# Patient Record
Sex: FEMALE | Race: WHITE | NOT HISPANIC OR LATINO | Employment: OTHER | URBAN - METROPOLITAN AREA
[De-identification: names, ages, dates, MRNs, and addresses within clinical notes are randomized per-mention and may not be internally consistent; named-entity substitution may affect disease eponyms.]

---

## 2017-06-26 ENCOUNTER — HOSPITAL ENCOUNTER (EMERGENCY)
Facility: HOSPITAL | Age: 32
Discharge: PSYCHIATRIC OR SUBSTANCE USE FACILITY | End: 2017-06-27
Attending: SURGERY
Payer: MEDICAID

## 2017-06-26 VITALS
SYSTOLIC BLOOD PRESSURE: 128 MMHG | RESPIRATION RATE: 16 BRPM | DIASTOLIC BLOOD PRESSURE: 71 MMHG | WEIGHT: 220 LBS | BODY MASS INDEX: 38.97 KG/M2 | HEART RATE: 89 BPM | TEMPERATURE: 98 F

## 2017-06-26 DIAGNOSIS — Z00.8 ENCOUNTER FOR PSYCHOLOGICAL EVALUATION: ICD-10-CM

## 2017-06-26 DIAGNOSIS — R45.851 DEPRESSION WITH SUICIDAL IDEATION: Primary | ICD-10-CM

## 2017-06-26 DIAGNOSIS — F32.A DEPRESSION WITH SUICIDAL IDEATION: Primary | ICD-10-CM

## 2017-06-26 LAB
ALBUMIN SERPL BCP-MCNC: 4.2 G/DL
ALP SERPL-CCNC: 72 U/L
ALT SERPL W/O P-5'-P-CCNC: 19 U/L
AMPHET+METHAMPHET UR QL: NEGATIVE
ANION GAP SERPL CALC-SCNC: 8 MMOL/L
APAP SERPL-MCNC: <3 UG/ML
AST SERPL-CCNC: 15 U/L
B-HCG UR QL: NEGATIVE
BARBITURATES UR QL SCN>200 NG/ML: NEGATIVE
BASOPHILS # BLD AUTO: 0.03 K/UL
BASOPHILS NFR BLD: 0.3 %
BENZODIAZ UR QL SCN>200 NG/ML: NEGATIVE
BILIRUB SERPL-MCNC: 0.4 MG/DL
BILIRUB UR QL STRIP: NEGATIVE
BUN SERPL-MCNC: 10 MG/DL
BZE UR QL SCN: NEGATIVE
CALCIUM SERPL-MCNC: 9.4 MG/DL
CANNABINOIDS UR QL SCN: NEGATIVE
CHLORIDE SERPL-SCNC: 107 MMOL/L
CLARITY UR: CLEAR
CO2 SERPL-SCNC: 26 MMOL/L
COLOR UR: YELLOW
CREAT SERPL-MCNC: 0.9 MG/DL
CREAT UR-MCNC: 186.8 MG/DL
DIFFERENTIAL METHOD: NORMAL
EOSINOPHIL # BLD AUTO: 0.1 K/UL
EOSINOPHIL NFR BLD: 1.4 %
ERYTHROCYTE [DISTWIDTH] IN BLOOD BY AUTOMATED COUNT: 13 %
EST. GFR  (AFRICAN AMERICAN): >60 ML/MIN/1.73 M^2
EST. GFR  (NON AFRICAN AMERICAN): >60 ML/MIN/1.73 M^2
ETHANOL SERPL-MCNC: <10 MG/DL
GLUCOSE SERPL-MCNC: 88 MG/DL
GLUCOSE UR QL STRIP: NEGATIVE
HCT VFR BLD AUTO: 43.5 %
HGB BLD-MCNC: 14.6 G/DL
HGB UR QL STRIP: NEGATIVE
KETONES UR QL STRIP: NEGATIVE
LEUKOCYTE ESTERASE UR QL STRIP: NEGATIVE
LYMPHOCYTES # BLD AUTO: 2.9 K/UL
LYMPHOCYTES NFR BLD: 31.3 %
MCH RBC QN AUTO: 29.7 PG
MCHC RBC AUTO-ENTMCNC: 33.6 %
MCV RBC AUTO: 88 FL
METHADONE UR QL SCN>300 NG/ML: NEGATIVE
MONOCYTES # BLD AUTO: 0.6 K/UL
MONOCYTES NFR BLD: 6.9 %
NEUTROPHILS # BLD AUTO: 5.6 K/UL
NEUTROPHILS NFR BLD: 60.1 %
NITRITE UR QL STRIP: NEGATIVE
OPIATES UR QL SCN: NEGATIVE
PCP UR QL SCN>25 NG/ML: NEGATIVE
PH UR STRIP: 8 [PH] (ref 5–8)
PLATELET # BLD AUTO: 254 K/UL
PMV BLD AUTO: 12.4 FL
POTASSIUM SERPL-SCNC: 4.2 MMOL/L
PROT SERPL-MCNC: 7.7 G/DL
PROT UR QL STRIP: ABNORMAL
RBC # BLD AUTO: 4.92 M/UL
SALICYLATES SERPL-MCNC: <5 MG/DL
SODIUM SERPL-SCNC: 141 MMOL/L
SP GR UR STRIP: 1.02 (ref 1–1.03)
T4 FREE SERPL-MCNC: 0.96 NG/DL
TOXICOLOGY INFORMATION: NORMAL
TSH SERPL DL<=0.005 MIU/L-ACNC: 2.36 UIU/ML
URN SPEC COLLECT METH UR: ABNORMAL
UROBILINOGEN UR STRIP-ACNC: 1 EU/DL
WBC # BLD AUTO: 9.28 K/UL

## 2017-06-26 PROCEDURE — 99285 EMERGENCY DEPT VISIT HI MDM: CPT | Mod: 25

## 2017-06-26 PROCEDURE — 80307 DRUG TEST PRSMV CHEM ANLYZR: CPT

## 2017-06-26 PROCEDURE — 81025 URINE PREGNANCY TEST: CPT

## 2017-06-26 PROCEDURE — 84443 ASSAY THYROID STIM HORMONE: CPT

## 2017-06-26 PROCEDURE — 82746 ASSAY OF FOLIC ACID SERUM: CPT

## 2017-06-26 PROCEDURE — 80320 DRUG SCREEN QUANTALCOHOLS: CPT

## 2017-06-26 PROCEDURE — 81003 URINALYSIS AUTO W/O SCOPE: CPT

## 2017-06-26 PROCEDURE — 82306 VITAMIN D 25 HYDROXY: CPT

## 2017-06-26 PROCEDURE — 85025 COMPLETE CBC W/AUTO DIFF WBC: CPT

## 2017-06-26 PROCEDURE — 82607 VITAMIN B-12: CPT

## 2017-06-26 PROCEDURE — 80053 COMPREHEN METABOLIC PANEL: CPT

## 2017-06-26 PROCEDURE — 84439 ASSAY OF FREE THYROXINE: CPT

## 2017-06-26 PROCEDURE — 80329 ANALGESICS NON-OPIOID 1 OR 2: CPT

## 2017-06-26 PROCEDURE — 36415 COLL VENOUS BLD VENIPUNCTURE: CPT

## 2017-06-26 PROCEDURE — 84481 FREE ASSAY (FT-3): CPT

## 2017-06-26 RX ORDER — DIPHENHYDRAMINE HYDROCHLORIDE 50 MG/ML
50 INJECTION INTRAMUSCULAR; INTRAVENOUS EVERY 4 HOURS PRN
Status: DISCONTINUED | OUTPATIENT
Start: 2017-06-26 | End: 2017-06-27 | Stop reason: HOSPADM

## 2017-06-26 RX ORDER — HALOPERIDOL 5 MG/ML
5 INJECTION INTRAMUSCULAR EVERY 4 HOURS PRN
Status: DISCONTINUED | OUTPATIENT
Start: 2017-06-26 | End: 2017-06-27 | Stop reason: HOSPADM

## 2017-06-26 RX ORDER — LORAZEPAM 2 MG/ML
2 INJECTION INTRAMUSCULAR EVERY 4 HOURS PRN
Status: DISCONTINUED | OUTPATIENT
Start: 2017-06-26 | End: 2017-06-27 | Stop reason: HOSPADM

## 2017-06-27 LAB
25(OH)D3+25(OH)D2 SERPL-MCNC: 38 NG/ML
FOLATE SERPL-MCNC: 13.9 NG/ML
T3FREE SERPL-MCNC: 2.5 PG/ML
VIT B12 SERPL-MCNC: 1059 PG/ML

## 2017-06-27 PROCEDURE — 93005 ELECTROCARDIOGRAM TRACING: CPT

## 2017-06-27 PROCEDURE — 93010 ELECTROCARDIOGRAM REPORT: CPT | Mod: ,,, | Performed by: INTERNAL MEDICINE

## 2017-06-27 NOTE — ED NOTES
Call received from [place],  with [facility], states transfer will be delayed till around 0500, r/t no truck available in area.

## 2017-06-27 NOTE — ED NOTES
Hourly Rounding Complete. Pt resting quietly eyes closed respirations even and unlabored. Bed locked and low. Sitter at bedside for continuous visual monitoring. Will continue to monitor. Pt packet faxed out to psychiatric facilities for placement.

## 2017-06-27 NOTE — ED PROVIDER NOTES
[facility]                                        2017                   Chief Complaint  31 y.o. female with Psychiatric Evaluation    History of Present Illness  Maria Yancey presents to the emergency room with suicidal ideation and depression  Patient states she's have had a long history of depression, off medications this past week  Patient states that she has bipolar, however is not manic on ER evaluation this evening  Patient states that she had a recent psychiatric hospitalization in 2016 for SI  Patient states she no longer wants to live but expresses no clear suicidal plan on interview   Patient is not paranoid or psychotic, not hallucinating or delusional on ER interview today    The history is provided by the patient    Past Medical History   -- ADHD (attention deficit hyperactivity disorder)    -- Asthma    -- Bipolar 1 disorder    -- Cervical pain    -- Fibromyalgia    -- Spina bifida    -- Spinal stenosis of lumbar region    -- Syringomyelia    -- Syrinx of spinal cord    -- Trigger finger      Past Surgical History   -- CARPAL TUNNEL RELEASE     --  SECTION, CLASSIC     -- sciatica     -- spinal bifida     -- spinalsonois        No Known Allergies     Review of Systems and Physical Exam     Review of Systems  -- Constitution - no fever, denies fatigue, no weakness, no chills  -- Eyes - no tearing or redness, no visual disturbance  -- Ear, Nose - no tinnitus or earache, no nasal congestion or discharge  -- Mouth,Throat - no sore throat, no toothache, normal voice, normal swallowing  -- Respiratory - denies cough and congestion, no shortness of breath, no SILVEIRA  -- Cardiovascular - denies chest pain, no palpitations, denies claudication  -- Gastrointestinal - denies abdominal pain, nausea, vomiting, or diarrhea  -- Musculoskeletal - denies back pain, negative for myalgias and arthralgias   -- Neurological - no headache, denies weakness or seizure; no  LOC  -- Psychiatric - suicidal ideation, no psychosis or fractured thought noted     Vital Signs  -- Her blood pressure is 128/71 and her pulse is 89. Her respiration is 16.      Physical Exam  -- Nursing note and vitals reviewed  -- Constitutional: Appears well-developed and well-nourished  -- Head: Atraumatic. Normocephalic. No obvious abnormality  -- Eyes: Pupils are equal and reactive to light. Normal conjunctiva and lids  -- Cardiac: Normal rate, regular rhythm and normal heart sounds  -- Pulmonary: Normal respiratory effort, breath sounds clear to auscultation  -- Abdominal: Soft, no tenderness. Normal bowel sounds. Normal liver edge  -- Musculoskeletal: Normal range of motion, no effusions. Joints stable   -- Neurological: No focal deficits. Showed good interaction with staff    Emergency Room Course     Diagnosis  -- The encounter diagnosis was Depression with suicidal ideation.    Disposition and Plan  -- Disposition: PEC  -- Condition: stable  -- Pt will be placed in a psychiatric facility  -- The patient is a direct observation until placement  -- The patient has been made aware of his or her rights while under PEC in the ER  -- All questions have been answered; will follow ER protocols until placement    Lab work was performed in the ER, this patient is cleared for psychiatric placement     This note is dictated on Dragon Natural Speaking word recognition program.  There are word recognition mistakes that are occasionally missed on review.           Tobi Ruvalcaba MD  06/26/17 [id number]

## 2017-06-27 NOTE — ED NOTES
Spoke with Darcie from [facility] regarding a few questions about pt's medicaol hx. States she will contact MD about accepting pt and call me back.

## 2017-06-27 NOTE — ED NOTES
Received return call from aDrcie at [facility], pt accepted by Dr Bowers. Will call report and arrange transfer.

## 2017-06-27 NOTE — ED TRIAGE NOTES
Pt reports depression, off of psych meds x1 month, SI w/ plan of shoot self or jump in front of car.

## 2018-09-08 PROCEDURE — 80061 LIPID PANEL: CPT

## 2018-09-08 PROCEDURE — 83036 HEMOGLOBIN GLYCOSYLATED A1C: CPT

## 2018-09-09 ENCOUNTER — HOSPITAL ENCOUNTER (INPATIENT)
Facility: HOSPITAL | Age: 33
LOS: 6 days | Discharge: HOME | DRG: 885 | End: 2018-09-15
Attending: PSYCHIATRY & NEUROLOGY | Admitting: PSYCHIATRY & NEUROLOGY
Payer: MEDICAID

## 2018-09-09 DIAGNOSIS — R45.851 DEPRESSION WITH SUICIDAL IDEATION: Primary | ICD-10-CM

## 2018-09-09 DIAGNOSIS — F19.20 POLYSUBSTANCE DEPENDENCE: ICD-10-CM

## 2018-09-09 DIAGNOSIS — M79.7 FIBROMYALGIA: ICD-10-CM

## 2018-09-09 DIAGNOSIS — F32.A DEPRESSION WITH SUICIDAL IDEATION: Primary | ICD-10-CM

## 2018-09-09 DIAGNOSIS — F25.1 SCHIZOAFFECTIVE DISORDER, DEPRESSIVE TYPE: ICD-10-CM

## 2018-09-09 DIAGNOSIS — M25.572 LEFT ANKLE PAIN: ICD-10-CM

## 2018-09-09 DIAGNOSIS — F43.10 PTSD (POST-TRAUMATIC STRESS DISORDER): ICD-10-CM

## 2018-09-09 DIAGNOSIS — F41.9 ANXIETY: ICD-10-CM

## 2018-09-09 PROCEDURE — 11400000 HC PSYCH PRIVATE ROOM

## 2018-09-09 PROCEDURE — 25000003 PHARM REV CODE 250: Performed by: PSYCHIATRY & NEUROLOGY

## 2018-09-09 RX ORDER — IBUPROFEN 200 MG
1 TABLET ORAL DAILY PRN
Status: DISCONTINUED | OUTPATIENT
Start: 2018-09-09 | End: 2018-09-15 | Stop reason: HOSPADM

## 2018-09-09 RX ORDER — MAG HYDROX/ALUMINUM HYD/SIMETH 200-200-20
30 SUSPENSION, ORAL (FINAL DOSE FORM) ORAL EVERY 6 HOURS PRN
Status: DISCONTINUED | OUTPATIENT
Start: 2018-09-09 | End: 2018-09-15 | Stop reason: HOSPADM

## 2018-09-09 RX ORDER — HYDROXYZINE PAMOATE 50 MG/1
50 CAPSULE ORAL NIGHTLY PRN
Status: DISCONTINUED | OUTPATIENT
Start: 2018-09-09 | End: 2018-09-15 | Stop reason: HOSPADM

## 2018-09-09 RX ORDER — OLANZAPINE 10 MG/1
10 TABLET ORAL EVERY 8 HOURS PRN
Status: DISCONTINUED | OUTPATIENT
Start: 2018-09-09 | End: 2018-09-15 | Stop reason: HOSPADM

## 2018-09-09 RX ORDER — FOLIC ACID 1 MG/1
1 TABLET ORAL DAILY
Status: DISCONTINUED | OUTPATIENT
Start: 2018-09-09 | End: 2018-09-15 | Stop reason: HOSPADM

## 2018-09-09 RX ORDER — ACETAMINOPHEN 325 MG/1
650 TABLET ORAL EVERY 6 HOURS PRN
Status: DISCONTINUED | OUTPATIENT
Start: 2018-09-09 | End: 2018-09-15 | Stop reason: HOSPADM

## 2018-09-09 RX ORDER — LOPERAMIDE HYDROCHLORIDE 2 MG/1
2 CAPSULE ORAL
Status: DISCONTINUED | OUTPATIENT
Start: 2018-09-09 | End: 2018-09-15 | Stop reason: HOSPADM

## 2018-09-09 RX ORDER — DOCUSATE SODIUM 100 MG/1
100 CAPSULE, LIQUID FILLED ORAL DAILY PRN
Status: DISCONTINUED | OUTPATIENT
Start: 2018-09-09 | End: 2018-09-15 | Stop reason: HOSPADM

## 2018-09-09 RX ORDER — OLANZAPINE 10 MG/2ML
10 INJECTION, POWDER, FOR SOLUTION INTRAMUSCULAR EVERY 8 HOURS PRN
Status: DISCONTINUED | OUTPATIENT
Start: 2018-09-09 | End: 2018-09-15 | Stop reason: HOSPADM

## 2018-09-09 RX ADMIN — FOLIC ACID 1 MG: 1 TABLET ORAL at 02:09

## 2018-09-09 RX ADMIN — HYDROXYZINE PAMOATE 50 MG: 50 CAPSULE ORAL at 08:09

## 2018-09-09 RX ADMIN — THERA TABS 1 TABLET: TAB at 02:09
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
## 2018-09-09 NOTE — PLAN OF CARE
"Problem: Overarching Goals (Adult)  Goal: Develops/Participates in Therapeutic [place] to Support Successful Transition    Intervention: Foster Therapeutic [place]  Group Note:    Behavior:  Patient attended group therapy, appeared depressed,  was tearful at times, and observed slowly rocking back and forth intermittently in her seat.            Intervention:  The SW implemented a motivational interviewing based group therapy session with materials and handouts prescribed in the Group Treatment for Substance Abuse, second edition, A Djcupw-xk-Yttizl Therapy Manual. The materials used were from Pre contemplation/Contemplation/Preparation Session Eleven discussing gratitude.              Response:  Patient verbalized she is grateful to "wake up every day". States she is particularly grateful for her "mom" and will "buy her a flower or cup of coffee at the store to show her appreciation". Patient disclosed she was previously "homeless for 2 years" and is grateful for the roof over her head. She also verbalized she enjoys "the beach" or "going for a walk" and reflecting on the things she is grateful for.           Plan:  Encourage ongoing participation in therapeutic activities.                         "

## 2018-09-10 PROBLEM — F43.10 PTSD (POST-TRAUMATIC STRESS DISORDER): Status: ACTIVE | Noted: 2018-09-10

## 2018-09-10 PROBLEM — F25.1 SCHIZOAFFECTIVE DISORDER, DEPRESSIVE TYPE: Status: ACTIVE | Noted: 2018-09-10

## 2018-09-10 PROBLEM — F19.20 POLYSUBSTANCE DEPENDENCE: Status: ACTIVE | Noted: 2018-09-10

## 2018-09-10 LAB
CHOLEST SERPL-MCNC: 169 MG/DL
CHOLEST/HDLC SERPL: 3.2 {RATIO}
ESTIMATED AVG GLUCOSE: 91 MG/DL
HBA1C MFR BLD HPLC: 4.8 %
HDLC SERPL-MCNC: 53 MG/DL
HDLC SERPL: 31.4 %
LDLC SERPL CALC-MCNC: 103.8 MG/DL
NONHDLC SERPL-MCNC: 116 MG/DL
TRIGL SERPL-MCNC: 61 MG/DL

## 2018-09-10 PROCEDURE — S4991 NICOTINE PATCH NONLEGEND: HCPCS | Performed by: PSYCHIATRY & NEUROLOGY

## 2018-09-10 PROCEDURE — 25000003 PHARM REV CODE 250: Performed by: PSYCHIATRY & NEUROLOGY

## 2018-09-10 PROCEDURE — 99233 SBSQ HOSP IP/OBS HIGH 50: CPT | Mod: AF,HB,, | Performed by: PSYCHIATRY & NEUROLOGY

## 2018-09-10 PROCEDURE — 11400000 HC PSYCH PRIVATE ROOM

## 2018-09-10 PROCEDURE — 99231 SBSQ HOSP IP/OBS SF/LOW 25: CPT | Mod: ,,, | Performed by: NURSE PRACTITIONER

## 2018-09-10 PROCEDURE — 90833 PSYTX W PT W E/M 30 MIN: CPT | Mod: AF,HB,, | Performed by: PSYCHIATRY & NEUROLOGY

## 2018-09-10 PROCEDURE — 36415 COLL VENOUS BLD VENIPUNCTURE: CPT

## 2018-09-10 RX ORDER — DIVALPROEX SODIUM 500 MG/1
500 TABLET, FILM COATED, EXTENDED RELEASE ORAL NIGHTLY
Status: DISCONTINUED | OUTPATIENT
Start: 2018-09-10 | End: 2018-09-14

## 2018-09-10 RX ORDER — VENLAFAXINE HYDROCHLORIDE 37.5 MG/1
37.5 CAPSULE, EXTENDED RELEASE ORAL DAILY
Status: DISCONTINUED | OUTPATIENT
Start: 2018-09-10 | End: 2018-09-11

## 2018-09-10 RX ORDER — QUETIAPINE FUMARATE 25 MG/1
50 TABLET, FILM COATED ORAL NIGHTLY
Status: DISCONTINUED | OUTPATIENT
Start: 2018-09-10 | End: 2018-09-11

## 2018-09-10 RX ADMIN — NICOTINE 1 PATCH: 14 PATCH, EXTENDED RELEASE TRANSDERMAL at 10:09

## 2018-09-10 RX ADMIN — VENLAFAXINE HYDROCHLORIDE 37.5 MG: 37.5 CAPSULE, EXTENDED RELEASE ORAL at 10:09

## 2018-09-10 RX ADMIN — THERA TABS 1 TABLET: TAB at 08:09

## 2018-09-10 RX ADMIN — DIVALPROEX SODIUM 500 MG: 500 TABLET, EXTENDED RELEASE ORAL at 08:09

## 2018-09-10 RX ADMIN — QUETIAPINE FUMARATE 50 MG: 25 TABLET ORAL at 08:09

## 2018-09-10 RX ADMIN — FOLIC ACID 1 MG: 1 TABLET ORAL at 08:09

## 2018-09-10 NOTE — PLAN OF CARE
Problem: Patient Care Overview (Adult)  Goal: Plan of Care Review  Outcome: Ongoing (interventions implemented as appropriate)  Pt.  Slept  8.5 Hours so far  this shift without problems noted. q 15 min safety checks done this shift. Safety and comfort maintained. Cont. Plan.

## 2018-09-10 NOTE — PROGRESS NOTES
"   09/10/18 1040   [facility] Group Therapy   Group Name Leisure Skills Training   Specific Interventions Cognitive Stimulation Training   Participation Level None   Patient did not attend group due to "I'm not feeling good." Patient remained in bed in her assigned room. Staff will continue to monitor and document progress made toward POC.  "

## 2018-09-10 NOTE — PLAN OF CARE
Problem: Patient Care Overview (Adult)  Goal: Plan of Care Review  Outcome: Ongoing (interventions implemented as appropriate)  Pt is withdrawn and depressed.  Staying in room.  Pt compliant with meds.  Cooperative with instructions.  Encouraged pt to come out of her room more and attend groups as she begins to feel better and not as paranoid.  Pt verbalized understanding, will continue to monitor.

## 2018-09-10 NOTE — PLAN OF CARE
Problem: Patient Care Overview (Adult)  Goal: Individualization & Mutuality  Outcome: Ongoing (interventions implemented as appropriate)  Paranoid/delusional thinking will improve from taking meds as ordered.  Depression will also improve through med compliance and attending groups to learn coping skills.

## 2018-09-10 NOTE — PLAN OF CARE
Problem: Patient Care Overview (Adult)  Goal: Plan of Care Review  Outcome: Ongoing (interventions implemented as appropriate)  Up and about the unit.  Showered this evening.  Talking appropriately with peers.  Received hydroxyzine pamoate 50 mg po for c/o insomnia this evening..  Pt is paranoid and thinks people are trying to kill her and her kids.  Also thinks the radio is sending her messages.  All precautions maintained.
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
## 2018-09-10 NOTE — HPI
32 yo female patient presented to ER with c/o suicidal thoughts. Admitted to [facility] and medicine consulted for H/P. VSS/afebrile. Reviewed labs

## 2018-09-10 NOTE — PLAN OF CARE
Problem: Overarching Goals (Adult)  Goal: Optimized Coping Skills in Response to Life Stressors    Intervention: Promote Effective Coping Strategies  Group Note    Behavior:  Patient attended Psychotherapy Group and participated. Patient was calm and cooperative.       Intervention:  Temptation and Confidence - Counselor utilized material from Group Treatment for Substance Abuse, second edition, A [facility] Therapy Manual. The materials used were from Pre contemplation/Contemplation/Preparation Session 14 (pp. 146-152). Patients identified the situations where they are most tempted (to uses substances or resort to unhealthy behaviors) and then assessed their confidence to refrain from using in similar situations.         Response:  Patient states her most tempting times are when she is physically tired, angry or experiencing pain. Patient states she is somewhat confident that she can manage temptations using here higher power. Encouraged patient to practice regular self reflection and relying on a good support system.         Plan:
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
## 2018-09-10 NOTE — CONSULTS
[facility] Hospital Medicine  Consult Note    Patient Name: Maria Yancey  MRN: [id number]  Admission Date: 2018  Hospital Length of Stay: 1 days  Attending Physician: Julio Almazan MD   Primary Care Provider: Primary Doctor No           Patient information was obtained from patient and ER records.     Inpatient consult to [facility] for History and Physical  Consult performed by: Belgica Kumar NP  Consult ordered by: Salomón Nettles MD        Subjective:     Principal Problem: Schizoaffective disorder, depressive type    Chief Complaint: No chief complaint on file.       HPI: 34 yo female patient presented to ER with c/o suicidal thoughts. Admitted to U and medicine consulted for H/P. VSS/afebrile. Reviewed labs    Past Medical History:   Diagnosis Date    Addiction to drug     ADHD (attention deficit hyperactivity disorder)     Anxiety     Asthma     Bipolar 1 disorder     Cervical pain     Depression     Fibromyalgia     Hallucination     History of psychiatric hospitalization     Hx of psychiatric care     Magalis     Psychiatric exam requested by authority     Psychiatric problem     Schizoaffective disorder     Spina bifida     Spinal stenosis of lumbar region     Substance abuse     Suicide attempt     Syringomyelia     Syrinx of spinal cord     Therapy     Trigger finger        Past Surgical History:   Procedure Laterality Date    CARPAL TUNNEL RELEASE       SECTION, CLASSIC      siactica      spinal bifida      spinalsonois         Review of patient's allergies indicates:  No Known Allergies    Current Facility-Administered Medications on File Prior to Encounter   Medication    [DISCONTINUED] diphenhydrAMINE injection 50 mg    [DISCONTINUED] haloperidol lactate injection 5 mg    [DISCONTINUED] lorazepam injection 2 mg    [COMPLETED] nicotine 21 mg/24 hr 1 patch     Current Outpatient Medications on File Prior to Encounter    Medication Sig    [DISCONTINUED] lisdexamfetamine (VYVANSE) 30 MG capsule Take 1 capsule (30 mg total) by mouth every morning.     Family History     Problem Relation (Age of Onset)    Arthritis Father    Diabetes Mother, Father    Hyperlipidemia Mother, Father    Hypertension Mother, Father        Tobacco Use    Smoking status: Current Every Day Smoker     Packs/day: 0.50     Years: 15.00     Pack years: 7.50     Types: Cigarettes    Smokeless tobacco: Never Used    Tobacco comment: smoking cessation handout provided and reviewed with pt   Substance and Sexual Activity    Alcohol use: Yes     Alcohol/week: 0.0 oz     Comment: socially 1 glass of wine    Drug use: Yes     Types: Amphetamines     Comment: states last use was 3 days ago    Sexual activity: Not Currently     Partners: Male     Review of Systems   Constitutional: Negative for chills, fatigue, fever and unexpected weight change.   HENT: Negative for congestion, ear pain, sore throat and trouble swallowing.    Eyes: Negative for pain and visual disturbance.   Respiratory: Negative for cough, chest tightness and shortness of breath.    Cardiovascular: Negative for chest pain, palpitations and leg swelling.   Gastrointestinal: Negative for abdominal distention, abdominal pain, constipation, diarrhea and vomiting.   Genitourinary: Negative for difficulty urinating, dysuria, flank pain, frequency and hematuria.   Musculoskeletal: Negative for back pain, gait problem, joint swelling, neck pain and neck stiffness.   Skin: Negative for rash and wound.   Neurological: Negative for dizziness, seizures, speech difficulty, light-headedness and headaches.     Objective:     Vital Signs (Most Recent):  Temp: 96.3 °F (35.7 °C) (09/10/18 0900)  Pulse: 74 (09/10/18 0900)  Resp: 18 (09/10/18 0900)  BP: 113/65 (09/10/18 0900) Vital Signs (24h Range):  Temp:  [96.3 °F (35.7 °C)-98.8 °F (37.1 °C)] 96.3 °F (35.7 °C)  Pulse:  [74-88] 74  Resp:  [18] 18  BP:  (109-119)/(55-78) 113/65     Weight: 85.7 kg (189 lb)  Body mass index is 33.48 kg/m².    Physical Exam   Constitutional: She is oriented to person, place, and time. She appears well-developed and well-nourished.   HENT:   Head: Normocephalic and atraumatic.   Neck: No thyromegaly present.   Cardiovascular: Normal rate, regular rhythm, normal heart sounds and intact distal pulses.   No murmur heard.  Pulmonary/Chest: Effort normal and breath sounds normal. No respiratory distress. She has no wheezes. She has no rales.   Abdominal: Soft. Bowel sounds are normal. She exhibits no distension and no mass. There is no tenderness.   Musculoskeletal: Normal range of motion. She exhibits no edema.   Lymphadenopathy:     She has no cervical adenopathy.   Neurological: She is alert and oriented to person, place, and time.     Neuro: Cranial nerves:  CN II Visual fields full to confrontation.   CN III, IV, VI Pupils are equal, round, and reactive to light.  CN III: no palsy  Nystagmus: none   Diplopia: none  Ophthalmoparesis: none  CN V Facial sensation intact.   CN VII Facial expression full, symmetric.   CN VIII normal.   CN IX normal.   CN X normal.   CN XI normal.   CN XII normal.         Skin: Skin is warm and dry. No erythema.   Vitals reviewed.      Significant Labs:   UPT negative  U/A  Results for orders placed or performed during the hospital encounter of 06/26/17   Urinalysis   Result Value Ref Range    Specimen UA Urine, Clean Catch     Color, UA Yellow Yellow, Straw, Jess    Appearance, UA Clear Clear    pH, UA 8.0 5.0 - 8.0    Specific Gravity, UA 1.020 1.005 - 1.030    Protein, UA Trace (A) Negative    Glucose, UA Negative Negative    Ketones, UA Negative Negative    Bilirubin (UA) Negative Negative    Occult Blood UA Negative Negative    Nitrite, UA Negative Negative    Urobilinogen, UA 1.0 <2.0 EU/dL    Leukocytes, UA Negative Negative     UDS  Results for orders placed or performed during the hospital  encounter of 09/08/18   Drug screen panel, emergency   Result Value Ref Range    Benzodiazepines Negative     Methadone metabolites Negative     Cocaine (Metab.) Negative     Opiate Scrn, Ur Negative     Barbiturate Screen, Ur Negative     Amphetamine Screen, Ur Presumptive Positive     THC Negative     Phencyclidine Negative     Creatinine, Random Ur 315.4 15.0 - 325.0 mg/dL    Toxicology Information SEE COMMENT      CBC  Results for orders placed or performed during the hospital encounter of 09/08/18   CBC auto differential   Result Value Ref Range    WBC 8.86 3.90 - 12.70 K/uL    RBC 4.71 4.00 - 5.40 M/uL    Hemoglobin 13.6 12.0 - 16.0 g/dL    Hematocrit 40.2 37.0 - 48.5 %    MCV 85 82 - 98 fL    MCH 28.9 27.0 - 31.0 pg    MCHC 33.8 32.0 - 36.0 g/dL    RDW 13.0 11.5 - 14.5 %    Platelets 330 150 - 350 K/uL    MPV 12.3 9.2 - 12.9 fL    Gran # (ANC) 5.5 1.8 - 7.7 K/uL    Lymph # 2.5 1.0 - 4.8 K/uL    Mono # 0.8 0.3 - 1.0 K/uL    Eos # 0.0 0.0 - 0.5 K/uL    Baso # 0.03 0.00 - 0.20 K/uL    Gran% 61.6 38.0 - 73.0 %    Lymph% 28.7 18.0 - 48.0 %    Mono% 8.9 4.0 - 15.0 %    Eosinophil% 0.5 0.0 - 8.0 %    Basophil% 0.3 0.0 - 1.9 %    Differential Method Automated      CMP  Results for orders placed or performed during the hospital encounter of 09/08/18   Comprehensive metabolic panel   Result Value Ref Range    Sodium 142 136 - 145 mmol/L    Potassium 3.7 3.5 - 5.1 mmol/L    Chloride 110 95 - 110 mmol/L    CO2 20 (L) 23 - 29 mmol/L    Glucose 120 (H) 70 - 110 mg/dL    BUN, Bld 13 6 - 20 mg/dL    Creatinine 0.9 0.5 - 1.4 mg/dL    Calcium 9.9 8.7 - 10.5 mg/dL    Total Protein 8.2 6.0 - 8.4 g/dL    Albumin 4.5 3.5 - 5.2 g/dL    Total Bilirubin 1.0 0.1 - 1.0 mg/dL    Alkaline Phosphatase 85 55 - 135 U/L    AST 14 10 - 40 U/L    ALT 14 10 - 44 U/L    Anion Gap 12 8 - 16 mmol/L    eGFR if African American >60 >60 mL/min/1.73 m^2    eGFR if non African American >60 >60 mL/min/1.73 m^2     TSH  Results for orders placed or  performed during the hospital encounter of 09/08/18   TSH   Result Value Ref Range    TSH 1.085 0.400 - 4.000 uIU/mL     ETOH  Results for orders placed or performed during the hospital encounter of 09/08/18   Ethanol   Result Value Ref Range    Alcohol, Medical, Serum <10 <10 mg/dL     Salicylate  Results for orders placed or performed during the hospital encounter of 09/08/18   Salicylate level   Result Value Ref Range    Salicylate Lvl <5.0 (L) 15.0 - 30.0 mg/dL     Acetaminophen  Results for orders placed or performed during the hospital encounter of 09/08/18   Acetaminophen level   Result Value Ref Range    Acetaminophen (Tylenol), Serum <3.0 (L) 10.0 - 20.0 ug/mL             Assessment/Plan:     * Schizoaffective disorder, depressive type              Polysubstance dependence              PTSD (post-traumatic stress disorder)              Depression with suicidal ideation    Further orders per psych          Anxiety              Fibromyalgia                VTE Risk Mitigation (From admission, onward)    None              Thank you for your consult. I will sign off. Please contact us if you have any additional questions.    Belgica Kumar NP  Department of Hospital Medicine   [facility]

## 2018-09-10 NOTE — SUBJECTIVE & OBJECTIVE
Past Medical History:   Diagnosis Date    Addiction to drug     ADHD (attention deficit hyperactivity disorder)     Anxiety     Asthma     Bipolar 1 disorder     Cervical pain     Depression     Fibromyalgia     Hallucination     History of psychiatric hospitalization     Hx of psychiatric care     Magalis     Psychiatric exam requested by authority     Psychiatric problem     Schizoaffective disorder     Spina bifida     Spinal stenosis of lumbar region     Substance abuse     Suicide attempt     Syringomyelia     Syrinx of spinal cord     Therapy     Trigger finger        Past Surgical History:   Procedure Laterality Date    CARPAL TUNNEL RELEASE       SECTION, CLASSIC      siactica      spinal bifida      spinalsonois         Review of patient's allergies indicates:  No Known Allergies    Current Facility-Administered Medications on File Prior to Encounter   Medication    [DISCONTINUED] diphenhydrAMINE injection 50 mg    [DISCONTINUED] haloperidol lactate injection 5 mg    [DISCONTINUED] lorazepam injection 2 mg    [COMPLETED] nicotine 21 mg/24 hr 1 patch     Current Outpatient Medications on File Prior to Encounter   Medication Sig    [DISCONTINUED] lisdexamfetamine (VYVANSE) 30 MG capsule Take 1 capsule (30 mg total) by mouth every morning.     Family History     Problem Relation (Age of Onset)    Arthritis Father    Diabetes Mother, Father    Hyperlipidemia Mother, Father    Hypertension Mother, Father        Tobacco Use    Smoking status: Current Every Day Smoker     Packs/day: 0.50     Years: 15.00     Pack years: 7.50     Types: Cigarettes    Smokeless tobacco: Never Used    Tobacco comment: smoking cessation handout provided and reviewed with pt   Substance and Sexual Activity    Alcohol use: Yes     Alcohol/week: 0.0 oz     Comment: socially 1 glass of wine    Drug use: Yes     Types: Amphetamines     Comment: states last use was 3 days ago    Sexual activity:  Not Currently     Partners: Male     Review of Systems   Constitutional: Negative for chills, fatigue, fever and unexpected weight change.   HENT: Negative for congestion, ear pain, sore throat and trouble swallowing.    Eyes: Negative for pain and visual disturbance.   Respiratory: Negative for cough, chest tightness and shortness of breath.    Cardiovascular: Negative for chest pain, palpitations and leg swelling.   Gastrointestinal: Negative for abdominal distention, abdominal pain, constipation, diarrhea and vomiting.   Genitourinary: Negative for difficulty urinating, dysuria, flank pain, frequency and hematuria.   Musculoskeletal: Negative for back pain, gait problem, joint swelling, neck pain and neck stiffness.   Skin: Negative for rash and wound.   Neurological: Negative for dizziness, seizures, speech difficulty, light-headedness and headaches.     Objective:     Vital Signs (Most Recent):  Temp: 96.3 °F (35.7 °C) (09/10/18 0900)  Pulse: 74 (09/10/18 0900)  Resp: 18 (09/10/18 0900)  BP: 113/65 (09/10/18 0900) Vital Signs (24h Range):  Temp:  [96.3 °F (35.7 °C)-98.8 °F (37.1 °C)] 96.3 °F (35.7 °C)  Pulse:  [74-88] 74  Resp:  [18] 18  BP: (109-119)/(55-78) 113/65     Weight: 85.7 kg (189 lb)  Body mass index is 33.48 kg/m².    Physical Exam   Constitutional: She is oriented to person, place, and time. She appears well-developed and well-nourished.   HENT:   Head: Normocephalic and atraumatic.   Neck: No thyromegaly present.   Cardiovascular: Normal rate, regular rhythm, normal heart sounds and intact distal pulses.   No murmur heard.  Pulmonary/Chest: Effort normal and breath sounds normal. No respiratory distress. She has no wheezes. She has no rales.   Abdominal: Soft. Bowel sounds are normal. She exhibits no distension and no mass. There is no tenderness.   Musculoskeletal: Normal range of motion. She exhibits no edema.   Lymphadenopathy:     She has no cervical adenopathy.   Neurological: She is alert  and oriented to person, place, and time.     Neuro: Cranial nerves:  CN II Visual fields full to confrontation.   CN III, IV, VI Pupils are equal, round, and reactive to light.  CN III: no palsy  Nystagmus: none   Diplopia: none  Ophthalmoparesis: none  CN V Facial sensation intact.   CN VII Facial expression full, symmetric.   CN VIII normal.   CN IX normal.   CN X normal.   CN XI normal.   CN XII normal.         Skin: Skin is warm and dry. No erythema.   Vitals reviewed.      Significant Labs:   UPT negative  U/A  Results for orders placed or performed during the hospital encounter of 06/26/17   Urinalysis   Result Value Ref Range    Specimen UA Urine, Clean Catch     Color, UA Yellow Yellow, Straw, Jess    Appearance, UA Clear Clear    pH, UA 8.0 5.0 - 8.0    Specific Gravity, UA 1.020 1.005 - 1.030    Protein, UA Trace (A) Negative    Glucose, UA Negative Negative    Ketones, UA Negative Negative    Bilirubin (UA) Negative Negative    Occult Blood UA Negative Negative    Nitrite, UA Negative Negative    Urobilinogen, UA 1.0 <2.0 EU/dL    Leukocytes, UA Negative Negative     UDS  Results for orders placed or performed during the hospital encounter of 09/08/18   Drug screen panel, emergency   Result Value Ref Range    Benzodiazepines Negative     Methadone metabolites Negative     Cocaine (Metab.) Negative     Opiate Scrn, Ur Negative     Barbiturate Screen, Ur Negative     Amphetamine Screen, Ur Presumptive Positive     THC Negative     Phencyclidine Negative     Creatinine, Random Ur 315.4 15.0 - 325.0 mg/dL    Toxicology Information SEE COMMENT      CBC  Results for orders placed or performed during the hospital encounter of 09/08/18   CBC auto differential   Result Value Ref Range    WBC 8.86 3.90 - 12.70 K/uL    RBC 4.71 4.00 - 5.40 M/uL    Hemoglobin 13.6 12.0 - 16.0 g/dL    Hematocrit 40.2 37.0 - 48.5 %    MCV 85 82 - 98 fL    MCH 28.9 27.0 - 31.0 pg    MCHC 33.8 32.0 - 36.0 g/dL    RDW 13.0 11.5 - 14.5 %     Platelets 330 150 - 350 K/uL    MPV 12.3 9.2 - 12.9 fL    Gran # (ANC) 5.5 1.8 - 7.7 K/uL    Lymph # 2.5 1.0 - 4.8 K/uL    Mono # 0.8 0.3 - 1.0 K/uL    Eos # 0.0 0.0 - 0.5 K/uL    Baso # 0.03 0.00 - 0.20 K/uL    Gran% 61.6 38.0 - 73.0 %    Lymph% 28.7 18.0 - 48.0 %    Mono% 8.9 4.0 - 15.0 %    Eosinophil% 0.5 0.0 - 8.0 %    Basophil% 0.3 0.0 - 1.9 %    Differential Method Automated      CMP  Results for orders placed or performed during the hospital encounter of 09/08/18   Comprehensive metabolic panel   Result Value Ref Range    Sodium 142 136 - 145 mmol/L    Potassium 3.7 3.5 - 5.1 mmol/L    Chloride 110 95 - 110 mmol/L    CO2 20 (L) 23 - 29 mmol/L    Glucose 120 (H) 70 - 110 mg/dL    BUN, Bld 13 6 - 20 mg/dL    Creatinine 0.9 0.5 - 1.4 mg/dL    Calcium 9.9 8.7 - 10.5 mg/dL    Total Protein 8.2 6.0 - 8.4 g/dL    Albumin 4.5 3.5 - 5.2 g/dL    Total Bilirubin 1.0 0.1 - 1.0 mg/dL    Alkaline Phosphatase 85 55 - 135 U/L    AST 14 10 - 40 U/L    ALT 14 10 - 44 U/L    Anion Gap 12 8 - 16 mmol/L    eGFR if African American >60 >60 mL/min/1.73 m^2    eGFR if non African American >60 >60 mL/min/1.73 m^2     TSH  Results for orders placed or performed during the hospital encounter of 09/08/18   TSH   Result Value Ref Range    TSH 1.085 0.400 - 4.000 uIU/mL     ETOH  Results for orders placed or performed during the hospital encounter of 09/08/18   Ethanol   Result Value Ref Range    Alcohol, Medical, Serum <10 <10 mg/dL     Salicylate  Results for orders placed or performed during the hospital encounter of 09/08/18   Salicylate level   Result Value Ref Range    Salicylate Lvl <5.0 (L) 15.0 - 30.0 mg/dL     Acetaminophen  Results for orders placed or performed during the hospital encounter of 09/08/18   Acetaminophen level   Result Value Ref Range    Acetaminophen (Tylenol), Serum <3.0 (L) 10.0 - 20.0 ug/mL
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
## 2018-09-11 PROCEDURE — 25000003 PHARM REV CODE 250: Performed by: PSYCHIATRY & NEUROLOGY

## 2018-09-11 PROCEDURE — 90833 PSYTX W PT W E/M 30 MIN: CPT | Mod: AF,HB,, | Performed by: PSYCHIATRY & NEUROLOGY

## 2018-09-11 PROCEDURE — 11400000 HC PSYCH PRIVATE ROOM

## 2018-09-11 PROCEDURE — 99233 SBSQ HOSP IP/OBS HIGH 50: CPT | Mod: AF,HB,, | Performed by: PSYCHIATRY & NEUROLOGY

## 2018-09-11 RX ORDER — VENLAFAXINE HYDROCHLORIDE 75 MG/1
75 CAPSULE, EXTENDED RELEASE ORAL DAILY
Status: DISCONTINUED | OUTPATIENT
Start: 2018-09-12 | End: 2018-09-13

## 2018-09-11 RX ORDER — QUETIAPINE FUMARATE 100 MG/1
100 TABLET, FILM COATED ORAL NIGHTLY
Status: DISCONTINUED | OUTPATIENT
Start: 2018-09-11 | End: 2018-09-12

## 2018-09-11 RX ORDER — BUPROPION HYDROCHLORIDE 150 MG/1
150 TABLET ORAL DAILY
Status: DISCONTINUED | OUTPATIENT
Start: 2018-09-11 | End: 2018-09-15 | Stop reason: HOSPADM

## 2018-09-11 RX ADMIN — THERA TABS 1 TABLET: TAB at 08:09

## 2018-09-11 RX ADMIN — FOLIC ACID 1 MG: 1 TABLET ORAL at 08:09

## 2018-09-11 RX ADMIN — BUPROPION HYDROCHLORIDE 150 MG: 150 TABLET, EXTENDED RELEASE ORAL at 12:09

## 2018-09-11 RX ADMIN — DIVALPROEX SODIUM 500 MG: 500 TABLET, EXTENDED RELEASE ORAL at 08:09

## 2018-09-11 RX ADMIN — QUETIAPINE FUMARATE 100 MG: 100 TABLET ORAL at 08:09

## 2018-09-11 RX ADMIN — VENLAFAXINE HYDROCHLORIDE 37.5 MG: 37.5 CAPSULE, EXTENDED RELEASE ORAL at 08:09

## 2018-09-11 NOTE — PLAN OF CARE
Problem: Overarching Goals (Adult)  Goal: Optimized Coping Skills in Response to Life Stressors    Intervention: Promote Effective Coping Strategies  Group Note    Behavior:  Patient attended Psychotherapy Group and participated. Patient presents with constricted affect, but appropriate mood.     Intervention:  Managing Expectations-- discussed what expectations are, who they come fromand why and how those expectations affect us. Shared with group suggestions to manage expectations. Participants shred expectations they have of themselves and others and how they can better manage them.         Response:  Patient states her expectations of herself are to stay sober, find a better job and make new friends. Patient states her family wants her to stay sober, pay her own bills. Patient states her 21yo and 12yo sons were sent to live with their father. Patient states they were putting her down. Patient states since she has cut out unsafe friends, she can only stay home and stare at her four walls. Discussed expanding network of support and moving into new sober circles.       Plan:   Will continue to encourage patient participation in group. Will meet 1:1 with patient later.   Patient signed release of information for her aunt and requested assistance with disability. Will send a referral for [facility].

## 2018-09-11 NOTE — PLAN OF CARE
Problem: Patient Care Overview (Adult)  Goal: Plan of Care Review  Outcome: Ongoing (interventions implemented as appropriate)  Pt is calm and cooperative.  Somewhat withdrawn at times.  Pt reports some depression but states her mood is better since admission.  Encouraged pt to continue taking meds to improve depression and to attend groups to learn coping skills for dealing with depression.  Pt verbalized understanding, will continue to monitor.

## 2018-09-11 NOTE — PROGRESS NOTES
"   09/11/18 1155   Assessment   Patient's Identification of the Problem Patient states I have a lot of mental health issues, my depression, anxiety, hearing voices through the radio and tv. I don't sleep good at night, stop taking my medicine about a year ago. I'm always paranoid thinking people are trying to kill me. Patient admits to negative leisure lifestyle of crystal meth, alcohol and cigarettes. Patient reports she is , has 2 sons, 2 years of college, unemployed, Mu-ism, lives alone in [place]. Patient verbalized main goal is to "get rest at night, control anxiety and paranoia, regulate medication, go to outpatient counseling, go to 12 step meetings."    Leisure Interest Yard Work;Gardening;Sit Outside;Cooking;Fishing;Ride Bike;Classical/Table Games;Quaker;Other (See Comments)  (decrease in leisure interest)   Leisure Barriers In Pain;Loss of Interest;Lack of Finances;ETOH Use;Energy Level;Drug Use;Physical Limitation;Fears/Phobias;Other (See Comments)  (fear being in public,arthritis&back pain,fibromyalgia per pt)   Treatment Focus To Improve Reality Orientation;To Improve Leisure Awareness/Lifestyle/Interest;To Promote Successful and Safe Self Expression;To Improve Coping Skills;To Increase Energy Level;To Educate and Increase Awareness of Sober Free Activities     Treatment Recommendation: To address problem(s) #  1:1 Intervention (as needed)    Reality Orientation Skilled Activity  Cognitive Stimulation Skilled Activity  Creative Expression Skilled Activity  Self Expression Skilled Activity  Mild Exercises Skilled Activity  Special Events / Awareness Skilled Activity  Stress Management Skilled Activity  Coping Skilled Activity  Leisure Education and Awareness Skilled Activity    Treatment Goal(s):  Long Term Goals Refer To Master Treatment Plan    Short Term Treatment Goal(s)  Patient Will:  Exhibit Improvement in Mood  Demonstrate Constructive Expression of Feelings and Behavior  Identify at " Least 2 Coping Skills or Leisure Skills to Reduce Depression and Hopelessness Upon Request from Therapist  Identify (+) Leisure / Recreation Activities that Promote Wellness and Promote a Sober Lifestyle    Discharge Recommendations:  Encourage Patient to Actively Utilize Available Community Resources to Increase Leisure Involvement to Decrease Signs and Symptoms of Illness  Encourage Patient to Utilize Coping Skills on a Regular Basis to Reduce the Risk of Decompensating and Re-Hospitalizations  AA/NA Meetings  Follow Up with After Care Appointments  Continue with Current Leisure Activities

## 2018-09-11 NOTE — PROGRESS NOTES
"PSYCHIATRY DAILY INPATIENT PROGRESS NOTE  SUBSEQUENT HOSPITAL VISIT    ENCOUNTER DATE: 9/11/2018  SITE: [facility]    DATE OF ADMISSION: 9/9/2018 12:41 PM  LENGTH OF STAY: 2 days      HISTORY    CHIEF COMPLAINT   Maria Yancey is a 33 y.o. female, seen during daily manzo rounds on the inpatient unit.  Maria Yancey presents with the chief complaint of paranoia- "My psychiatrist thought that I should come to the hospital."        HPI   (Elements: Location, Quality, Severity, Duration, Timing, Content, Modifying Factors, Associated Signs & Symptoms)    The patient was seen and examined. The chart was reviewed. Reviewed notes and exam by LPC, RNs, Actiivty Coordinator, and NP form the last 24 hours.    Staff reports no behavioral or management issues. Reports pt to be isolative and paranoid.    The patient has been compliant with treatment. The patient denied any side effects.    Continued Symptoms of Depression: +diminished mood or loss of interest/anhedonia; +irritability, +diminished energy, +change in sleep, +change in appetite, +diminished concentration or cognition or indecisiveness, no PMA/R, +excessive guilt or hopelessness or worthlessness, +suicidal ideations     Continued Changes in Sleep: +trouble with initiation/maintenance, no early morning awakening with inability to return to sleep     Continued Suicidal/(no)Homicidal ideations: +active/passive ideations, +organized plans, no future intentions; loneliness was identified as one of many triggers for this episode, along with her relapse, family stressors     Continued Symptoms of psychosis: +hallucinations, +delusions, no disorganized thinking, no disorganized behavior or abnormal motor behavior, no negative symptoms     Denied past or current Symptoms of esther or hypomania: no elevated, expansive, or irritable mood with no increased energy or activity; with no inflated self-esteem or grandiosity, decreased need for sleep, increased rate of speech, " FOI or racing thoughts, distractibility, increased goal directed activity or PMA, or risky/disinhibited behavior     Continued Symptoms of Anxiety: +excessive anxiety/worry/fear,  with +restlessness, +fatigue, +poor concentration, +irritability, +muscle tension, +sleep disturbance     Less Symptoms of Panic Disorder: no panic attacks since admission; withagoraphobia     Continued Symptoms of PTSD: +h/o trauma; +re-experiencing/intrusive symptoms, +avoidant behavior, +negative alterations in cognition or mood, +hyperarousal symptoms; without dissociative symptoms     +Substance Use: positive for intoxication, withdrawal, tolerance, used in larger amounts or duration than intended, unsuccessful attempts to limit or quit, increased time engaging in or seeking out, cravings or strong desire to use, failure to fulfill obligations, negative consequences in social/interpersonal/occupational,/recreational areas, use in dangerous situations, and medical or psychological consequences; +polysubstance use  -She is still interested in only outpatient treatment at this time  -She denied ay current symptoms of withdrawal.   -She denied current cravings    +h/o ADHD- treatment with a stimulant is possibly worsening her psychosis; she is open to alternative tx options.       PSYCHOTHERAPY ADD-ON +[id number]   30 (16-37*) minutes    Time: 16 minutes  Participants: Met with patient    Therapeutic Intervention Type: behavior modifying psychotherapy, supportive psychotherapy  Why chosen therapy is appropriate versus another modality: relevant to diagnosis, patient responds to this modality, evidence based practice    Target symptoms: depression, anxiety , substance abuse, psychosis  Primary focus: psychosis and hallucinations   Psychotherapeutic techniques: supportive, behavioral and cognitive techniques; psycho-education; identifying triggers and contributors to symptoms     Outcome monitoring methods: self-report, observation    Patient's  response to intervention:  The patient's response to intervention is accepting.    Progress toward goals:  The patient's progress toward goals is fair .          ROS  General ROS: negative  Ophthalmic ROS: negative  ENT ROS: negative  Allergy and Immunology ROS: negative  Hematological and Lymphatic ROS: negative  Endocrine ROS: negative  Respiratory ROS: no cough, shortness of breath, or wheezing  Cardiovascular ROS: no chest pain or dyspnea on exertion  Gastrointestinal ROS: no abdominal pain, change in bowel habits, or black or bloody stools  Genito-Urinary ROS: no dysuria, trouble voiding, or hematuria  Musculoskeletal ROS: negative  Neurological ROS: no TIA or stroke symptoms  Dermatological ROS: negative    PAST MEDICAL HISTORY   Past Medical History:   Diagnosis Date    Addiction to drug     ADHD (attention deficit hyperactivity disorder)     Anxiety     Asthma     Bipolar 1 disorder     Cervical pain     Depression     Fibromyalgia     Hallucination     History of psychiatric hospitalization     Hx of psychiatric care     Magalis     Psychiatric exam requested by authority     Psychiatric problem     Schizoaffective disorder     Spina bifida     Spinal stenosis of lumbar region     Substance abuse     Suicide attempt     Syringomyelia     Syrinx of spinal cord     Therapy     Trigger finger            PSYCHOTROPIC MEDICATIONS   Scheduled Meds:   divalproex ER  500 mg Oral QHS    folic acid  1 mg Oral Daily    multivitamin  1 tablet Oral Daily    QUEtiapine  50 mg Oral QHS    venlafaxine  37.5 mg Oral Daily     Continuous Infusions:  PRN Meds:.acetaminophen, aluminum-magnesium hydroxide-simethicone, docusate sodium, hydrOXYzine pamoate, loperamide, nicotine, OLANZapine **AND** OLANZapine        EXAMINATION    VITALS   Vitals:    09/11/18 0815   BP: 133/70   Pulse: 73   Resp: 18   Temp: 96.5 °F (35.8 °C)       CONSTITUTIONAL  General Appearance: WF, in hospital garb;  "NAD     MUSCULOSKELETAL  Muscle Strength and Tone:  normal  Abnormal Involuntary Movements:  none  Gait and Station:  normal; non-ataxic     PSYCHIATRIC   Level of Consciousness: awake, alert  Orientation: p/p/t/s  Grooming: diminished and inadequate to circumstances  Psychomotor Behavior: no PMA/R  Speech: nl r/t/v/s  Language:  English fluent  Mood: "alright"  Affect: decreased range, mood-incongruent, guarded, anxious, dysthymic  Thought Process:  linear and organized  Associations:  intact; no SHAGUFTA  Thought Content:  +AVH/delusions; denied HI, +SI  Memory:  intact to recent and remote events  Attention:  intact to conversation; not distractible   Fund of Knowledge:  age and education appropriate  Estimate if Intelligence:  average based on work/education history, vocabulary and mental status exam  Insight:  good- seeks help, recognizes illness  Judgment:   good- no bx issues, compliant and cooperative             DIAGNOSTIC TESTING   Laboratory Results  No results found for this or any previous visit (from the past 24 hour(s)).      ASSESSMENT      IMPRESSION   Schizoaffective Disorder, depressed type  Unspecified Anxiety Disorder  PTSD  H/o ADHD  Fibromyalgia      Polysubstance dependence (Methamphetamines, cannabis, alcohol, narcotics; methamphetamines current, continuous, severe, without physiological dependence)  Nicotine Dependence     Psychosocial stressors           MEDICAL DECISION MAKING          PROBLEM LIST AND MANAGEMENT PLANS; PRESCRIPTION DRUG MANAGEMENT  Compliance: yes  Side Effects: no  Regimen Adjustments:      Psychosis: Increase Seroquel to 100 mg po q HS     Depression: Increase Effexor XR to 75 mg po q day; start trial of Wellbutrin  mg po q day     Anxiety: Effexor as above; depakote off-label as below    ADHD: Wellbutrin as above     Pain: Continue Depakote 500 mg po q HS (off-label)- check level in 3 days with CMP (on Friday AM)     Substance use: pt counseled; outpatient tx once " stable; Wellbutrin off-label as above     Nicotine use: pt counseled; nicotine patch daily; Wellbutrin off-label as above     Psychosocial stressors: pt counseled; SW assisting with resources        DIAGNOSTIC TESTING  Labs reviewed; follow up pending labs     Disposition:  -SW to assist with aftercare planning and collateral  -Once stable discharge home with outpatient follow up care and/or rehab  -Continue inpatient treatment under a [facility] and/or [facility] for danger to self and grave disability as evident by depression with SI and psychosis with heavy addiction issues and psychosocial stressors.        NEED FOR CONTINUED HOSPITALIZATION  Psychiatric illness continues to pose a potential threat to life or bodily function, of self or others, thereby requiring the need for continued inpatient psychiatric hospitalization: Yes    Protective inpatient pyschiatric hospitalization required while a safe disposition plan is enacted: Yes    Patient stabilized and ready for discharge from inpatient psychiatric unit: No      STAFF:   Salomón Nettles MD  Psychiatry

## 2018-09-11 NOTE — PLAN OF CARE
Problem: Patient Care Overview (Adult)  Goal: Plan of Care Review  Outcome: Ongoing (interventions implemented as appropriate)  Plan of care reviewed.  Denies intent to harm self or others at this time.  Accepts PM medications.  Gait steady, no falls.  Pt in bed sleeping but easily aroused.  States that she is tired.  Explained program expectation but pt still wanted to stay in bed because she said she was sleepy. Noted in history of not sleeping well at home so allowed to sleep for this evening. Promoted an individualized safety plan, reality-based interactions, effective coping strategies, and impulse control.  Will continue to monitor for safety.         Problem: Mental State/Mood Impairment (Psychotic Signs/Symptoms) (Adult)  Goal: Improved Mental State/Mood  Pt staying in bed this evening, accepted PM meds as ordered by MD

## 2018-09-11 NOTE — PLAN OF CARE
Problem: Patient Care Overview (Adult)  Goal: Plan of Care Review  Outcome: Ongoing (interventions implemented as appropriate)  Lying quiet in bed, eyes closed, respiration even and unlabored, appearing asleep.  Slept most all shift except was awake a couple of times at 0030 and 0200.  Back to sleep within 1/2 hour to an hour respectively. Safety and precautions maintained with rounds every 15 minutes, bed is fixed in a low position and pathways kept clear.  No fall occurred.

## 2018-09-12 PROCEDURE — 99233 SBSQ HOSP IP/OBS HIGH 50: CPT | Mod: AF,HB,, | Performed by: PSYCHIATRY & NEUROLOGY

## 2018-09-12 PROCEDURE — 25000003 PHARM REV CODE 250: Performed by: PSYCHIATRY & NEUROLOGY

## 2018-09-12 PROCEDURE — 90833 PSYTX W PT W E/M 30 MIN: CPT | Mod: AF,HB,, | Performed by: PSYCHIATRY & NEUROLOGY

## 2018-09-12 PROCEDURE — 11400000 HC PSYCH PRIVATE ROOM

## 2018-09-12 RX ORDER — QUETIAPINE FUMARATE 25 MG/1
50 TABLET, FILM COATED ORAL DAILY
Status: DISCONTINUED | OUTPATIENT
Start: 2018-09-12 | End: 2018-09-15 | Stop reason: HOSPADM

## 2018-09-12 RX ORDER — QUETIAPINE FUMARATE 200 MG/1
200 TABLET, FILM COATED ORAL NIGHTLY
Status: DISCONTINUED | OUTPATIENT
Start: 2018-09-12 | End: 2018-09-15 | Stop reason: HOSPADM

## 2018-09-12 RX ADMIN — THERA TABS 1 TABLET: TAB at 08:09

## 2018-09-12 RX ADMIN — VENLAFAXINE HYDROCHLORIDE 75 MG: 75 CAPSULE, EXTENDED RELEASE ORAL at 08:09

## 2018-09-12 RX ADMIN — QUETIAPINE FUMARATE 50 MG: 25 TABLET ORAL at 09:09

## 2018-09-12 RX ADMIN — BUPROPION HYDROCHLORIDE 150 MG: 150 TABLET, EXTENDED RELEASE ORAL at 08:09

## 2018-09-12 RX ADMIN — DIVALPROEX SODIUM 500 MG: 500 TABLET, EXTENDED RELEASE ORAL at 08:09

## 2018-09-12 RX ADMIN — FOLIC ACID 1 MG: 1 TABLET ORAL at 08:09

## 2018-09-12 RX ADMIN — QUETIAPINE FUMARATE 200 MG: 200 TABLET ORAL at 08:09

## 2018-09-12 NOTE — PLAN OF CARE
Problem: Patient Care Overview (Adult)  Goal: Plan of Care Review  Outcome: Ongoing (interventions implemented as appropriate)  Pt mostly isolating in bed.Pt c/o poor sleep last night,awake on and off.Pt encouraged to try and stay out of bed today.Pt also c/o her medication making her feel drowsy.Eating 100% of meals.Denies suicidal ideations and hallucinations/delusions.Medication compliant.

## 2018-09-12 NOTE — PROGRESS NOTES
"PSYCHIATRY DAILY INPATIENT PROGRESS NOTE  SUBSEQUENT HOSPITAL VISIT    ENCOUNTER DATE: 9/12/2018  SITE: [facility]    DATE OF ADMISSION: 9/9/2018 12:41 PM  LENGTH OF STAY: 3 days      HISTORY    CHIEF COMPLAINT   Maria Yancey is a 33 y.o. female, seen during daily manzo rounds on the inpatient unit.  Maria Yancey presents with the chief complaint of paranoia- "My psychiatrist thought that I should come to the hospital."        HPI   (Elements: Location, Quality, Severity, Duration, Timing, Content, Modifying Factors, Associated Signs & Symptoms)    The patient was seen and examined. The chart was reviewed. Reviewed notes and exam by LPC, RNs, and Actiivty Coordinator, from the last 24 hours.    Staff reports no behavioral or management issues. Staff reports that the pt is periodically isolative and continues to be paranoid. She stayed in her room secondary to being spied on by the TV. She did attend some groups though.     The patient has been compliant with treatment. The patient denied any side effects.    Continued but less Symptoms of Depression: less diminished mood or loss of interest/anhedonia; no irritability, less diminished energy, less change in sleep, less change in appetite, +diminished concentration or cognition or indecisiveness, no PMA/R, +excessive guilt or hopelessness or worthlessness, less suicidal ideations     Continued but less Changes in Sleep: less trouble with initiation/maintenance, no early morning awakening with inability to return to sleep     Continued but less Suicidal/(no)Homicidal ideations: less active/passive ideations, no organized plans, no future intentions; loneliness was identified as one of many triggers for this episode, along with her relapse, and family stressors     Continued Symptoms of psychosis: less hallucinations, +delusions, no disorganized thinking, no disorganized behavior or abnormal motor behavior, no negative symptoms     Denied past or current " Symptoms of esther or hypomania: no elevated, expansive, or irritable mood with no increased energy or activity; with no inflated self-esteem or grandiosity, decreased need for sleep, increased rate of speech, FOI or racing thoughts, distractibility, increased goal directed activity or PMA, or risky/disinhibited behavior     Continued but less Symptoms of Anxiety: less excessive anxiety/worry/fear,  with less restlessness, less fatigue, +poor concentration, no irritability, less muscle tension, less sleep disturbance     Controlled Symptoms of Panic Disorder: no panic attacks since admission; withagoraphobia     Continued but less Symptoms of PTSD: +h/o trauma; less re-experiencing/intrusive symptoms, less avoidant behavior, less negative alterations in cognition or mood, less hyperarousal symptoms; without dissociative symptoms     +Substance Use: positive for intoxication, withdrawal, tolerance, used in larger amounts or duration than intended, unsuccessful attempts to limit or quit, increased time engaging in or seeking out, cravings or strong desire to use, failure to fulfill obligations, negative consequences in social/interpersonal/occupational,/recreational areas, use in dangerous situations, and medical or psychological consequences; +polysubstance use  -She is still interested in only outpatient treatment at this time  -She denied ay current symptoms of withdrawal.   -She denied current cravings    +h/o ADHD- treatment with a stimulant is possibly worsening her psychosis; she is open to alternative tx options (wellbutrin)      PSYCHOTHERAPY ADD-ON +[id number]   30 (16-37*) minutes    Time: 16 minutes  Participants: Met with patient    Therapeutic Intervention Type: behavior modifying psychotherapy, supportive psychotherapy  Why chosen therapy is appropriate versus another modality: relevant to diagnosis, patient responds to this modality, evidence based practice    Target symptoms: depression, anxiety , substance  abuse, psychosis  Primary focus: psychosis and hallucinations   Psychotherapeutic techniques: supportive, behavioral and cognitive techniques; psycho-education; identifying triggers and contributors to symptoms and solutions; problem solving techniques      Outcome monitoring methods: self-report, observation    Patient's response to intervention:  The patient's response to intervention is accepting.    Progress toward goals:  The patient's progress toward goals is fair .          ROS  General ROS: negative  Ophthalmic ROS: negative  ENT ROS: negative  Allergy and Immunology ROS: negative  Hematological and Lymphatic ROS: negative  Endocrine ROS: negative  Respiratory ROS: no cough, shortness of breath, or wheezing  Cardiovascular ROS: no chest pain or dyspnea on exertion  Gastrointestinal ROS: no abdominal pain, change in bowel habits, or black or bloody stools  Genito-Urinary ROS: no dysuria, trouble voiding, or hematuria  Musculoskeletal ROS: negative  Neurological ROS: no TIA or stroke symptoms  Dermatological ROS: negative    PAST MEDICAL HISTORY   Past Medical History:   Diagnosis Date    Addiction to drug     ADHD (attention deficit hyperactivity disorder)     Anxiety     Asthma     Bipolar 1 disorder     Cervical pain     Depression     Fibromyalgia     Hallucination     History of psychiatric hospitalization     Hx of psychiatric care     Magalis     Psychiatric exam requested by authority     Psychiatric problem     Schizoaffective disorder     Spina bifida     Spinal stenosis of lumbar region     Substance abuse     Suicide attempt     Syringomyelia     Syrinx of spinal cord     Therapy     Trigger finger            PSYCHOTROPIC MEDICATIONS   Scheduled Meds:   buPROPion  150 mg Oral Daily    divalproex ER  500 mg Oral QHS    folic acid  1 mg Oral Daily    multivitamin  1 tablet Oral Daily    QUEtiapine  100 mg Oral QHS    venlafaxine  75 mg Oral Daily     Continuous  "Infusions:  PRN Meds:.acetaminophen, aluminum-magnesium hydroxide-simethicone, docusate sodium, hydrOXYzine pamoate, loperamide, nicotine, OLANZapine **AND** OLANZapine        EXAMINATION    VITALS   Vitals:    09/12/18 0805   BP: 131/70   Pulse: 69   Resp: 18   Temp: 96.3 °F (35.7 °C)       CONSTITUTIONAL  General Appearance: WF, in casual garb; NAD     MUSCULOSKELETAL  Muscle Strength and Tone:  normal  Abnormal Involuntary Movements:  none  Gait and Station:  normal; non-ataxic     PSYCHIATRIC   Level of Consciousness: awake, alert  Orientation: p/p/t/s  Grooming: less diminished and more adequate to circumstances  Psychomotor Behavior: no PMA/R  Speech: nl r/t/v/s  Language:  English fluent  Mood: "ok"  Affect: decreased range, guarded, anxious, less dysthymic  Thought Process:  linear and organized  Associations:  intact; no SHAGUFTA  Thought Content:  less AH, no VH, +paranoid delusions; denied HI, less SI  Memory:  intact to recent and remote events  Attention:  intact to conversation; not distractible   Fund of Knowledge:  age and education appropriate  Estimate if Intelligence:  average based on work/education history, vocabulary and mental status exam  Insight:  good- seeks help, recognizes illness  Judgment:   good- no bx issues, compliant and cooperative             DIAGNOSTIC TESTING   Laboratory Results  No results found for this or any previous visit (from the past 24 hour(s)).      ASSESSMENT      IMPRESSION   Schizoaffective Disorder, depressed type  Unspecified Anxiety Disorder  PTSD  H/o ADHD  Fibromyalgia      Polysubstance dependence (Methamphetamines, cannabis, alcohol, narcotics; methamphetamines current, continuous, severe, without physiological dependence)  Nicotine Dependence     Psychosocial stressors           MEDICAL DECISION MAKING          PROBLEM LIST AND MANAGEMENT PLANS; PRESCRIPTION DRUG MANAGEMENT  Compliance: yes  Side Effects: no  Regimen Adjustments:      Psychosis: Increase " Seroquel to 50 mg po q AM and 200 mg po q HS     Depression: Continued Effexor XR 75 mg po q day; continue Wellbutrin  mg po q day     Anxiety: Effexor as above; depakote off-label as below    ADHD: Wellbutrin as above     Pain: Continue Depakote 500 mg po q HS (off-label)- check level in 2 days with CMP (on Friday AM)     Substance use: pt counseled; outpatient tx once stable; Wellbutrin off-label as above     Nicotine use: pt counseled; nicotine patch daily; Wellbutrin off-label as above     Psychosocial stressors: pt counseled; SW assisting with resources        DIAGNOSTIC TESTING  Labs reviewed; follow up pending labs     Disposition:  -SW to assist with aftercare planning and collateral  -Once stable discharge home with outpatient follow up care and/or rehab  -Continue inpatient treatment under a PEC and/or C[facility] for danger to self and grave disability as evident by depression with SI and psychosis with heavy addiction issues and psychosocial stressors.        NEED FOR CONTINUED HOSPITALIZATION  Psychiatric illness continues to pose a potential threat to life or bodily function, of self or others, thereby requiring the need for continued inpatient psychiatric hospitalization: Yes    Protective inpatient pyschiatric hospitalization required while a safe disposition plan is enacted: Yes    Patient stabilized and ready for discharge from inpatient psychiatric unit: No      STAFF:   Salomón Nettles MD  Psychiatry

## 2018-09-12 NOTE — PROGRESS NOTES
09/12/18 1040   [facility] Group Therapy   Group Name Leisure Education   Specific Interventions Skilled Activity Leisure Education and Awareness   Participation Level Active;Sharing   Participation Quality Cooperative;Social   Insight/Motivation Applies New Skills;Good   Affect/Mood Display Appropriate   Cognition Alert   Psychomotor WNL   Patient showed interest, directions repeated as needed, receptive to resource information given

## 2018-09-12 NOTE — PLAN OF CARE
Problem: Overarching Goals (Adult)  Goal: Optimized Coping Skills in Response to Life Stressors    Intervention: Promote Effective Coping Strategies  Group Note    Behavior:  Patient attended Psychotherapy Group but was sedated, falling asleep at the table and eventually left before the session was over.     Intervention:  How Do You [place] - Negative Coping Skills - Processed with group negative, unhealthy coping skills and emotions and the consequences. Discussed identifying triggers, and recognizing the negative impact of the behavior and replacing them with more positive ways to cope.        Response:  Patient notes she has numerous negative ways of coping including attempting suicide, using drugs and alcohol, using sleep to escape and not eating. Patient states her negative copers get people to leave her alone.  Discussed finding more positive ways to cope including exercise, journaling, and communicating her needs.         Plan:  Will continue to encourage patient participation in group. Patient states her medication has made her tired, so she went to lie day.

## 2018-09-12 NOTE — PLAN OF CARE
"Problem: Patient Care Overview (Adult)  Goal: Plan of Care Review  Outcome: Ongoing (interventions implemented as appropriate)  Pt came out of room to take meds. Questioned pt about going back to room so soon after eating snacks.  Pt stated "I don't like cell phones and TV's and electronic because they take pictures of me and I hear things." So pt remains paranoid and delusional.  Did take PM meds.         "

## 2018-09-12 NOTE — PLAN OF CARE
"Problem: Overarching Goals (Adult)  Goal: Develops/Participates in Therapeutic [place] to Support Successful Transition    Intervention: Mutually Develop Transition Plan  Collateral Contact:   Spoke with Patient's aunt Christal [phone]  "She thinks people are following her, thinks she has parasites in her house and scrubs herself with Clorox. Thinks people are watching her.  I think that's when she does the drugs or is coming off them. Sometimes she's fine. It's when she's talking crazy and repeating herself that I know she's done something.    It's been like that for the last couple of months. She works with me in the yards. She's been telling me she cant sleep. She's not on any of her psych meds from a year ago. She was going to [facility] but she was in sober living and when she came back, she didn't go back to [facility]. I made her an appointment with the psychiatrist and that's how she ended up in the hospital.   She cant sleep at night, is constantly thinking about stuff  When she's doing well she's a good person. She wouldn't do anything to hurt herself. She just needs to get back on track, get back on her medications. She rocks a little sometimes and gets emotional and starts crying but besides that she's been doing good. She rents from my son ($350.) and he's flexible if she gets behind.   Aunt states when patient is discharged she will make sure she gets to her appointments. States either she or her sister (patient's mom) will pick patient up when she is discharged. Patient's mother lives next door.              "

## 2018-09-12 NOTE — PLAN OF CARE
Problem: Patient Care Overview (Adult)  Goal: Plan of Care Review  Outcome: Ongoing (interventions implemented as appropriate)  Plan of care reviewed.  Denies intent to harm self or others at this time.  Accepts all meals and medications.  Gait steady, no falls.  Initially pt was sleeping in bed , easily aroused. Once awake asked if it was ok to go into the dayroom.  Informed pt that was expected for her to be out of her room as much as possible during the day/evening and for her to participate in the mileu.  Pt said ok and went into the dayroom. Unkempt appearance. However pt ate her snack and is currently back in bed.    Promoted an individualized safety plan, reality-based interactions, effective coping strategies, and impulse control.  Will continue to monitor for safety.         Problem: Mental State/Mood Impairment (Psychotic Signs/Symptoms) (Adult)  Goal: Improved Mental State/Mood  Outcome: Ongoing (interventions implemented as appropriate)  Pt compliant with meds as prescribed.

## 2018-09-13 PROCEDURE — 90833 PSYTX W PT W E/M 30 MIN: CPT | Mod: AF,HB,, | Performed by: PSYCHIATRY & NEUROLOGY

## 2018-09-13 PROCEDURE — 11400000 HC PSYCH PRIVATE ROOM

## 2018-09-13 PROCEDURE — 25000003 PHARM REV CODE 250: Performed by: PSYCHIATRY & NEUROLOGY

## 2018-09-13 PROCEDURE — 99233 SBSQ HOSP IP/OBS HIGH 50: CPT | Mod: AF,HB,, | Performed by: PSYCHIATRY & NEUROLOGY

## 2018-09-13 RX ORDER — VENLAFAXINE HYDROCHLORIDE 150 MG/1
150 CAPSULE, EXTENDED RELEASE ORAL DAILY
Status: DISCONTINUED | OUTPATIENT
Start: 2018-09-14 | End: 2018-09-15 | Stop reason: HOSPADM

## 2018-09-13 RX ADMIN — QUETIAPINE FUMARATE 200 MG: 200 TABLET ORAL at 08:09

## 2018-09-13 RX ADMIN — DIVALPROEX SODIUM 500 MG: 500 TABLET, EXTENDED RELEASE ORAL at 08:09

## 2018-09-13 RX ADMIN — BUPROPION HYDROCHLORIDE 150 MG: 150 TABLET, EXTENDED RELEASE ORAL at 08:09

## 2018-09-13 RX ADMIN — FOLIC ACID 1 MG: 1 TABLET ORAL at 08:09

## 2018-09-13 RX ADMIN — THERA TABS 1 TABLET: TAB at 08:09

## 2018-09-13 RX ADMIN — VENLAFAXINE HYDROCHLORIDE 75 MG: 75 CAPSULE, EXTENDED RELEASE ORAL at 08:09

## 2018-09-13 RX ADMIN — QUETIAPINE FUMARATE 50 MG: 25 TABLET ORAL at 08:09

## 2018-09-13 NOTE — NURSING
"Received pt. At start of the shift lying in bed resting. Pt. Got up to take a shower then to have hs snack. During one to one pt. Able to verbalize effectiveness of medications, stating decrease A/H, " they are more distance" No problems noted at this time. No paranoid behavior noted.  Cont. Plan.  "

## 2018-09-13 NOTE — PROGRESS NOTES
"   09/13/18 1030   [facility] Group Therapy   Group Name Therapeutic Recreation   Specific Interventions Cognitive Stimulation Training  (strategy,problem solve and teamwork)   Participation Level None   Patient refused to attend group due to "the medicine got me sleepy." Staff will continue to encourage group participation, monitor and document progress made toward POC.  "

## 2018-09-13 NOTE — PROGRESS NOTES
"PSYCHIATRY DAILY INPATIENT PROGRESS NOTE  SUBSEQUENT HOSPITAL VISIT    ENCOUNTER DATE: 9/13/2018  SITE: [facility]    DATE OF ADMISSION: 9/9/2018 12:41 PM  LENGTH OF STAY: 4 days      HISTORY    CHIEF COMPLAINT   Maria Yancey is a 33 y.o. female, seen during daily manzo rounds on the inpatient unit.  Maria Yancey presents with the chief complaint of paranoia- "My psychiatrist thought that I should come to the hospital."        HPI   (Elements: Location, Quality, Severity, Duration, Timing, Content, Modifying Factors, Associated Signs & Symptoms)    The patient was seen and examined. The chart was reviewed. Reviewed notes and exam by RNs, LPC, and Actiivty Coordinator, from the last 24 hours.    Staff reports no behavioral or management issues. Staff reports that the still isolates at times and continues to be paranoid (less so than at admit).  She did attend and participates in some groups.     The patient has been compliant with treatment. The patient denied any side effects.    Continued but lessening Symptoms of Depression: less diminished mood or loss of interest/anhedonia; no irritability, less diminished energy, less change in sleep, less change in appetite, less diminished concentration or cognition or indecisiveness, no PMA/R, less excessive guilt or hopelessness or worthlessness, less suicidal ideations     Continued but less Changes in Sleep: less trouble with initiation/maintenance, no early morning awakening with inability to return to sleep; slept 9.5 hours last night- less broken     Continued but less Suicidal/(no)Homicidal ideations: less active/passive ideations, no organized plans, no future intentions; loneliness was identified as one of many triggers for this episode, along with her relapse, and family stressors; symptoms are less frequent and less intense     Continued but lessening Symptoms of psychosis: less hallucinations, less delusions (persist but less fx/bx), no disorganized " thinking, no disorganized behavior or abnormal motor behavior, no negative symptoms     Denied past or current Symptoms of esther or hypomania: no elevated, expansive, or irritable mood with no increased energy or activity; with no inflated self-esteem or grandiosity, decreased need for sleep, increased rate of speech, FOI or racing thoughts, distractibility, increased goal directed activity or PMA, or risky/disinhibited behavior     Continued but lessening Symptoms of Anxiety: less excessive anxiety/worry/fear,  with less restlessness, less fatigue, less poor concentration, no irritability, less muscle tension, less sleep disturbance     Controlled Symptoms of Panic Disorder: no panic attacks since admission; withagoraphobia     Continued but lessening Symptoms of PTSD: +h/o trauma; less re-experiencing/intrusive symptoms, less avoidant behavior, less negative alterations in cognition or mood, less hyperarousal symptoms; without dissociative symptoms     +Substance Use: positive for intoxication, withdrawal, tolerance, used in larger amounts or duration than intended, unsuccessful attempts to limit or quit, increased time engaging in or seeking out, cravings or strong desire to use, failure to fulfill obligations, negative consequences in social/interpersonal/occupational,/recreational areas, use in dangerous situations, and medical or psychological consequences; +polysubstance use  -She is interested in only outpatient treatment at this time; she declined inpatient rehab  -She denied ay current symptoms of withdrawal.   -She denied current cravings    +h/o ADHD- treatment with a stimulant is possibly worsening her psychosis; she is open to alternative tx options (wellbutrin)    Her pain (back) is much improved. It is a 0/10 at this time. She feels that this is much improved.       PSYCHOTHERAPY ADD-ON +[id number]   30 (16-37*) minutes    Time: 16 minutes  Participants: Met with patient    Therapeutic Intervention Type:  behavior modifying psychotherapy, supportive psychotherapy  Why chosen therapy is appropriate versus another modality: relevant to diagnosis, patient responds to this modality, evidence based practice    Target symptoms: depression, anxiety , substance abuse, psychosis  Primary focus: psychosis and hallucinations   Psychotherapeutic techniques: supportive, behavioral and cognitive techniques; psycho-education; identifying modifiable triggers, precipitants and contributors to depression, anxiety and psychosis     Outcome monitoring methods: self-report, observation    Patient's response to intervention:  The patient's response to intervention is accepting.    Progress toward goals:  The patient's progress toward goals is fair .      ROS  General ROS: negative  Ophthalmic ROS: negative  ENT ROS: negative  Allergy and Immunology ROS: negative  Hematological and Lymphatic ROS: negative  Endocrine ROS: negative  Respiratory ROS: no cough, shortness of breath, or wheezing  Cardiovascular ROS: no chest pain or dyspnea on exertion  Gastrointestinal ROS: no abdominal pain, change in bowel habits, or black or bloody stools  Genito-Urinary ROS: no dysuria, trouble voiding, or hematuria  Musculoskeletal ROS: negative  Neurological ROS: no TIA or stroke symptoms  Dermatological ROS: negative    PAST MEDICAL HISTORY   Past Medical History:   Diagnosis Date    Addiction to drug     ADHD (attention deficit hyperactivity disorder)     Anxiety     Asthma     Bipolar 1 disorder     Cervical pain     Depression     Fibromyalgia     Hallucination     History of psychiatric hospitalization     Hx of psychiatric care     Magalis     Psychiatric exam requested by authority     Psychiatric problem     Schizoaffective disorder     Spina bifida     Spinal stenosis of lumbar region     Substance abuse     Suicide attempt     Syringomyelia     Syrinx of spinal cord     Therapy     Trigger finger            PSYCHOTROPIC  "MEDICATIONS   Scheduled Meds:   buPROPion  150 mg Oral Daily    divalproex ER  500 mg Oral QHS    folic acid  1 mg Oral Daily    multivitamin  1 tablet Oral Daily    QUEtiapine  200 mg Oral QHS    QUEtiapine  50 mg Oral Daily    venlafaxine  75 mg Oral Daily     Continuous Infusions:  PRN Meds:.acetaminophen, aluminum-magnesium hydroxide-simethicone, docusate sodium, hydrOXYzine pamoate, loperamide, nicotine, OLANZapine **AND** OLANZapine        EXAMINATION    VITALS   Vitals:    09/13/18 0816   BP: (!) 160/72   Pulse: 75   Resp: 18   Temp: 96.8 °F (36 °C)       CONSTITUTIONAL  General Appearance: WF, in casual garb; NAD     MUSCULOSKELETAL  Muscle Strength and Tone:  normal  Abnormal Involuntary Movements:  none  Gait and Station:  normal; non-ataxic     PSYCHIATRIC   Level of Consciousness: awake, alert  Orientation: p/p/t/s  Grooming: improved, adequate to circumstances  Psychomotor Behavior: no PMA/R  Speech: nl r/t/v/s  Language:  English fluent  Mood: "ok"  Affect: less decreased range, less guarded, less anxious, less dysthymic  Thought Process:  linear and organized  Associations:  intact; no SHAGUFTA  Thought Content:  less AH, no VH, less paranoid delusions; denied HI, less SI  Memory:  intact to recent and remote events  Attention:  intact to conversation; not distractible   Fund of Knowledge:  age and education appropriate  Estimate if Intelligence:  average based on work/education history, vocabulary and mental status exam  Insight:  good- seeks help, recognizes illness  Judgment:   good- no bx issues, compliant and cooperative             DIAGNOSTIC TESTING   Laboratory Results  No results found for this or any previous visit (from the past 24 hour(s)).      ASSESSMENT      IMPRESSION   Schizoaffective Disorder, depressed type  Unspecified Anxiety Disorder  PTSD  H/o ADHD  Fibromyalgia      Polysubstance dependence (Methamphetamines, cannabis, alcohol, narcotics; methamphetamines current, continuous, " severe, without physiological dependence)  Nicotine Dependence     Psychosocial stressors           MEDICAL DECISION MAKING          PROBLEM LIST AND MANAGEMENT PLANS; PRESCRIPTION DRUG MANAGEMENT  Compliance: yes  Side Effects: no  Regimen Adjustments:      Psychosis: Continue Seroquel 50 mg po q AM and 200 mg po q HS     Depression: Increase Effexor XR to 150 mg po q day; continue Wellbutrin  mg po q day     Anxiety: Effexor as above; depakote off-label as below    ADHD: Wellbutrin as above     Pain: effexor as above (off-label); Continue Depakote 500 mg po q HS (off-label)- check level tomorrow AM with CMP (on Friday AM)     Substance use: pt counseled; outpatient tx once stable; Wellbutrin off-label as above     Nicotine use: pt counseled; nicotine patch daily; Wellbutrin off-label as above     Psychosocial stressors: pt counseled; SW assisting with resources        DIAGNOSTIC TESTING  Labs reviewed; follow up pending labs     Disposition:  -SW to assist with aftercare planning and collateral  -Once stable discharge home with outpatient follow up care and/or rehab  -Continue inpatient treatment under a PEC and/or CEC for danger to self and grave disability as evident by depression with SI and psychosis with heavy addiction issues and psychosocial stressors.        NEED FOR CONTINUED HOSPITALIZATION  Psychiatric illness continues to pose a potential threat to life or bodily function, of self or others, thereby requiring the need for continued inpatient psychiatric hospitalization: Yes    Protective inpatient pyschiatric hospitalization required while a safe disposition plan is enacted: Yes    Patient stabilized and ready for discharge from inpatient psychiatric unit: No      STAFF:   Salomón Nettels MD  Psychiatry

## 2018-09-13 NOTE — PLAN OF CARE
"Problem: Overarching Goals (Adult)  Goal: Optimized Coping Skills in Response to Life Stressors    Intervention: Promote Effective Coping Strategies  Patient did not attend psychotherapy group. Patient was awake in bed and stated "ok" when asked to come for group. Will continue to encourage patient participation.        "

## 2018-09-13 NOTE — PLAN OF CARE
Problem: Patient Care Overview (Adult)  Goal: Plan of Care Review  Outcome: Ongoing (interventions implemented as appropriate)  Pt in bed much more today.Pt c/o feeling more drowsy today.Up for meals and appetite good.Pt reports the voices are much less but they never totally go away.Medication compliant.

## 2018-09-13 NOTE — PLAN OF CARE
Problem: Patient Care Overview (Adult)  Goal: Plan of Care Review  Outcome: Ongoing (interventions implemented as appropriate)  Pt is sleeping at this time and has slept 8 hours with one awakening.  NAD.  Resp even & unlabored.  Pathways clear.  Q 15 minute safety checks ongoing.  All precautions maintained

## 2018-09-14 LAB
ALBUMIN SERPL BCP-MCNC: 3.4 G/DL
ALP SERPL-CCNC: 69 U/L
ALT SERPL W/O P-5'-P-CCNC: 15 U/L
ANION GAP SERPL CALC-SCNC: 7 MMOL/L
AST SERPL-CCNC: 12 U/L
BILIRUB SERPL-MCNC: 0.2 MG/DL
BUN SERPL-MCNC: 13 MG/DL
CALCIUM SERPL-MCNC: 9 MG/DL
CHLORIDE SERPL-SCNC: 104 MMOL/L
CO2 SERPL-SCNC: 29 MMOL/L
CREAT SERPL-MCNC: 0.7 MG/DL
EST. GFR  (AFRICAN AMERICAN): >60 ML/MIN/1.73 M^2
EST. GFR  (NON AFRICAN AMERICAN): >60 ML/MIN/1.73 M^2
GLUCOSE SERPL-MCNC: 76 MG/DL
POTASSIUM SERPL-SCNC: 4.3 MMOL/L
PROT SERPL-MCNC: 6.5 G/DL
SODIUM SERPL-SCNC: 140 MMOL/L
VALPROATE SERPL-MCNC: 22 UG/ML

## 2018-09-14 PROCEDURE — 36415 COLL VENOUS BLD VENIPUNCTURE: CPT

## 2018-09-14 PROCEDURE — 25000003 PHARM REV CODE 250: Performed by: PSYCHIATRY & NEUROLOGY

## 2018-09-14 PROCEDURE — S4991 NICOTINE PATCH NONLEGEND: HCPCS | Performed by: PSYCHIATRY & NEUROLOGY

## 2018-09-14 PROCEDURE — 11400000 HC PSYCH PRIVATE ROOM

## 2018-09-14 PROCEDURE — 80164 ASSAY DIPROPYLACETIC ACD TOT: CPT

## 2018-09-14 PROCEDURE — 99233 SBSQ HOSP IP/OBS HIGH 50: CPT | Mod: AF,HB,, | Performed by: PSYCHIATRY & NEUROLOGY

## 2018-09-14 PROCEDURE — 80053 COMPREHEN METABOLIC PANEL: CPT

## 2018-09-14 RX ORDER — DIVALPROEX SODIUM 500 MG/1
500 TABLET, FILM COATED, EXTENDED RELEASE ORAL NIGHTLY
Status: DISCONTINUED | OUTPATIENT
Start: 2018-09-14 | End: 2018-09-15 | Stop reason: HOSPADM

## 2018-09-14 RX ORDER — DIVALPROEX SODIUM 500 MG/1
1500 TABLET, FILM COATED, EXTENDED RELEASE ORAL NIGHTLY
Status: DISCONTINUED | OUTPATIENT
Start: 2018-09-14 | End: 2018-09-14

## 2018-09-14 RX ADMIN — VENLAFAXINE HYDROCHLORIDE 150 MG: 150 CAPSULE, EXTENDED RELEASE ORAL at 08:09

## 2018-09-14 RX ADMIN — FOLIC ACID 1 MG: 1 TABLET ORAL at 08:09

## 2018-09-14 RX ADMIN — THERA TABS 1 TABLET: TAB at 08:09

## 2018-09-14 RX ADMIN — QUETIAPINE FUMARATE 50 MG: 25 TABLET ORAL at 08:09

## 2018-09-14 RX ADMIN — DIVALPROEX SODIUM 500 MG: 500 TABLET, EXTENDED RELEASE ORAL at 08:09

## 2018-09-14 RX ADMIN — QUETIAPINE FUMARATE 200 MG: 200 TABLET ORAL at 08:09

## 2018-09-14 RX ADMIN — BUPROPION HYDROCHLORIDE 150 MG: 150 TABLET, EXTENDED RELEASE ORAL at 08:09

## 2018-09-14 RX ADMIN — NICOTINE 1 PATCH: 14 PATCH, EXTENDED RELEASE TRANSDERMAL at 02:09

## 2018-09-14 NOTE — PLAN OF CARE
Problem: Patient Care Overview (Adult)  Goal: Plan of Care Review  Outcome: Ongoing (interventions implemented as appropriate)  Pt continues to isolate in room.Out of room only for meals.Appetite good.Grooming fair.Reports voices are less.Medication compliant.

## 2018-09-14 NOTE — PLAN OF CARE
Problem: Overarching Goals (Adult)  Goal: Optimized Coping Skills in Response to Life Stressors  Patient did not attend Psychotherapy Group. Patient isolating in room in bed. Patient state the medication is sedating. Told her I had informed the doctor. Will continue to encourage patient participation in group.

## 2018-09-14 NOTE — PLAN OF CARE
Problem: Patient Care Overview (Adult)  Goal: Plan of Care Review  Outcome: Ongoing (interventions implemented as appropriate)  Received pt. Lying in bed at start of the shift with eyes closed. Up for HS snack then back to bed. Pt. Appeared distracted upon one to one. When asked if the medicine was helping with the voices, pt. Replied yes, moving head up and down then getting under the bed covers. Q 15 min checks, Safety and comfort maintained.  Pt. Slept 8 hours so far this shift. Cont. Plan.

## 2018-09-14 NOTE — PROGRESS NOTES
"PSYCHIATRY DAILY INPATIENT PROGRESS NOTE  SUBSEQUENT HOSPITAL VISIT    ENCOUNTER DATE: 9/14/2018  SITE: [facility]    DATE OF ADMISSION: 9/9/2018 12:41 PM  LENGTH OF STAY: 5 days      HISTORY    CHIEF COMPLAINT   Maria Yancey is a 33 y.o. female, seen during daily manzo rounds on the inpatient unit.  Maria Yancey presents with the chief complaint of paranoia- "My psychiatrist thought that I should come to the hospital."        HPI   (Elements: Location, Quality, Severity, Duration, Timing, Content, Modifying Factors, Associated Signs & Symptoms)    The patient was seen and examined. The chart was reviewed. Reviewed notes and exam by RNs, LPC, and Actiivty Coordinator, from the last 24 hours.    Staff reports no behavioral or management issues. Staff reports that the still isolates at times and continues to be paranoid (less so than at admit).  She did attend and participates in groups    The patient has been compliant with treatment. The patient denied any side effects.    Improved Symptoms of Depression: less diminished mood or loss of interest/anhedonia; no irritability, less diminished energy, less change in sleep, less change in appetite, less diminished concentration or cognition or indecisiveness, no PMA/R, less excessive guilt or hopelessness or worthlessness, no suicidal ideations     Improved Changes in Sleep: less trouble with initiation/maintenance, no early morning awakening with inability to return to sleep;      Resolved Suicidal/(no)Homicidal ideations: no active/passive ideations, no organized plans, no future intentions; loneliness was identified as one of many triggers for this episode, along with her relapse, and family stressors; symptoms are less frequent and less intense     Improved Symptoms of psychosis: less hallucinations, less delusions (persist but less fx/bx), no disorganized thinking, no disorganized behavior or abnormal motor behavior, no negative symptoms     Denied past or " current Symptoms of esther or hypomania: no elevated, expansive, or irritable mood with no increased energy or activity; with no inflated self-esteem or grandiosity, decreased need for sleep, increased rate of speech, FOI or racing thoughts, distractibility, increased goal directed activity or PMA, or risky/disinhibited behavior     Continued but lessening Symptoms of Anxiety: less excessive anxiety/worry/fear,  with less restlessness, less fatigue, less poor concentration, no irritability, less muscle tension, less sleep disturbance     Controlled Symptoms of Panic Disorder: no panic attacks since admission; withagoraphobia     Continued but lessening Symptoms of PTSD: +h/o trauma; less re-experiencing/intrusive symptoms, less avoidant behavior, less negative alterations in cognition or mood, less hyperarousal symptoms; without dissociative symptoms     +Substance Use: positive for intoxication, withdrawal, tolerance, used in larger amounts or duration than intended, unsuccessful attempts to limit or quit, increased time engaging in or seeking out, cravings or strong desire to use, failure to fulfill obligations, negative consequences in social/interpersonal/occupational,/recreational areas, use in dangerous situations, and medical or psychological consequences; +polysubstance use  -She is interested in only outpatient treatment at this time; she declined inpatient rehab  -She denied ay current symptoms of withdrawal.   -She denied current cravings    +h/o ADHD- treatment with a stimulant is possibly worsening her psychosis; she is open to alternative tx options (wellbutrin)    Her pain (back) is much improved. It is a 0/10 at this time. She feels that this is much improved.     ROS  General ROS: negative  Ophthalmic ROS: negative  ENT ROS: negative  Allergy and Immunology ROS: negative  Hematological and Lymphatic ROS: negative  Endocrine ROS: negative  Respiratory ROS: no cough, shortness of breath, or  wheezing  Cardiovascular ROS: no chest pain or dyspnea on exertion  Gastrointestinal ROS: no abdominal pain, change in bowel habits, or black or bloody stools  Genito-Urinary ROS: no dysuria, trouble voiding, or hematuria  Musculoskeletal ROS: negative  Neurological ROS: no TIA or stroke symptoms  Dermatological ROS: negative    PAST MEDICAL HISTORY   Past Medical History:   Diagnosis Date    Addiction to drug     ADHD (attention deficit hyperactivity disorder)     Anxiety     Asthma     Bipolar 1 disorder     Cervical pain     Depression     Fibromyalgia     Hallucination     History of psychiatric hospitalization     Hx of psychiatric care     Magalis     Psychiatric exam requested by authority     Psychiatric problem     Schizoaffective disorder     Spina bifida     Spinal stenosis of lumbar region     Substance abuse     Suicide attempt     Syringomyelia     Syrinx of spinal cord     Therapy     Trigger finger            PSYCHOTROPIC MEDICATIONS   Scheduled Meds:   buPROPion  150 mg Oral Daily    divalproex ER  500 mg Oral QHS    folic acid  1 mg Oral Daily    multivitamin  1 tablet Oral Daily    QUEtiapine  200 mg Oral QHS    QUEtiapine  50 mg Oral Daily    venlafaxine  150 mg Oral Daily     Continuous Infusions:  PRN Meds:.acetaminophen, aluminum-magnesium hydroxide-simethicone, docusate sodium, hydrOXYzine pamoate, loperamide, nicotine, OLANZapine **AND** OLANZapine        EXAMINATION    VITALS   Vitals:    09/14/18 0850   BP: 117/73   Pulse: 67   Resp: 18   Temp: 96.9 °F (36.1 °C)       CONSTITUTIONAL  General Appearance: WF, in casual garb; NAD     MUSCULOSKELETAL  Muscle Strength and Tone:  normal  Abnormal Involuntary Movements:  none  Gait and Station:  normal; non-ataxic     PSYCHIATRIC   Level of Consciousness: awake, alert  Orientation: p/p/t/s  Grooming: improved, adequate to circumstances  Psychomotor Behavior: no PMA/R  Speech: nl r/t/v/s  Language:  English  "fluent  Mood: "ok"  Affect: less decreased range, less guarded, less anxious, less dysthymic  Thought Process:  linear and organized  Associations:  intact; no SHAGUFTA  Thought Content:  less AH, no VH, no apparent paranoid delusions; denied HI, no SI  Memory:  intact to recent and remote events  Attention:  intact to conversation; not distractible   Fund of Knowledge:  age and education appropriate  Estimate if Intelligence:  average based on work/education history, vocabulary and mental status exam  Insight:  good- seeks help, recognizes illness  Judgment:   good- no bx issues, compliant and cooperative             DIAGNOSTIC TESTING   Laboratory Results  Recent Results (from the past 24 hour(s))   Valproic Acid    Collection Time: 09/14/18  6:31 AM   Result Value Ref Range    Valproic Acid Lvl 22.0 (L) 50.0 - 100.0 ug/mL   Comprehensive metabolic panel    Collection Time: 09/14/18  6:31 AM   Result Value Ref Range    Sodium 140 136 - 145 mmol/L    Potassium 4.3 3.5 - 5.1 mmol/L    Chloride 104 95 - 110 mmol/L    CO2 29 23 - 29 mmol/L    Glucose 76 70 - 110 mg/dL    BUN, Bld 13 6 - 20 mg/dL    Creatinine 0.7 0.5 - 1.4 mg/dL    Calcium 9.0 8.7 - 10.5 mg/dL    Total Protein 6.5 6.0 - 8.4 g/dL    Albumin 3.4 (L) 3.5 - 5.2 g/dL    Total Bilirubin 0.2 0.1 - 1.0 mg/dL    Alkaline Phosphatase 69 55 - 135 U/L    AST 12 10 - 40 U/L    ALT 15 10 - 44 U/L    Anion Gap 7 (L) 8 - 16 mmol/L    eGFR if African American >60 >60 mL/min/1.73 m^2    eGFR if non African American >60 >60 mL/min/1.73 m^2         ASSESSMENT      IMPRESSION   Schizoaffective Disorder, depressed type  Unspecified Anxiety Disorder  PTSD  H/o ADHD  Fibromyalgia      Polysubstance dependence (Methamphetamines, cannabis, alcohol, narcotics; methamphetamines current, continuous, severe, without physiological dependence)  Nicotine Dependence     Psychosocial stressors           MEDICAL DECISION MAKING          PROBLEM LIST AND MANAGEMENT PLANS; PRESCRIPTION DRUG " MANAGEMENT  Compliance: yes  Side Effects: no  Regimen Adjustments:      Psychosis: Continue Seroquel 50 mg po q AM and 200 mg po q HS     Depression: Increase Effexor XR to 150 mg po q day; continue Wellbutrin  mg po q day     Anxiety: Effexor as above; depakote off-label as below  9/14 level of 22    ADHD: Wellbutrin as above     Pain: effexor as above (off-label); Continue Depakote 500 mg po q HS (off-label)- check level tomorrow AM with CMP (on Friday AM)     Substance use: pt counseled; outpatient tx once stable; Wellbutrin off-label as above     Nicotine use: pt counseled; nicotine patch daily; Wellbutrin off-label as above     Psychosocial stressors: pt counseled; SW assisting with resources        DIAGNOSTIC TESTING  Labs reviewed; follow up pending labs     Disposition:  -SW to assist with aftercare planning and collateral  -Once stable discharge home with outpatient follow up care and/or rehab  -Continue inpatient treatment under a PEC and/or CEC for danger to self and grave disability as evident by depression with SI and psychosis with heavy addiction issues and psychosocial stressors.        NEED FOR CONTINUED HOSPITALIZATION  Psychiatric illness continues to pose a potential threat to life or bodily function, of self or others, thereby requiring the need for continued inpatient psychiatric hospitalization: Yes    Protective inpatient pyschiatric hospitalization required while a safe disposition plan is enacted: Yes    Patient stabilized and ready for discharge from inpatient psychiatric unit: No      STAFF:   Julio Almazan MD  Psychiatry

## 2018-09-14 NOTE — PROGRESS NOTES
09/14/18 1030   [facility] Group Therapy   Group Name Leisure Skills Training   Specific Interventions Cognitive Stimulation Training  (creative thinking/memory recall/improve concentration)   Participation Level Active;Sharing   Participation Quality Cooperative;Social   Insight/Motivation Applies New Skills;Good   Affect/Mood Display Appropriate   Cognition Alert   Psychomotor WNL   Patient interest was stimulated, maintained attention span/focused on activity. Patient read, wrote and shared answers.

## 2018-09-15 VITALS
HEART RATE: 81 BPM | TEMPERATURE: 97 F | BODY MASS INDEX: 33.84 KG/M2 | HEIGHT: 63 IN | WEIGHT: 191 LBS | DIASTOLIC BLOOD PRESSURE: 74 MMHG | SYSTOLIC BLOOD PRESSURE: 111 MMHG | RESPIRATION RATE: 20 BRPM

## 2018-09-15 PROBLEM — F32.A DEPRESSION WITH SUICIDAL IDEATION: Status: RESOLVED | Noted: 2018-09-09 | Resolved: 2018-09-15

## 2018-09-15 PROBLEM — R45.851 DEPRESSION WITH SUICIDAL IDEATION: Status: RESOLVED | Noted: 2018-09-09 | Resolved: 2018-09-15

## 2018-09-15 PROCEDURE — 25000003 PHARM REV CODE 250: Performed by: PSYCHIATRY & NEUROLOGY

## 2018-09-15 PROCEDURE — 99239 HOSP IP/OBS DSCHRG MGMT >30: CPT | Mod: AF,HB,, | Performed by: PSYCHIATRY & NEUROLOGY

## 2018-09-15 RX ORDER — DIVALPROEX SODIUM 500 MG/1
500 TABLET, FILM COATED, EXTENDED RELEASE ORAL NIGHTLY
Qty: 30 TABLET | Refills: 1 | Status: ON HOLD | OUTPATIENT
Start: 2018-09-15 | End: 2019-01-29 | Stop reason: HOSPADM

## 2018-09-15 RX ORDER — QUETIAPINE FUMARATE 50 MG/1
50 TABLET, FILM COATED ORAL DAILY
Qty: 30 TABLET | Refills: 1 | Status: ON HOLD | OUTPATIENT
Start: 2018-09-16 | End: 2019-01-29 | Stop reason: HOSPADM

## 2018-09-15 RX ORDER — IBUPROFEN 200 MG
1 TABLET ORAL DAILY
Status: ON HOLD | COMMUNITY
Start: 2018-09-15 | End: 2019-03-17 | Stop reason: HOSPADM

## 2018-09-15 RX ORDER — FOLIC ACID 1 MG/1
1 TABLET ORAL DAILY
Qty: 30 TABLET | Refills: 1 | Status: SHIPPED | OUTPATIENT
Start: 2018-09-16 | End: 2019-05-30

## 2018-09-15 RX ORDER — BUPROPION HYDROCHLORIDE 150 MG/1
150 TABLET ORAL DAILY
Qty: 30 TABLET | Refills: 1 | Status: ON HOLD | OUTPATIENT
Start: 2018-09-16 | End: 2019-01-29 | Stop reason: HOSPADM

## 2018-09-15 RX ORDER — QUETIAPINE FUMARATE 200 MG/1
200 TABLET, FILM COATED ORAL NIGHTLY
Qty: 30 TABLET | Refills: 1 | Status: ON HOLD | OUTPATIENT
Start: 2018-09-15 | End: 2019-01-29 | Stop reason: HOSPADM

## 2018-09-15 RX ORDER — VENLAFAXINE HYDROCHLORIDE 150 MG/1
150 CAPSULE, EXTENDED RELEASE ORAL DAILY
Qty: 30 CAPSULE | Refills: 1 | Status: ON HOLD | OUTPATIENT
Start: 2018-09-16 | End: 2019-01-29 | Stop reason: HOSPADM

## 2018-09-15 RX ADMIN — VENLAFAXINE HYDROCHLORIDE 150 MG: 150 CAPSULE, EXTENDED RELEASE ORAL at 08:09

## 2018-09-15 RX ADMIN — FOLIC ACID 1 MG: 1 TABLET ORAL at 08:09

## 2018-09-15 RX ADMIN — THERA TABS 1 TABLET: TAB at 08:09

## 2018-09-15 RX ADMIN — QUETIAPINE FUMARATE 50 MG: 25 TABLET ORAL at 08:09

## 2018-09-15 RX ADMIN — BUPROPION HYDROCHLORIDE 150 MG: 150 TABLET, EXTENDED RELEASE ORAL at 08:09

## 2018-09-15 NOTE — PROGRESS NOTES
PSYCHOTHERAPY ADD-ON +[id number]   30 (16-37*) minutes    Time: 16 minutes  Participants: Met with patient    Therapeutic Intervention Type: insight oriented psychotherapy, behavior modifying psychotherapy, supportive psychotherapy  Why chosen therapy is appropriate versus another modality: relevant to diagnosis, patient responds to this modality, evidence based practice    Target symptoms: depression, anxiety , substance abuse, mood disorder, psychosis  Primary focus: mood, psychosis, substance use  Psychotherapeutic techniques: supportive, behavioral and motivational techniques; psycho-education; safety plan and wrap up session; identifying support system    Outcome monitoring methods: self-report, observation    Patient's response to intervention:  The patient's response to intervention is accepting.    Progress toward goals:  The patient's progress toward goals is good.    Salomón Nettles MD  Psychiatry

## 2018-09-15 NOTE — PLAN OF CARE
Problem: Patient Care Overview (Adult)  Goal: Plan of Care Review  Outcome: Ongoing (interventions implemented as appropriate)  Patient calm and cooperative, isolative in rooms. Taking medications as ordered, appetite good, vs stable., denies any SI/HI. Will continue to monitor patient.

## 2018-09-15 NOTE — PLAN OF CARE
Problem: Overarching Goals (Adult)  Goal: Optimized Coping Skills in Response to Life Stressors    Intervention: Promote Effective Coping Strategies  Group Note        Behavior    Patient attended group psychotherapy this morning. Patient exhibited severe anxiety as evidenced by a tendency to exhibit racing thoughts.         Intervention       CBT-based group psychotherapy focusing on the relevance of self-reevaluation concerning the problem behavior and recognizing when and how this behavior conflicts with personal values and life goals.         Response    Patient stated that she thinks some things she can do is get to sleep on time, wake up and read some thing inspirational and go for long walks to help her in getting ready to interact with others, She agreed that looking at what she can do to minimize her anxiety is a good exercise.         Plan     Patient will be encouraged to continue to explore internal and external coping strategies as well as to look on the impact of her behavior on others.
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
## 2018-09-15 NOTE — NURSING
Pt discharge arranged for today.  All belongings accounted for and will be returned upon leaving.  Discharge instructions and meds reviewed with pt, understanding verbalized.  Denies any S/I, H/I or any other acute distress at this time.  Pt will be following up with [facility] out-pt services in [place] [address].  [phone].  Appointment is on 9/25/18 at 4pm.  Pt will receive a tobacco cessation appointment and substance abuse therapy appointment at mentioned appointment due to a positive UDS.  AVS was faxed at 2027 on 9/18/18.

## 2019-01-19 PROBLEM — M79.606 LEG PAIN: Status: ACTIVE | Noted: 2019-01-19

## 2019-01-19 PROBLEM — S86.892A LEFT MEDIAL TIBIAL STRESS SYNDROME: Status: ACTIVE | Noted: 2019-01-19

## 2019-01-25 ENCOUNTER — HOSPITAL ENCOUNTER (INPATIENT)
Facility: HOSPITAL | Age: 34
LOS: 5 days | Discharge: OTHER INSTITUTION | DRG: 885 | End: 2019-01-30
Attending: PSYCHIATRY & NEUROLOGY | Admitting: PSYCHIATRY & NEUROLOGY
Payer: MEDICAID

## 2019-01-25 DIAGNOSIS — R45.851 DEPRESSION WITH SUICIDAL IDEATION: ICD-10-CM

## 2019-01-25 DIAGNOSIS — F25.1 SCHIZOAFFECTIVE DISORDER, DEPRESSIVE TYPE: Primary | ICD-10-CM

## 2019-01-25 DIAGNOSIS — F32.A DEPRESSION WITH SUICIDAL IDEATION: ICD-10-CM

## 2019-01-25 PROCEDURE — 11400000 HC PSYCH PRIVATE ROOM

## 2019-01-25 PROCEDURE — 25000003 PHARM REV CODE 250: Performed by: PSYCHIATRY & NEUROLOGY

## 2019-01-25 RX ORDER — OLANZAPINE 10 MG/1
10 TABLET ORAL EVERY 8 HOURS PRN
Status: DISCONTINUED | OUTPATIENT
Start: 2019-01-25 | End: 2019-01-30 | Stop reason: HOSPADM

## 2019-01-25 RX ORDER — LOPERAMIDE HYDROCHLORIDE 2 MG/1
2 CAPSULE ORAL
Status: DISCONTINUED | OUTPATIENT
Start: 2019-01-25 | End: 2019-01-30 | Stop reason: HOSPADM

## 2019-01-25 RX ORDER — OLANZAPINE 10 MG/2ML
10 INJECTION, POWDER, FOR SOLUTION INTRAMUSCULAR EVERY 8 HOURS PRN
Status: DISCONTINUED | OUTPATIENT
Start: 2019-01-25 | End: 2019-01-30 | Stop reason: HOSPADM

## 2019-01-25 RX ORDER — DIVALPROEX SODIUM 500 MG/1
500 TABLET, DELAYED RELEASE ORAL NIGHTLY
Status: DISCONTINUED | OUTPATIENT
Start: 2019-01-25 | End: 2019-01-30 | Stop reason: HOSPADM

## 2019-01-25 RX ORDER — QUETIAPINE FUMARATE 100 MG/1
100 TABLET, FILM COATED ORAL NIGHTLY
Status: DISCONTINUED | OUTPATIENT
Start: 2019-01-25 | End: 2019-01-26

## 2019-01-25 RX ORDER — ACETAMINOPHEN 325 MG/1
650 TABLET ORAL EVERY 6 HOURS PRN
Status: DISCONTINUED | OUTPATIENT
Start: 2019-01-25 | End: 2019-01-30 | Stop reason: HOSPADM

## 2019-01-25 RX ORDER — FOLIC ACID 1 MG/1
1 TABLET ORAL DAILY
Status: DISCONTINUED | OUTPATIENT
Start: 2019-01-26 | End: 2019-01-30 | Stop reason: HOSPADM

## 2019-01-25 RX ORDER — MAG HYDROX/ALUMINUM HYD/SIMETH 200-200-20
30 SUSPENSION, ORAL (FINAL DOSE FORM) ORAL EVERY 6 HOURS PRN
Status: DISCONTINUED | OUTPATIENT
Start: 2019-01-25 | End: 2019-01-30 | Stop reason: HOSPADM

## 2019-01-25 RX ORDER — IBUPROFEN 200 MG
1 TABLET ORAL DAILY PRN
Status: DISCONTINUED | OUTPATIENT
Start: 2019-01-25 | End: 2019-01-30 | Stop reason: HOSPADM

## 2019-01-25 RX ORDER — HYDROXYZINE PAMOATE 50 MG/1
50 CAPSULE ORAL NIGHTLY PRN
Status: DISCONTINUED | OUTPATIENT
Start: 2019-01-25 | End: 2019-01-30 | Stop reason: HOSPADM

## 2019-01-25 RX ORDER — DOCUSATE SODIUM 100 MG/1
100 CAPSULE, LIQUID FILLED ORAL DAILY PRN
Status: DISCONTINUED | OUTPATIENT
Start: 2019-01-25 | End: 2019-01-30 | Stop reason: HOSPADM

## 2019-01-25 RX ADMIN — QUETIAPINE FUMARATE 100 MG: 100 TABLET ORAL at 08:01

## 2019-01-25 RX ADMIN — DIVALPROEX SODIUM 500 MG: 500 TABLET, DELAYED RELEASE ORAL at 08:01

## 2019-01-25 NOTE — PLAN OF CARE
Problem: Impaired Control (Excessive Substance Use)  Goal: Participates in Recovery Program (Excessive Substance Use)  Outcome: Ongoing (interventions implemented as appropriate)  Pt will be committed to maintaining sobriety by accepting placement in a rehab facility from here by day 3.  Pt will actively participate in treatment to promote recovery.

## 2019-01-25 NOTE — PLAN OF CARE
Problem: Adult Behavioral Health Plan of Care  Goal: Patient-Specific Goal (Individualization)  Outcome: Ongoing (interventions implemented as appropriate)  Admit Note:  Pt received from [facility] ryan, pec'd by dr Velasco.  Arrived on [facility] at 1555, accompanied by tech and .  Pt wanded outside unit.  Ambulatory and skin assessment/body search complete.  Pt has some swelling to her left lower leg just above the ankle and she reports some tingling to her left foot.  No discoloration, pedal pulse palpable.  Pt reports injury to leg about a month ago, states it was xrayed at [place], results negative per pt.  Normal gait.  Pt states she went to her out-pt psychiatrist today and he recommended she come back to the hospital after his eval.  Pt has been off meds for about a month.  She is positive for methamphetamines.  She is visibly restless and paranoid/fearful.  Reports that she believes someone might be trying to kill her.  Thinks songs on the radio are about her and always feels like she is being watched.  Pt is restless and tearful on admission interview but cooperative and nonviolent.  Pt has a long Hx of in-pt admissions with substance abuse and med noncompliance.  Pt wishes to be back on her medication so she can stop being so afraid and get her life back on track.  States she is willing to go to rehab from here.  States she is homeless at the moment.  Pt also reports auditory hallucinations, last being yesterday morning.  Denies any thoughts of self-harm at this time but states she has had them at times for brief periods.  Denies any intent, able to contract for safety.  Pt oriented to unit.  Instructed to notify staff of any needs or concerns, understanding verbalized.  Will continue to monitor mood and behavior.

## 2019-01-25 NOTE — PLAN OF CARE
Problem: Sensory Perception Impairment (Psychotic Signs/Symptoms)  Goal: Decreased Frequency and Intensity of Sensory Symptoms (Psychotic Signs/Symptoms)  Outcome: Ongoing (interventions implemented as appropriate)  Pt will voice an improvement in feelings of paranoia and report a decrease in the frequency of auditory hallucinations by day 4 through adherence with medications.

## 2019-01-25 NOTE — PLAN OF CARE
Problem: Feelings of Worthlessness, Hopelessness, Excessive Guilt (Depressive Signs/Symptoms)  Goal: Enhanced Self-Esteem and Confidence (Depressive Signs/Symptoms)  Outcome: Ongoing (interventions implemented as appropriate)  Pt will be able to state 3 things she likes about herself by day 4 and voice her short-term and long-term goals to maintain an improved mood once she leaves the hospital.

## 2019-01-26 PROCEDURE — 99233 SBSQ HOSP IP/OBS HIGH 50: CPT | Mod: AF,HB,, | Performed by: PSYCHIATRY & NEUROLOGY

## 2019-01-26 PROCEDURE — 25000003 PHARM REV CODE 250: Performed by: FAMILY MEDICINE

## 2019-01-26 PROCEDURE — 11400000 HC PSYCH PRIVATE ROOM

## 2019-01-26 PROCEDURE — 25000003 PHARM REV CODE 250: Performed by: PSYCHIATRY & NEUROLOGY

## 2019-01-26 PROCEDURE — 90833 PR PSYCHOTHERAPY W/PATIENT W/E&M, 30 MIN (ADD ON): ICD-10-PCS | Mod: AF,HB,, | Performed by: PSYCHIATRY & NEUROLOGY

## 2019-01-26 PROCEDURE — 99231 PR SUBSEQUENT HOSPITAL CARE,LEVL I: ICD-10-PCS | Mod: ,,, | Performed by: FAMILY MEDICINE

## 2019-01-26 PROCEDURE — 99233 PR SUBSEQUENT HOSPITAL CARE,LEVL III: ICD-10-PCS | Mod: AF,HB,, | Performed by: PSYCHIATRY & NEUROLOGY

## 2019-01-26 PROCEDURE — 90833 PSYTX W PT W E/M 30 MIN: CPT | Mod: AF,HB,, | Performed by: PSYCHIATRY & NEUROLOGY

## 2019-01-26 PROCEDURE — 99231 SBSQ HOSP IP/OBS SF/LOW 25: CPT | Mod: ,,, | Performed by: FAMILY MEDICINE

## 2019-01-26 RX ORDER — MUPIROCIN 20 MG/G
OINTMENT TOPICAL 2 TIMES DAILY
Status: DISCONTINUED | OUTPATIENT
Start: 2019-01-26 | End: 2019-01-30 | Stop reason: HOSPADM

## 2019-01-26 RX ORDER — ALBUTEROL SULFATE 2.5 MG/.5ML
2.5 SOLUTION RESPIRATORY (INHALATION) EVERY 6 HOURS PRN
Status: DISCONTINUED | OUTPATIENT
Start: 2019-01-26 | End: 2019-01-30 | Stop reason: HOSPADM

## 2019-01-26 RX ORDER — BUPROPION HYDROCHLORIDE 150 MG/1
150 TABLET ORAL DAILY
Status: DISCONTINUED | OUTPATIENT
Start: 2019-01-26 | End: 2019-01-30 | Stop reason: HOSPADM

## 2019-01-26 RX ORDER — QUETIAPINE FUMARATE 200 MG/1
200 TABLET, FILM COATED ORAL NIGHTLY
Status: DISCONTINUED | OUTPATIENT
Start: 2019-01-26 | End: 2019-01-27

## 2019-01-26 RX ADMIN — DIVALPROEX SODIUM 500 MG: 500 TABLET, DELAYED RELEASE ORAL at 08:01

## 2019-01-26 RX ADMIN — BUPROPION HYDROCHLORIDE 150 MG: 150 TABLET, EXTENDED RELEASE ORAL at 02:01

## 2019-01-26 RX ADMIN — THERA TABS 1 TABLET: TAB at 08:01

## 2019-01-26 RX ADMIN — MUPIROCIN: 20 OINTMENT TOPICAL at 08:01

## 2019-01-26 RX ADMIN — MUPIROCIN: 20 OINTMENT TOPICAL at 02:01

## 2019-01-26 RX ADMIN — FOLIC ACID 1 MG: 1 TABLET ORAL at 08:01

## 2019-01-26 RX ADMIN — QUETIAPINE FUMARATE 200 MG: 200 TABLET ORAL at 08:01
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
## 2019-01-26 NOTE — PSYCH
"Patient stated that she would come to group and never came to the session today.       Intervention     CBT-based group psychotherapy focused today on ways to manage depressive thoughts by re-framing them.       Response    Patient was very anxious when she stated she would attend group and she appeared to quickly walk to the restroom and stated "as soon as I wash my face I will be there."       Plan     To continue to encourage the patient to attend group psychotherapy with focused attention on safety planning development.  "

## 2019-01-26 NOTE — CONSULTS
[facility] Medicine  Consult Note    Patient Name: Maria Yancey  MRN: [id number]  Admission Date: 1/25/2019  Hospital Length of Stay: 1 days  Attending Physician: Julio Almazan MD   Primary Care Provider: Primary Doctor No           Patient information was obtained from patient and ER records.     Consults     History & Physical      SUBJECTIVE:     History of Present Illness:  Patient is a 33 y.o. female presents with depression, anxiety, polysubstance abuse. Admitted to [facility]. With pmh of fibromyalgia and asthma - ventolin prn    No past medical history other than psych. No complaints today.    PTA Medications   Medication Sig    buPROPion (WELLBUTRIN XL) 150 MG TB24 tablet Take 1 tablet (150 mg total) by mouth once daily.    divalproex ER (DEPAKOTE) 500 MG Tb24 Take 1 tablet (500 mg total) by mouth every evening.    folic acid (FOLVITE) 1 MG tablet Take 1 tablet (1 mg total) by mouth once daily.    gabapentin (NEURONTIN) 300 MG capsule Take 1 capsule (300 mg total) by mouth 3 (three) times daily.    HYDROcodone-acetaminophen (NORCO) 7.5-325 mg per tablet Take 1 tablet by mouth every 6 (six) hours as needed for Pain.    nicotine (NICODERM CQ) 14 mg/24 hr Place 1 patch onto the skin once daily.    QUEtiapine (SEROQUEL) 200 MG Tab Take 1 tablet (200 mg total) by mouth every evening.    QUEtiapine (SEROQUEL) 50 MG tablet Take 1 tablet (50 mg total) by mouth once daily.    venlafaxine (EFFEXOR-XR) 150 MG Cp24 Take 1 capsule (150 mg total) by mouth once daily.       Review of patient's allergies indicates:  No Known Allergies    Past Medical History:   Diagnosis Date    Addiction to drug     ADHD (attention deficit hyperactivity disorder)     Anxiety     Asthma     Bipolar 1 disorder     Cervical pain     Depression     Fibromyalgia     Hallucination     History of psychiatric hospitalization     Hx of psychiatric care     Magalis     Psychiatric exam requested by  authority     Psychiatric problem     Schizoaffective disorder     Spina bifida     Spinal stenosis of lumbar region     Substance abuse     Suicide attempt     Syringomyelia     Syrinx of spinal cord     Therapy     Trigger finger      Past Surgical History:   Procedure Laterality Date    CARPAL TUNNEL RELEASE       SECTION, CLASSIC      siactica      spinal bifida      spinalsonois       Family History   Problem Relation Age of Onset    Hypertension Mother     Diabetes Mother     Hyperlipidemia Mother     Hypertension Father     Arthritis Father     Diabetes Father     Hyperlipidemia Father      Social History     Tobacco Use    Smoking status: Current Every Day Smoker     Packs/day: 0.50     Years: 15.00     Pack years: 7.50     Types: Cigarettes    Smokeless tobacco: Never Used    Tobacco comment: smoking cessation handout provided and reviewed with pt   Substance Use Topics    Alcohol use: Yes     Alcohol/week: 0.0 oz     Comment: socially 1 glass of wine    Drug use: Yes     Types: Amphetamines     Comment: states last use was 3 days ago        Review of Systems:  Constitutional: no fever or chills  Respiratory: no cough or shorness of breath  Cardiovascular: no chest pain or palpitations  Gastrointestinal: no nausea or vomiting, no abdominal pain or change in bowel habits  Musculoskeletal: no arthralgias or myalgias  Psych: anxious, depressed  OBJECTIVE:     Vital Signs (Most Recent)  Temp: 96.7 °F (35.9 °C) (19)  Pulse: 72 (19)  Resp: 18 (19)  BP: (!) 94/46 (19)    Physical Exam:  General: well developed, well nourished  Lungs:  clear to auscultation bilaterally and normal respiratory effort  Cardiovascular: Heart: regular rate and rhythm, S1, S2 normal, no murmur, click, rub or gallop. Chest Wall: no tenderness. Extremities: no cyanosis or edema, or clubbing. Pulses: 2+ and symmetric.  Abdomen/Rectal: Abdomen: soft, non-tender  non-distented; bowel sounds normal; no masses,  no organomegaly. Rectal: not examined  Skin: Skin color, texture, turgor normal. No rashes. Lesion to her chin  Musculoskeletal:normal gait  Psych: pleasant  Neuro: Cranial nerves:  CN II Visual fields full to confrontation.   CN III, IV, VI Pupils are equal, round, and reactive to light.  CN III: no palsy  Nystagmus: none   Diplopia: none  Ophthalmoparesis: none  CN V Facial sensation intact.   CN VII Facial expression full, symmetric.   CN VIII normal.   CN IX normal.   CN X normal.   CN XI normal.   CN XII normal.        Laboratory  CBC:   Recent Labs   Lab 01/25/19  1354   WBC 5.39   RBC 4.47   HGB 12.0   HCT 39.0      MCV 87   MCH 26.8*   MCHC 30.8*     CMP:   Recent Labs   Lab 01/25/19  1354   GLU 82   CALCIUM 9.3   ALBUMIN 3.9   PROT 7.5      K 4.2   CO2 28      BUN 10   CREATININE 0.8   ALKPHOS 88   ALT 14   AST 14   BILITOT 0.4     Recent Labs   Lab 01/25/19  1346   COLORU Yellow   SPECGRAV 1.015   PHUR >8.0   PROTEINUA Trace*   NITRITE Negative   LEUKOCYTESUR Negative   UROBILINOGEN Negative     TSH   Date Value Ref Range Status   09/08/2018 1.085 0.400 - 4.000 uIU/mL Final     No results found for this or any previous visit.  Alcohol, Medical, Serum   Date Value Ref Range Status   01/25/2019 <10 <10 mg/dL Final     Acetaminophen (Tylenol), Serum   Date Value Ref Range Status   01/25/2019 <3.0 (L) 10.0 - 20.0 ug/mL Final     Comment:     Toxic Levels:  Adults (4 hr post-ingestion).........>150 ug/mL  Adults (12 hr post-ingestion)........>40 ug/mL  Peds (2 hr post-ingestion, liquid)...>225 ug/mL       Results for orders placed or performed during the hospital encounter of 01/25/19   Salicylate level   Result Value Ref Range    Salicylate Lvl <5.0 (L) 15.0 - 30.0 mg/dL     Results for orders placed or performed during the hospital encounter of 01/25/19   Drug screen panel, emergency   Result Value Ref Range    Benzodiazepines Negative      Methadone metabolites Negative     Cocaine (Metab.) Negative     Opiate Scrn, Ur Negative     Barbiturate Screen, Ur Negative     Amphetamine Screen, Ur Presumptive Positive     THC Negative     Phencyclidine Negative     Creatinine, Random Ur 130.6 15.0 - 325.0 mg/dL    Toxicology Information SEE COMMENT      Preg Test, Ur   Date Value Ref Range Status   01/25/2019 Negative  Final       ASSESSMENT/PLAN:     Active Hospital Problems    Diagnosis  POA    *Schizoaffective disorder, depressive type [F25.1]  Yes    Depression with suicidal ideation [F32.9, R45.851]  Not Applicable    PTSD (post-traumatic stress disorder) [F43.10]  Yes    Polysubstance dependence [F19.20]  Yes    Fibromyalgia [M79.7]  Yes      Resolved Hospital Problems   No resolved problems to display.       Plan: Further orders for psych      Assessment/Plan:     * Schizoaffective disorder, depressive type    As per psych       PTSD (post-traumatic stress disorder)    Orders per psyche       Fibromyalgia    Noted.  Warmth, nsaids, pain control, maximize sleep and exercise.         VTE Risk Mitigation (From admission, onward)    None              Thank you for your consult. I will sign off. Please contact us if you have any additional questions.    Vanita Minaya MD  Department of Hospital Medicine   [facility]

## 2019-01-26 NOTE — PLAN OF CARE
Problem: Adult Behavioral Health Plan of Care  Goal: Plan of Care Review  Outcome: Ongoing (interventions implemented as appropriate)  Plan of care reviewed with patient, patient agrees with plan. Denies suicidal ideations and homicidal ideations at this time. Accepts meals, snacks and medication. Gait steady, no falls. Clear pathways and clutter-free environment maintained. Resting in bed most of time, but out on unit more today. Promoted an individualized safety plan, effective coping skills, a drug-free lifestyle, clarity of thought and reality-based interactions, and resolution of depression.  Will continue to monitor for safety.

## 2019-01-26 NOTE — PLAN OF CARE
Problem: Adult Behavioral Health Plan of Care  Goal: Plan of Care Review  Outcome: Ongoing (interventions implemented as appropriate)  Plan of care reviewed with patient, patient agrees with plan. Denies suicidal ideations and homicidal ideations at this time. Accepts meals, snacks and medication. Gait steady, no falls. Clear pathways and clutter-free environment maintained. Resting in bed most of time. Promoted an individualized safety plan, effective coping skills, a drug-free lifestyle, clarity of thought and reality-based interactions, and resolution of depression.  Will continue to monitor for safety.
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
## 2019-01-26 NOTE — NURSING
Patient resting quietly in bed with eyes closed.  Respirations easy and unlabored appears asleep.  Slept well for 10 hours.  Safety maintained with rounds every 15 minutes. Bed fixed at lowest position.  Path ways kept clear.  No fall occured .

## 2019-01-27 PROCEDURE — 99233 SBSQ HOSP IP/OBS HIGH 50: CPT | Mod: AF,HB,, | Performed by: PSYCHIATRY & NEUROLOGY

## 2019-01-27 PROCEDURE — 25000003 PHARM REV CODE 250: Performed by: PSYCHIATRY & NEUROLOGY

## 2019-01-27 PROCEDURE — 99233 PR SUBSEQUENT HOSPITAL CARE,LEVL III: ICD-10-PCS | Mod: AF,HB,, | Performed by: PSYCHIATRY & NEUROLOGY

## 2019-01-27 PROCEDURE — 11400000 HC PSYCH PRIVATE ROOM

## 2019-01-27 RX ORDER — QUETIAPINE FUMARATE 300 MG/1
300 TABLET, FILM COATED ORAL NIGHTLY
Status: DISCONTINUED | OUTPATIENT
Start: 2019-01-27 | End: 2019-01-30 | Stop reason: HOSPADM

## 2019-01-27 RX ADMIN — MUPIROCIN: 20 OINTMENT TOPICAL at 08:01

## 2019-01-27 RX ADMIN — BUPROPION HYDROCHLORIDE 150 MG: 150 TABLET, EXTENDED RELEASE ORAL at 08:01

## 2019-01-27 RX ADMIN — FOLIC ACID 1 MG: 1 TABLET ORAL at 08:01

## 2019-01-27 RX ADMIN — QUETIAPINE FUMARATE 300 MG: 300 TABLET ORAL at 08:01

## 2019-01-27 RX ADMIN — DIVALPROEX SODIUM 500 MG: 500 TABLET, DELAYED RELEASE ORAL at 08:01

## 2019-01-27 RX ADMIN — THERA TABS 1 TABLET: TAB at 08:01

## 2019-01-27 NOTE — PLAN OF CARE
Problem: Adult Behavioral Health Plan of Care  Goal: Plan of Care Review  Outcome: Ongoing (interventions implemented as appropriate)   01/26/19 1951   Plan of Care Review   Plan of Care Reviewed With patient   Progress improving     Patient was in her room until it was time for a shower.  She states that she is feeling better since she has been able to rest, and that she had not been able to rest well because she has been homeless.  She is calm and cooperative with staff.  Patient denies SI, HI, or plan at this time.

## 2019-01-27 NOTE — PLAN OF CARE
Problem: Adult Behavioral Health Plan of Care  Goal: Plan of Care Review  Outcome: Ongoing (interventions implemented as appropriate)  Pt has slept 8 hours with one interruption so far.  NAD.  Resp even & unlabored.  Pathways clear and bed is low.  Q 15 minute safety checks ongoing.  All precautions maintained.

## 2019-01-27 NOTE — PROGRESS NOTES
PSYCHIATRY DAILY INPATIENT PROGRESS NOTE  SUBSEQUENT HOSPITAL VISIT    ENCOUNTER DATE: 1/27/2019  [facility]    DATE OF ADMISSION: 1/25/2019  4:03 PM  LENGTH OF STAY: 2 days      HISTORY    CHIEF COMPLAINT   Maria Yancey is a 33 y.o. female, seen during daily manzo rounds on the inpatient unit.  Maria Yancey presents with the chief complaint of depression/SI        HPI   (Elements: Location, Quality, Severity, Duration, Timing, Content, Modifying Factors, Associated Signs & Symptoms)    The patient was seen and examined. The chart was reviewed. Reviewed notes by RNs, LPNs, LPC and MD from the last 24 hours.     Staff reports no behavioral or management issues.     The patient has been compliant with treatment. The patient denied any side effects.    Symptoms are reported to be unchanged for yesterday as documented below.     Continued Symptoms of Depression: +diminished mood or loss of interest/anhedonia; +irritability, +diminished energy, +change in sleep, +change in appetite, +diminished concentration or cognition or indecisiveness, no PMA/R, +excessive guilt or hopelessness or worthlessness, +suicidal ideations     Continued Changes in Sleep: +trouble with initiation/maintenance, no early morning awakening with inability to return to sleep     Continued Suicidal/(no)Homicidal ideations: +active/passive ideations, +organized plans, no future intentions     Continued Symptoms of psychosis: +hallucinations, +delusions, no disorganized thinking, no disorganized behavior or abnormal motor behavior, no negative symptoms     Denied past or current Symptoms of esther or hypomania: no elevated, expansive, or irritable mood with no increased energy or activity; with no inflated self-esteem or grandiosity, decreased need for sleep, increased rate of speech, FOI or racing thoughts, distractibility, increased goal directed activity or PMA, or risky/disinhibited behavior     Continued Symptoms of JORDIN: +excessive  anxiety/worry/fear, +more days than not, not about numerous issues, +difficult to control, with +restlessness, +fatigue, +poor concentration, +irritability, +muscle tension, +sleep disturbance; +causes functionally impairing distress      Controlled Symptoms of Panic Disorder: no panic attacks since admission; with agoraphobia     Continued Symptoms of PTSD: +h/o trauma; +re-experiencing/intrusive symptoms, +avoidant behavior, +negative alterations in cognition or mood, +hyperarousal symptoms; without dissociative symptoms     +Substance Use (amphetamines: positive for intoxication, withdrawal, tolerance, used in larger amounts or duration than intended, unsuccessful attempts to limit or quit, increased time engaging in or seeking out, cravings or strong desire to use, failure to fulfill obligations, negative consequences in social/interpersonal/occupational,/recreational areas, use in dangerous situations, and medical or psychological consequences; +polysubstance use  +meth withdrawals       ROS  General ROS: negative  Ophthalmic ROS: negative  ENT ROS: negative  Allergy and Immunology ROS: negative  Hematological and Lymphatic ROS: negative  Endocrine ROS: negative  Respiratory ROS: no cough, shortness of breath, or wheezing  Cardiovascular ROS: no chest pain or dyspnea on exertion  Gastrointestinal ROS: no abdominal pain, change in bowel habits, or black or bloody stools  Genito-Urinary ROS: no dysuria, trouble voiding, or hematuria  Musculoskeletal ROS: negative  Neurological ROS: no TIA or stroke symptoms  Dermatological ROS: negative    PAST MEDICAL HISTORY   Past Medical History:   Diagnosis Date    Addiction to drug     ADHD (attention deficit hyperactivity disorder)     Anxiety     Asthma     Bipolar 1 disorder     Cervical pain     Depression     Fibromyalgia     Hallucination     History of psychiatric hospitalization     Hx of psychiatric care     Magalis     Psychiatric exam requested by  "authority     Psychiatric problem     Schizoaffective disorder     Spina bifida     Spinal stenosis of lumbar region     Substance abuse     Suicide attempt     Syringomyelia     Syrinx of spinal cord     Therapy     Trigger finger            PSYCHOTROPIC MEDICATIONS   Scheduled Meds:   buPROPion  150 mg Oral Daily    divalproex  500 mg Oral QHS    folic acid  1 mg Oral Daily    multivitamin  1 tablet Oral Daily    mupirocin   Topical (Top) BID    QUEtiapine  200 mg Oral QHS     Continuous Infusions:  PRN Meds:.acetaminophen, albuterol sulfate, aluminum-magnesium hydroxide-simethicone, docusate sodium, hydrOXYzine pamoate, loperamide, nicotine, OLANZapine **AND** OLANZapine        EXAMINATION    VITALS   Vitals:    01/27/19 0731   BP: 125/70   Pulse: 76   Resp: 18   Temp: 97 °F (36.1 °C)     Body mass index is 33.27 kg/m².'      CONSTITUTIONAL  General Appearance: WF, in hospital garb; NAD     MUSCULOSKELETAL  Muscle Strength and Tone:  normal  Abnormal Involuntary Movements:  none  Gait and Station:  normal; non-ataxic     PSYCHIATRIC   Level of Consciousness: awake, alert  Orientation: p/p/t/s  Grooming: diminished and inadequate to circumstances  Psychomotor Behavior: no PMA/R  Speech: nl r/t/v/s  Language:  English fluent  Mood: "about the same"  Affect: decreased range, guarded, anxious, dysthymic  Thought Process:  linear and organized  Associations:  intact; no SHAGUFTA  Thought Content:  +AVH/delusions; denied HI, +SI  Memory:  intact to recent and remote events  Attention:  intact to conversation; not distractible   Fund of Knowledge:  age and education appropriate  Estimate if Intelligence:  average based on work/education history, vocabulary and mental status exam  Insight:  good- seeks help, recognizes illness  Judgment:   good- no bx issues, compliant and cooperative                DIAGNOSTIC TESTING   Laboratory Results  No results found for this or any previous visit (from the past 24 " hour(s)).      ASSESSMENT      IMPRESSION   Schizoaffective Disorder, depressed type  Unspecified Anxiety Disorder  PTSD  Fibromyalgia      Polysubstance dependence (Methamphetamines, cannabis, alcohol, narcotics; methamphetamines current, continuous, severe, without physiological dependence)  Nicotine Dependence     Psychosocial stressors           MEDICAL DECISION MAKING          PROBLEM LIST AND MANAGEMENT PLANS; PRESCRIPTION DRUG MANAGEMENT  Compliance: yes  Side Effects: no  Regimen Adjustments:      Psychosis: increase Serqouel to 300 mg po q H; adjunctive depakote as below     Depression: continue Wellbutrin  mg po q day; consider re-trial of effexor     Anxiety: depakote off-label as below; wellbutrin off-label as above     Pain: cotninue Depakote 500 mg po q HS (off-label)- check level in 3 days     Substance use: pt counseled; outpatient tx once stable vs rehab if pt willing     Nicotine use: pt counseled; nicotine patch daily     Psychosocial stressors: pt counseled; SW assisting with resources        DIAGNOSTIC TESTING  Labs reviewed; follow up pending labs     Disposition:  -SW to assist with aftercare planning and collateral  -Once stable discharge home with outpatient follow up care and/or rehab  -Continue inpatient treatment under a PEC and/or CEC for danger to self and grave disability as evident by depression with SI and psychosis with heavy addiction issues and psychosocial stressors.        NEED FOR CONTINUED HOSPITALIZATION  Psychiatric illness continues to pose a potential threat to life or bodily function, of self or others, thereby requiring the need for continued inpatient psychiatric hospitalization: Yes    Protective inpatient pyschiatric hospitalization required while a safe disposition plan is enacted: Yes    Patient stabilized and ready for discharge from inpatient psychiatric unit: No      STAFF:   Salomón Nettles MD  Psychiatry

## 2019-01-27 NOTE — PLAN OF CARE
Problem: Adult Behavioral Health Plan of Care  Goal: Plan of Care Review  Outcome: Ongoing (interventions implemented as appropriate)  Pt calm and cooperative.  Somewhat anxious and restless.  Pt does not report any hallucinations at this time.  Still seems suspicious and guarded.  Pt denies any S/i and H/i at this time.  Encouraged pt to continue taking all meds, attending groups and report any issues to staff.  Pt verbalized understanding, will continue to monitor.

## 2019-01-28 PROCEDURE — 90833 PSYTX W PT W E/M 30 MIN: CPT | Mod: AF,HB,, | Performed by: PSYCHIATRY & NEUROLOGY

## 2019-01-28 PROCEDURE — 99233 SBSQ HOSP IP/OBS HIGH 50: CPT | Mod: AF,HB,, | Performed by: PSYCHIATRY & NEUROLOGY

## 2019-01-28 PROCEDURE — 90471 IMMUNIZATION ADMIN: CPT | Performed by: PSYCHIATRY & NEUROLOGY

## 2019-01-28 PROCEDURE — 63600175 PHARM REV CODE 636 W HCPCS: Performed by: PSYCHIATRY & NEUROLOGY

## 2019-01-28 PROCEDURE — 11400000 HC PSYCH PRIVATE ROOM

## 2019-01-28 PROCEDURE — 90833 PR PSYCHOTHERAPY W/PATIENT W/E&M, 30 MIN (ADD ON): ICD-10-PCS | Mod: AF,HB,, | Performed by: PSYCHIATRY & NEUROLOGY

## 2019-01-28 PROCEDURE — 99233 PR SUBSEQUENT HOSPITAL CARE,LEVL III: ICD-10-PCS | Mod: AF,HB,, | Performed by: PSYCHIATRY & NEUROLOGY

## 2019-01-28 PROCEDURE — 90686 IIV4 VACC NO PRSV 0.5 ML IM: CPT | Performed by: PSYCHIATRY & NEUROLOGY

## 2019-01-28 PROCEDURE — 25000003 PHARM REV CODE 250: Performed by: PSYCHIATRY & NEUROLOGY

## 2019-01-28 RX ADMIN — BUPROPION HYDROCHLORIDE 150 MG: 150 TABLET, EXTENDED RELEASE ORAL at 08:01

## 2019-01-28 RX ADMIN — QUETIAPINE FUMARATE 300 MG: 300 TABLET ORAL at 09:01

## 2019-01-28 RX ADMIN — DIVALPROEX SODIUM 500 MG: 500 TABLET, DELAYED RELEASE ORAL at 09:01

## 2019-01-28 RX ADMIN — INFLUENZA A VIRUS A/MICHIGAN/45/2015 X-275 (H1N1) ANTIGEN (FORMALDEHYDE INACTIVATED), INFLUENZA A VIRUS A/SINGAPORE/INFIMH-16-0019/2016 IVR-186 (H3N2) ANTIGEN (FORMALDEHYDE INACTIVATED), INFLUENZA B VIRUS B/PHUKET/3073/2013 ANTIGEN (FORMALDEHYDE INACTIVATED), AND INFLUENZA B VIRUS B/MARYLAND/15/2016 BX-69A ANTIGEN (FORMALDEHYDE INACTIVATED) 0.5 ML: 15; 15; 15; 15 INJECTION, SUSPENSION INTRAMUSCULAR at 05:01

## 2019-01-28 RX ADMIN — MUPIROCIN: 20 OINTMENT TOPICAL at 08:01

## 2019-01-28 RX ADMIN — THERA TABS 1 TABLET: TAB at 08:01

## 2019-01-28 RX ADMIN — FOLIC ACID 1 MG: 1 TABLET ORAL at 08:01

## 2019-01-28 RX ADMIN — MUPIROCIN: 20 OINTMENT TOPICAL at 09:01

## 2019-01-28 NOTE — PLAN OF CARE
Problem: Adult Behavioral Health Plan of Care  Goal: Plan of Care Review  Outcome: Ongoing (interventions implemented as appropriate)  Shift note : patient is in her room . She is taking all ordered medications and is eating all of her meals . She states that she was on medications but needs them to be changed so that they will work better . She is depressed . But hopeful .

## 2019-01-28 NOTE — PLAN OF CARE
"Problem: Adult Behavioral Health Plan of Care  Goal: Optimized Coping Skills in Response to Life Stressors    Intervention: Promote Effective Coping Strategies  Group Note:    Behavior:   The patient attended group, but did not participate significantly. She said she is feeling "really tired" from adjusting to her medications.    Intervention:  The counselor implemented a brief CBT-based approach to group therapy focused on clarifying the importance of effective, appropriate boundaries as well as strategies to improve boundaries.    Response:  She said she "never really thought about" boundaries before.    Plan:  Continue to encourage the patient to participate in milieu.                          "
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
## 2019-01-28 NOTE — PLAN OF CARE
Problem: Adult Behavioral Health Plan of Care  Goal: Plan of Care Review  Outcome: Ongoing (interventions implemented as appropriate)  Note : patient was offered the tamiflu and the flu vaccine . She declined the tamiflu but wants the flu vaccine .

## 2019-01-28 NOTE — PLAN OF CARE
"Problem: Adult Behavioral Health Plan of Care  Goal: Rounds/Family Conference  TREATMENT TEAM UPDATE      Chief Complaint:  The patient was admitted due to depression and psychotic behavior related to substance use. Her UTOX was positive for amphetamines. She has a history of schizoaffective disorder.    Current:  She attended team dressed in personal clothing. She said she feels "okay" today, and said she feels more well rested. She said, "I don't feel jittery anymore." She said she feels "sleepy" but slept well last night. She said the auditory hallucinations have improved and she feels safe on the unit.    She said she initially came to the hospital because se was unable to fill her medications. She was off of her medicines for 2 weeks, lost her job and her home, and she is now here to "get back on track."    She said, "I normally hear voices through the TV and the radio, but I'm feeling better and I'm not as depressed." She said she often feels anxious and this causes issues for her in maintaining a job.    Plan:  Continue to assess and obtain collaterals.      "

## 2019-01-28 NOTE — PSYCH
The patient has signed a release of information form for [facility] in [place], [place]  The packet has been forwarded to [place].

## 2019-01-28 NOTE — PLAN OF CARE
Problem: Adult Behavioral Health Plan of Care  Goal: Plan of Care Review  Outcome: Ongoing (interventions implemented as appropriate)  Isolated to assigned bed much of the evening but did come out to eat snack, take PM medications, then returned to assigned room.   Pt stated that she feels better than when she was first admitted.  Guarded, calm, quiet, cooperative.  Good appetite.  Pt states that she would like to go to drug rehab once she is discharged from [facility].  Pt is resting quietly in her bed at this time.  Sleeping off and on.  All precautions maintained.

## 2019-01-28 NOTE — PROGRESS NOTES
"   01/28/19 1434   Assessment   Patient's Identification of the Problem Patient is looking down, shaking continously and "ok drowsy" mood. Patient states her admit is due to "went 2 weeks without medications, hearing voices, got real depressed, lost my job(December). Patient admits to negative leisure lifestyle of cigarettes, meth, wine(occasionally). Patient reports she is , have 2 children, aged 13 and 20, 2 years of college, unemployed, suppose to wear glasses, lives in her truck or on friends couches in [place]. Patient verbalized main goals " just need to get my medications right so I can get back on track, find a place to live, go to rehab if I have to and a sober living house."   Leisure Interest Exercise;Listen to Music;Sit Outside;Fishing;Ride Bike;Enjoy Family/Friends;Computer;Sports;Adventist;Play Instrument;Other (See Comments)  (play guitar,decrease in leisure interest)   Leisure Barriers Endurance;In Pain;Loss of Interest;Lack of Finances;Energy Level;Self Confidence;Social Support;Drug Use;Fears/Phobias;Other (See Comments)  (fear parts being sold black market,back pain,fibromyalgia)   Treatment Focus To Improve Mood;Increase Self Confidence;To Improve Leisure Awareness/Lifestyle/Interest;To Promote Successful and Safe Self Expression;To Improve Coping Skills;To Increase Energy Level;To Promote Social Skills/Tolerance/Interaction;To Educate and Increase Awareness of Sober Free Activities     Treatment Recommendation: To address problem(s) #  1:1 Intervention (as needed)    Reality Orientation Skilled Activity  Cognitive Stimulation Skilled Activity  Creative Expression Skilled Activity  Mild Exercises Skilled Activity  Coping Skilled Activity  Leisure Education and Awareness Skilled Activity    Treatment Goal(s):  Long Term Goals Refer To Master Treatment Plan    Short Term Treatment Goal(s)  Patient Will:  Exhibit Improvement in Mood  Demonstrate Constructive Expression of Feelings and " Behavior  Identify at Least 2 Coping Skills or Leisure Skills to Reduce Depression and Hopelessness Upon Request from Therapist  Identify (+) Leisure / Recreation Activities that Promote Wellness and Promote a Sober Lifestyle    Discharge Recommendations:  Encourage Patient to Actively Utilize Available Community Resources to Increase Leisure Involvement to Decrease Signs and Symptoms of Illness  Encourage Patient to Utilize Coping Skills on a Regular Basis to Reduce the Risk of Decompensating and Re-Hospitalizations  AA/NA Meetings  Follow Up with After Care Appointments  Continue with Current Leisure Activities

## 2019-01-28 NOTE — PROGRESS NOTES
PSYCHIATRY DAILY INPATIENT PROGRESS NOTE  SUBSEQUENT HOSPITAL VISIT    ENCOUNTER DATE: 1/28/2019  SITE: [facility]    DATE OF ADMISSION: 1/25/2019  4:03 PM  LENGTH OF STAY: 3 days      HISTORY    CHIEF COMPLAINT   Maria Yancey is a 33 y.o. female, seen during daily manzo rounds on the inpatient unit.  Maria Yancey presents with the chief complaint of depression/SI        HPI   (Elements: Location, Quality, Severity, Duration, Timing, Content, Modifying Factors, Associated Signs & Symptoms)    The patient was seen and examined. The chart was reviewed. Reviewed notes by RNs, LPNs, LPC and MD from the last 24 hours.     Staff reports no behavioral or management issues.     The patient has been compliant with treatment. The patient denied any side effects.    Symptoms are reported to be mildly improved from yesterday as documented below.     Continued but less Symptoms of Depression: less diminished mood or loss of interest/anhedonia; less irritability, less diminished energy, no change in sleep, less change in appetite, less diminished concentration or cognition or indecisiveness, no PMA/R, less excessive guilt or hopelessness or worthlessness, less suicidal ideations     Continued but less Changes in Sleep: less trouble with initiation/maintenance, no early morning awakening with inability to return to sleep     Lessening Suicidal/(no)Homicidal ideations: less active/passive ideations, +organized plans, no future intentions     Lessening Symptoms of psychosis: less hallucinations, less delusions, no disorganized thinking, no disorganized behavior or abnormal motor behavior, no negative symptoms     Denied past or current Symptoms of esther or hypomania: no elevated, expansive, or irritable mood with no increased energy or activity; with no inflated self-esteem or grandiosity, decreased need for sleep, increased rate of speech, FOI or racing thoughts, distractibility, increased goal directed activity or PMA, or  risky/disinhibited behavior     Continued but less Symptoms of JORDIN: less excessive anxiety/worry/fear, with less restlessness, fatigue, poor concentration, irritability, muscle tension, and sleep disturbance     Controlled Symptoms of Panic Disorder: no panic attacks since admission; with agoraphobia     Continued but less Symptoms of PTSD: +h/o trauma; less re-experiencing/intrusive symptoms, less avoidant behavior, less negative alterations in cognition or mood, less hyperarousal symptoms; without dissociative symptoms     +Substance Use (amphetamines: positive for intoxication, withdrawal, tolerance, used in larger amounts or duration than intended, unsuccessful attempts to limit or quit, increased time engaging in or seeking out, cravings or strong desire to use, failure to fulfill obligations, negative consequences in social/interpersonal/occupational,/recreational areas, use in dangerous situations, and medical or psychological consequences; +polysubstance use  +meth withdrawals- mildly improved     PSYCHOTHERAPY ADD-ON +[id number]   30 (16-37*) minutes    Time: 16 minutes  Participants: Met with patient    Therapeutic Intervention Type: insight oriented psychotherapy, behavior modifying psychotherapy, supportive psychotherapy  Why chosen therapy is appropriate versus another modality: relevant to diagnosis, patient responds to this modality, evidence based practice    Target symptoms: substance abuse, mood disorder, psychosis  Primary focus: substance use, psychosis, mood  Psychotherapeutic techniques: supportive, behavioral and motivational techniques; psycho-education    Outcome monitoring methods: self-report, observation    Patient's response to intervention:  The patient's response to intervention is accepting.    Progress toward goals:  The patient's progress toward goals is good.          ROS  General ROS: negative  Ophthalmic ROS: negative  ENT ROS: negative  Allergy and Immunology ROS:  negative  Hematological and Lymphatic ROS: negative  Endocrine ROS: negative  Respiratory ROS: no cough, shortness of breath, or wheezing  Cardiovascular ROS: no chest pain or dyspnea on exertion  Gastrointestinal ROS: no abdominal pain, change in bowel habits, or black or bloody stools  Genito-Urinary ROS: no dysuria, trouble voiding, or hematuria  Musculoskeletal ROS: negative  Neurological ROS: no TIA or stroke symptoms  Dermatological ROS: negative    PAST MEDICAL HISTORY   Past Medical History:   Diagnosis Date    Addiction to drug     ADHD (attention deficit hyperactivity disorder)     Anxiety     Asthma     Bipolar 1 disorder     Cervical pain     Depression     Fibromyalgia     Hallucination     History of psychiatric hospitalization     Hx of psychiatric care     Magalis     Psychiatric exam requested by authority     Psychiatric problem     Schizoaffective disorder     Spina bifida     Spinal stenosis of lumbar region     Substance abuse     Suicide attempt     Syringomyelia     Syrinx of spinal cord     Therapy     Trigger finger            PSYCHOTROPIC MEDICATIONS   Scheduled Meds:   buPROPion  150 mg Oral Daily    divalproex  500 mg Oral QHS    folic acid  1 mg Oral Daily    multivitamin  1 tablet Oral Daily    mupirocin   Topical (Top) BID    QUEtiapine  300 mg Oral QHS     Continuous Infusions:  PRN Meds:.acetaminophen, albuterol sulfate, aluminum-magnesium hydroxide-simethicone, docusate sodium, hydrOXYzine pamoate, loperamide, nicotine, OLANZapine **AND** OLANZapine        EXAMINATION    VITALS   Vitals:    01/28/19 0759   BP: (!) 109/56   Pulse: 74   Resp: 18   Temp: 97.6 °F (36.4 °C)     Body mass index is 33.27 kg/m².'      CONSTITUTIONAL  General Appearance: WF, in casual garb; NAD     MUSCULOSKELETAL  Muscle Strength and Tone:  normal  Abnormal Involuntary Movements:  none  Gait and Station:  normal; non-ataxic     PSYCHIATRIC   Level of Consciousness: awake,  "alert  Orientation: p/p/t/s  Grooming: improving and more adequate to circumstances  Psychomotor Behavior: no PMA/R  Speech: nl r/t/v/s  Language:  English fluent  Mood: "a little better"  Affect: less decreased range, less guarded, less anxious/dysthymic  Thought Process:  linear and organized  Associations:  intact; no SHAGUFTA  Thought Content:  less AVH/delusions; denied HI, less SI  Memory:  intact to recent and remote events  Attention:  intact to conversation; not distractible   Fund of Knowledge:  age and education appropriate  Estimate if Intelligence:  average based on work/education history, vocabulary and mental status exam  Insight:  good- seeks help, recognizes illness  Judgment:   good- no bx issues, compliant and cooperative           DIAGNOSTIC TESTING   Laboratory Results  No results found for this or any previous visit (from the past 24 hour(s)).      ASSESSMENT      IMPRESSION   Schizoaffective Disorder, depressed type  Unspecified Anxiety Disorder  PTSD  Fibromyalgia      Polysubstance dependence (Methamphetamines, cannabis, alcohol, narcotics; methamphetamines current, continuous, severe, without physiological dependence)  Nicotine Dependence     Psychosocial stressors           MEDICAL DECISION MAKING          PROBLEM LIST AND MANAGEMENT PLANS; PRESCRIPTION DRUG MANAGEMENT  Compliance: yes  Side Effects: no  Regimen Adjustments:      Psychosis: Increased Serqouel to 300 mg po q H; adjunctive depakote as below     Depression: continue Wellbutrin  mg po q day; consider re-trial of effexor if needed     Anxiety: depakote off-label as below; wellbutrin off-label as above     Pain: continue Depakote 500 mg po q HS (off-label)- check level in 2 days     Substance use: pt counseled; outpatient tx once stable vs rehab if pt willing     Nicotine use: pt counseled; nicotine patch daily     Psychosocial stressors: pt counseled; SW assisting with resources        DIAGNOSTIC TESTING  Labs reviewed; follow " up pending labs     Disposition:  -SW to assist with aftercare planning and collateral  -Once stable discharge home with outpatient follow up care and/or rehab  -Continue inpatient treatment under a PEC and/or CEC for danger to self and grave disability as evident by depression with SI and psychosis with heavy addiction issues and psychosocial stressors.        NEED FOR CONTINUED HOSPITALIZATION  Psychiatric illness continues to pose a potential threat to life or bodily function, of self or others, thereby requiring the need for continued inpatient psychiatric hospitalization: Yes    Protective inpatient pyschiatric hospitalization required while a safe disposition plan is enacted: Yes    Patient stabilized and ready for discharge from inpatient psychiatric unit: No      STAFF:   Salomón Nettles MD  Psychiatry

## 2019-01-28 NOTE — PLAN OF CARE
Problem: Adult Behavioral Health Plan of Care  Goal: Plan of Care Review  Outcome: Ongoing (interventions implemented as appropriate)  Lying quiet in bed, eyes closed, respiration even and unlabored, appearing asleep.  Slept well most all shift. Awoke briefly at about 0030 but was noted back to sleep within the 1/2 hour.  Safety and precautions maintained with rounds every 15 minutes, bed is fixed in a low position and pathways kept clear.  No fall occurred.

## 2019-01-28 NOTE — PSYCH
The patient has been accepted at [facility], on 1/30/2019. [facility] is located at,  [address] [place] [address]. The patient will be scheduled to leave [facility] at 6:00 a.m by [employer] transportation.

## 2019-01-28 NOTE — PROGRESS NOTES
01/28/19 1040   [facility] Group Therapy   Group Name Leisure Skills Training   Specific Interventions Cognitive Stimulation Training   Participation Level None   Despite encouragement to patient's resistant to attend group, staff will continue monitor and document progress toward POC and encourage group participation.

## 2019-01-29 VITALS
DIASTOLIC BLOOD PRESSURE: 67 MMHG | RESPIRATION RATE: 20 BRPM | SYSTOLIC BLOOD PRESSURE: 111 MMHG | HEART RATE: 90 BPM | HEIGHT: 63 IN | BODY MASS INDEX: 33.28 KG/M2 | WEIGHT: 187.81 LBS | TEMPERATURE: 98 F

## 2019-01-29 PROCEDURE — 99233 PR SUBSEQUENT HOSPITAL CARE,LEVL III: ICD-10-PCS | Mod: AF,HB,, | Performed by: PSYCHIATRY & NEUROLOGY

## 2019-01-29 PROCEDURE — 99233 SBSQ HOSP IP/OBS HIGH 50: CPT | Mod: AF,HB,, | Performed by: PSYCHIATRY & NEUROLOGY

## 2019-01-29 PROCEDURE — 25000003 PHARM REV CODE 250: Performed by: PSYCHIATRY & NEUROLOGY

## 2019-01-29 PROCEDURE — 90833 PSYTX W PT W E/M 30 MIN: CPT | Mod: AF,HB,, | Performed by: PSYCHIATRY & NEUROLOGY

## 2019-01-29 PROCEDURE — 11400000 HC PSYCH PRIVATE ROOM

## 2019-01-29 PROCEDURE — 90833 PR PSYCHOTHERAPY W/PATIENT W/E&M, 30 MIN (ADD ON): ICD-10-PCS | Mod: AF,HB,, | Performed by: PSYCHIATRY & NEUROLOGY

## 2019-01-29 RX ORDER — IBUPROFEN 200 MG
1 TABLET ORAL DAILY
Status: ON HOLD | COMMUNITY
Start: 2019-01-29 | End: 2019-03-17 | Stop reason: HOSPADM

## 2019-01-29 RX ORDER — QUETIAPINE FUMARATE 300 MG/1
300 TABLET, FILM COATED ORAL NIGHTLY
Qty: 30 TABLET | Refills: 1 | Status: ON HOLD | OUTPATIENT
Start: 2019-01-29 | End: 2019-03-17 | Stop reason: HOSPADM

## 2019-01-29 RX ORDER — DIVALPROEX SODIUM 500 MG/1
500 TABLET, DELAYED RELEASE ORAL NIGHTLY
Qty: 30 TABLET | Refills: 1 | Status: ON HOLD | OUTPATIENT
Start: 2019-01-29 | End: 2019-03-17 | Stop reason: HOSPADM

## 2019-01-29 RX ORDER — BUPROPION HYDROCHLORIDE 150 MG/1
150 TABLET ORAL DAILY
Qty: 30 TABLET | Refills: 1 | Status: ON HOLD | OUTPATIENT
Start: 2019-01-30 | End: 2019-03-17 | Stop reason: SDUPTHER

## 2019-01-29 RX ADMIN — DIVALPROEX SODIUM 500 MG: 500 TABLET, DELAYED RELEASE ORAL at 08:01

## 2019-01-29 RX ADMIN — THERA TABS 1 TABLET: TAB at 08:01

## 2019-01-29 RX ADMIN — FOLIC ACID 1 MG: 1 TABLET ORAL at 08:01

## 2019-01-29 RX ADMIN — MUPIROCIN: 20 OINTMENT TOPICAL at 08:01

## 2019-01-29 RX ADMIN — QUETIAPINE FUMARATE 300 MG: 300 TABLET ORAL at 08:01

## 2019-01-29 RX ADMIN — BUPROPION HYDROCHLORIDE 150 MG: 150 TABLET, EXTENDED RELEASE ORAL at 08:01

## 2019-01-29 NOTE — PROGRESS NOTES
PSYCHIATRY DAILY INPATIENT PROGRESS NOTE  SUBSEQUENT HOSPITAL VISIT    ENCOUNTER DATE: 1/29/2019  SITE: [facility]    DATE OF ADMISSION: 1/25/2019  4:03 PM  LENGTH OF STAY: 4 days      HISTORY    CHIEF COMPLAINT   Maria Yancey is a 33 y.o. female, seen during daily manzo rounds on the inpatient unit.  Maria Yancey presents with the chief complaint of depression/SI        HPI   (Elements: Location, Quality, Severity, Duration, Timing, Content, Modifying Factors, Associated Signs & Symptoms)    The patient was seen and examined. The chart was reviewed. Reviewed notes by RNs, LPNs, LPC and MD from the last 24 hours.     Staff reports no behavioral or management issues.     The patient has been compliant with treatment. The patient denied any side effects.    Patient continues to be dysphoric and experience some passive suicidal ideation.  She is future oriented toward discharge to [place].  She sees the pattern that she is in and feels like her life will not change.      Improving Symptoms of Depression: less diminished mood or loss of interest/anhedonia; less irritability, less diminished energy, no change in sleep, less change in appetite, less diminished concentration or cognition or indecisiveness, no PMA/R, less excessive guilt or hopelessness or worthlessness, no suicidal ideations     Continued but less Changes in Sleep: less trouble with initiation/maintenance, no early morning awakening with inability to return to sleep     Lessening Suicidal/(no)Homicidal ideations: less active/passive ideations, no organized plans, no future intentions     Resolved Symptoms of psychosis: no hallucinations, less delusions, no disorganized thinking, no disorganized behavior or abnormal motor behavior, no negative symptoms     Denied past or current Symptoms of esther or hypomania: no elevated, expansive, or irritable mood with no increased energy or activity; with no inflated self-esteem or grandiosity, decreased  need for sleep, increased rate of speech, FOI or racing thoughts, distractibility, increased goal directed activity or PMA, or risky/disinhibited behavior     Continued but less Symptoms of JORDIN: less excessive anxiety/worry/fear, with less restlessness, fatigue, poor concentration, irritability, muscle tension, and sleep disturbance     Controlled Symptoms of Panic Disorder: no panic attacks since admission; with agoraphobia     Continued but less Symptoms of PTSD: +h/o trauma; less re-experiencing/intrusive symptoms, less avoidant behavior, less negative alterations in cognition or mood, less hyperarousal symptoms; without dissociative symptoms     +Substance Use (amphetamines: positive for intoxication, withdrawal, tolerance, used in larger amounts or duration than intended, unsuccessful attempts to limit or quit, increased time engaging in or seeking out, cravings or strong desire to use, failure to fulfill obligations, negative consequences in social/interpersonal/occupational,/recreational areas, use in dangerous situations, and medical or psychological consequences; +polysubstance use  +meth withdrawals- mildly improved     PSYCHOTHERAPY ADD-ON +[id number]   30 (16-37*) minutes    Time: 16 minutes  Participants: Met with patient    Therapeutic Intervention Type: behavior modifying psychotherapy, supportive psychotherapy  Why chosen therapy is appropriate versus another modality: relevant to diagnosis, patient responds to this modality, evidence based practice    Target symptoms: substance abuse, mood disorder, psychosis  Primary focus: substance use, psychosis, mood  Psychotherapeutic techniques: supportive, behavioral and motivational techniques; psycho-education    Outcome monitoring methods: self-report, observation    Patient's response to intervention:  The patient's response to intervention is accepting.    Progress toward goals:  The patient's progress toward goals is good.    Discussed her avoidance of work  and responsibility as worsening her perception of herself      ROS  General ROS: negative  Ophthalmic ROS: negative  ENT ROS: negative  Allergy and Immunology ROS: negative  Hematological and Lymphatic ROS: negative  Endocrine ROS: negative  Respiratory ROS: no cough, shortness of breath, or wheezing  Cardiovascular ROS: no chest pain or dyspnea on exertion  Gastrointestinal ROS: no abdominal pain, change in bowel habits, or black or bloody stools  Genito-Urinary ROS: no dysuria, trouble voiding, or hematuria  Musculoskeletal ROS: negative  Neurological ROS: no TIA or stroke symptoms  Dermatological ROS: negative    PAST MEDICAL HISTORY   Past Medical History:   Diagnosis Date    Addiction to drug     ADHD (attention deficit hyperactivity disorder)     Anxiety     Asthma     Bipolar 1 disorder     Cervical pain     Depression     Fibromyalgia     Hallucination     History of psychiatric hospitalization     [facility]    Hx of psychiatric care     Seroquel    Magalis     Psychiatric exam requested by authority     Psychiatric problem     Auditory hallucinations    PTSD (post-traumatic stress disorder)     Schizoaffective disorder     Sleep difficulties     Spina bifida     Spinal stenosis of lumbar region     Substance abuse     Suicide attempt     Once by hanging, once by overdose    Syringomyelia     Syrinx of spinal cord     Therapy     [facility]    Trigger finger     Withdrawal symptoms, drug or narcotic            PSYCHOTROPIC MEDICATIONS   Scheduled Meds:   buPROPion  150 mg Oral Daily    divalproex  500 mg Oral QHS    folic acid  1 mg Oral Daily    multivitamin  1 tablet Oral Daily    mupirocin   Topical (Top) BID    QUEtiapine  300 mg Oral QHS     Continuous Infusions:  PRN Meds:.acetaminophen, albuterol sulfate, aluminum-magnesium hydroxide-simethicone, docusate sodium, hydrOXYzine pamoate, loperamide, nicotine, OLANZapine **AND**  "OLANZapine        EXAMINATION    VITALS   Vitals:    01/29/19 0749   BP: 119/67   Pulse: 77   Resp: 18   Temp: 97.2 °F (36.2 °C)     Body mass index is 33.27 kg/m².'      CONSTITUTIONAL  General Appearance: WF, in casual garb; NAD     MUSCULOSKELETAL  Muscle Strength and Tone:  normal  Abnormal Involuntary Movements:  none  Gait and Station:  normal; non-ataxic     PSYCHIATRIC   Level of Consciousness: awake, alert  Orientation: p/p/t/s  Grooming: improving and more adequate to circumstances  Psychomotor Behavior: no PMA/R  Speech: nl r/t/v/s  Language:  English fluent  Mood: "bad"  Affect: dysphoric and tearful, anxious/dysthymic  Thought Process:  linear and organized  Associations:  intact; no SHAGUFTA  Thought Content:  less AVH/delusions; denied HI, less SI  Memory:  intact to recent and remote events  Attention:  intact to conversation; not distractible   Fund of Knowledge:  age and education appropriate  Estimate if Intelligence:  average based on work/education history, vocabulary and mental status exam  Insight:  good- seeks help, recognizes illness  Judgment:   good- no bx issues, compliant and cooperative           DIAGNOSTIC TESTING   Laboratory Results  No results found for this or any previous visit (from the past 24 hour(s)).      ASSESSMENT      IMPRESSION   Schizoaffective Disorder, depressed type  Unspecified Anxiety Disorder  PTSD  Fibromyalgia      Polysubstance dependence (Methamphetamines, cannabis, alcohol, narcotics; methamphetamines current, continuous, severe, without physiological dependence)  Nicotine Dependence     Psychosocial stressors           MEDICAL DECISION MAKING          PROBLEM LIST AND MANAGEMENT PLANS; PRESCRIPTION DRUG MANAGEMENT  Compliance: yes  Side Effects: no  Regimen Adjustments:      Psychosis: Continue Serqouel to 300 mg po q H; adjunctive depakote as below     Depression: continue Wellbutrin  mg po q day; consider re-trial of effexor if needed     Anxiety: depakote " off-label as below; wellbutrin off-label as above     Pain: continue Depakote 500 mg po q HS (off-label)- check level tomorrow morning     Substance use: pt counseled; outpatient tx once stable vs rehab if pt willing     Nicotine use: pt counseled; nicotine patch daily     Psychosocial stressors: pt counseled; SW assisting with resources        DIAGNOSTIC TESTING  Labs reviewed; follow up pending labs     Disposition:  -SW to assist with aftercare planning and collateral  -Once stable discharge home with outpatient follow up care and/or rehab  -Continue inpatient treatment under a PEC and/or CEC for danger to self and grave disability as evident by depression with SI and psychosis with heavy addiction issues and psychosocial stressors.        NEED FOR CONTINUED HOSPITALIZATION  Psychiatric illness continues to pose a potential threat to life or bodily function, of self or others, thereby requiring the need for continued inpatient psychiatric hospitalization: Yes    Protective inpatient pyschiatric hospitalization required while a safe disposition plan is enacted: Yes    Patient stabilized and ready for discharge from inpatient psychiatric unit: No      STAFF:   Julio Almazan MD  Psychiatry

## 2019-01-29 NOTE — PROGRESS NOTES
"   01/29/19 1040   [facility] Group Therapy   Group Name Therapeutic Recreation   Specific Interventions Coping Skills Training   Participation Level None   Patient chose to remain in bed due to "the medicine got me groggy."  "

## 2019-01-29 NOTE — PLAN OF CARE
"Problem: Adult Behavioral Health Plan of Care  Goal: Optimized Coping Skills in Response to Life Stressors    Intervention: Promote Effective Coping Strategies  Group Note:    Behavior:  The patient came to group, but participated only minimally. She presented with depressed mood and guarded affect, and only participated when asked a direct question.    Intervention:  The counselor implemented a brief CBT based group focused on identifying protective factors for mental health, including an assessment of the domains of social support, coping skills, physical health, sense of purpose, self-esteem, and healthy thinking. Patients were encouraged to set a goal to improve at least one domain.    Response:  She said, "I think I have good thinking. I could be living on a street, but I have a truck." She sat with poor eye contact for the rest of the session.    Plan:  Continue to encourage the patient to participate in milieu.                          "

## 2019-01-29 NOTE — PSYCH
Transportation arrangements have been scheduled for 1/30/2019at 6 am. [employer] transportation has been requested and notified. The confirmation number for the ride is [id number].

## 2019-01-29 NOTE — PLAN OF CARE
Problem: Adult Behavioral Health Plan of Care  Goal: Plan of Care Review  Outcome: Ongoing (interventions implemented as appropriate)  Shift note : patient is waiting to go to rehab soon . She is eating all meals and is taking all of her ordered medication her mood is good and her affect is bright .

## 2019-01-30 LAB
ALBUMIN SERPL BCP-MCNC: 3.5 G/DL
ALP SERPL-CCNC: 71 U/L
ALT SERPL W/O P-5'-P-CCNC: 12 U/L
ANION GAP SERPL CALC-SCNC: 8 MMOL/L
AST SERPL-CCNC: 12 U/L
BILIRUB SERPL-MCNC: 0.3 MG/DL
BUN SERPL-MCNC: 17 MG/DL
CALCIUM SERPL-MCNC: 9.1 MG/DL
CHLORIDE SERPL-SCNC: 106 MMOL/L
CO2 SERPL-SCNC: 25 MMOL/L
CREAT SERPL-MCNC: 0.7 MG/DL
EST. GFR  (AFRICAN AMERICAN): >60 ML/MIN/1.73 M^2
EST. GFR  (NON AFRICAN AMERICAN): >60 ML/MIN/1.73 M^2
GLUCOSE SERPL-MCNC: 82 MG/DL
POTASSIUM SERPL-SCNC: 3.9 MMOL/L
PROT SERPL-MCNC: 6.9 G/DL
SODIUM SERPL-SCNC: 139 MMOL/L
VALPROATE SERPL-MCNC: 38.2 UG/ML

## 2019-01-30 PROCEDURE — 80053 COMPREHEN METABOLIC PANEL: CPT

## 2019-01-30 PROCEDURE — 80164 ASSAY DIPROPYLACETIC ACD TOT: CPT

## 2019-01-30 PROCEDURE — 99239 PR HOSPITAL DISCHARGE DAY,>30 MIN: ICD-10-PCS | Mod: AF,HB,, | Performed by: PSYCHIATRY & NEUROLOGY

## 2019-01-30 PROCEDURE — 36415 COLL VENOUS BLD VENIPUNCTURE: CPT

## 2019-01-30 PROCEDURE — 99239 HOSP IP/OBS DSCHRG MGMT >30: CPT | Mod: AF,HB,, | Performed by: PSYCHIATRY & NEUROLOGY

## 2019-01-30 NOTE — PLAN OF CARE
Problem: Adult Behavioral Health Plan of Care  Goal: Plan of Care Review  Outcome: Ongoing (interventions implemented as appropriate)  Pt is sleeping at this time and has slept 8.5 hours uninterrupted.  NAD.  Resp even & unlabored.  Pathways clear and bed in low position.  Q 15 minute safety checks ongoing.  All precautions maintained

## 2019-01-30 NOTE — NURSING
Pt discharged to [facility] at [address] [place] [address] in [place], [place].  Phone number is [phone].  [employer] will be here at 6am to transport.  Prescriptions, personal belongings and discharge instructions provided.  Pt voices readiness for discharge.  Denies SI/HI/hallucinations.  No distress noted.

## 2019-01-30 NOTE — NURSING
Pt discharged to [place] per order.  Personal belongings, prescriptions, discharge instructions and bag meal provided.  Pt voices readiness for discharge.  Denies SI/HI/hallucinations.  No distress noted.  Pt will receive tobacco cessation appointment at mentioned appointment.

## 2019-01-30 NOTE — PSYCH
Patient will be following up with [facility] at [address] [place] [address] in [place], [phone].  Patient was brought directly there from this facility by Medicaid transportation at 6 am on 1/30/2019.  Patient will receive tobacco cessation therapy along with substance abuse therapy due to a positive UDS on admit.  AVS faxed on 1/30/2019 at 11:37 am
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
## 2019-03-12 ENCOUNTER — HOSPITAL ENCOUNTER (INPATIENT)
Facility: HOSPITAL | Age: 34
LOS: 6 days | Discharge: HOME | DRG: 885 | End: 2019-03-18
Attending: PSYCHIATRY & NEUROLOGY | Admitting: PSYCHIATRY & NEUROLOGY
Payer: MEDICAID

## 2019-03-12 DIAGNOSIS — F25.1 SCHIZOAFFECTIVE DISORDER, DEPRESSIVE TYPE: ICD-10-CM

## 2019-03-12 DIAGNOSIS — R45.851 DEPRESSION WITH SUICIDAL IDEATION: Primary | ICD-10-CM

## 2019-03-12 DIAGNOSIS — F17.210 CIGARETTE NICOTINE DEPENDENCE WITHOUT COMPLICATION: ICD-10-CM

## 2019-03-12 DIAGNOSIS — F19.20 POLYSUBSTANCE DEPENDENCE: ICD-10-CM

## 2019-03-12 DIAGNOSIS — M79.7 FIBROMYALGIA: ICD-10-CM

## 2019-03-12 DIAGNOSIS — F32.A DEPRESSION WITH SUICIDAL IDEATION: Primary | ICD-10-CM

## 2019-03-12 PROCEDURE — 83036 HEMOGLOBIN GLYCOSYLATED A1C: CPT

## 2019-03-12 PROCEDURE — 81025 URINE PREGNANCY TEST: CPT

## 2019-03-12 PROCEDURE — 25000003 PHARM REV CODE 250: Performed by: PSYCHIATRY & NEUROLOGY

## 2019-03-12 PROCEDURE — 11400000 HC PSYCH PRIVATE ROOM

## 2019-03-12 RX ORDER — OLANZAPINE 10 MG/2ML
10 INJECTION, POWDER, FOR SOLUTION INTRAMUSCULAR EVERY 8 HOURS PRN
Status: DISCONTINUED | OUTPATIENT
Start: 2019-03-12 | End: 2019-03-18 | Stop reason: HOSPADM

## 2019-03-12 RX ORDER — LOPERAMIDE HYDROCHLORIDE 2 MG/1
2 CAPSULE ORAL
Status: DISCONTINUED | OUTPATIENT
Start: 2019-03-12 | End: 2019-03-18 | Stop reason: HOSPADM

## 2019-03-12 RX ORDER — DOCUSATE SODIUM 100 MG/1
100 CAPSULE, LIQUID FILLED ORAL DAILY PRN
Status: DISCONTINUED | OUTPATIENT
Start: 2019-03-12 | End: 2019-03-18 | Stop reason: HOSPADM

## 2019-03-12 RX ORDER — FOLIC ACID 1 MG/1
1 TABLET ORAL DAILY
Status: DISCONTINUED | OUTPATIENT
Start: 2019-03-13 | End: 2019-03-18 | Stop reason: HOSPADM

## 2019-03-12 RX ORDER — MAG HYDROX/ALUMINUM HYD/SIMETH 200-200-20
30 SUSPENSION, ORAL (FINAL DOSE FORM) ORAL EVERY 6 HOURS PRN
Status: DISCONTINUED | OUTPATIENT
Start: 2019-03-12 | End: 2019-03-18 | Stop reason: HOSPADM

## 2019-03-12 RX ORDER — ACETAMINOPHEN 325 MG/1
650 TABLET ORAL EVERY 6 HOURS PRN
Status: DISCONTINUED | OUTPATIENT
Start: 2019-03-12 | End: 2019-03-18 | Stop reason: HOSPADM

## 2019-03-12 RX ORDER — OLANZAPINE 10 MG/1
10 TABLET ORAL EVERY 8 HOURS PRN
Status: DISCONTINUED | OUTPATIENT
Start: 2019-03-12 | End: 2019-03-18 | Stop reason: HOSPADM

## 2019-03-12 RX ORDER — QUETIAPINE FUMARATE 300 MG/1
300 TABLET, FILM COATED ORAL NIGHTLY
Status: DISCONTINUED | OUTPATIENT
Start: 2019-03-12 | End: 2019-03-13

## 2019-03-12 RX ORDER — HYDROXYZINE PAMOATE 50 MG/1
50 CAPSULE ORAL NIGHTLY PRN
Status: DISCONTINUED | OUTPATIENT
Start: 2019-03-12 | End: 2019-03-18 | Stop reason: HOSPADM

## 2019-03-12 RX ORDER — IBUPROFEN 200 MG
1 TABLET ORAL DAILY PRN
Status: DISCONTINUED | OUTPATIENT
Start: 2019-03-12 | End: 2019-03-18 | Stop reason: HOSPADM

## 2019-03-12 RX ADMIN — QUETIAPINE FUMARATE 300 MG: 300 TABLET ORAL at 09:03

## 2019-03-13 PROBLEM — F17.210 CIGARETTE NICOTINE DEPENDENCE WITHOUT COMPLICATION: Status: ACTIVE | Noted: 2019-03-13

## 2019-03-13 LAB
B-HCG UR QL: NEGATIVE
ESTIMATED AVG GLUCOSE: 100 MG/DL
HBA1C MFR BLD HPLC: 5.1 %

## 2019-03-13 PROCEDURE — 90833 PSYTX W PT W E/M 30 MIN: CPT | Mod: AF,HB,, | Performed by: PSYCHIATRY & NEUROLOGY

## 2019-03-13 PROCEDURE — 11400000 HC PSYCH PRIVATE ROOM

## 2019-03-13 PROCEDURE — 99223 PR INITIAL HOSPITAL CARE,LEVL III: ICD-10-PCS | Mod: AF,HB,, | Performed by: PSYCHIATRY & NEUROLOGY

## 2019-03-13 PROCEDURE — 99223 1ST HOSP IP/OBS HIGH 75: CPT | Mod: AF,HB,, | Performed by: PSYCHIATRY & NEUROLOGY

## 2019-03-13 PROCEDURE — 36415 COLL VENOUS BLD VENIPUNCTURE: CPT

## 2019-03-13 PROCEDURE — 99233 SBSQ HOSP IP/OBS HIGH 50: CPT | Mod: ,,, | Performed by: NURSE PRACTITIONER

## 2019-03-13 PROCEDURE — 90833 PR PSYCHOTHERAPY W/PATIENT W/E&M, 30 MIN (ADD ON): ICD-10-PCS | Mod: AF,HB,, | Performed by: PSYCHIATRY & NEUROLOGY

## 2019-03-13 PROCEDURE — 99233 PR SUBSEQUENT HOSPITAL CARE,LEVL III: ICD-10-PCS | Mod: ,,, | Performed by: NURSE PRACTITIONER

## 2019-03-13 PROCEDURE — 25000003 PHARM REV CODE 250: Performed by: PSYCHIATRY & NEUROLOGY

## 2019-03-13 RX ORDER — PRAZOSIN HYDROCHLORIDE 1 MG/1
1 CAPSULE ORAL NIGHTLY
Status: DISCONTINUED | OUTPATIENT
Start: 2019-03-13 | End: 2019-03-16

## 2019-03-13 RX ORDER — RISPERIDONE 0.5 MG/1
1 TABLET ORAL NIGHTLY
Status: DISCONTINUED | OUTPATIENT
Start: 2019-03-13 | End: 2019-03-14

## 2019-03-13 RX ORDER — DIAZEPAM 5 MG/1
5 TABLET ORAL 4 TIMES DAILY
Status: DISCONTINUED | OUTPATIENT
Start: 2019-03-13 | End: 2019-03-14

## 2019-03-13 RX ORDER — DIVALPROEX SODIUM 500 MG/1
1000 TABLET, FILM COATED, EXTENDED RELEASE ORAL NIGHTLY
Status: DISCONTINUED | OUTPATIENT
Start: 2019-03-13 | End: 2019-03-18 | Stop reason: HOSPADM

## 2019-03-13 RX ADMIN — DIAZEPAM 5 MG: 5 TABLET ORAL at 04:03

## 2019-03-13 RX ADMIN — THERA TABS 1 TABLET: TAB at 09:03

## 2019-03-13 RX ADMIN — DIAZEPAM 5 MG: 5 TABLET ORAL at 09:03

## 2019-03-13 RX ADMIN — RISPERIDONE 1 MG: 0.5 TABLET ORAL at 08:03

## 2019-03-13 RX ADMIN — DIAZEPAM 5 MG: 5 TABLET ORAL at 08:03

## 2019-03-13 RX ADMIN — HYDROXYZINE PAMOATE 50 MG: 50 CAPSULE ORAL at 12:03

## 2019-03-13 RX ADMIN — DIVALPROEX SODIUM 1000 MG: 500 TABLET, FILM COATED, EXTENDED RELEASE ORAL at 08:03

## 2019-03-13 RX ADMIN — FOLIC ACID 1 MG: 1 TABLET ORAL at 09:03

## 2019-03-13 RX ADMIN — PRAZOSIN HYDROCHLORIDE 1 MG: 1 CAPSULE ORAL at 08:03

## 2019-03-13 RX ADMIN — DIAZEPAM 5 MG: 5 TABLET ORAL at 01:03

## 2019-03-13 NOTE — PROGRESS NOTES
"   03/13/19 1040   [facility] Group Therapy   Group Name Leisure Skills Training   Specific Interventions Coping Skills Training   Participation Level None   Patient did not attend group due to "I'm kind of sleepy. I'll come tomorrow." Patient remained in bed, in her assigned room.  "
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
## 2019-03-13 NOTE — PROGRESS NOTES
"   03/13/19 1536   Assessment   Patient's Identification of the Problem Patient presents sleepy, tired, disheveled and "embarrassed, the valium got me feeling very tired and sleepy." Patient states her admit is due to "SI, depression, being homeless, relapsed with meth." Patient admits to negative leisure lifestyle of cigarettes, alcohol(occasionally), meth. Patient reports she is , currently single, 2 children, 2 years of college, unemployed, a Yazdanism, suppose to wear glasses, lives with her cousin or sometimes sleep in her truck. Patient verbalized main goal is to go to treatment and transition into a sober living home.   Leisure Interest Watching TV;Exercise;Listen to Music;Sit Outside;Fishing;Ride Bike;Enjoy Family/Friends;Computer;Sports;Mandaen;Play Instrument;Journal;Other (See Comments)  (decrease in participation)   Leisure Barriers Endurance;Loss of Interest;Lack of Finances;ETOH Use;Energy Level;Self Confidence;Social Support;Drug Use;Fears/Phobias;Other (See Comments)  (fear not stopping drugs&being homeless,alcohol occasional)   Treatment Focus To Improve Mood;Increase Self Confidence;To Improve Leisure Awareness/Lifestyle/Interest;To Promote Successful and Safe Self Expression;To Improve Coping Skills;To Increase Energy Level;To Promote Social Skills/Tolerance/Interaction;To Educate and Increase Awareness of Sober Free Activities     Treatment Recommendation: To address problem(s) #  1:1 Intervention (as needed)    Cognitive Stimulation Skilled Activity  Creative Expression Skilled Activity  Mild Exercises Skilled Activity  Stress Management Skilled Activity  Coping Skilled Activity  Leisure Education and Awareness Skilled Activity    Treatment Goal(s):  Long Term Goals Refer To Master Treatment Plan    Short Term Treatment Goal(s)  Patient Will:  Exhibit Improvement in Mood  Demonstrate Constructive Expression of Feelings and Behavior  Identify at Least 2 Coping Skills or Leisure Skills to " Reduce Depression and Hopelessness Upon Request from Therapist  Identify (+) Leisure / Recreation Activities that Promote Wellness and Promote a Sober Lifestyle    Discharge Recommendations:  Encourage Patient to Actively Utilize Available Community Resources to Increase Leisure Involvement to Decrease Signs and Symptoms of Illness  AA/NA Meetings  Support Group  Follow Up with After Care Appointments  Continue with Current Leisure Activities

## 2019-03-13 NOTE — CONSULTS
[facility] Medicine  Consult Note    Patient Name: Maria Yancey  MRN: [id number]  Admission Date: 3/12/2019  Hospital Length of Stay: 1 days  Attending Physician: Julio Almazan MD   Primary Care Provider: Darci Georges MD           Patient information was obtained from patient and ER records.     Inpatient consult to [facility] for History and Physical  Consult performed by: Sheila Aguilar NP  Consult ordered by: Julio Almazan MD        Subjective:     Principal Problem: <principal problem not specified>    Chief Complaint: No chief complaint on file.       HPI:   33 y.o. female with a past psychiatric history of schizoaffective PTSD Fibromyalgia, currently admitted to the inpatient unit with: suicidal ideation  BP Readings from Last 3 Encounters:   19 112/63   19 122/73   19 111/67     + smoker  UDS +amphetamines          Past Medical History:   Diagnosis Date    Addiction to drug     ADHD (attention deficit hyperactivity disorder)     Anxiety     Asthma     Bipolar 1 disorder     Cervical pain     Depression     Fibromyalgia     Hallucination     History of psychiatric hospitalization     [facility] 2018    Hx of psychiatric care     Seroquel    Magalis     Psychiatric exam requested by authority     Psychiatric problem     Auditory hallucinations    PTSD (post-traumatic stress disorder)     Schizoaffective disorder     Sleep difficulties     Spina bifida     Spinal stenosis of lumbar region     Substance abuse     Suicide attempt     Once by hanging, once by overdose    Syringomyelia     Syrinx of spinal cord     Therapy     [facility]    Trigger finger     Withdrawal symptoms, drug or narcotic        Past Surgical History:   Procedure Laterality Date    CARPAL TUNNEL RELEASE       SECTION, CLASSIC      siactica      spinal bifida      spinalsonois         Review of patient's allergies  indicates:  No Known Allergies    Current Facility-Administered Medications on File Prior to Encounter   Medication    [DISCONTINUED] diphenhydrAMINE injection 50 mg    [DISCONTINUED] haloperidol lactate injection 5 mg    [DISCONTINUED] lorazepam injection 2 mg     Current Outpatient Medications on File Prior to Encounter   Medication Sig    buPROPion (WELLBUTRIN XL) 150 MG TB24 tablet Take 1 tablet (150 mg total) by mouth once daily.    divalproex (DEPAKOTE) 500 MG TbEC Take 1 tablet (500 mg total) by mouth every evening.    folic acid (FOLVITE) 1 MG tablet Take 1 tablet (1 mg total) by mouth once daily.    nicotine (NICODERM CQ) 14 mg/24 hr Place 1 patch onto the skin once daily.    nicotine (NICODERM CQ) 14 mg/24 hr Place 1 patch onto the skin once daily.    QUEtiapine (SEROQUEL) 300 MG Tab Take 1 tablet (300 mg total) by mouth every evening.     Family History     Problem Relation (Age of Onset)    Arthritis Father    Diabetes Mother, Father    Hyperlipidemia Mother, Father    Hypertension Mother, Father        Tobacco Use    Smoking status: Current Every Day Smoker     Packs/day: 0.50     Years: 15.00     Pack years: 7.50     Types: Cigarettes    Smokeless tobacco: Never Used    Tobacco comment: smoking cessation handout provided and reviewed with pt   Substance and Sexual Activity    Alcohol use: Yes     Alcohol/week: 0.0 oz     Comment: socially 1 glass of wine    Drug use: Yes     Types: Amphetamines     Comment: states last use was 3 days ago    Sexual activity: Not Currently     Partners: Male     Birth control/protection: None     Review of Systems   Constitutional: Negative for chills and fever.   HENT: Negative for congestion and sore throat.    Respiratory: Negative for cough, chest tightness and shortness of breath.    Cardiovascular: Negative for chest pain, palpitations and leg swelling.   Gastrointestinal: Negative for abdominal pain, diarrhea, nausea and vomiting.   Genitourinary:  Negative for flank pain, frequency and hematuria.   Musculoskeletal: Negative for back pain and neck pain.   Skin: Negative for rash and wound.   Neurological: Negative for dizziness, seizures, syncope and headaches.   Psychiatric/Behavioral: Positive for suicidal ideas. Negative for agitation, confusion and self-injury.     Objective:     Vital Signs (Most Recent):  Temp: 96.7 °F (35.9 °C) (03/13/19 0753)  Pulse: 84 (03/13/19 0753)  Resp: 20 (03/13/19 0753)  BP: 112/63 (03/13/19 0753) Vital Signs (24h Range):  Temp:  [96.7 °F (35.9 °C)-97.5 °F (36.4 °C)] 96.7 °F (35.9 °C)  Pulse:  [] 84  Resp:  [18-20] 20  SpO2:  [98 %] 98 %  BP: (106-122)/(63-86) 112/63     Weight: 58.7 kg (129 lb 6.6 oz)  Body mass index is 22.92 kg/m².    Physical Exam   Constitutional: She is oriented to person, place, and time. She appears well-developed and well-nourished. No distress.   HENT:   Head: Normocephalic and atraumatic.   Eyes: Pupils are equal, round, and reactive to light.   Neck: No thyromegaly present.   Cardiovascular: Normal rate, regular rhythm, normal heart sounds and intact distal pulses.   No murmur heard.  Pulmonary/Chest: Effort normal and breath sounds normal. No respiratory distress. She has no wheezes. She has no rales.   Abdominal: Soft. Bowel sounds are normal. She exhibits no distension and no mass. There is no tenderness.   Musculoskeletal: Normal range of motion. She exhibits no edema or deformity.   Lymphadenopathy:     She has no cervical adenopathy.   Neurological: She is alert and oriented to person, place, and time.   CN II visual fields full to confrontation  CN III, Iv, VI- pupils ERRL   CN III- no palsy  Nystagmus; none   Diplopia- none  ophthalmoparesis- none  CN V- facial sensation intact  CN VII- facial expression full and symmetric  CNVIII- normal  CN IV-Normal  CN X- normal  CN XI- Normal   CN XII normal      Skin: Skin is warm and dry. No rash noted. No erythema.   Psychiatric: She is  withdrawn.   Nursing note and vitals reviewed.      Significant Labs:   History & Physical      SUBJECTIVE:     History of Present Illness:  Patient is a 33 y.o. female presents with depression/si. Ad[facility] to [facility].    No past medical history other than psych. No complaints today.    PTA Medications   Medication Sig    buPROPion (WELLBUTRIN XL) 150 MG TB24 tablet Take 1 tablet (150 mg total) by mouth once daily.    divalproex (DEPAKOTE) 500 MG TbEC Take 1 tablet (500 mg total) by mouth every evening.    folic acid (FOLVITE) 1 MG tablet Take 1 tablet (1 mg total) by mouth once daily.    nicotine (NICODERM CQ) 14 mg/24 hr Place 1 patch onto the skin once daily.    nicotine (NICODERM CQ) 14 mg/24 hr Place 1 patch onto the skin once daily.    QUEtiapine (SEROQUEL) 300 MG Tab Take 1 tablet (300 mg total) by mouth every evening.       Review of patient's allergies indicates:  No Known Allergies    Past Medical History:   Diagnosis Date    Addiction to drug     ADHD (attention deficit hyperactivity disorder)     Anxiety     Asthma     Bipolar 1 disorder     Cervical pain     Depression     Fibromyalgia     Hallucination     History of psychiatric hospitalization     [facility] 2018     of psychiatric care     Seroquel    Magalis     Psychiatric exam requested by authority     Psychiatric problem     Auditory hallucinations    PTSD (post-traumatic stress disorder)     Schizoaffective disorder     Sleep difficulties     Spina bifida     Spinal stenosis of lumbar region     Substance abuse     Suicide attempt     Once by hanging, once by overdose    Syringomyelia     Syrinx of spinal cord     Therapy     [facility]    Trigger finger     Withdrawal symptoms, drug or narcotic      Past Surgical History:   Procedure Laterality Date    CARPAL TUNNEL RELEASE       SECTION, CLASSIC      siactica      spinal bifida      spinalsonois       Family History   Problem Relation Age  of Onset    Hypertension Mother     Diabetes Mother     Hyperlipidemia Mother     Hypertension Father     Arthritis Father     Diabetes Father     Hyperlipidemia Father      Social History     Tobacco Use    Smoking status: Current Every Day Smoker     Packs/day: 0.50     Years: 15.00     Pack years: 7.50     Types: Cigarettes    Smokeless tobacco: Never Used    Tobacco comment: smoking cessation handout provided and reviewed with pt   Substance Use Topics    Alcohol use: Yes     Alcohol/week: 0.0 oz     Comment: socially 1 glass of wine    Drug use: Yes     Types: Amphetamines     Comment: states last use was 3 days ago        Review of Systems:  Constitutional: no fever or chills  Respiratory: no cough or shorness of breath  Cardiovascular: no chest pain or palpitations  Gastrointestinal: no nausea or vomiting, no abdominal pain or change in bowel habits  Musculoskeletal: no arthralgias or myalgias  Psych dysphoric, withdrawn  OBJECTIVE:     Vital Signs (Most Recent)  Temp: 96.7 °F (35.9 °C) (03/13/19 0753)  Pulse: 84 (03/13/19 0753)  Resp: 20 (03/13/19 0753)  BP: 112/63 (03/13/19 0753)    Physical Exam:  General: well developed, well nourished  Lungs:  clear to auscultation bilaterally and normal respiratory effort  Cardiovascular: Heart: regular rate and rhythm, S1, S2 normal, no murmur, click, rub or gallop. Chest Wall: no tenderness. Extremities: no cyanosis or edema, or clubbing. Pulses: 2+ and symmetric.  Abdomen/Rectal: Abdomen: soft, non-tender non-distented; bowel sounds normal; no masses,  no organomegaly. Rectal: not examined  Skin: Skin color, texture, turgor normal. No rashes or lesions  Musculoskeletal:normal gait  Psych:withdrawn, dysphoric  Neuro: Cranial nerves:  CN II Visual fields full to confrontation.   CN III, IV, VI Pupils are equal, round, and reactive to light.  CN III: no palsy  Nystagmus: none   Diplopia: none  Ophthalmoparesis: none  CN V Facial sensation intact.   CN VII  Facial expression full, symmetric.   CN VIII normal.   CN IX normal.   CN X normal.   CN XI normal.   CN XII normal.        Laboratory  CBC:   Recent Labs   Lab 03/12/19  1758   WBC 7.88   RBC 4.77   HGB 13.0   HCT 39.9      MCV 84   MCH 27.3   MCHC 32.6     CMP:   Recent Labs   Lab 03/12/19  1757   GLU 94   CALCIUM 9.8   ALBUMIN 4.1   PROT 7.9      K 3.7   CO2 25      BUN 14   CREATININE 0.8   ALKPHOS 93   ALT 14   AST 14   BILITOT 0.4     Recent Labs   Lab 03/12/19  1725   COLORU Yellow   SPECGRAV 1.020   PHUR 7.0   PROTEINUA Negative   NITRITE Negative   LEUKOCYTESUR Negative   UROBILINOGEN Negative     TSH   Date Value Ref Range Status   03/12/2019 1.873 0.400 - 4.000 uIU/mL Final     No results found for this or any previous visit.  Alcohol, Medical, Serum   Date Value Ref Range Status   03/12/2019 <10 <10 mg/dL Final     Acetaminophen (Tylenol), Serum   Date Value Ref Range Status   03/12/2019 <3.0 (L) 10.0 - 20.0 ug/mL Final     Comment:     Toxic Levels:  Adults (4 hr post-ingestion).........>150 ug/mL  Adults (12 hr post-ingestion)........>40 ug/mL  Peds (2 hr post-ingestion, liquid)...>225 ug/mL       Results for orders placed or performed during the hospital encounter of 03/12/19   Salicylate level   Result Value Ref Range    Salicylate Lvl <5.0 (L) 15.0 - 30.0 mg/dL     Results for orders placed or performed during the hospital encounter of 03/12/19   Drug screen panel, emergency   Result Value Ref Range    Benzodiazepines Negative     Methadone metabolites Negative     Cocaine (Metab.) Negative     Opiate Scrn, Ur Negative     Barbiturate Screen, Ur Negative     Amphetamine Screen, Ur Presumptive Positive     THC Negative     Phencyclidine Negative     Creatinine, Random Ur 125.6 15.0 - 325.0 mg/dL    Toxicology Information SEE COMMENT      Preg Test, Ur   Date Value Ref Range Status   03/12/2019 Negative  Final       ASSESSMENT/PLAN:     Active Hospital Problems    Diagnosis  POA     Depression with suicidal ideation [F32.9, R45.851]  Not Applicable      Resolved Hospital Problems   No resolved problems to display.       Plan: Further orders for psych      Significant Imaging: none    Assessment/Plan:     Cigarette nicotine dependence without complication    Nicotine patch       Depression with suicidal ideation    Per psychiatry        Polysubstance dependence      Per psychiatry /rehab     Fibromyalgia    Pain; psychiatry tx with depakote off label         VTE Risk Mitigation (From admission, onward)    None              Thank you for your consult. I will sign off. Please contact us if you have any additional questions.    Sheila Aguilar, NP  Department of Hospital Medicine   [facility]

## 2019-03-13 NOTE — HPI
33 y.o. female with a past psychiatric history of schizoaffective PTSD Fibromyalgia, currently admitted to the inpatient unit with: suicidal ideation  BP Readings from Last 3 Encounters:   03/13/19 112/63   03/12/19 122/73   01/29/19 111/67     + smoker  UDS +amphetamines

## 2019-03-13 NOTE — PLAN OF CARE
Problem: Adult Inpatient Plan of Care  Goal: Plan of Care Review  Outcome: Ongoing (interventions implemented as appropriate)  Pt in room in bed majority of shift.Up for meals and eating 100%.Hygiene and grooming poor.Pt,when up had trouble remaining still.Interested in rehab.Medication compliant.

## 2019-03-13 NOTE — PLAN OF CARE
"Problem: Adult Behavioral Health Plan of Care  Goal: Rounds/Family Conference  TREATMENT TEAM UPDATE      Chief Complaint:  The patient was admitted due to depression related to a recent relapse on meth. She was hospitalized on this unit in January 2019, and she went to [place]. She returned home and relapsed. UTOX was positive for amphetamines.    Current:  The patient attended team dressed in hospital scrubs. She appeared tired and disheveled, and she said she was embarrassed to have returned to the hospital. She said she went to rehab at [place], but she said she came home to do her taxes. She said she was using "a few lines per day" and also was smoking additional meth. She said she wants to become sober and would like to go to treatment and transition into a sober living home. She said she felt "really tired" and was having trouble sleeping, so she requested to go to her room to rest. She was calm, cooperative, and polite.    Plan:  Continue to assess and obtain collaterals.      "

## 2019-03-13 NOTE — SUBJECTIVE & OBJECTIVE
Past Medical History:   Diagnosis Date    Addiction to drug     ADHD (attention deficit hyperactivity disorder)     Anxiety     Asthma     Bipolar 1 disorder     Cervical pain     Depression     Fibromyalgia     Hallucination     [facility] of [facility]     [facility]    Hx of psychiatric care     Seroquel    Magalis     Psychiatric exam requested by authority     Psychiatric problem     Auditory hallucinations    PTSD (post-traumatic stress disorder)     Schizoaffective disorder     Sleep difficulties     Spina bifida     Spinal stenosis of lumbar region     Substance abuse     Suicide attempt     Once by hanging, once by overdose    Syringomyelia     Syrinx of spinal cord     Therapy     [facility]    Trigger finger     Withdrawal symptoms, drug or narcotic        Past Surgical History:   Procedure Laterality Date    CARPAL TUNNEL RELEASE       SECTION, CLASSIC      siactica      spinal bifida      spinalsonois         Review of patient's allergies indicates:  No Known Allergies    Current Facility-Administered Medications on File Prior to Encounter   Medication    [DISCONTINUED] diphenhydrAMINE injection 50 mg    [DISCONTINUED] haloperidol lactate injection 5 mg    [DISCONTINUED] lorazepam injection 2 mg     Current Outpatient Medications on File Prior to Encounter   Medication Sig    buPROPion (WELLBUTRIN XL) 150 MG TB24 tablet Take 1 tablet (150 mg total) by mouth once daily.    divalproex (DEPAKOTE) 500 MG TbEC Take 1 tablet (500 mg total) by mouth every evening.    folic acid (FOLVITE) 1 MG tablet Take 1 tablet (1 mg total) by mouth once daily.    nicotine (NICODERM CQ) 14 mg/24 hr Place 1 patch onto the skin once daily.    nicotine (NICODERM CQ) 14 mg/24 hr Place 1 patch onto the skin once daily.    QUEtiapine (SEROQUEL) 300 MG Tab Take 1 tablet (300 mg total) by mouth every evening.     Family History     Problem Relation (Age of  Onset)    Arthritis Father    Diabetes Mother, Father    Hyperlipidemia Mother, Father    Hypertension Mother, Father        Tobacco Use    Smoking status: Current Every Day Smoker     Packs/day: 0.50     Years: 15.00     Pack years: 7.50     Types: Cigarettes    Smokeless tobacco: Never Used    Tobacco comment: smoking cessation handout provided and reviewed with pt   Substance and Sexual Activity    Alcohol use: Yes     Alcohol/week: 0.0 oz     Comment: socially 1 glass of wine    Drug use: Yes     Types: Amphetamines     Comment: states last use was 3 days ago    Sexual activity: Not Currently     Partners: Male     Birth control/protection: None     Review of Systems   Constitutional: Negative for chills and fever.   HENT: Negative for congestion and sore throat.    Respiratory: Negative for cough, chest tightness and shortness of breath.    Cardiovascular: Negative for chest pain, palpitations and leg swelling.   Gastrointestinal: Negative for abdominal pain, diarrhea, nausea and vomiting.   Genitourinary: Negative for flank pain, frequency and hematuria.   Musculoskeletal: Negative for back pain and neck pain.   Skin: Negative for rash and wound.   Neurological: Negative for dizziness, seizures, syncope and headaches.   Psychiatric/Behavioral: Positive for suicidal ideas. Negative for agitation, confusion and self-injury.     Objective:     Vital Signs (Most Recent):  Temp: 96.7 °F (35.9 °C) (03/13/19 0753)  Pulse: 84 (03/13/19 0753)  Resp: 20 (03/13/19 0753)  BP: 112/63 (03/13/19 0753) Vital Signs (24h Range):  Temp:  [96.7 °F (35.9 °C)-97.5 °F (36.4 °C)] 96.7 °F (35.9 °C)  Pulse:  [] 84  Resp:  [18-20] 20  SpO2:  [98 %] 98 %  BP: (106-122)/(63-86) 112/63     Weight: 58.7 kg (129 lb 6.6 oz)  Body mass index is 22.92 kg/m².    Physical Exam   Constitutional: She is oriented to person, place, and time. She appears well-developed and well-nourished. No distress.   HENT:   Head: Normocephalic and  atraumatic.   Eyes: Pupils are equal, round, and reactive to light.   Neck: No thyromegaly present.   Cardiovascular: Normal rate, regular rhythm, normal heart sounds and intact distal pulses.   No murmur heard.  Pulmonary/Chest: Effort normal and breath sounds normal. No respiratory distress. She has no wheezes. She has no rales.   Abdominal: Soft. Bowel sounds are normal. She exhibits no distension and no mass. There is no tenderness.   Musculoskeletal: Normal range of motion. She exhibits no edema or deformity.   Lymphadenopathy:     She has no cervical adenopathy.   Neurological: She is alert and oriented to person, place, and time.   CN II visual fields full to confrontation  CN III, Iv, VI- pupils ERRL   CN III- no palsy  Nystagmus; none   Diplopia- none  ophthalmoparesis- none  CN V- facial sensation intact  CN VII- facial expression full and symmetric  CNVIII- normal  CN IV-Normal  CN X- normal  CN XI- Normal   CN XII normal      Skin: Skin is warm and dry. No rash noted. No erythema.   Psychiatric: She is withdrawn.   Nursing note and vitals reviewed.      Significant Labs:   History & Physical      SUBJECTIVE:     History of Present Illness:  Patient is a 33 y.o. female presents with depression/si. Admitted to [facility].    No past medical history other than psych. No complaints today.    PTA Medications   Medication Sig    buPROPion (WELLBUTRIN XL) 150 MG TB24 tablet Take 1 tablet (150 mg total) by mouth once daily.    divalproex (DEPAKOTE) 500 MG TbEC Take 1 tablet (500 mg total) by mouth every evening.    folic acid (FOLVITE) 1 MG tablet Take 1 tablet (1 mg total) by mouth once daily.    nicotine (NICODERM CQ) 14 mg/24 hr Place 1 patch onto the skin once daily.    nicotine (NICODERM CQ) 14 mg/24 hr Place 1 patch onto the skin once daily.    QUEtiapine (SEROQUEL) 300 MG Tab Take 1 tablet (300 mg total) by mouth every evening.       Review of patient's allergies indicates:  No Known Allergies    Past  Medical History:   Diagnosis Date    Addiction to drug     ADHD (attention deficit hyperactivity disorder)     Anxiety     Asthma     Bipolar 1 disorder     Cervical pain     Depression     Fibromyalgia     Hallucination     History of psychiatric hospitalization     [facility] 2018    Hx of psychiatric care     Seroquel    Magalis     Psychiatric exam requested by authority     Psychiatric problem     Auditory hallucinations    PTSD (post-traumatic stress disorder)     Schizoaffective disorder     Sleep difficulties     Spina bifida     Spinal stenosis of lumbar region     Substance abuse     Suicide attempt     Once by hanging, once by overdose    Syringomyelia     Syrinx of spinal cord     Therapy     [facility]    Trigger finger     Withdrawal symptoms, drug or narcotic      Past Surgical History:   Procedure Laterality Date    CARPAL TUNNEL RELEASE       SECTION, CLASSIC      siactica      spinal bifida      spinalsonois       Family History   Problem Relation Age of Onset    Hypertension Mother     Diabetes Mother     Hyperlipidemia Mother     Hypertension Father     Arthritis Father     Diabetes Father     Hyperlipidemia Father      Social History     Tobacco Use    Smoking status: Current Every Day Smoker     Packs/day: 0.50     Years: 15.00     Pack years: 7.50     Types: Cigarettes    Smokeless tobacco: Never Used    Tobacco comment: smoking cessation handout provided and reviewed with pt   Substance Use Topics    Alcohol use: Yes     Alcohol/week: 0.0 oz     Comment: socially 1 glass of wine    Drug use: Yes     Types: Amphetamines     Comment: states last use was 3 days ago        Review of Systems:  Constitutional: no fever or chills  Respiratory: no cough or shorness of breath  Cardiovascular: no chest pain or palpitations  Gastrointestinal: no nausea or vomiting, no abdominal pain or change in bowel habits  Musculoskeletal: no arthralgias or  myalgias  Psych dysphoric, withdrawn  OBJECTIVE:     Vital Signs (Most Recent)  Temp: 96.7 °F (35.9 °C) (03/13/19 0753)  Pulse: 84 (03/13/19 0753)  Resp: 20 (03/13/19 0753)  BP: 112/63 (03/13/19 0753)    Physical Exam:  General: well developed, well nourished  Lungs:  clear to auscultation bilaterally and normal respiratory effort  Cardiovascular: Heart: regular rate and rhythm, S1, S2 normal, no murmur, click, rub or gallop. Chest Wall: no tenderness. Extremities: no cyanosis or edema, or clubbing. Pulses: 2+ and symmetric.  Abdomen/Rectal: Abdomen: soft, non-tender non-distented; bowel sounds normal; no masses,  no organomegaly. Rectal: not examined  Skin: Skin color, texture, turgor normal. No rashes or lesions  Musculoskeletal:normal gait  Psych:withdrawn, dysphoric  Neuro: Cranial nerves:  CN II Visual fields full to confrontation.   CN III, IV, VI Pupils are equal, round, and reactive to light.  CN III: no palsy  Nystagmus: none   Diplopia: none  Ophthalmoparesis: none  CN V Facial sensation intact.   CN VII Facial expression full, symmetric.   CN VIII normal.   CN IX normal.   CN X normal.   CN XI normal.   CN XII normal.        Laboratory  CBC:   Recent Labs   Lab 03/12/19  1758   WBC 7.88   RBC 4.77   HGB 13.0   HCT 39.9      MCV 84   MCH 27.3   MCHC 32.6     CMP:   Recent Labs   Lab 03/12/19  1757   GLU 94   CALCIUM 9.8   ALBUMIN 4.1   PROT 7.9      K 3.7   CO2 25      BUN 14   CREATININE 0.8   ALKPHOS 93   ALT 14   AST 14   BILITOT 0.4     Recent Labs   Lab 03/12/19  1725   COLORU Yellow   SPECGRAV 1.020   PHUR 7.0   PROTEINUA Negative   NITRITE Negative   LEUKOCYTESUR Negative   UROBILINOGEN Negative     TSH   Date Value Ref Range Status   03/12/2019 1.873 0.400 - 4.000 uIU/mL Final     No results found for this or any previous visit.  Alcohol, Medical, Serum   Date Value Ref Range Status   03/12/2019 <10 <10 mg/dL Final     Acetaminophen (Tylenol), Serum   Date Value Ref Range Status    03/12/2019 <3.0 (L) 10.0 - 20.0 ug/mL Final     Comment:     Toxic Levels:  Adults (4 hr post-ingestion).........>150 ug/mL  Adults (12 hr post-ingestion)........>40 ug/mL  Peds (2 hr post-ingestion, liquid)...>225 ug/mL       Results for orders placed or performed during the hospital encounter of 03/12/19   Salicylate level   Result Value Ref Range    Salicylate Lvl <5.0 (L) 15.0 - 30.0 mg/dL     Results for orders placed or performed during the hospital encounter of 03/12/19   Drug screen panel, emergency   Result Value Ref Range    Benzodiazepines Negative     Methadone metabolites Negative     Cocaine (Metab.) Negative     Opiate Scrn, Ur Negative     Barbiturate Screen, Ur Negative     Amphetamine Screen, Ur Presumptive Positive     THC Negative     Phencyclidine Negative     Creatinine, Random Ur 125.6 15.0 - 325.0 mg/dL    Toxicology Information SEE COMMENT      Preg Test, Ur   Date Value Ref Range Status   03/12/2019 Negative  Final       ASSESSMENT/PLAN:     Active Hospital Problems    Diagnosis  POA    Depression with suicidal ideation [F32.9, R45.851]  Not Applicable      Resolved Hospital Problems   No resolved problems to display.       Plan: Further orders for psych      Significant Imaging: none
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
## 2019-03-13 NOTE — PLAN OF CARE
Problem: Adult Behavioral Health Plan of Care  Goal: Optimized Coping Skills in Response to Life Stressors    Intervention: Promote Effective Coping Strategies  Group Note:    Behavior:  The patient is currently experiencing withdrawal from amphetamines. She is sleeping in her room because she said she has not been sleeping well recently. She also received valium today to help her withdrawal symptoms, which may be impacting her ability to participate. She is clinically inappropriate for group today because of these concerns.    Plan:  Staff will continue to encourage the patient to participate in milieu.

## 2019-03-13 NOTE — PLAN OF CARE
Problem: Adult Inpatient Plan of Care  Goal: Plan of Care Review  Outcome: Ongoing (interventions implemented as appropriate)  Lying quiet in bed, eyes closed, respiration even and unlabored, appearing asleep.  Slept poorly most all shift, waking often and with the first time requiring redirection to go to the bathroom.  Asleep at present and slept about 3 hours of broken sleep so far.  Safety and precautions maintained with rounds every 15 minutes, bed is fixed in a low position and pathways kept clear.  No fall occurred.

## 2019-03-14 PROCEDURE — 99233 PR SUBSEQUENT HOSPITAL CARE,LEVL III: ICD-10-PCS | Mod: AF,HB,, | Performed by: PSYCHIATRY & NEUROLOGY

## 2019-03-14 PROCEDURE — 25000003 PHARM REV CODE 250: Performed by: PSYCHIATRY & NEUROLOGY

## 2019-03-14 PROCEDURE — 99233 SBSQ HOSP IP/OBS HIGH 50: CPT | Mod: AF,HB,, | Performed by: PSYCHIATRY & NEUROLOGY

## 2019-03-14 PROCEDURE — 11400000 HC PSYCH PRIVATE ROOM

## 2019-03-14 RX ORDER — DIAZEPAM 5 MG/1
5 TABLET ORAL 3 TIMES DAILY
Status: DISCONTINUED | OUTPATIENT
Start: 2019-03-14 | End: 2019-03-15

## 2019-03-14 RX ORDER — RISPERIDONE 2 MG/1
2 TABLET ORAL NIGHTLY
Status: DISCONTINUED | OUTPATIENT
Start: 2019-03-14 | End: 2019-03-18 | Stop reason: HOSPADM

## 2019-03-14 RX ADMIN — DIAZEPAM 5 MG: 5 TABLET ORAL at 08:03

## 2019-03-14 RX ADMIN — HYDROXYZINE PAMOATE 50 MG: 50 CAPSULE ORAL at 09:03

## 2019-03-14 RX ADMIN — PRAZOSIN HYDROCHLORIDE 1 MG: 1 CAPSULE ORAL at 08:03

## 2019-03-14 RX ADMIN — RISPERIDONE 2 MG: 2 TABLET ORAL at 08:03

## 2019-03-14 RX ADMIN — DIAZEPAM 5 MG: 5 TABLET ORAL at 03:03

## 2019-03-14 RX ADMIN — DIVALPROEX SODIUM 1000 MG: 500 TABLET, FILM COATED, EXTENDED RELEASE ORAL at 08:03

## 2019-03-14 RX ADMIN — THERA TABS 1 TABLET: TAB at 08:03

## 2019-03-14 RX ADMIN — FOLIC ACID 1 MG: 1 TABLET ORAL at 08:03

## 2019-03-14 NOTE — PLAN OF CARE
Problem: Adult Behavioral Health Plan of Care  Goal: Optimized Coping Skills in Response to Life Stressors    Intervention: Promote Effective Coping Strategies  Group Note:    Behavior:  The patient declined a group or 1:1 today because she said she felt tired and depressed. The patient remains withdrawn and is only minimally interacting with staff and peers.    Plan:  Continue to encourage the patient to participate in milieu.

## 2019-03-14 NOTE — PLAN OF CARE
"Problem: Adult Behavioral Health Plan of Care  Goal: Develops/Participates in Therapeutic [place] to Support Successful Transition    Intervention: Foster Therapeutic [place]  Admit Note:    Chief Complaint:  The patient was admitted due to depression and psychosis related to a recent relapse on amphetamines.    Pt Age/Gender/Appearance/Psych History/Symptoms and Duration:  The patient is a 33 year old female who appears her stated age. She was recently inpatient on this unit in January 2019. She has a history of multiple other hospitalizations, and she reports a history of schizoaffective disorder. She has a history of auditory hallucinations and has believed at times the TV was sending her messages. She recently relapsed on meth when she became homeless. When she was last here, she was referred to [place] and stayed for 3 weeks until she had to return to this area to file her taxes. She has been paranoid and suspicious as well as isolating in her room since admit. She is motivated to return to treatment because she would like to cease substance use.    Suicidal Ideations/Plan/Attempt History/Risk and Protective Factors:  The patient reported thoughts that her life was not worth living. She denies a current plan. She has two prior attempts from 2009 (hanging)  and 2016 (overdose). She is at risk due to substance use and is protected by willingness to go to treatment.    Substance Abuse History/UTOX:  The patient's UTOX was positive for amphetamines. She said she has been using a "few lines" per day and also smoking meth as well.    Sleep/Appetite Quality:  She has been sleeping more than usual since admit, but she denies appetite changes.    Compliance/Legal History/Issues:  She denies current legal involvement.    Abuse Concerns:  She denies experiencing abuse.    Cultural/[place] Values/Beliefs:  She is Anabaptist, but she said she is not practicing.    Supports/Marital Status/Quality of Interpersonal " Relationships:  The patient is not currently . She has two children, one of whom is an adult. She said she has poor interpersonal relationships due to substance use.    Initial Discharge Plan:  Refer to rehab    Recommendations:  Encourage participation in milieu.

## 2019-03-14 NOTE — NURSING
"Received pt. Out and aabout on the unit at start of the shift,took a shower, had HS snack and took hs med. Pt. Noted isolative to self, noted bizarre at times, talking to self," that bitch" pt. Appeared paranoid , talking and looking back at female peer. Will cont. To monitor closely.   "

## 2019-03-14 NOTE — PROGRESS NOTES
"   03/14/19 1030   [facility] Group Therapy   Group Name Leisure Skills Training   Specific Interventions Cognitive Stimulation Training   Participation Level None   Patient did not attend group due to "is there another group later today? Can I go to that one? I'm a lilttle depressed, my back hurts , I'm just tired." Patient remained in bed in her assigned room.  "

## 2019-03-14 NOTE — PROGRESS NOTES
PSYCHIATRY DAILY INPATIENT PROGRESS NOTE  SUBSEQUENT HOSPITAL VISIT    ENCOUNTER DATE: 3/14/2019  S[facility]: [facility]    DATE OF ADMISSION: 3/12/2019  7:55 PM  LENGTH OF STAY: 2 days      HISTORY    CHIEF COMPLAINT   Maria Yancey is a 33 y.o. female, seen during daily manzo rounds on the inpatient unit.  Maria Yancey presents with the chief complaint of suicidal ideation    HPI   (Elements: Location, Quality, Severity, Duration, Timing, Content, Modifying Factors, Associated Signs & Symptoms)    The patient was seen and examined. The chart was reviewed.     Staff reports no behavioral or management issues.     The patient has been compliant with treatment. The patient denied any side effects.    Patient appears nervous and somewhat bizarre for interview.  She is not terribly talkative and is somewhat guarded.      Continued Symptoms of Depression: +diminished mood or loss of interest/anhedonia; +irritability, +diminished energy, +change in sleep, +change in appetite, +diminished concentration or cognition or indecisiveness, no PMA/R, +excessive guilt or hopelessness or worthlessness, +suicidal ideations     Improving Changes in Sleep: less trouble with initiation/maintenance, no early morning awakening with inability to return to sleep     Continued Suicidal/(no)Homicidal ideations: +active/passive ideations, +organized plans, no future intentions     With plan to walk infront of traffic     Somewhat improving Symptoms of psychosis: less hallucinations, +delusions, no disorganized thinking, no disorganized behavior or abnormal motor behavior, no negative symptoms    Continues to believe people are after her but has some insight to possible delusional content versus some reality based thoughts     Continued Symptoms of JORDIN: +excessive anxiety/worry/fear, +more days than not, not about numerous issues, +difficult to control, with +restlessness, +fatigue, +poor concentration, +irritability, +muscle tension,  +sleep disturbance; +causes functionally impairing distress      + Symptoms of Panic Disorder: +recurrent panic attacks, may be precipitated or un-precipitated, note source of worry and/or behavioral changes secondary; withagoraphobia     +Symptoms of PTSD: +h/o trauma; +re-experiencing/intrusive symptoms, +avoidant behavior, +negative alterations in cognition or mood, +hyperarousal symptoms; without dissociative symptoms      No symptoms of magalis ocd or eating disorder      ROS  Review of systems  Constitutional: No recent change in weight or fever  Musculoskeletal: No back, neck, muscle, or joint pain  Respiratory: No cough or shortness of breath  Cardiovascular: No chest pain, palpitations, or leg swelling  Gastrointestinal: No abdominal pain, nausea vomiting, or diarrhea  Genitourinary: No pain on urination  Neurologic: No dizziness, seizures, or headaches  Eyes: No change in vision  HENT: No congestion, hearing problem, tinnitus, dysphagia, or sore throat  Skin: No rash or wound    PAST MEDICAL HISTORY   Past Medical History:   Diagnosis Date    Addiction to drug     ADHD (attention deficit hyperactivity disorder)     Anxiety     Asthma     Bipolar 1 disorder     Cervical pain     Depression     Fibromyalgia     Hallucination     History of psychiatric hospitalization     [facility] 9/2018     of psychiatric care     Seroquel    Magalis     Psychiatric exam requested by authority     Psychiatric problem     Auditory hallucinations    PTSD (post-traumatic stress disorder)     Schizoaffective disorder     Sleep difficulties     Spina bifida     Spinal stenosis of lumbar region     Substance abuse     Suicide attempt     Once by hanging, once by overdose    Syringomyelia     Syrinx of spinal cord     Therapy     [facility]    Trigger finger     Withdrawal symptoms, drug or narcotic            PSYCHOTROPIC MEDICATIONS   Scheduled Meds:   diazePAM  5 mg Oral TID    divalproex ER  " 1,000 mg Oral QHS    folic acid  1 mg Oral Daily    multivitamin  1 tablet Oral Daily    prazosin  1 mg Oral QHS    risperiDONE  2 mg Oral QHS     Continuous Infusions:  PRN Meds:.acetaminophen, aluminum-magnesium hydroxide-simethicone, docusate sodium, hydrOXYzine pamoate, loperamide, nicotine, OLANZapine **AND** OLANZapine        EXAMINATION    VITALS   Vitals:    03/14/19 0900   BP: 99/66   Pulse: 88   Resp: 18   Temp: 97.3 °F (36.3 °C)       CONSTITUTIONAL  General Appearance: older than stated age; NAD     MUSCULOSKELETAL  Muscle Strength and Tone:  normal  Abnormal Involuntary Movements:  none  Gait and Station:  normal; non-ataxic     PSYCHIATRIC   Level of Consciousness: awake, alert  Orientation: p/p/t/s  Grooming:  inadequate to circumstances  Psychomotor Behavior: some PMA  Speech: nl r/t/v/s  Language: able to repeat words English fluent  Mood: "nervous"  Affect: fidgety guarded uncomfortable  Thought Process:  linear and organized  Associations:  intact; no SHAGUFTA  Thought Content: +SI AVH no HI  Memory:  intact to recent and remote events  Attention:  intact to conversation; not distractible   Fund of Knowledge:  age and education appropriate  Estimate if Intelligence:  average based on work/education history, vocabulary and mental status exam  Insight:  good- seeks help  Judgment:   good- no bx issues, compliant and cooperative            DIAGNOSTIC TESTING   Laboratory Results  No results found for this or any previous visit (from the past 24 hour(s)).      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  JORDIN  Panic without agoraphobia   PTSD  Fibromyalgia      Polysubstance dependence (Methamphetamines, cannabis, alcohol, narcotics; methamphetamines current, continuous, severe, without physiological dependence)  Nicotine Dependence     Psychosocial stressors        PROBLEM LIST AND MANAGEMENT PLANS    PRESCRIPTION DRUG " MANAGEMENT  Compliance: yes  Side Effects: no  Regimen Adjustments:      Psychosis: Stop Seroquel, Increase Risperdal 2mg QHS with plan to titrate higher as tolerated     Depression: Restarted Wellbutrin  mg po q day;     Anxiety: depakote off-label as below; wellbutrin      Pain: Restart Depakote at higher dose 1000 mg po q HS (off-label)     Substance use: pt counseled; will attempt to place in inpatient rehab if willing; valium taper will decrease as agitation and psychosis symptoms improve     Nicotine use: pt counseled; nicotine patch daily     Psychosocial stressors: pt counseled; SW assisting with resources    DISCHARGE PLANNING  -SW to assist with aftercare planning and collateral  -Once stable discharge home with outpatient follow up care and/or rehab  -Continue inpatient treatment under a PEC and/or CEC for danger to self, as evident by voiced suicidal ideation in the context of heavy substance abuse and withdrawal.       NEED FOR CONTINUED HOSPITALIZATION  Psychiatric illness continues to pose a potential threat to life or bodily function, of self or others, thereby requiring the need for continued inpatient psychiatric hospitalization: Yes    Protective inpatient pyschiatric hospitalization required while a safe disposition plan is enacted: Yes    Patient stabilized and ready for discharge from inpatient psychiatric unit: No      STAFF:   Julio Almazan MD  Psychiatry

## 2019-03-14 NOTE — PSYCH
The patient has signed release of information forms for [facility] and [facility]. Packets have been faxed to both facilities.

## 2019-03-14 NOTE — PLAN OF CARE
Problem: Adult Inpatient Plan of Care  Goal: Plan of Care Review  Outcome: Ongoing (interventions implemented as appropriate)  Pt reports poor sleep last night.Pt reports she was disturbed due to frequent checks by staff.Pt unkept.Appetite good.Pt appears less confused today.Isolating in room.Appears anxious when engaged in conversation.Medication compliant.

## 2019-03-14 NOTE — PLAN OF CARE
"Problem: Adult Inpatient Plan of Care  Goal: Plan of Care Review  Outcome: Ongoing (interventions implemented as appropriate)  Pt.  Slept 4  Hours  so far this shift. Noted to br easily awaken while during rounds. Pt. Declined sleep aide, stating, " I do not want to sleep that much"  q 15 min safety checks done this shift. Safety and comfort maintained. Cont. Plan.      "

## 2019-03-15 PROCEDURE — 25000003 PHARM REV CODE 250: Performed by: PSYCHIATRY & NEUROLOGY

## 2019-03-15 PROCEDURE — 11400000 HC PSYCH PRIVATE ROOM

## 2019-03-15 PROCEDURE — 99233 PR SUBSEQUENT HOSPITAL CARE,LEVL III: ICD-10-PCS | Mod: AF,HB,, | Performed by: PSYCHIATRY & NEUROLOGY

## 2019-03-15 PROCEDURE — 99233 SBSQ HOSP IP/OBS HIGH 50: CPT | Mod: AF,HB,, | Performed by: PSYCHIATRY & NEUROLOGY

## 2019-03-15 RX ORDER — DIAZEPAM 2 MG/1
2 TABLET ORAL 3 TIMES DAILY
Status: DISCONTINUED | OUTPATIENT
Start: 2019-03-15 | End: 2019-03-15

## 2019-03-15 RX ORDER — DIAZEPAM 2 MG/1
2 TABLET ORAL 2 TIMES DAILY
Status: DISCONTINUED | OUTPATIENT
Start: 2019-03-15 | End: 2019-03-16

## 2019-03-15 RX ADMIN — DIAZEPAM 5 MG: 5 TABLET ORAL at 09:03

## 2019-03-15 RX ADMIN — DIAZEPAM 2 MG: 2 TABLET ORAL at 08:03

## 2019-03-15 RX ADMIN — DIVALPROEX SODIUM 1000 MG: 500 TABLET, FILM COATED, EXTENDED RELEASE ORAL at 08:03

## 2019-03-15 RX ADMIN — PRAZOSIN HYDROCHLORIDE 1 MG: 1 CAPSULE ORAL at 08:03

## 2019-03-15 RX ADMIN — THERA TABS 1 TABLET: TAB at 09:03

## 2019-03-15 RX ADMIN — FOLIC ACID 1 MG: 1 TABLET ORAL at 09:03

## 2019-03-15 RX ADMIN — RISPERIDONE 2 MG: 2 TABLET ORAL at 08:03

## 2019-03-15 NOTE — PLAN OF CARE
Problem: Adult Behavioral Health Plan of Care  Goal: Optimized Coping Skills in Response to Life Stressors    Intervention: Promote Effective Coping Strategies  Group Note:    Behavior:  While the patient voiced some improvement, she remains isolative and guarded and did not wish to attend group.    Plan:  Continue to encourage the patient to participate in therapeutic activities.

## 2019-03-15 NOTE — PLAN OF CARE
Problem: Adult Inpatient Plan of Care  Goal: Plan of Care Review  Outcome: Ongoing (interventions implemented as appropriate)  Pt is sleeping at this time and has slept 6.5 hours intermittently.  NAD.  Resp even & unlabored.  Pathways clear and bed in low position.  Q 15 minute safety checks ongoing.  All precautions maintained

## 2019-03-15 NOTE — PROGRESS NOTES
PSYCHIATRY DAILY INPATIENT PROGRESS NOTE  SUBSEQUENT HOSPITAL VISIT    ENCOUNTER DATE: 3/15/2019  [facility]    DATE OF ADMISSION: 3/12/2019  7:55 PM  LENGTH OF STAY: 3 days      HISTORY    CHIEF COMPLAINT   Maria Yancey is a 33 y.o. female, seen during daily manzo rounds on the inpatient unit.  Maria Yancey presents with the chief complaint of suicidal ideation    HPI   (Elements: Location, Quality, Severity, Duration, Timing, Content, Modifying Factors, Associated Signs & Symptoms)    The patient was seen and examined. The chart was reviewed.     Staff reports no behavioral or management issues.     The patient has been compliant with treatment. The patient feels overly sedated    Patient is more sedated today.  She explains that the valium is too strong.  She continues to be bizarre and isolative.  She is not having withdrawal symptoms.  Her suicidal ideation is improving.      Continued Symptoms of Depression: +diminished mood or loss of interest/anhedonia; +irritability, +diminished energy, +change in sleep, +change in appetite, +diminished concentration or cognition or indecisiveness, no PMA/R, +excessive guilt or hopelessness or worthlessness, +suicidal ideations     Improving Changes in Sleep: less trouble with initiation/maintenance, no early morning awakening with inability to return to sleep     Less Suicidal/(no)Homicidal ideations: less active/passive ideations, no longer organized plans, no future intentions     Recently with plan to work infront of traffic but this is resolving.      Somewhat improving Symptoms of psychosis: resolved hallucinations, +delusions, no disorganized thinking, no disorganized behavior or abnormal motor behavior, no negative symptoms    Less thoughts that persons are after her.  This could be a product of her sedation.       Continued Symptoms of JORDIN: +excessive anxiety/worry/fear, +more days than not, not about numerous issues, +difficult to control, with  +restlessness, +fatigue, +poor concentration, +irritability, +muscle tension, +sleep disturbance; +causes functionally impairing distress      + Symptoms of Panic Disorder: +recurrent panic attacks, may be precipitated or un-precipitated, note source of worry and/or behavioral changes secondary; withagoraphobia     +Symptoms of PTSD: +h/o trauma; +re-experiencing/intrusive symptoms, +avoidant behavior, +negative alterations in cognition or mood, +hyperarousal symptoms; without dissociative symptoms      No symptoms of magalis ocd or eating disorder      ROS  Review of systems  Constitutional: No recent change in weight or fever  Musculoskeletal: No back, neck, muscle, or joint pain  Respiratory: No cough or shortness of breath  Cardiovascular: No chest pain, palpitations, or leg swelling  Gastrointestinal: No abdominal pain, nausea vomiting, or diarrhea  Genitourinary: No pain on urination  Neurologic: No dizziness, seizures, or headaches  Eyes: No change in vision  HENT: No congestion, hearing problem, tinnitus, dysphagia, or sore throat  Skin: No rash or wound    PAST MEDICAL HISTORY   Past Medical History:   Diagnosis Date    Addiction to drug     ADHD (attention deficit hyperactivity disorder)     Anxiety     Asthma     Bipolar 1 disorder     Cervical pain     Depression     Fibromyalgia     Hallucination     [facility]     [facility]    Hx of psychiatric care     Seroquel    Magalis     Psychiatric exam requested by authority     Psychiatric problem     Auditory hallucinations    PTSD (post-traumatic stress disorder)     Schizoaffective disorder     Sleep difficulties     Spina bifida     Spinal stenosis of lumbar region     Substance abuse     Suicide attempt     Once by hanging, once by overdose    Syringomyelia     Syrinx of spinal cord     Therapy     [facility]    Trigger finger     Withdrawal symptoms, drug or narcotic   "          PSYCHOTROPIC MEDICATIONS   Scheduled Meds:   diazePAM  2 mg Oral TID    divalproex ER  1,000 mg Oral QHS    folic acid  1 mg Oral Daily    multivitamin  1 tablet Oral Daily    prazosin  1 mg Oral QHS    risperiDONE  2 mg Oral QHS     Continuous Infusions:  PRN Meds:.acetaminophen, aluminum-magnesium hydroxide-simethicone, docusate sodium, hydrOXYzine pamoate, loperamide, nicotine, OLANZapine **AND** OLANZapine        EXAMINATION    VITALS   Vitals:    03/15/19 0739   BP: 107/72   Pulse: 94   Resp: 18   Temp: 97.7 °F (36.5 °C)       CONSTITUTIONAL  General Appearance: older than stated age; NAD     MUSCULOSKELETAL  Muscle Strength and Tone:  normal  Abnormal Involuntary Movements:  none  Gait and Station:  normal; non-ataxic     PSYCHIATRIC   Level of Consciousness: tired  Orientation: p/p/t/s  Grooming:  inadequate to circumstances  Psychomotor Behavior: some PMA  Speech: nl r/t/v/s  Language: able to repeat words English fluent  Mood: "real tired"  Affect: tired in bed,   Thought Process:  linear and organized  Associations:  intact; no SHAGUFTA  Thought Content: less SI delusions no AVH no HI  Memory:  intact to recent and remote events  Attention:  intact to conversation; not distractible   Fund of Knowledge:  age and education appropriate  Estimate if Intelligence:  average based on work/education history, vocabulary and mental status exam  Insight:  good- seeks help  Judgment:   good- no bx issues, compliant and cooperative            DIAGNOSTIC TESTING   Laboratory Results  No results found for this or any previous visit (from the past 24 hour(s)).      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  JORDIN  Panic without agoraphobia   PTSD  Fibromyalgia      Polysubstance dependence (Methamphetamines, cannabis, alcohol, narcotics; methamphetamines current, continuous, severe, without physiological dependence)  Nicotine Dependence     Psychosocial " stressors        PROBLEM LIST AND MANAGEMENT PLANS    PRESCRIPTION DRUG MANAGEMENT  Compliance: yes  Side Effects: no  Regimen Adjustments:      Psychosis: Stop Seroquel, Increase Risperdal 2mg QHS with plan to titrate higher as tolerated     Depression: Restarted Wellbutrin  mg po q day;     Anxiety: depakote off-label as below; wellbutrin      Pain: Restart Depakote at higher dose 1000 mg po q HS (off-label)  Will get CMP Depakote Noe morning     Substance use: pt counseled; will attempt to place in inpatient rehab if willing; valium taper will decrease as agitation and psychosis symptoms improve     Nicotine use: pt counseled; nicotine patch daily     Psychosocial stressors: pt counseled; SW assisting with resources    DISCHARGE PLANNING  -SW to assist with aftercare planning and collateral  -Once stable discharge home with outpatient follow up care and/or rehab  -Continue inpatient treatment under a PEC and/or CEC for danger to self, as evident by voiced suicidal ideation in the context of heavy substance abuse and withdrawal.       NEED FOR CONTINUED HOSPITALIZATION  Psychiatric illness continues to pose a potential threat to life or bodily function, of self or others, thereby requiring the need for continued inpatient psychiatric hospitalization: Yes    Protective inpatient pyschiatric hospitalization required while a safe disposition plan is enacted: Yes    Patient stabilized and ready for discharge from inpatient psychiatric unit: No      STAFF:   Julio Almazan MD  Psychiatry

## 2019-03-15 NOTE — PSYCH
The patient has been accepted at [facility], [facility] [place]. [id number], [phone].  The patient is scheduled to arrive at [facility] on Monday, March 18, 2019. Medicaid transportation has been arranged for 8:30am.; therefore,  [facility]'s transportation has the ride, the confirmation number is [id number]. Prescriptions must accompany the patient to the facility with her discharge papers

## 2019-03-15 NOTE — PROGRESS NOTES
"   03/15/19 1030   [facility] Group Therapy   Group Name Leisure Education   Specific Interventions Coping Skills Training   Participation Level Sharing   Participation Quality Drowsy   Insight/Motivation Improved   Affect/Mood Display Appropriate   Cognition Oriented   Psychomotor WNL   Patient participated in 1:1 discussion about the importance of therapeutic recreation and how it helps you to Get Well, Live Well and Stay Well. Patient given handout. Patient states "I feel tired, trying to find the energy to get up." Patient says "participating in positive activities would give me more energy and improve my health. I like playing basketball and walking."  "

## 2019-03-15 NOTE — PLAN OF CARE
Problem: Adult Inpatient Plan of Care  Goal: Plan of Care Review  Outcome: Ongoing (interventions implemented as appropriate)  Pt mostly in bed resting/sleeping.Pt c/o feeling tired in spite of sleeping better last night.Pt reports feeling better overall today.Thinking more clearly.Talking more today.Medication compliant.

## 2019-03-15 NOTE — PLAN OF CARE
Problem: Adult Inpatient Plan of Care  Goal: Plan of Care Review  Outcome: Ongoing (interventions implemented as appropriate)  Pt calm and cooperative.  Denies any S/I or H/I.  Somewhat withdrawn and stays in her room most of the time.  Encouraged pt to interact with others more once she feels better.  Pt verbalized understanding.  No acute distress apparent at this time, will continue to monitor.

## 2019-03-16 PROCEDURE — 25000003 PHARM REV CODE 250: Performed by: PSYCHIATRY & NEUROLOGY

## 2019-03-16 PROCEDURE — 11400000 HC PSYCH PRIVATE ROOM

## 2019-03-16 PROCEDURE — 99233 PR SUBSEQUENT HOSPITAL CARE,LEVL III: ICD-10-PCS | Mod: AF,HB,, | Performed by: PSYCHIATRY & NEUROLOGY

## 2019-03-16 PROCEDURE — 90833 PR PSYCHOTHERAPY W/PATIENT W/E&M, 30 MIN (ADD ON): ICD-10-PCS | Mod: AF,HB,, | Performed by: PSYCHIATRY & NEUROLOGY

## 2019-03-16 PROCEDURE — 90833 PSYTX W PT W E/M 30 MIN: CPT | Mod: AF,HB,, | Performed by: PSYCHIATRY & NEUROLOGY

## 2019-03-16 PROCEDURE — 99233 SBSQ HOSP IP/OBS HIGH 50: CPT | Mod: AF,HB,, | Performed by: PSYCHIATRY & NEUROLOGY

## 2019-03-16 RX ORDER — BUPROPION HYDROCHLORIDE 150 MG/1
150 TABLET ORAL DAILY
Status: DISCONTINUED | OUTPATIENT
Start: 2019-03-16 | End: 2019-03-18 | Stop reason: HOSPADM

## 2019-03-16 RX ADMIN — ACETAMINOPHEN 650 MG: 325 TABLET ORAL at 08:03

## 2019-03-16 RX ADMIN — RISPERIDONE 2 MG: 2 TABLET ORAL at 08:03

## 2019-03-16 RX ADMIN — DIAZEPAM 2 MG: 2 TABLET ORAL at 08:03

## 2019-03-16 RX ADMIN — THERA TABS 1 TABLET: TAB at 08:03

## 2019-03-16 RX ADMIN — FOLIC ACID 1 MG: 1 TABLET ORAL at 08:03

## 2019-03-16 RX ADMIN — DIVALPROEX SODIUM 1000 MG: 500 TABLET, FILM COATED, EXTENDED RELEASE ORAL at 08:03

## 2019-03-16 RX ADMIN — BUPROPION HYDROCHLORIDE 150 MG: 150 TABLET, EXTENDED RELEASE ORAL at 09:03

## 2019-03-16 NOTE — PROGRESS NOTES
PSYCHIATRY DAILY INPATIENT PROGRESS NOTE  SUBSEQUENT HOSPITAL VISIT    ENCOUNTER DATE: 3/16/2019  SITE: [facility]    DATE OF ADMISSION: 3/12/2019  7:55 PM  LENGTH OF STAY: 4 days      HISTORY    CHIEF COMPLAINT   Maria Yancey is a 33 y.o. female, seen during daily manzo rounds on the inpatient unit.  Maria Yancey presents with the chief complaint of suicidal ideation    HPI   (Elements: Location, Quality, Severity, Duration, Timing, Content, Modifying Factors, Associated Signs & Symptoms)    The patient was seen and examined. The chart was reviewed. Reviewed notes by RNs, LPC, CTRS and MD from the last 24 hours.     Staff reports no behavioral or management issues.     The patient has been compliant with treatment. The patient feels overly sedated.    She is scheduled to go to [facility] on Monday, March 18, 2019. She is excited about the opportunity.     Continued but less Symptoms of Depression: less diminished mood or loss of interest/anhedonia; less irritability, less diminished energy, less change in sleep, less change in appetite, less diminished concentration or cognition or indecisiveness, no PMA/R, less excessive guilt or hopelessness or worthlessness, less suicidal ideations     Improving Changes in Sleep: less trouble with initiation/maintenance, no early morning awakening with inability to return to sleep     Lessening Suicidal/(no)Homicidal ideations: less active/passive ideations, no longer organized plans, no future intentions; symptoms are less frequent/intense     Improving Symptoms of psychosis: resolved hallucinations, less delusions, no disorganized thinking, no disorganized behavior or abnormal motor behavior, no negative symptoms    Continued but less Symptoms of JORDIN: less excessive anxiety/worry/fear, with less restlessness, less fatigue, less poor concentration, less irritability, less muscle tension, less sleep disturbance     Improving Symptoms of Panic Disorder: no panic attacks  since admission; with less agoraphobia     Continued but less Symptoms of PTSD: +h/o trauma; less re-experiencing/intrusive symptoms, less avoidant behavior, less negative alterations in cognition or mood, less hyperarousal symptoms; without dissociative symptoms      Denied Symptoms of esther or hypomania: no elevated, expansive, or irritable mood with no  increased energy or activity; with no inflated self-esteem or grandiosity, decreased need for sleep, increased rate of speech, FOI or racing thoughts, distractibility, increased goal directed activity or PMA, or risky/disinhibited behavior    PSYCHOTHERAPY ADD-ON +[id number]   30 (16-37*) minutes    Time: 16 minutes  Participants: Met with patient    Therapeutic Intervention Type: insight oriented psychotherapy, behavior modifying psychotherapy, supportive psychotherapy  Why chosen therapy is appropriate versus another modality: relevant to diagnosis, patient responds to this modality, evidence based practice    Target symptoms: depression, anxiety , substance abuse, psychosis  Primary focus: paranoia, substance use, psychosocial stressors  Psychotherapeutic techniques: supportive, behavioral, and motivational techniques; psycho-education    Outcome monitoring methods: self-report, observation    Patient's response to intervention:  The patient's response to intervention is accepting.    Progress toward goals:  The patient's progress toward goals is good.            ROS  Review of systems  Constitutional: No recent change in weight or fever  Musculoskeletal: No back, neck, muscle, or joint pain  Respiratory: No cough or shortness of breath  Cardiovascular: No chest pain, palpitations, or leg swelling  Gastrointestinal: No abdominal pain, nausea vomiting, or diarrhea  Genitourinary: No pain on urination  Neurologic: No dizziness, seizures, or headaches  Eyes: No change in vision  HENT: No congestion, hearing problem, tinnitus, dysphagia, or sore throat  Skin: No rash or  "wound    PAST MEDICAL HISTORY   Past Medical History:   Diagnosis Date    Addiction to drug     ADHD (attention deficit hyperactivity disorder)     Anxiety     Asthma     Bipolar 1 disorder     Cervical pain     Depression     Fibromyalgia     Hallucination     History of psychiatric hospitalization     [facility]/2018    Hx of psychiatric care     Seroquel    Magalis     Psychiatric exam requested by authority     Psychiatric problem     Auditory hallucinations    PTSD (post-traumatic stress disorder)     Schizoaffective disorder     Sleep difficulties     Spina bifida     Spinal stenosis of lumbar region     Substance abuse     Suicide attempt     Once by hanging, once by overdose    Syringomyelia     Syrinx of spinal cord     Therapy     [facility]    Trigger finger     Withdrawal symptoms, drug or narcotic            PSYCHOTROPIC MEDICATIONS   Scheduled Meds:   diazePAM  2 mg Oral BID    divalproex ER  1,000 mg Oral QHS    folic acid  1 mg Oral Daily    multivitamin  1 tablet Oral Daily    prazosin  1 mg Oral QHS    risperiDONE  2 mg Oral QHS     Continuous Infusions:  PRN Meds:.acetaminophen, aluminum-magnesium hydroxide-simethicone, docusate sodium, hydrOXYzine pamoate, loperamide, nicotine, OLANZapine **AND** OLANZapine        EXAMINATION    VITALS   Vitals:    03/16/19 0808   BP: (!) 108/56   Pulse: 94   Resp: 16   Temp: 97.5 °F (36.4 °C)     Body mass index is 22.92 kg/m².    CONSTITUTIONAL  General Appearance: older than stated age; NAD; WF, normal weight     MUSCULOSKELETAL  Muscle Strength and Tone:  normal  Abnormal Involuntary Movements:  none  Gait and Station:  normal; non-ataxic     PSYCHIATRIC   Level of Consciousness: awake, alert  Orientation: p/p/t/s  Grooming:  more adequate to circumstances  Psychomotor Behavior: no PMR or PMA  Speech: nl r/t/v/s  Language: able to repeat words English fluent  Mood: "ok"  Affect: improving range, less " anxious/dysthymia  Thought Process:  linear and organized, goal directed  Associations:  intact; no SHAGUFTA  Thought Content: less SI, less delusions, no AVH, no HI  Memory:  intact to recent and remote events  Attention:  intact to conversation; not distractible   Fund of Knowledge:  age and education appropriate  Estimate if Intelligence:  average based on work/education history, vocabulary and mental status exam  Insight:  good- seeks help, recognizes illness  Judgment:   good- no bx issues, compliant and cooperative        DIAGNOSTIC TESTING   Laboratory Results  No results found for this or any previous visit (from the past 24 hour(s)).      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  JORDIN  Panic without agoraphobia   PTSD  Fibromyalgia      Polysubstance dependence (Methamphetamines, cannabis, alcohol, narcotics; methamphetamines current, continuous, severe, without physiological dependence)  Nicotine Dependence     Psychosocial stressors        PROBLEM LIST AND MANAGEMENT PLANS; PRESCRIPTION DRUG MANAGEMENT  Compliance: yes  Side Effects: no  Regimen Adjustments:      Psychosis: Continue Risperdal 2mg QHS; depakote as below     Depression: Resume Wellbutrin XL at 150 mg po q day     Anxiety: depakote off-label as below; wellbutrin      Pain: Resumed/Continue Depakote at 1000 mg po q HS (off-label)  Will get CMP, CBC, and Depakote level Sunday morning     Substance use: pt counseled; rehab Monday; valium taper completed     Nicotine use: pt counseled; nicotine patch daily; wellbutrin     Psychosocial stressors: pt counseled; SW assisting with resources    DISCHARGE PLANNING  -SW to assist with aftercare planning and collateral  -Once stable discharge home with outpatient follow up care and/or rehab  -Continue inpatient treatment under a PEC and/or CEC for danger to self, as evident by voiced suicidal ideation in the context of heavy substance abuse and  withdrawal.   Killian plan to discharge the patient to rehab Monday if stable.     NEED FOR CONTINUED HOSPITALIZATION  Psychiatric illness continues to pose a potential threat to life or bodily function, of self or others, thereby requiring the need for continued inpatient psychiatric hospitalization: Yes    Protective inpatient pyschiatric hospitalization required while a safe disposition plan is enacted: Yes    Patient stabilized and ready for discharge from inpatient psychiatric unit: No      STAFF:   Salomón Nettles MD  Psychiatry

## 2019-03-16 NOTE — PSYCH
Pt no back and forth on wanting to go to rehab.  Earlier today stated she did not want to go to rehab.

## 2019-03-16 NOTE — PLAN OF CARE
"Problem: Adult Inpatient Plan of Care  Goal: Plan of Care Review  Outcome: Ongoing (interventions implemented as appropriate)  Patient accepting medication and snacks without difficult  Isolates in her room, no interaction with peers and minim interaction with staff  States "I doing ok today"  Mood sad and downcast  Denies suicidal ideation   Encouraged to live a drug free life style upon discharge  NADN  Will monitor for safety      "

## 2019-03-16 NOTE — PLAN OF CARE
Problem: Adult Inpatient Plan of Care  Goal: Plan of Care Review  Outcome: Ongoing (interventions implemented as appropriate)  Pt is calm and cooperative.  Denies any S/I or H/I at this time.  Pt still withdrawn and stays in her room most of the time.  Encouraged pt to be more interactive.  Scheduled to dc to rehab on Monday.  No acute distress apparent at this time, will continue to monitor.

## 2019-03-17 PROBLEM — R45.851 DEPRESSION WITH SUICIDAL IDEATION: Status: RESOLVED | Noted: 2019-01-25 | Resolved: 2019-03-17

## 2019-03-17 PROBLEM — F32.A DEPRESSION WITH SUICIDAL IDEATION: Status: RESOLVED | Noted: 2019-01-25 | Resolved: 2019-03-17

## 2019-03-17 LAB
ALBUMIN SERPL BCP-MCNC: 3.2 G/DL
ALP SERPL-CCNC: 76 U/L
ALT SERPL W/O P-5'-P-CCNC: 10 U/L
ANION GAP SERPL CALC-SCNC: 8 MMOL/L
AST SERPL-CCNC: 10 U/L
BASOPHILS # BLD AUTO: 0.03 K/UL
BASOPHILS NFR BLD: 0.5 %
BILIRUB SERPL-MCNC: 0.4 MG/DL
BUN SERPL-MCNC: 15 MG/DL
CALCIUM SERPL-MCNC: 9 MG/DL
CHLORIDE SERPL-SCNC: 108 MMOL/L
CO2 SERPL-SCNC: 24 MMOL/L
CREAT SERPL-MCNC: 0.7 MG/DL
DIFFERENTIAL METHOD: ABNORMAL
EOSINOPHIL # BLD AUTO: 0.1 K/UL
EOSINOPHIL NFR BLD: 1.7 %
ERYTHROCYTE [DISTWIDTH] IN BLOOD BY AUTOMATED COUNT: 14.2 %
EST. GFR  (AFRICAN AMERICAN): >60 ML/MIN/1.73 M^2
EST. GFR  (NON AFRICAN AMERICAN): >60 ML/MIN/1.73 M^2
GLUCOSE SERPL-MCNC: 83 MG/DL
HCT VFR BLD AUTO: 39 %
HGB BLD-MCNC: 12.4 G/DL
LYMPHOCYTES # BLD AUTO: 2.1 K/UL
LYMPHOCYTES NFR BLD: 35.4 %
MCH RBC QN AUTO: 26.9 PG
MCHC RBC AUTO-ENTMCNC: 31.8 G/DL
MCV RBC AUTO: 85 FL
MONOCYTES # BLD AUTO: 0.4 K/UL
MONOCYTES NFR BLD: 7.1 %
NEUTROPHILS # BLD AUTO: 3.3 K/UL
NEUTROPHILS NFR BLD: 55.3 %
PLATELET # BLD AUTO: 246 K/UL
PMV BLD AUTO: 12 FL
POTASSIUM SERPL-SCNC: 3.9 MMOL/L
PROT SERPL-MCNC: 6.8 G/DL
RBC # BLD AUTO: 4.61 M/UL
SODIUM SERPL-SCNC: 140 MMOL/L
VALPROATE SERPL-MCNC: 46.4 UG/ML
WBC # BLD AUTO: 6.02 K/UL

## 2019-03-17 PROCEDURE — 36415 COLL VENOUS BLD VENIPUNCTURE: CPT

## 2019-03-17 PROCEDURE — 90833 PSYTX W PT W E/M 30 MIN: CPT | Mod: AF,HB,, | Performed by: PSYCHIATRY & NEUROLOGY

## 2019-03-17 PROCEDURE — 11400000 HC PSYCH PRIVATE ROOM

## 2019-03-17 PROCEDURE — 99233 SBSQ HOSP IP/OBS HIGH 50: CPT | Mod: AF,HB,, | Performed by: PSYCHIATRY & NEUROLOGY

## 2019-03-17 PROCEDURE — 99233 PR SUBSEQUENT HOSPITAL CARE,LEVL III: ICD-10-PCS | Mod: AF,HB,, | Performed by: PSYCHIATRY & NEUROLOGY

## 2019-03-17 PROCEDURE — 80164 ASSAY DIPROPYLACETIC ACD TOT: CPT

## 2019-03-17 PROCEDURE — 90833 PR PSYCHOTHERAPY W/PATIENT W/E&M, 30 MIN (ADD ON): ICD-10-PCS | Mod: AF,HB,, | Performed by: PSYCHIATRY & NEUROLOGY

## 2019-03-17 PROCEDURE — 85025 COMPLETE CBC W/AUTO DIFF WBC: CPT

## 2019-03-17 PROCEDURE — 25000003 PHARM REV CODE 250: Performed by: PSYCHIATRY & NEUROLOGY

## 2019-03-17 PROCEDURE — 80053 COMPREHEN METABOLIC PANEL: CPT

## 2019-03-17 RX ORDER — DIVALPROEX SODIUM 500 MG/1
1000 TABLET, FILM COATED, EXTENDED RELEASE ORAL NIGHTLY
Qty: 60 TABLET | Refills: 1 | Status: ON HOLD | OUTPATIENT
Start: 2019-03-17 | End: 2019-11-03 | Stop reason: HOSPADM

## 2019-03-17 RX ORDER — RISPERIDONE 2 MG/1
2 TABLET ORAL NIGHTLY
Qty: 30 TABLET | Refills: 1 | Status: ON HOLD | OUTPATIENT
Start: 2019-03-17 | End: 2019-11-03 | Stop reason: HOSPADM

## 2019-03-17 RX ORDER — IBUPROFEN 200 MG
1 TABLET ORAL DAILY
COMMUNITY
Start: 2019-03-17 | End: 2019-05-30

## 2019-03-17 RX ORDER — BUPROPION HYDROCHLORIDE 150 MG/1
150 TABLET ORAL DAILY
Qty: 30 TABLET | Refills: 1 | Status: ON HOLD | OUTPATIENT
Start: 2019-03-17 | End: 2019-11-03 | Stop reason: HOSPADM

## 2019-03-17 RX ADMIN — THERA TABS 1 TABLET: TAB at 08:03

## 2019-03-17 RX ADMIN — BUPROPION HYDROCHLORIDE 150 MG: 150 TABLET, EXTENDED RELEASE ORAL at 08:03

## 2019-03-17 RX ADMIN — DIVALPROEX SODIUM 1000 MG: 500 TABLET, FILM COATED, EXTENDED RELEASE ORAL at 08:03

## 2019-03-17 RX ADMIN — RISPERIDONE 2 MG: 2 TABLET ORAL at 08:03

## 2019-03-17 RX ADMIN — FOLIC ACID 1 MG: 1 TABLET ORAL at 08:03

## 2019-03-17 NOTE — PLAN OF CARE
Problem: Adult Behavioral Health Plan of Care  Goal: Optimized Coping Skills in Response to Life Stressors    Intervention: Promote Effective Coping Strategies  1:1 with pt:    Patient is resting in her room and is not engaged in milieu despite encouragement. She has also decided she no longer wants to go to the [facility] tomorrow. She refused an individual session today.    Staff will continue to encourage her to participate in milieu.

## 2019-03-17 NOTE — PLAN OF CARE
Problem: Adult Inpatient Plan of Care  Goal: Plan of Care Review  Outcome: Ongoing (interventions implemented as appropriate)  Pt now does not want rehab.  Scheduled to dc tomorrow.  Pt denies any S/I or H/I.  Still withdrawn and isolative.  No acute distress apparent, will continue to monitor.

## 2019-03-17 NOTE — PROGRESS NOTES
"PSYCHIATRY DAILY INPATIENT PROGRESS NOTE  SUBSEQUENT HOSPITAL VISIT    ENCOUNTER DATE: 3/17/2019  S[facility]    DATE OF ADMISSION: 3/12/2019  7:55 PM  LENGTH OF STAY: 5 days      HISTORY    CHIEF COMPLAINT   Maria Yancey is a 33 y.o. female, seen during daily manzo rounds on the inpatient unit.  Maria Yancey presents with the chief complaint of suicidal ideation    HPI   (Elements: Location, Quality, Severity, Duration, Timing, Content, Modifying Factors, Associated Signs & Symptoms)    The patient was seen and examined. The chart was reviewed. Reviewed notes by RNs from the last 24 hours.     Staff reports no behavioral or management issues.     The patient has been compliant with treatment. The patient feels overly sedated.    She was scheduled to go to [facility] on Monday, March 18, 2019. She now reports that she will no longer go to rehab, as she wants to go stay with a family member to be closer to her son.     Improving Symptoms of Depression: less diminished mood or loss of interest/anhedonia; no irritability, no diminished energy, no change in sleep, nochange in appetite, no diminished concentration or cognition or indecisiveness, no PMA/R, no excessive guilt or hopelessness or worthlessness, no suicidal ideations     Improved Changes in Sleep: no trouble with initiation/maintenance, no early morning awakening with inability to return to sleep     Improving Suicidal/(no)Homicidal ideations: no active/passive ideations, no longer organized plans, no future intentions; she is future oriented, and reports that she would not kill herself because, "I could not do that to my son."     Improved Symptoms of psychosis: resolved hallucinations, no delusions, no disorganized thinking, no disorganized behavior or abnormal motor behavior, no negative symptoms    Improving Symptoms of JORDIN: less excessive anxiety/worry/fear, with no restlessness, no fatigue, no poor concentration, no irritability, no muscle " tension, no sleep disturbance     Improved Symptoms of Panic Disorder: no panic attacks since admission; with less agoraphobia     Improving Symptoms of PTSD: +h/o trauma; no re-experiencing/intrusive symptoms, no avoidant behavior, no negative alterations in cognition or mood, no hyperarousal symptoms; without dissociative symptoms      Denied Symptoms of esther or hypomania: no elevated, expansive, or irritable mood with no  increased energy or activity; with no inflated self-esteem or grandiosity, decreased need for sleep, increased rate of speech, FOI or racing thoughts, distractibility, increased goal directed activity or PMA, or risky/disinhibited behavior    PSYCHOTHERAPY ADD-ON +11908   30 (16-37*) minutes    Time: 16 minutes  Participants: Met with patient    Therapeutic Intervention Type: insight oriented psychotherapy, behavior modifying psychotherapy, supportive psychotherapy  Why chosen therapy is appropriate versus another modality: relevant to diagnosis, patient responds to this modality, evidence based practice    Target symptoms: depression, anxiety , substance abuse, psychosis  Primary focus: paranoia, substance use, psychosocial stressors  Psychotherapeutic techniques: supportive, behavioral, and motivational techniques; psycho-education; identifying and managing cravings    Outcome monitoring methods: self-report, observation    Patient's response to intervention:  The patient's response to intervention is accepting.    Progress toward goals:  The patient's progress toward goals is good.        ROS  Review of systems  General ROS: negative  Ophthalmic ROS: negative  ENT ROS: negative  Allergy and Immunology ROS: negative  Hematological and Lymphatic ROS: negative  Endocrine ROS: negative  Respiratory ROS: no cough, shortness of breath, or wheezing  Cardiovascular ROS: no chest pain or dyspnea on exertion  Gastrointestinal ROS: no abdominal pain, change in bowel habits, or black or bloody  stools  Genito-Urinary ROS: no dysuria, trouble voiding, or hematuria  Musculoskeletal ROS: negative  Neurological ROS: no TIA or stroke symptoms  Dermatological ROS: negative    PAST MEDICAL HISTORY   Past Medical History:   Diagnosis Date    Addiction to drug     ADHD (attention deficit hyperactivity disorder)     Anxiety     Asthma     Bipolar 1 disorder     Cervical pain     Depression     Fibromyalgia     Hallucination     [facility]    Hx of psychiatric care     Seroquel    Magalis     Psychiatric exam requested by authority     Psychiatric problem     Auditory hallucinations    PTSD (post-traumatic stress disorder)     Schizoaffective disorder     Sleep difficulties     Spina bifida     Spinal stenosis of lumbar region     Substance abuse     Suicide attempt     Once by hanging, once by overdose    Syringomyelia     Syrinx of spinal cord     Therapy     [facility]    Trigger finger     Withdrawal symptoms, drug or narcotic            PSYCHOTROPIC MEDICATIONS   Scheduled Meds:   buPROPion  150 mg Oral Daily    divalproex ER  1,000 mg Oral QHS    folic acid  1 mg Oral Daily    multivitamin  1 tablet Oral Daily    risperiDONE  2 mg Oral QHS     Continuous Infusions:  PRN Meds:.acetaminophen, aluminum-magnesium hydroxide-simethicone, docusate sodium, hydrOXYzine pamoate, loperamide, nicotine, OLANZapine **AND** OLANZapine        EXAMINATION    VITALS   Vitals:    03/17/19 0804   BP: (!) 131/58   Pulse: 83   Resp: 18   Temp: 97 °F (36.1 °C)     Body mass index is 33.47 kg/m².    CONSTITUTIONAL  General Appearance: older than stated age; NAD; WF, obese     MUSCULOSKELETAL  Muscle Strength and Tone:  normal  Abnormal Involuntary Movements:  none  Gait and Station:  normal; non-ataxic     PSYCHIATRIC   Level of Consciousness: awake, alert  Orientation: p/p/t/s  Grooming:  more adequate to circumstances  Psychomotor Behavior: no  "PMR or PMA  Speech: nl r/t/v/s  Language: able to repeat words English fluent  Mood: "ok"  Affect: improving range, less anxious/dysthymia- improving  Thought Process:  linear and organized, goal directed  Associations:  intact; no SHAGUFTA  Thought Content: no SI, no delusions, no AVH, no HI  Memory:  intact to recent and remote events  Attention:  intact to conversation; not distractible   Fund of Knowledge:  age and education appropriate  Estimate if Intelligence:  average based on work/education history, vocabulary and mental status exam  Insight:  good- seeks help, recognizes illness  Judgment:   good- no bx issues, compliant and cooperative        DIAGNOSTIC TESTING   Laboratory Results  Recent Results (from the past 24 hour(s))   Comprehensive metabolic panel    Collection Time: 03/17/19  7:11 AM   Result Value Ref Range    Sodium 140 136 - 145 mmol/L    Potassium 3.9 3.5 - 5.1 mmol/L    Chloride 108 95 - 110 mmol/L    CO2 24 23 - 29 mmol/L    Glucose 83 70 - 110 mg/dL    BUN, Bld 15 6 - 20 mg/dL    Creatinine 0.7 0.5 - 1.4 mg/dL    Calcium 9.0 8.7 - 10.5 mg/dL    Total Protein 6.8 6.0 - 8.4 g/dL    Albumin 3.2 (L) 3.5 - 5.2 g/dL    Total Bilirubin 0.4 0.1 - 1.0 mg/dL    Alkaline Phosphatase 76 55 - 135 U/L    AST 10 10 - 40 U/L    ALT 10 10 - 44 U/L    Anion Gap 8 8 - 16 mmol/L    eGFR if African American >60 >60 mL/min/1.73 m^2    eGFR if non African American >60 >60 mL/min/1.73 m^2   CBC auto differential    Collection Time: 03/17/19  7:11 AM   Result Value Ref Range    WBC 6.02 3.90 - 12.70 K/uL    RBC 4.61 4.00 - 5.40 M/uL    Hemoglobin 12.4 12.0 - 16.0 g/dL    Hematocrit 39.0 37.0 - 48.5 %    MCV 85 82 - 98 fL    MCH 26.9 (L) 27.0 - 31.0 pg    MCHC 31.8 (L) 32.0 - 36.0 g/dL    RDW 14.2 11.5 - 14.5 %    Platelets 246 150 - 350 K/uL    MPV 12.0 9.2 - 12.9 fL    Gran # (ANC) 3.3 1.8 - 7.7 K/uL    Lymph # 2.1 1.0 - 4.8 K/uL    Mono # 0.4 0.3 - 1.0 K/uL    Eos # 0.1 0.0 - 0.5 K/uL    Baso # 0.03 0.00 - 0.20 K/uL "    Gran% 55.3 38.0 - 73.0 %    Lymph% 35.4 18.0 - 48.0 %    Mono% 7.1 4.0 - 15.0 %    Eosinophil% 1.7 0.0 - 8.0 %    Basophil% 0.5 0.0 - 1.9 %    Differential Method Automated          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  JORDIN  Panic without agoraphobia   PTSD  Fibromyalgia      Polysubstance dependence (Methamphetamines, cannabis, alcohol, narcotics; methamphetamines current, continuous, severe, without physiological dependence)  Nicotine Dependence     Psychosocial stressors        PROBLEM LIST AND MANAGEMENT PLANS; PRESCRIPTION DRUG MANAGEMENT  Compliance: yes  Side Effects: no  Regimen Adjustments:      Psychosis: Continue Risperdal 2mg QHS; depakote as below     Depression:continue Wellbutrin XL at 150 mg po q day     Anxiety: depakote off-label as below; wellbutrin      Pain: Continue Depakote at 1000 mg po q HS (off-label)  F/u pending Depakote level      Substance use: pt counseled; pt no longer interested in rehab- pt to f/u with 12-step meetings and outpatient treatment; valium taper completed     Nicotine use: pt counseled; nicotine patch daily; Wellbutrin as above     Psychosocial stressors: pt counseled; SW assisting with resources    DISCHARGE PLANNING  -SW to assist with aftercare planning and collateral  -Once stable discharge home with outpatient follow up care and/or rehab  -Continue inpatient treatment under a PEC and/or CEC for danger to self, as evident by voiced suicidal ideation in the context of heavy substance abuse and withdrawal.   Will plan to discharge the patient to rehab Monday if stable.     NEED FOR CONTINUED HOSPITALIZATION  Psychiatric illness continues to pose a potential threat to life or bodily function, of self or others, thereby requiring the need for continued inpatient psychiatric hospitalization: Yes    Protective inpatient pyschiatric hospitalization required while a safe disposition plan is enacted: Yes    Patient  stabilized and ready for discharge from inpatient psychiatric unit: No      STAFF:   Salomón Nettles MD  Psychiatry

## 2019-03-17 NOTE — PLAN OF CARE
"Problem: Adult Inpatient Plan of Care  Goal: Plan of Care Review  Outcome: Ongoing (interventions implemented as appropriate)  Plan of care reviewed.  Denies intent to harm self or others at this time.  Accepts snacks and medications.  Gait steady, no falls. Did tell MHT that she doesn't want to go to inpt rehab as recommended by treatment team. Encouraged to do so since she is relapsing with doing outpt rehab.  States she doesn't want to go because of her son.  But was encouraged pt to do inpatient for her son as well as her son.  Pt then stated "well maybe."  Promoted an individualized safety plan, reality-based interactions, effective coping strategies, and impulse control.  Will continue to monitor for safety.         Problem: Safety Awareness Impairment (Excessive Substance Use)  Goal: Enhanced Safety Awareness (Excessive Substance Use)  Outcome: Ongoing (interventions implemented as appropriate)  Accepting meds as ordered by MD. Encourages to do inpt rehab after discharge since pt verbalized not wanting to go.       "

## 2019-03-17 NOTE — PLAN OF CARE
Problem: Adult Inpatient Plan of Care  Goal: Plan of Care Review  Outcome: Ongoing (interventions implemented as appropriate)  Lying quiet in bed, eyes closed, respiration even and unlabored, appearing asleep.  Slept on and off all shift only slept about 5.5 hours thus far.  Safety and precautions maintained with rounds every 15 minutes, bed is fixed in a low position and pathways kept clear.  No fall occurred.

## 2019-03-18 VITALS
TEMPERATURE: 97 F | BODY MASS INDEX: 33.48 KG/M2 | RESPIRATION RATE: 18 BRPM | HEART RATE: 104 BPM | SYSTOLIC BLOOD PRESSURE: 129 MMHG | HEIGHT: 63 IN | WEIGHT: 188.94 LBS | DIASTOLIC BLOOD PRESSURE: 72 MMHG

## 2019-03-18 PROCEDURE — 25000003 PHARM REV CODE 250: Performed by: PSYCHIATRY & NEUROLOGY

## 2019-03-18 PROCEDURE — 99239 PR HOSPITAL DISCHARGE DAY,>30 MIN: ICD-10-PCS | Mod: AF,HB,, | Performed by: PSYCHIATRY & NEUROLOGY

## 2019-03-18 PROCEDURE — 99239 HOSP IP/OBS DSCHRG MGMT >30: CPT | Mod: AF,HB,, | Performed by: PSYCHIATRY & NEUROLOGY

## 2019-03-18 RX ADMIN — FOLIC ACID 1 MG: 1 TABLET ORAL at 08:03

## 2019-03-18 RX ADMIN — THERA TABS 1 TABLET: TAB at 08:03

## 2019-03-18 RX ADMIN — BUPROPION HYDROCHLORIDE 150 MG: 150 TABLET, EXTENDED RELEASE ORAL at 08:03

## 2019-03-18 NOTE — PROGRESS NOTES
03/18/19 1100   [facility] Group Therapy   Group Name Leisure Education   Specific Interventions Coping Skills Training  (Benefits Of Leisure Activities)   Participation Level Appropriate;Sharing   Participation Quality Cooperative   Insight/Motivation Good   Affect/Mood Display Appropriate   Cognition Alert   Psychomotor WNL   Patient needed encouragement to come out of her room and participate in group. Patient discussed and identified reading and listening to music for relaxation, exercise to stay physically fit and getting on Facebook to socialize with others.

## 2019-03-18 NOTE — PLAN OF CARE
"Problem: Adult Inpatient Plan of Care  Goal: Plan of Care Review  Outcome: Ongoing (interventions implemented as appropriate)  Plan of care reviewed.  Denies intent to harm self or others at this time.  Accepts snacks and medications.  Gait steady, no falls. Limited interactions with staff and peers, mostly isolating in room, lying in bed.  Did see pt get on phone after encouragement to find own ride home but states she did not find anyone.  Attempt to call medicaid transportation but was tole must call back in AM.  may call as early as 0530. Promoted an individualized safety plan, reality-based interactions, effective coping strategies, and impulse control.  Will continue to monitor for safety.         Problem: Safety Awareness Impairment (Excessive Substance Use)  Goal: Enhanced Safety Awareness (Excessive Substance Use)  Outcome: Ongoing (interventions implemented as appropriate)  Accepting meds as ordered by MD. Pt is definitely declining inpt rehab will be going home instead.  Encouraged to find a ride home and pt still showing poor judgement and insight stating, "yall can just let me hitchhike home, I do it all the time".  Informed pt that if she could not find own ride home then we would try to arrange something for her safe transportation home.        "

## 2019-03-18 NOTE — NURSING
Transportation company called again this am to confirm that the patient will be going home instead of inpatient rehab and that [employer] will be the one doing the transporting.  [facility] as been scheduled to be here between the 9:30-10am.

## 2019-03-18 NOTE — PLAN OF CARE
Problem: Adult Inpatient Plan of Care  Goal: Plan of Care Review  Outcome: Ongoing (interventions implemented as appropriate)  Lying quiet in bed, eyes closed, respiration even and unlabored, appearing asleep.  Slept well most all shift with 2 brief awakening.  One of those times pt came to nursing station requesting menstrual pad. Safety and precautions maintained with rounds every 15 minutes, bed is fixed in a low position and pathways kept clear.  No fall occurred.

## 2019-03-18 NOTE — DISCHARGE SUMMARY
Discharge Summary  Psychiatry    Admit Date: 3/12/2019    Discharge Date and Time:  03/18/2019 7:52 AM    Attending Physician: Julio Almazan MD; Salomón Nettles MD    Discharge Provider: Salomón Nettles MD    Reason for Admission:  suicidal ideation    History of Present Illness:   The patient presented to the ER on 3/12/19 with complaints of suicidal ideation and methamphetamine use     The patient was medically cleared and admitted to the [facility].      Patient was at [facility] late January early February for 3 weeks.  She left and quickly relapsed on methamphetamines.  She did not follow up with mental health nor take her psychiatric medications.  She has suicidal thoughts of walking infront of a bus.  She endorses avh with delusions of persons hunting her.  She has ideas of reference that worsen with amphetamine use.       +Symptoms of Depression: +diminished mood or loss of interest/anhedonia; +irritability, +diminished energy, +change in sleep, +change in appetite, +diminished concentration or cognition or indecisiveness, no PMA/R, +excessive guilt or hopelessness or worthlessness, +suicidal ideations     +Changes in Sleep: +trouble with initiation/maintenance, no early morning awakening with inability to return to sleep     +Suicidal/(no)Homicidal ideations: +active/passive ideations, +organized plans, no future intentions     With plan to walk infront of traffic     +Symptoms of psychosis: +hallucinations, +delusions, no disorganized thinking, no disorganized behavior or abnormal motor behavior, no negative symptoms     +Symptoms of JORDIN: +excessive anxiety/worry/fear, +more days than not, not about numerous issues, +difficult to control, with +restlessness, +fatigue, +poor concentration, +irritability, +muscle tension, +sleep disturbance; +causes functionally impairing distress      + Symptoms of Panic Disorder: +recurrent panic attacks, may be precipitated or un-precipitated, note source of worry  and/or behavioral changes secondary; withagoraphobia     +Symptoms of PTSD: +h/o trauma; +re-experiencing/intrusive symptoms, +avoidant behavior, +negative alterations in cognition or mood, +hyperarousal symptoms; without dissociative symptoms      No symptoms of esther ocd or eating disorder    Procedures Performed: * No surgery found *    Hospital Course (synopsis of major diagnoses, care, treatment, and services provided during the course of the hospital stay):   The patient was stabilized and discharged on the following medications:    Psychosis: Continue Risperdal 2mg QHS; depakote as below     Depression:continue Wellbutrin XL at 150 mg po q day     Anxiety: depakote off-label as below; wellbutrin      Pain: Continue Depakote at 1000 mg po q HS (off-label)     Substance use: pt counseled; pt no longer interested in rehab- pt to f/u with 12-step meetings and outpatient treatment; valium taper completed     Nicotine use: pt counseled; nicotine patch daily; Wellbutrin as above     Psychosocial stressors: pt counseled; SW assisting with resources    The patient was compliant with treatment. The patient denied any side effects.     She was scheduled to go to [facility] on Monday, March 18, 2019. She now reports that she will no longer go to rehab, as she wants to go stay with a family member to be closer to her son.      Improved Symptoms of Depression: no diminished mood or loss of interest/anhedonia; no irritability, no diminished energy, no change in sleep, nochange in appetite, no diminished concentration or cognition or indecisiveness, no PMA/R, no excessive guilt or hopelessness or worthlessness, no suicidal ideations     Improved Changes in Sleep: no trouble with initiation/maintenance, no early morning awakening with inability to return to sleep     Improved/Resolved Suicidal/(no)Homicidal ideations: no active/passive ideations, no longer organized plans, no future intentions; she is future oriented, and  "reports that she would not kill herself because, "I could not do that to my son."     Improved Symptoms of psychosis: resolved hallucinations, no delusions, no disorganized thinking, no disorganized behavior or abnormal motor behavior, no negative symptoms     Improved Symptoms of JORDIN: no excessive anxiety/worry/fear, with no restlessness, no fatigue, no poor concentration, no irritability, no muscle tension, no sleep disturbance     Improved Symptoms of Panic Disorder: no panic attacks since admission; with less agoraphobia     Improved Symptoms of PTSD: +h/o trauma; no re-experiencing/intrusive symptoms, no avoidant behavior, no negative alterations in cognition or mood, no hyperarousal symptoms; without dissociative symptoms      Denied Symptoms of esther or hypomania: no elevated, expansive, or irritable mood with no  increased energy or activity; with no inflated self-esteem or grandiosity, decreased need for sleep, increased rate of speech, FOI or racing thoughts, distractibility, increased goal directed activity or PMA, or risky/disinhibited behavior    No current symptoms of withdrawal.    Discussed diagnosis, risks and benefits of proposed treatment vs alternative treatments vs no treatment, and potential side effects of these treatments.  The patient expresses understanding of the above and displays the capacity to agree with this treatment given said understanding.  Patient also agrees that, currently, the benefits outweigh the risks and would like to pursue treatment at this time.    MSE: stated age, casually dressed, well groomed.  No psychomotor agitation or retardation.  No abnormal involuntary movements.  Gait normal.  Speech normal, conversational.  Language fluent English. Mood fine.  Affect normal range, pleasant, euthymic.  Thought process linear.  Associations intact.  Denies suicidal or homicidal ideation.  Denies auditory hallucinations, paranoid ideation, ideas of reference.  Memory intact.  " Attention intact.  Fund of knowledge intact.  Insight intact.  Judgment intact.  Alert and oriented to person, place, time.      Tobacco Usage:  Is patient a smoker? Yes  Does patient want prescription for Tobacco Cessation? No  Does patient want counseling for Tobacco Cessation? No    If patient would like to quit, then over the counter nicotine patch could be used. The patient could also follow up with his PCP or psychiatric provider for other alternatives.     Final Diagnoses:    Principal Problem: Major Depressive Disorder Severe Recurrent with psychosis   Secondary Diagnoses:   JORDIN  Panic without agoraphobia   PTSD  Fibromyalgia      Polysubstance dependence (Methamphetamines, cannabis, alcohol, narcotics; methamphetamines current, continuous, severe, without physiological dependence)  Nicotine Dependence     Psychosocial stressors        Labs:  Admission on 03/12/2019   Component Date Value Ref Range Status    Preg Test, Ur 03/12/2019 Negative   Final    Hemoglobin A1C 03/12/2019 5.1  4.0 - 5.6 % Final    Estimated Avg Glucose 03/12/2019 100  68 - 131 mg/dL Final    Sodium 03/17/2019 140  136 - 145 mmol/L Final    Potassium 03/17/2019 3.9  3.5 - 5.1 mmol/L Final    Chloride 03/17/2019 108  95 - 110 mmol/L Final    CO2 03/17/2019 24  23 - 29 mmol/L Final    Glucose 03/17/2019 83  70 - 110 mg/dL Final    BUN, Bld 03/17/2019 15  6 - 20 mg/dL Final    Creatinine 03/17/2019 0.7  0.5 - 1.4 mg/dL Final    Calcium 03/17/2019 9.0  8.7 - 10.5 mg/dL Final    Total Protein 03/17/2019 6.8  6.0 - 8.4 g/dL Final    Albumin 03/17/2019 3.2* 3.5 - 5.2 g/dL Final    Total Bilirubin 03/17/2019 0.4  0.1 - 1.0 mg/dL Final    Alkaline Phosphatase 03/17/2019 76  55 - 135 U/L Final    AST 03/17/2019 10  10 - 40 U/L Final    ALT 03/17/2019 10  10 - 44 U/L Final    Anion Gap 03/17/2019 8  8 - 16 mmol/L Final    eGFR if African American 03/17/2019 >60  >60 mL/min/1.73 m^2 Final    eGFR if non African American  03/17/2019 >60  >60 mL/min/1.73 m^2 Final    Valproic Acid Lvl 03/17/2019 46.4* 50.0 - 100.0 ug/mL Final    WBC 03/17/2019 6.02  3.90 - 12.70 K/uL Final    RBC 03/17/2019 4.61  4.00 - 5.40 M/uL Final    Hemoglobin 03/17/2019 12.4  12.0 - 16.0 g/dL Final    Hematocrit 03/17/2019 39.0  37.0 - 48.5 % Final    MCV 03/17/2019 85  82 - 98 fL Final    MCH 03/17/2019 26.9* 27.0 - 31.0 pg Final    MCHC 03/17/2019 31.8* 32.0 - 36.0 g/dL Final    RDW 03/17/2019 14.2  11.5 - 14.5 % Final    Platelets 03/17/2019 246  150 - 350 K/uL Final    MPV 03/17/2019 12.0  9.2 - 12.9 fL Final    Gran # (ANC) 03/17/2019 3.3  1.8 - 7.7 K/uL Final    Lymph # 03/17/2019 2.1  1.0 - 4.8 K/uL Final    Mono # 03/17/2019 0.4  0.3 - 1.0 K/uL Final    Eos # 03/17/2019 0.1  0.0 - 0.5 K/uL Final    Baso # 03/17/2019 0.03  0.00 - 0.20 K/uL Final    Gran% 03/17/2019 55.3  38.0 - 73.0 % Final    Lymph% 03/17/2019 35.4  18.0 - 48.0 % Final    Mono% 03/17/2019 7.1  4.0 - 15.0 % Final    Eosinophil% 03/17/2019 1.7  0.0 - 8.0 % Final    Basophil% 03/17/2019 0.5  0.0 - 1.9 % Final    Differential Method 03/17/2019 Automated   Final   Admission on 03/12/2019, Discharged on 03/12/2019   Component Date Value Ref Range Status    Specimen UA 03/12/2019 Urine, Clean Catch   Final    Color, UA 03/12/2019 Yellow  Yellow, Straw, Jess Final    Appearance, UA 03/12/2019 Clear  Clear Final    pH, UA 03/12/2019 7.0  5.0 - 8.0 Final    Specific Gravity, UA 03/12/2019 1.020  1.005 - 1.030 Final    Protein, UA 03/12/2019 Negative  Negative Final    Glucose, UA 03/12/2019 Negative  Negative Final    Ketones, UA 03/12/2019 Negative  Negative Final    Bilirubin (UA) 03/12/2019 Negative  Negative Final    Occult Blood UA 03/12/2019 Negative  Negative Final    Nitrite, UA 03/12/2019 Negative  Negative Final    Urobilinogen, UA 03/12/2019 Negative  <2.0 EU/dL Final    Leukocytes, UA 03/12/2019 Negative  Negative Final    Sodium 03/12/2019 142   136 - 145 mmol/L Final    Potassium 03/12/2019 3.7  3.5 - 5.1 mmol/L Final    Chloride 03/12/2019 108  95 - 110 mmol/L Final    CO2 03/12/2019 25  23 - 29 mmol/L Final    Glucose 03/12/2019 94  70 - 110 mg/dL Final    BUN, Bld 03/12/2019 14  6 - 20 mg/dL Final    Creatinine 03/12/2019 0.8  0.5 - 1.4 mg/dL Final    Calcium 03/12/2019 9.8  8.7 - 10.5 mg/dL Final    Total Protein 03/12/2019 7.9  6.0 - 8.4 g/dL Final    Albumin 03/12/2019 4.1  3.5 - 5.2 g/dL Final    Total Bilirubin 03/12/2019 0.4  0.1 - 1.0 mg/dL Final    Alkaline Phosphatase 03/12/2019 93  55 - 135 U/L Final    AST 03/12/2019 14  10 - 40 U/L Final    ALT 03/12/2019 14  10 - 44 U/L Final    Anion Gap 03/12/2019 9  8 - 16 mmol/L Final    eGFR if African American 03/12/2019 >60  >60 mL/min/1.73 m^2 Final    eGFR if non African American 03/12/2019 >60  >60 mL/min/1.73 m^2 Final    WBC 03/12/2019 7.88  3.90 - 12.70 K/uL Final    RBC 03/12/2019 4.77  4.00 - 5.40 M/uL Final    Hemoglobin 03/12/2019 13.0  12.0 - 16.0 g/dL Final    Hematocrit 03/12/2019 39.9  37.0 - 48.5 % Final    MCV 03/12/2019 84  82 - 98 fL Final    MCH 03/12/2019 27.3  27.0 - 31.0 pg Final    MCHC 03/12/2019 32.6  32.0 - 36.0 g/dL Final    RDW 03/12/2019 14.3  11.5 - 14.5 % Final    Platelets 03/12/2019 293  150 - 350 K/uL Final    MPV 03/12/2019 11.7  9.2 - 12.9 fL Final    Gran # (ANC) 03/12/2019 4.9  1.8 - 7.7 K/uL Final    Lymph # 03/12/2019 2.2  1.0 - 4.8 K/uL Final    Mono # 03/12/2019 0.7  0.3 - 1.0 K/uL Final    Eos # 03/12/2019 0.1  0.0 - 0.5 K/uL Final    Baso # 03/12/2019 0.03  0.00 - 0.20 K/uL Final    Gran% 03/12/2019 62.0  38.0 - 73.0 % Final    Lymph% 03/12/2019 27.8  18.0 - 48.0 % Final    Mono% 03/12/2019 8.4  4.0 - 15.0 % Final    Eosinophil% 03/12/2019 1.4  0.0 - 8.0 % Final    Basophil% 03/12/2019 0.4  0.0 - 1.9 % Final    Differential Method 03/12/2019 Automated   Final    Acetaminophen (Tylenol), Serum 03/12/2019 <3.0* 10.0 -  20.0 ug/mL Final    Salicylate Lvl 03/12/2019 <5.0* 15.0 - 30.0 mg/dL Final    Benzodiazepines 03/12/2019 Negative   Final    Methadone metabolites 03/12/2019 Negative   Final    Cocaine (Metab.) 03/12/2019 Negative   Final    Opiate Scrn, Ur 03/12/2019 Negative   Final    Barbiturate Screen, Ur 03/12/2019 Negative   Final    Amphetamine Screen, Ur 03/12/2019 Presumptive Positive   Final    THC 03/12/2019 Negative   Final    Phencyclidine 03/12/2019 Negative   Final    Creatinine, Random Ur 03/12/2019 125.6  15.0 - 325.0 mg/dL Final    Toxicology Information 03/12/2019 SEE COMMENT   Final    TSH 03/12/2019 1.873  0.400 - 4.000 uIU/mL Final    Alcohol, Medical, Serum 03/12/2019 <10  <10 mg/dL Final         Discharged Condition: stable and improved; not currently a danger to self/others or gravely disabled    Disposition: Home or Self Care    Is patient being discharged on multiple neuroleptics? No    Follow Up/Patient Instructions:     Medications:  Reconciled Home Medications:      Medication List      START taking these medications    divalproex  MG Tb24  Commonly known as:  DEPAKOTE  Take 2 tablets (1,000 mg total) by mouth every evening.  Replaces:  divalproex 500 MG Tbec     risperiDONE 2 MG tablet  Commonly known as:  RISPERDAL  Take 1 tablet (2 mg total) by mouth every evening.        CHANGE how you take these medications    nicotine 14 mg/24 hr  Commonly known as:  NICODERM CQ  Place 1 patch onto the skin once daily.  What changed:  Another medication with the same name was removed. Continue taking this medication, and follow the directions you see here.        CONTINUE taking these medications    buPROPion 150 MG TB24 tablet  Commonly known as:  WELLBUTRIN XL  Take 1 tablet (150 mg total) by mouth once daily.     folic acid 1 MG tablet  Commonly known as:  FOLVITE  Take 1 tablet (1 mg total) by mouth once daily.        STOP taking these medications    divalproex 500 MG Tbec  Commonly known  as:  DEPAKOTE  Replaced by:  divalproex  MG Tb24     QUEtiapine 300 MG Tab  Commonly known as:  SEROQUEL          No discharge procedures on file.  Follow-up Information     [facility].    Why:  28 day, Recovery Program, as scheduled for 8;30 am   Contact information:  [address] [place] [address]  [phone]               Follow up with [facility]    Diet: regular     Activity as tolerated    Total time spent discharging patient: 32 minutes    Salomón Nettles MD  Psychiatry

## 2019-03-18 NOTE — PSYCH
Order for discharge received.Discharge instructions explained to pt and voiced a understanding.Calm,cooperative,no si/hi,no psychosis.Pt's mom will provide ride home.

## 2019-05-30 ENCOUNTER — OFFICE VISIT (OUTPATIENT)
Dept: OBSTETRICS AND GYNECOLOGY | Facility: CLINIC | Age: 34
End: 2019-05-30
Payer: MEDICAID

## 2019-05-30 VITALS
DIASTOLIC BLOOD PRESSURE: 67 MMHG | WEIGHT: 207 LBS | SYSTOLIC BLOOD PRESSURE: 122 MMHG | HEART RATE: 79 BPM | RESPIRATION RATE: 12 BRPM | BODY MASS INDEX: 36.68 KG/M2 | HEIGHT: 63 IN

## 2019-05-30 DIAGNOSIS — N92.0 MENORRHAGIA WITH REGULAR CYCLE: Primary | ICD-10-CM

## 2019-05-30 PROCEDURE — 99999 PR PBB SHADOW E&M-EST. PATIENT-LVL IV: ICD-10-PCS | Mod: PBBFAC,,, | Performed by: OBSTETRICS & GYNECOLOGY

## 2019-05-30 PROCEDURE — 99203 OFFICE O/P NEW LOW 30 MIN: CPT | Mod: S$PBB,,, | Performed by: OBSTETRICS & GYNECOLOGY

## 2019-05-30 PROCEDURE — 99214 OFFICE O/P EST MOD 30 MIN: CPT | Mod: PBBFAC | Performed by: OBSTETRICS & GYNECOLOGY

## 2019-05-30 PROCEDURE — 99203 PR OFFICE/OUTPT VISIT, NEW, LEVL III, 30-44 MIN: ICD-10-PCS | Mod: S$PBB,,, | Performed by: OBSTETRICS & GYNECOLOGY

## 2019-05-30 PROCEDURE — 99999 PR PBB SHADOW E&M-EST. PATIENT-LVL IV: CPT | Mod: PBBFAC,,, | Performed by: OBSTETRICS & GYNECOLOGY

## 2019-05-30 RX ORDER — HYDROCODONE BITARTRATE AND ACETAMINOPHEN 5; 325 MG/1; MG/1
1 TABLET ORAL EVERY 6 HOURS PRN
Status: ON HOLD | COMMUNITY
End: 2019-10-27

## 2019-05-30 NOTE — PROGRESS NOTES
Subjective:       Patient ID: Maria Yancey is a 33 y.o. female.    Chief Complaint:  Menorrhagia and Dysmenorrhea      History of Present Illness  Patient presents complaining of heavy vaginal bleeding at the beginning of her menstrual cycle.  Patient states that she has been having 1 cycle each month and each cycle last approximately 6 days.  Patient states she has 3 days of very heavy bleeding.  Patient states she will change pads sometimes multiple times an hour.  Patient states she has bled through to her clothing.  Patient states that she is unable to leave home to the 1st 2 days of her menstrual cycle.  She also has some cramping in the beginning of her menstrual cycle.  Patient no longer desires childbearing.    Menstrual History:  OB History        2    Para   2    Term                AB        Living   2       SAB        TAB        Ectopic        Multiple        Live Births                    Menarche age:  Patient's last menstrual period was 2019 (approximate).         Review of Systems  Review of Systems   Constitutional: Negative for activity change, appetite change, chills, diaphoresis, fatigue, fever and unexpected weight change.   HENT: Positive for ear discharge. Negative for congestion, dental problem, drooling, ear pain, facial swelling, hearing loss, mouth sores, nosebleeds, postnasal drip, rhinorrhea, sinus pressure, sneezing, sore throat, tinnitus, trouble swallowing and voice change.    Eyes: Negative for photophobia, pain, discharge, redness, itching and visual disturbance.   Respiratory: Negative for apnea, cough, choking, chest tightness, shortness of breath, wheezing and stridor.    Cardiovascular: Positive for leg swelling. Negative for chest pain and palpitations.   Gastrointestinal: Positive for abdominal pain. Negative for abdominal distention, anal bleeding, blood in stool, constipation, diarrhea, nausea, rectal pain and vomiting.   Endocrine: Positive for  polydipsia. Negative for cold intolerance, heat intolerance, polyphagia and polyuria.   Genitourinary: Positive for menstrual problem. Negative for decreased urine volume, difficulty urinating, dyspareunia, dysuria, enuresis, flank pain, frequency, genital sores, hematuria, pelvic pain, urgency, vaginal bleeding, vaginal discharge and vaginal pain.   Musculoskeletal: Negative for arthralgias, back pain, gait problem, joint swelling, myalgias, neck pain and neck stiffness.   Skin: Negative for color change, pallor, rash and wound.   Allergic/Immunologic: Negative for environmental allergies, food allergies and immunocompromised state.   Neurological: Negative for dizziness, tremors, seizures, syncope, facial asymmetry, speech difficulty, weakness, light-headedness, numbness and headaches.   Hematological: Negative for adenopathy. Does not bruise/bleed easily.   Psychiatric/Behavioral: Positive for behavioral problems. Negative for agitation, confusion, decreased concentration, dysphoric mood, hallucinations, self-injury, sleep disturbance and suicidal ideas. The patient is not nervous/anxious and is not hyperactive.            Objective:      Physical Exam   Genitourinary: Rectal exam shows no external hemorrhoid. No labial fusion. There is no rash, tenderness, lesion or injury on the right labia. There is no rash, tenderness, lesion or injury on the left labia. Uterus is not deviated, not enlarged, not fixed and not tender. Cervix exhibits no motion tenderness, no discharge and no friability. Right adnexum displays no mass, no tenderness and no fullness. Left adnexum displays no mass, no tenderness and no fullness. No erythema, tenderness or bleeding in the vagina. No foreign body in the vagina. No signs of injury around the vagina. No vaginal discharge found.   Nursing note and vitals reviewed.          Assessment:        1. Menorrhagia with regular cycle                Plan:      Patient would like D&C and  marii   Maria was seen today for menorrhagia and dysmenorrhea.    Diagnoses and all orders for this visit:    Menorrhagia with regular cycle

## 2019-06-06 ENCOUNTER — TELEPHONE (OUTPATIENT)
Dept: OBSTETRICS AND GYNECOLOGY | Facility: CLINIC | Age: 34
End: 2019-06-06

## 2019-06-06 NOTE — TELEPHONE ENCOUNTER
Pt's medicaid insurance termed on 5/31/19, she has applied for medicaid as of 6/5/19.  Pt has requested to cancel surgery scheduled for 6/7/19 per case request number [id number].  Jessica in surgery and Dr. Gibson notified.

## 2019-10-26 ENCOUNTER — HOSPITAL ENCOUNTER (INPATIENT)
Facility: HOSPITAL | Age: 34
LOS: 9 days | Discharge: HOME | DRG: 885 | End: 2019-11-04
Attending: PSYCHIATRY & NEUROLOGY | Admitting: PSYCHIATRY & NEUROLOGY
Payer: MEDICAID

## 2019-10-26 DIAGNOSIS — F25.8 OTHER SCHIZOAFFECTIVE DISORDERS: ICD-10-CM

## 2019-10-26 DIAGNOSIS — F25.1 SCHIZOAFFECTIVE DISORDER, DEPRESSIVE TYPE: Primary | ICD-10-CM

## 2019-10-26 PROCEDURE — 82728 ASSAY OF FERRITIN: CPT

## 2019-10-26 PROCEDURE — 11400000 HC PSYCH PRIVATE ROOM

## 2019-10-26 PROCEDURE — 83036 HEMOGLOBIN GLYCOSYLATED A1C: CPT

## 2019-10-26 PROCEDURE — 83540 ASSAY OF IRON: CPT

## 2019-10-26 PROCEDURE — 80061 LIPID PANEL: CPT

## 2019-10-26 RX ORDER — MAG HYDROX/ALUMINUM HYD/SIMETH 200-200-20
30 SUSPENSION, ORAL (FINAL DOSE FORM) ORAL EVERY 6 HOURS PRN
Status: DISCONTINUED | OUTPATIENT
Start: 2019-10-26 | End: 2019-11-04 | Stop reason: HOSPADM

## 2019-10-26 RX ORDER — OLANZAPINE 10 MG/2ML
10 INJECTION, POWDER, FOR SOLUTION INTRAMUSCULAR EVERY 4 HOURS PRN
Status: DISCONTINUED | OUTPATIENT
Start: 2019-10-26 | End: 2019-11-04 | Stop reason: HOSPADM

## 2019-10-26 RX ORDER — DOCUSATE SODIUM 100 MG/1
100 CAPSULE, LIQUID FILLED ORAL DAILY PRN
Status: DISCONTINUED | OUTPATIENT
Start: 2019-10-26 | End: 2019-11-04 | Stop reason: HOSPADM

## 2019-10-26 RX ORDER — IBUPROFEN 200 MG
1 TABLET ORAL DAILY PRN
Status: DISCONTINUED | OUTPATIENT
Start: 2019-10-26 | End: 2019-11-04 | Stop reason: HOSPADM

## 2019-10-26 RX ORDER — ACETAMINOPHEN 325 MG/1
650 TABLET ORAL EVERY 6 HOURS PRN
Status: DISCONTINUED | OUTPATIENT
Start: 2019-10-26 | End: 2019-11-04 | Stop reason: HOSPADM

## 2019-10-26 RX ORDER — LOPERAMIDE HYDROCHLORIDE 2 MG/1
2 CAPSULE ORAL
Status: DISCONTINUED | OUTPATIENT
Start: 2019-10-26 | End: 2019-11-04 | Stop reason: HOSPADM

## 2019-10-26 RX ORDER — OLANZAPINE 10 MG/1
10 TABLET ORAL EVERY 4 HOURS PRN
Status: DISCONTINUED | OUTPATIENT
Start: 2019-10-26 | End: 2019-11-04 | Stop reason: HOSPADM

## 2019-10-26 RX ORDER — HYDROXYZINE PAMOATE 50 MG/1
50 CAPSULE ORAL EVERY 6 HOURS PRN
Status: DISCONTINUED | OUTPATIENT
Start: 2019-10-26 | End: 2019-11-04 | Stop reason: HOSPADM

## 2019-10-26 RX ORDER — FOLIC ACID 1 MG/1
1 TABLET ORAL DAILY
Status: DISCONTINUED | OUTPATIENT
Start: 2019-10-27 | End: 2019-11-04 | Stop reason: HOSPADM

## 2019-10-27 PROBLEM — N30.00 ACUTE CYSTITIS WITHOUT HEMATURIA: Status: ACTIVE | Noted: 2019-10-27

## 2019-10-27 LAB
CHOLEST SERPL-MCNC: 162 MG/DL (ref 120–199)
CHOLEST/HDLC SERPL: 2.6 {RATIO} (ref 2–5)
ESTIMATED AVG GLUCOSE: 94 MG/DL (ref 68–131)
FERRITIN SERPL-MCNC: 6 NG/ML (ref 20–300)
HBA1C MFR BLD HPLC: 4.9 % (ref 4–5.6)
HDLC SERPL-MCNC: 62 MG/DL (ref 40–75)
HDLC SERPL: 38.3 % (ref 20–50)
IRON SERPL-MCNC: 18 UG/DL (ref 30–160)
LDLC SERPL CALC-MCNC: 87 MG/DL (ref 63–159)
NONHDLC SERPL-MCNC: 100 MG/DL
SATURATED IRON: 4 % (ref 20–50)
TOTAL IRON BINDING CAPACITY: 514 UG/DL (ref 250–450)
TRANSFERRIN SERPL-MCNC: 347 MG/DL (ref 200–375)
TRIGL SERPL-MCNC: 65 MG/DL (ref 30–150)

## 2019-10-27 PROCEDURE — 99222 PR INITIAL HOSPITAL CARE,LEVL II: ICD-10-PCS | Mod: ,,, | Performed by: INTERNAL MEDICINE

## 2019-10-27 PROCEDURE — 25000003 PHARM REV CODE 250: Performed by: INTERNAL MEDICINE

## 2019-10-27 PROCEDURE — 99223 1ST HOSP IP/OBS HIGH 75: CPT | Mod: AF,HB,S$PBB, | Performed by: PSYCHIATRY & NEUROLOGY

## 2019-10-27 PROCEDURE — 25000003 PHARM REV CODE 250: Performed by: PSYCHIATRY & NEUROLOGY

## 2019-10-27 PROCEDURE — 99223 PR INITIAL HOSPITAL CARE,LEVL III: ICD-10-PCS | Mod: AF,HB,S$PBB, | Performed by: PSYCHIATRY & NEUROLOGY

## 2019-10-27 PROCEDURE — 11400000 HC PSYCH PRIVATE ROOM

## 2019-10-27 PROCEDURE — 99222 1ST HOSP IP/OBS MODERATE 55: CPT | Mod: ,,, | Performed by: INTERNAL MEDICINE

## 2019-10-27 PROCEDURE — 90833 PSYTX W PT W E/M 30 MIN: CPT | Mod: AF,HB,S$PBB, | Performed by: PSYCHIATRY & NEUROLOGY

## 2019-10-27 PROCEDURE — 36415 COLL VENOUS BLD VENIPUNCTURE: CPT

## 2019-10-27 PROCEDURE — 90833 PR PSYCHOTHERAPY W/PATIENT W/E&M, 30 MIN (ADD ON): ICD-10-PCS | Mod: AF,HB,S$PBB, | Performed by: PSYCHIATRY & NEUROLOGY

## 2019-10-27 RX ORDER — ASPIRIN 325 MG
50000 TABLET, DELAYED RELEASE (ENTERIC COATED) ORAL
Status: DISCONTINUED | OUTPATIENT
Start: 2019-10-27 | End: 2019-11-04 | Stop reason: HOSPADM

## 2019-10-27 RX ORDER — RISPERIDONE 0.5 MG/1
1 TABLET ORAL NIGHTLY
Status: DISCONTINUED | OUTPATIENT
Start: 2019-10-27 | End: 2019-10-28

## 2019-10-27 RX ORDER — BUPROPION HYDROCHLORIDE 150 MG/1
150 TABLET ORAL DAILY
Status: DISCONTINUED | OUTPATIENT
Start: 2019-10-27 | End: 2019-10-31

## 2019-10-27 RX ORDER — DIVALPROEX SODIUM 500 MG/1
500 TABLET, FILM COATED, EXTENDED RELEASE ORAL NIGHTLY
Status: DISCONTINUED | OUTPATIENT
Start: 2019-10-27 | End: 2019-10-28

## 2019-10-27 RX ORDER — CEPHALEXIN 500 MG/1
500 CAPSULE ORAL EVERY 8 HOURS
Status: COMPLETED | OUTPATIENT
Start: 2019-10-27 | End: 2019-11-01

## 2019-10-27 RX ADMIN — OFLOXACIN 50000 UNITS: 300 TABLET, COATED ORAL at 09:10

## 2019-10-27 RX ADMIN — CEPHALEXIN 500 MG: 500 CAPSULE ORAL at 01:10

## 2019-10-27 RX ADMIN — DIVALPROEX SODIUM 500 MG: 500 TABLET, FILM COATED, EXTENDED RELEASE ORAL at 08:10

## 2019-10-27 RX ADMIN — THERA TABS 1 TABLET: TAB at 08:10

## 2019-10-27 RX ADMIN — CEPHALEXIN 500 MG: 500 CAPSULE ORAL at 09:10

## 2019-10-27 RX ADMIN — RISPERIDONE 1 MG: 0.5 TABLET ORAL at 08:10

## 2019-10-27 RX ADMIN — BUPROPION HYDROCHLORIDE 150 MG: 150 TABLET, EXTENDED RELEASE ORAL at 09:10

## 2019-10-27 RX ADMIN — FOLIC ACID 1 MG: 1 TABLET ORAL at 08:10

## 2019-10-27 NOTE — SUBJECTIVE & OBJECTIVE
Past Medical History:   Diagnosis Date    Addiction to drug     recovering drug addict    ADHD (attention deficit hyperactivity disorder)     Anxiety     Asthma     Bipolar 1 disorder     Cervical pain     Chronic constipation     Chronic diarrhea     Depression     Fibromyalgia     Hallucination     History of psychiatric hospitalization     Hx of psychiatric care     Seroquel    Magalis     Polysubstance dependence     Psychiatric exam requested by authority     Psychiatric problem     Auditory hallucinations    PTSD (post-traumatic stress disorder)     Schizoaffective disorder     Sleep difficulties     Spina bifida     Spinal stenosis of lumbar region     Substance abuse     Suicide attempt     Once by hanging, once by overdose    Syringomyelia     Syrinx of spinal cord     Therapy     [facility]    Trigger finger     Withdrawal symptoms, drug or narcotic        Past Surgical History:   Procedure Laterality Date    CARPAL TUNNEL RELEASE       SECTION, CLASSIC      siactica      spinal bifida      spinalsonois         Review of patient's allergies indicates:  No Known Allergies    Current Facility-Administered Medications on File Prior to Encounter   Medication    [COMPLETED] LORazepam tablet 1 mg    [DISCONTINUED] diphenhydrAMINE injection 50 mg    [DISCONTINUED] haloperidol lactate injection 5 mg    [DISCONTINUED] lorazepam injection 2 mg    [DISCONTINUED] nicotine 21 mg/24 hr 1 patch     Current Outpatient Medications on File Prior to Encounter   Medication Sig    buPROPion (WELLBUTRIN XL) 150 MG TB24 tablet Take 1 tablet (150 mg total) by mouth once daily.    risperiDONE (RISPERDAL) 2 MG tablet Take 1 tablet (2 mg total) by mouth every evening.    divalproex ER (DEPAKOTE) 500 MG Tb24 Take 2 tablets (1,000 mg total) by mouth every evening.    [DISCONTINUED] benzphetamine 50 mg Tab Take one tablet by mouth three times a day    [DISCONTINUED]  HYDROcodone-acetaminophen (NORCO) 5-325 mg per tablet Take 1 tablet by mouth every 6 (six) hours as needed for Pain.     Family History     Problem Relation (Age of Onset)    Arthritis Father    Breast cancer Cousin    Diabetes Mother, Father    Hyperlipidemia Mother, Father    Hypertension Mother, Father        Tobacco Use    Smoking status: Current Every Day Smoker     Packs/day: 0.50     Years: 15.00     Pack years: 7.50     Types: Cigarettes    Smokeless tobacco: Never Used    Tobacco comment: smoking cessation handout provided and reviewed with pt   Substance and Sexual Activity    Alcohol use: Yes     Alcohol/week: 0.0 standard drinks     Frequency: Monthly or less     Comment: socially 1-3 glass of fruity drink about every other month    Drug use: Yes     Types: Methamphetamines     Comment: Hx ivda-meth-quit 3/19/19    Sexual activity: Not Currently     Partners: Male     Birth control/protection: None     Comment:      Review of Systems   Constitutional: Negative for activity change, fatigue, fever and unexpected weight change.   HENT: Negative for congestion, ear pain, hearing loss, rhinorrhea and sore throat.    Eyes: Negative for redness and visual disturbance.   Respiratory: Negative for cough, shortness of breath and wheezing.    Cardiovascular: Negative for chest pain, palpitations and leg swelling.   Gastrointestinal: Negative for abdominal pain, constipation, diarrhea, nausea and vomiting.   Genitourinary: Positive for dysuria and vaginal bleeding. Negative for frequency, pelvic pain and urgency.   Musculoskeletal: Negative for back pain, joint swelling and neck pain.   Skin: Negative for color change, rash and wound.   Neurological: Negative for dizziness, tremors, weakness, light-headedness and headaches.   Psychiatric/Behavioral: Positive for decreased concentration.     Objective:     Vital Signs (Most Recent):  Temp: 97.5 °F (36.4 °C) (10/27/19 0813)  Pulse: 94 (10/27/19  0813)  Resp: 20 (10/27/19 0813)  BP: (!) 116/56 (10/27/19 0813) Vital Signs (24h Range):  Temp:  [97.5 °F (36.4 °C)-98.4 °F (36.9 °C)] 97.5 °F (36.4 °C)  Pulse:  [] 94  Resp:  [17-20] 20  SpO2:  [98 %] 98 %  BP: (116-139)/(56-95) 116/56     Weight: 94.1 kg (207 lb 7.3 oz)  Body mass index is 36.75 kg/m².    Physical Exam   Constitutional: She is oriented to person, place, and time. She appears well-developed and well-nourished. No distress.   HENT:   Head: Normocephalic and atraumatic.   Right Ear: External ear normal.   Left Ear: External ear normal.   Mouth/Throat: No oropharyngeal exudate.   Eyes: Pupils are equal, round, and reactive to light. Conjunctivae and EOM are normal. Right eye exhibits no discharge. Left eye exhibits no discharge.   Neck: Neck supple. No tracheal deviation present.   Cardiovascular: Normal rate and regular rhythm. Exam reveals no gallop and no friction rub.   No murmur heard.  Pulmonary/Chest: Effort normal and breath sounds normal. No respiratory distress. She has no wheezes. She has no rales.   Abdominal: Soft. Bowel sounds are normal. She exhibits no distension. There is no tenderness.   Neurological: She is alert and oriented to person, place, and time. No cranial nerve deficit.     Neuro: Cranial nerves:  CN II Visual fields full to confrontation.   CN III, IV, VI Pupils are equal, round, and reactive to light.  CN III: no palsy  Nystagmus: none   Diplopia: none  Ophthalmoparesis: none  CN V Facial sensation intact.   CN VII Facial expression full, symmetric.   CN VIII normal.   CN IX normal.   CN X normal.   CN XI normal.   CN XII normal.         Skin: Skin is warm and dry.   Psychiatric: She has a normal mood and affect. Her behavior is normal.   Vitals reviewed.      Significant Labs:   CBC:   Recent Labs   Lab 10/26/19  1800   WBC 9.30   HGB 10.2*   HCT 34.4*        CMP:   Recent Labs   Lab 10/26/19  1800      K 3.7      CO2 25   GLU 93   BUN 7    CREATININE 0.7   CALCIUM 9.4   PROT 7.8   ALBUMIN 4.2   BILITOT 0.3   ALKPHOS 88   AST 50*   ALT 33   ANIONGAP 10   EGFRNONAA >60     Cardiac Markers: No results for input(s): CKMB, MYOGLOBIN, BNP, TROPISTAT in the last 48 hours.  Troponin: No results for input(s): TROPONINI in the last 48 hours.  Urine Culture: No results for input(s): LABURIN in the last 48 hours.  Urine Studies:   Recent Labs   Lab 10/26/19  1751   COLORU Yellow   APPEARANCEUA Hazy*   PHUR 7.0   SPECGRAV 1.015   PROTEINUA Negative   GLUCUA Negative   KETONESU Negative   BILIRUBINUA Negative   OCCULTUA Negative   NITRITE Negative   UROBILINOGEN Negative   LEUKOCYTESUR Negative     All pertinent labs within the past 24 hours have been reviewed.    Significant Imaging: I have reviewed all pertinent imaging results/findings within the past 24 hours.

## 2019-10-27 NOTE — HPI
Patient admitted to [facility] with depression and SI. Medicine consulted for H&P. She denies any significant PMH. She reports dysuria and vaginal bleeding. Dysuria started 1 week ago. She reports fever at home to 103 F, none here. No leukocytosis but UA does appears consistent with UTI. Urine cx pending. She reports vaginal bleeding is a long time issues and was scheduled for D&C but lost insurance; no recent change in bleeding. Otherwise no acute medical complaints. Labs reviewed.

## 2019-10-27 NOTE — PLAN OF CARE
Lying quiet in bed, eyes closed, respiration even and unlabored, appearing asleep.  Slept well most all shift except from [id number].  Had no complaints and was reading the info given at admit. Safety and precautions maintained with rounds every 15 minutes, bed is fixed in a low position and pathways kept clear.  No fall occurred.

## 2019-10-27 NOTE — PLAN OF CARE
Shift note : patient is eating all meals and taking all ordered medications . She is detoxing from meth safely . Her h&p was done by dr. Minaya . She is in her room sleeping .

## 2019-10-27 NOTE — CONSULTS
[facility] Medicine  Consult Note    Patient Name: Maria Yancey  MRN: [id number]  Admission Date: 10/26/2019  Hospital Length of Stay: 1 days  Attending Physician: Salomón Nettles MD   Primary Care Provider: Jose R Boyd MD           Patient information was obtained from patient and ER records.     Inpatient consult to [facility] for History and Physical  Consult performed by: Ania Roman MD  Consult ordered by: Salomón Nettles MD        Subjective:     Principal Problem: <principal problem not specified>    Chief Complaint: No chief complaint on file.       HPI: Patient admitted to [facility] with depression and SI. Medicine consulted for H&P. She denies any significant PMH. She reports dysuria and vaginal bleeding. Dysuria started 1 week ago. She reports fever at home to 103 F, none here. No leukocytosis but UA does appears consistent with UTI. Urine cx pending. She reports vaginal bleeding is a long time issues and was scheduled for D&C but lost insurance; no recent change in bleeding. Otherwise no acute medical complaints. Labs reviewed.     Past Medical History:   Diagnosis Date    Addiction to drug     recovering drug addict    ADHD (attention deficit hyperactivity disorder)     Anxiety     Asthma     Bipolar 1 disorder     Cervical pain     Chronic constipation     Chronic diarrhea     Depression     Fibromyalgia     Hallucination     History of psychiatric hospitalization     Hx of psychiatric care     Seroquel    Magalis     Polysubstance dependence     Psychiatric exam requested by authority     Psychiatric problem     Auditory hallucinations    PTSD (post-traumatic stress disorder)     Schizoaffective disorder     Sleep difficulties     Spina bifida     Spinal stenosis of lumbar region     Substance abuse     Suicide attempt     Once by hanging, once by overdose    Syringomyelia     Syrinx of spinal cord     [facility]     [facility]    Trigger finger     Withdrawal symptoms, drug or narcotic        Past Surgical History:   Procedure Laterality Date    CARPAL TUNNEL RELEASE       SECTION, CLASSIC      siactica      spinal bifida      spinalsonois         Review of patient's allergies indicates:  No Known Allergies    Current Facility-Administered Medications on File Prior to Encounter   Medication    [COMPLETED] LORazepam tablet 1 mg    [DISCONTINUED] diphenhydrAMINE injection 50 mg    [DISCONTINUED] haloperidol lactate injection 5 mg    [DISCONTINUED] lorazepam injection 2 mg    [DISCONTINUED] nicotine 21 mg/24 hr 1 patch     Current Outpatient Medications on File Prior to Encounter   Medication Sig    buPROPion (WELLBUTRIN XL) 150 MG TB24 tablet Take 1 tablet (150 mg total) by mouth once daily.    risperiDONE (RISPERDAL) 2 MG tablet Take 1 tablet (2 mg total) by mouth every evening.    divalproex ER (DEPAKOTE) 500 MG Tb24 Take 2 tablets (1,000 mg total) by mouth every evening.    [DISCONTINUED] benzphetamine 50 mg Tab Take one tablet by mouth three times a day    [DISCONTINUED] HYDROcodone-acetaminophen (NORCO) 5-325 mg per tablet Take 1 tablet by mouth every 6 (six) hours as needed for Pain.     Family History     Problem Relation (Age of Onset)    Arthritis Father    Breast cancer Cousin    Diabetes Mother, Father    Hyperlipidemia Mother, Father    Hypertension Mother, Father        Tobacco Use    Smoking status: Current Every Day Smoker     Packs/day: 0.50     Years: 15.00     Pack years: 7.50     Types: Cigarettes    Smokeless tobacco: Never Used    Tobacco comment: smoking cessation handout provided and reviewed with pt   Substance and Sexual Activity    Alcohol use: Yes     Alcohol/week: 0.0 standard drinks     Frequency: Monthly or less     Comment: socially 1-3 glass of fruity drink about every other month    Drug use: Yes     Types: Methamphetamines     Comment: Hx ivda-meth-quit 3/19/19     Sexual activity: Not Currently     Partners: Male     Birth control/protection: None     Comment:      Review of Systems   Constitutional: Negative for activity change, fatigue, fever and unexpected weight change.   HENT: Negative for congestion, ear pain, hearing loss, rhinorrhea and sore throat.    Eyes: Negative for redness and visual disturbance.   Respiratory: Negative for cough, shortness of breath and wheezing.    Cardiovascular: Negative for chest pain, palpitations and leg swelling.   Gastrointestinal: Negative for abdominal pain, constipation, diarrhea, nausea and vomiting.   Genitourinary: Positive for dysuria and vaginal bleeding. Negative for frequency, pelvic pain and urgency.   Musculoskeletal: Negative for back pain, joint swelling and neck pain.   Skin: Negative for color change, rash and wound.   Neurological: Negative for dizziness, tremors, weakness, light-headedness and headaches.   Psychiatric/Behavioral: Positive for decreased concentration.     Objective:     Vital Signs (Most Recent):  Temp: 97.5 °F (36.4 °C) (10/27/19 0813)  Pulse: 94 (10/27/19 0813)  Resp: 20 (10/27/19 0813)  BP: (!) 116/56 (10/27/19 0813) Vital Signs (24h Range):  Temp:  [97.5 °F (36.4 °C)-98.4 °F (36.9 °C)] 97.5 °F (36.4 °C)  Pulse:  [] 94  Resp:  [17-20] 20  SpO2:  [98 %] 98 %  BP: (116-139)/(56-95) 116/56     Weight: 94.1 kg (207 lb 7.3 oz)  Body mass index is 36.75 kg/m².    Physical Exam   Constitutional: She is oriented to person, place, and time. She appears well-developed and well-nourished. No distress.   HENT:   Head: Normocephalic and atraumatic.   Right Ear: External ear normal.   Left Ear: External ear normal.   Mouth/Throat: No oropharyngeal exudate.   Eyes: Pupils are equal, round, and reactive to light. Conjunctivae and EOM are normal. Right eye exhibits no discharge. Left eye exhibits no discharge.   Neck: Neck supple. No tracheal deviation present.   Cardiovascular: Normal rate and  regular rhythm. Exam reveals no gallop and no friction rub.   No murmur heard.  Pulmonary/Chest: Effort normal and breath sounds normal. No respiratory distress. She has no wheezes. She has no rales.   Abdominal: Soft. Bowel sounds are normal. She exhibits no distension. There is no tenderness.   Neurological: She is alert and oriented to person, place, and time. No cranial nerve deficit.     Neuro: Cranial nerves:  CN II Visual fields full to confrontation.   CN III, IV, VI Pupils are equal, round, and reactive to light.  CN III: no palsy  Nystagmus: none   Diplopia: none  Ophthalmoparesis: none  CN V Facial sensation intact.   CN VII Facial expression full, symmetric.   CN VIII normal.   CN IX normal.   CN X normal.   CN XI normal.   CN XII normal.         Skin: Skin is warm and dry.   Psychiatric: She has a normal mood and affect. Her behavior is normal.   Vitals reviewed.      Significant Labs:   CBC:   Recent Labs   Lab 10/26/19  1800   WBC 9.30   HGB 10.2*   HCT 34.4*        CMP:   Recent Labs   Lab 10/26/19  1800      K 3.7      CO2 25   GLU 93   BUN 7   CREATININE 0.7   CALCIUM 9.4   PROT 7.8   ALBUMIN 4.2   BILITOT 0.3   ALKPHOS 88   AST 50*   ALT 33   ANIONGAP 10   EGFRNONAA >60     Cardiac Markers: No results for input(s): CKMB, MYOGLOBIN, BNP, TROPISTAT in the last 48 hours.  Troponin: No results for input(s): TROPONINI in the last 48 hours.  Urine Culture: No results for input(s): LABURIN in the last 48 hours.  Urine Studies:   Recent Labs   Lab 10/26/19  1751   COLORU Yellow   APPEARANCEUA Hazy*   PHUR 7.0   SPECGRAV 1.015   PROTEINUA Negative   GLUCUA Negative   KETONESU Negative   BILIRUBINUA Negative   OCCULTUA Negative   NITRITE Negative   UROBILINOGEN Negative   LEUKOCYTESUR Negative     All pertinent labs within the past 24 hours have been reviewed.    Significant Imaging: I have reviewed all pertinent imaging results/findings within the past 24 hours.    Assessment/Plan:      Acute cystitis without hematuria  Will start Keflex pending urine culture  No fevers, no leukocytosis      Other schizoaffective disorders  Per psych      Cigarette nicotine dependence without complication  Nicotine patch if needed      Schizoaffective disorder, depressive type  Per psych      Polysubstance dependence  Per psych      PTSD (post-traumatic stress disorder)  Per psych      Anxiety  Per psych      Fibromyalgia  Per psych  Good sleep hygiene  Exercise  Weight loss        VTE Risk Mitigation (From admission, onward)    None              Thank you for your consult. I will sign off. Please contact us if you have any additional questions.    Ania Roman MD  Department of Hospital Medicine   [facility]
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
## 2019-10-28 PROCEDURE — S4991 NICOTINE PATCH NONLEGEND: HCPCS | Performed by: PSYCHIATRY & NEUROLOGY

## 2019-10-28 PROCEDURE — 99233 PR SUBSEQUENT HOSPITAL CARE,LEVL III: ICD-10-PCS | Mod: AF,HB,S$PBB, | Performed by: PSYCHIATRY & NEUROLOGY

## 2019-10-28 PROCEDURE — 90833 PSYTX W PT W E/M 30 MIN: CPT | Mod: AF,HB,S$PBB, | Performed by: PSYCHIATRY & NEUROLOGY

## 2019-10-28 PROCEDURE — 11400000 HC PSYCH PRIVATE ROOM

## 2019-10-28 PROCEDURE — 25000003 PHARM REV CODE 250: Performed by: INTERNAL MEDICINE

## 2019-10-28 PROCEDURE — 99233 SBSQ HOSP IP/OBS HIGH 50: CPT | Mod: AF,HB,S$PBB, | Performed by: PSYCHIATRY & NEUROLOGY

## 2019-10-28 PROCEDURE — 90833 PR PSYCHOTHERAPY W/PATIENT W/E&M, 30 MIN (ADD ON): ICD-10-PCS | Mod: AF,HB,S$PBB, | Performed by: PSYCHIATRY & NEUROLOGY

## 2019-10-28 PROCEDURE — 25000003 PHARM REV CODE 250: Performed by: PSYCHIATRY & NEUROLOGY

## 2019-10-28 RX ORDER — DIVALPROEX SODIUM 500 MG/1
1000 TABLET, FILM COATED, EXTENDED RELEASE ORAL NIGHTLY
Status: DISCONTINUED | OUTPATIENT
Start: 2019-10-28 | End: 2019-11-01

## 2019-10-28 RX ORDER — RISPERIDONE 2 MG/1
2 TABLET ORAL NIGHTLY
Status: DISCONTINUED | OUTPATIENT
Start: 2019-10-28 | End: 2019-10-29

## 2019-10-28 RX ORDER — FERROUS SULFATE 325(65) MG
325 TABLET ORAL 2 TIMES DAILY
Status: DISCONTINUED | OUTPATIENT
Start: 2019-10-28 | End: 2019-11-04 | Stop reason: HOSPADM

## 2019-10-28 RX ADMIN — CEPHALEXIN 500 MG: 500 CAPSULE ORAL at 09:10

## 2019-10-28 RX ADMIN — FERROUS SULFATE TAB 325 MG (65 MG ELEMENTAL FE) 325 MG: 325 (65 FE) TAB at 08:10

## 2019-10-28 RX ADMIN — RISPERIDONE 2 MG: 2 TABLET ORAL at 08:10

## 2019-10-28 RX ADMIN — Medication 1 PATCH: at 09:10

## 2019-10-28 RX ADMIN — DIVALPROEX SODIUM 1000 MG: 500 TABLET, FILM COATED, EXTENDED RELEASE ORAL at 08:10

## 2019-10-28 RX ADMIN — FOLIC ACID 1 MG: 1 TABLET ORAL at 09:10

## 2019-10-28 RX ADMIN — BUPROPION HYDROCHLORIDE 150 MG: 150 TABLET, EXTENDED RELEASE ORAL at 09:10

## 2019-10-28 RX ADMIN — FERROUS SULFATE TAB 325 MG (65 MG ELEMENTAL FE) 325 MG: 325 (65 FE) TAB at 09:10

## 2019-10-28 RX ADMIN — CEPHALEXIN 500 MG: 500 CAPSULE ORAL at 05:10

## 2019-10-28 RX ADMIN — CEPHALEXIN 500 MG: 500 CAPSULE ORAL at 02:10

## 2019-10-28 RX ADMIN — ACETAMINOPHEN 650 MG: 325 TABLET ORAL at 08:10

## 2019-10-28 RX ADMIN — THERA TABS 1 TABLET: TAB at 09:10

## 2019-10-28 NOTE — PLAN OF CARE
Pt accepts medications without difficulty. Pt calm and cooperative. Interacting well with staff and select peers. Flat affect. Pt reports feeling depressed. Denies SI, HI and AVH. No self harming behaviors noted. Will continue to monitor for safety.

## 2019-10-28 NOTE — PLAN OF CARE
Problem: Adult Behavioral Health Plan of Care  Goal: Optimized Coping Skills in Response to Life Stressors  Intervention: Promote Effective Coping Strategies  Flowsheets (Taken 10/28/2019 1538)  Supportive Measures: active listening utilized; positive reinforcement provided; verbalization of feelings encouraged; counseling provided; relaxation techniques promoted; self-care encouraged; problem solving facilitated; decision-making supported; self-responsibility promoted; self-reflection promoted; goal setting facilitated     Behavior: Patient did not attend psychotherapeutic group, reporting that she is tired. She is withdrawal from amphetamines.     Intervention:  will engage patients in a CBT-based group about the stages of relapse. Patients will be given time to express thoughts, concerns and goals for treatment and discharge, as well as perceived barriers to progress.    Response: Patient did not attend group, but told the  that she is feeling really tired.     Plan:  will continue to encourage patient to explore and verbalize emotions, set goals to aid in preventing re-hospitalization, attend psychotherapeutic group, and follow up with aftercare appointments.

## 2019-10-28 NOTE — NURSING
Pt lying quietly in bed. Slept well most of shift. NAD. Respiration even and unlabored. Pathways clear and bed in low position. Q15 minute safety checks ongoing. All precautions maintained.

## 2019-10-28 NOTE — PROGRESS NOTES
"   10/28/19 7281   Assessment   Patient's Identification of the Problem Patient presents with tearful, depressed affect and "tired, head hurts" mood. Patient states her admit is due to depression and suicidal thoughts." Patient reports she has not taken her medication for 2 months, hearing voices, seeing things, don't sleep, is scared at night." Patient admits to negative leisure lifestyle of cigarettes, meth and history of alcohol. Patient reports she is , currently single, has 2 children, 2 years of college(not goos wit reading and writing per patient, have dyslexia), wears glasses, unemployed, lives in her mother's yard in a camper in [place]. Patient verbalized main goals "get my medications right and try to get on disability so I can live better."   Leisure Interest Watching TV;Shopping;Listen to Music;Walk;Sleep;Ride Bike;Enjoy Family/Friends;Spiritism   Leisure Barriers Lack of Transportation;Loss of Interest;Cognitive Skill Level;Lack of Finances;Energy Level;Self Confidence;Drug Use;Poor Social Tolerance;Fears/Phobias;Other (See Comments)  (fear public places, dyslexia)   Treatment Focus To Improve Reality Orientation;To Improve Mood;To Increase Motivation;Increase Self Confidence;To Improve Leisure Awareness/Lifestyle/Interest;To Promote Successful and Safe Self Expression;To Improve Coping Skills;To Increase Energy Level;To Promote Social Skills/Tolerance/Interaction;To Educate and Increase Awareness of Sober Free Activities     Treatment Recommendation: To address problem(s) #  1:1 Intervention (as needed)    Reality Orientation Skilled Activity  Cognitive Stimulation Skilled Activity  Creative Expression Skilled Activity  Mild Exercises Skilled Activity  Stress Management Skilled Activity  Coping Skilled Activity  Leisure Education and Awareness Skilled Activity    Treatment Goal(s):  Long Term Goals Refer To Master Treatment Plan    Short Term Treatment Goal(s)  Patient Will:  Exhibit Improvement " in Mood  Demonstrate Improvement in Thought Processes / Awareness  Identify at Least 2 Coping Skills or Leisure Skills to Reduce Depression and Hopelessness Upon Request from Therapist  Identify (+) Leisure / Recreation Activities that Promote Wellness and Promote a Sober Lifestyle    Discharge Recommendations:  Encourage Patient to Actively Utilize Available Community Resources to Increase Leisure Involvement to Decrease Signs and Symptoms of Illness  Encourage Patient to Utilize Coping Skills on a Regular Basis to Reduce the Risk of Decompensating and Re-Hospitalizations  AA/NA Meetings  Follow Up with After Care Appointments  Continue with Current Leisure Activities

## 2019-10-28 NOTE — CONSULTS
"  [facility]  Adult Nutrition  Consult Note    SUMMARY     Recommendations    Recommendation: Continue regular diet as ordered  Goals: pt to intake at least 75% of meals daily  Nutrition Goal Status: new  Communication of RD Recs: other (comment)(POC)    Reason for Assessment    Reason For Assessment: consult  Diagnosis: psychological disorder  Relevant Medical History: bipolar 1 disorder, spina bifida, addiction, depression, Schizoaffective disorder  General Information Comments: 6lb wt loss in 6 months. Meal intake 100%, snack intake adequate. NFPE not conducted per psych status.  Nutrition Discharge Planning: regular diet    Nutrition Risk Screen    Nutrition Risk Screen: no indicators present    Nutrition/Diet History    Spiritual, Cultural Beliefs, Uatsdin Practices, Values that Affect Care: no  Food Allergies: NKFA  Factors Affecting Nutritional Intake: None identified at this time    Anthropometrics    Temp: 97.5 °F (36.4 °C)  Height Method: Stated  Height: 5' 3" (160 cm)  Height (inches): 63 in  Weight Method: Standard Scale  Weight: 93.9 kg (207 lb)  Weight (lb): 207 lb  Ideal Body Weight (IBW), Female: 115 lb  % Ideal Body Weight, Female (lb): 180 lb  BMI (Calculated): 36.7  BMI Grade: 35 - 39.9 - obesity - grade II     Lab/Procedures/Meds    Pertinent Labs Reviewed: reviewed  Pertinent Labs Comments: iron 18, AST 50  Pertinent Medications Reviewed: reviewed  Pertinent Medications Comments: ferrous sulfate, multivitamin, folic acid    Estimated/Assessed Needs    Weight Used For Calorie Calculations: 93.9 kg (207 lb)  Energy Calorie Requirements (kcal): 1598  Energy Need Method: Wright-St Jeor(no AF)  Protein Requirements: 75-94 g/kg  Weight Used For Protein Calculations: 93.9 kg (207 lb)  Estimated Fluid Requirement Method: RDA Method  RDA Method (mL): 1598     Nutrition Prescription Ordered    Current Diet Order: Regular    Evaluation of Received Nutrient/Fluid Intake    Energy " Calories Required: meeting needs  Protein Required: meeting needs  % Intake of Estimated Energy Needs: 75 - 100 %  % Meal Intake: 75 - 100 %    Nutrition Risk    Level of Risk/Frequency of Follow-up: (1x/wk)     Assessment and Plan  Nutrition Problem:  No nutrition diagnosis at this time    Interventions:  General healthful diet  Collaboration with other providers    Nutrition Diagnosis Status:   New    Monitor and Evaluation    Food and Nutrient Intake: energy intake, food and beverage intake  Food and Nutrient Adminstration: diet order  Anthropometric Measurements: body mass index     Nutrition Follow-Up    RD Follow-up?: Yes

## 2019-10-28 NOTE — PLAN OF CARE
Recommendation: Continue regular diet as ordered    Goals: pt to intake at least 75% of meals daily  Nutrition Goal Status: new  Nutrition Discharge Planning: regular diet

## 2019-10-28 NOTE — PLAN OF CARE
"  Problem: Adult Behavioral Health Plan of Care  Goal: Rounds/Family Conference  Outcome: Ongoing, Progressing  Flowsheets (Taken 10/28/2019 0714)  Participants: patient; therapeutic recreation; other (see comments); psychiatrist; social work; nursing (social )  Summary: review reason for admit and patient care plan     Chief Complaint:  Patient is depression with suicidal ideation and +UTOX for amphetamines. Pt has hx of schizoaffective disorder and PTSD.        Current:  Patient presented to treatment team dressed in personal clothing, avoiding eye contact, dysphoric mood, disheveled, stating "a little tired, a little better, not much. I got into it with my mom because I haven't been on my medications. Medicaid cancelled for me, so I haven't been on my meds for two months, I have been hearing voices and seeing things, I sleep with box cutters. It messes up my relationships. I just recently got back on my medications. I have a hard time with my family, because they don't understand or believe me and that makes me angry. It is hard for me to get a job like this. (tearful) I have been trying to get some help financially, just been going through a lot. I get real scared, so I don't want to sleep. I take meth so I can stay up and that makes my situation worse. When I am on my medications I don't use. No one wants me to stay with them. They are scared of me, so I have to stay in little campers. I am going back to moms house, she is going to put me in this little camper behind her house. Then I am back to being scared." Pt states that she has trouble reading or writing, so she has not been able to file for SSI by herself; she has been denied once before. Pt does not want to go to substance use rehab- residential at this time, stating she just needs her medications. "I always hear the voices but it's not as bad when I take my Depakote." Pt endorses swelling and blisters in her feet, had to cut her hair " "due to "hair bugs. No matter what I say they say I am crazy, won't take me to the hospital. They say it is in my mind." Pt states that she went to residential recently for the first time for 21 days in a "turtle suit, half naked, because I have schizophrenia, I was just going see my son." Pt gave collateral consent for staff to contact her mother, also requesting that her illness be explained to her mother.      Plan:  Patient will be encouraged to engage in psychotherapeutic groups and recreational therapy. Patient's medication will be monitored and adjusted as needed. Patient will be encouraged to follow up with aftercare appointments.  "

## 2019-10-28 NOTE — PROGRESS NOTES
"PSYCHIATRY DAILY INPATIENT PROGRESS NOTE  SUBSEQUENT HOSPITAL VISIT    ENCOUNTER DATE: 10/28/2019  SITE: [facility]    DATE OF ADMISSION: 10/26/2019  8:06 PM  LENGTH OF STAY: 2 days      HISTORY    CHIEF COMPLAINT   Maria Yancey is a 34 y.o. female, seen during daily manzo rounds on the inpatient unit.  Maria Yancey presents with the chief complaint of depression and suicidal ideation, "I am doing very bad."    HPI   (Elements: Location, Quality, Severity, Duration, Timing, Content, Modifying Factors, Associated Signs & Symptoms)    The patient was seen and examined. The chart was reviewed.     Reviewed notes by RNs and MD from the last 24 hours.    The patient's case was discussed with the treatment team and care providers today, including RNs and MD.    Staff reports no behavioral or management issues.     The patient has been compliant with treatment. The patient denied any side effects.    Psychosocial stressors include minimal social support, financial distress, and substance use.    Symptoms persist unchanged from yesterday as documented below.     Continued Symptoms of Depression: +diminished mood or loss of interest/anhedonia; +irritability, +diminished energy, +change in sleep, +change in appetite, +diminished concentration or cognition or indecisiveness, no PMA/R, +excessive guilt or hopelessness or worthlessness, +suicidal ideations     Continued Changes in Sleep: +trouble with initiation/maintenance, no early morning awakening with inability to return to sleep     Continued Suicidal/(no)Homicidal ideations: +active/passive ideations, +organized plans, no future intentions     Continued Symptoms of psychosis: +hallucinations, +delusions, no disorganized thinking, no disorganized behavior or abnormal motor behavior, no negative symptoms     Denied past or current Symptoms of esther or hypomania: no elevated, expansive, or irritable mood with no increased energy or activity; with no inflated " self-esteem or grandiosity, decreased need for sleep, increased rate of speech, FOI or racing thoughts, distractibility, increased goal directed activity or PMA, or risky/disinhibited behavior     Continued Symptoms of JORDIN: +excessive anxiety/worry/fear, no panic attacks since admission; with less agoraphobia (attended some group activities     Continued Symptoms of PTSD: +h/o trauma; +re-experiencing/intrusive symptoms, +avoidant behavior, +negative alterations in cognition or mood, +hyperarousal symptoms; without dissociative symptoms     +Methamphetamine withdrawal    PSYCHOTHERAPY ADD-ON +28037   30 (16-37*) minutes    Time: 16 minutes  Participants: Met with patient    Therapeutic Intervention Type: behavior modifying psychotherapy, supportive psychotherapy  Why chosen therapy is appropriate versus another modality: relevant to diagnosis, patient responds to this modality, evidence based practice    Target symptoms: substance abuse, mood disorder, psychosis  Primary focus: mood and psychosocial stressors  Psychotherapeutic techniques: supportive and problem solving and motivational techniques; psycho-education    Outcome monitoring methods: self-report, observation    Patient's response to intervention:  The patient's response to intervention is accepting.    Progress toward goals:  The patient's progress toward goals is fair .          ROS  General ROS: negative  Ophthalmic ROS: negative  ENT ROS: negative  Allergy and Immunology ROS: negative  Hematological and Lymphatic ROS: negative  Endocrine ROS: negative  Respiratory ROS: no cough, shortness of breath, or wheezing  Cardiovascular ROS: no chest pain or dyspnea on exertion  Gastrointestinal ROS: no abdominal pain, change in bowel habits, or black or bloody stools  Genito-Urinary ROS: no dysuria, trouble voiding, or hematuria  Musculoskeletal ROS: negative  Neurological ROS: no TIA or stroke symptoms  Dermatological ROS: negative    PAST MEDICAL HISTORY    Past Medical History:   Diagnosis Date    Addiction to drug     recovering drug addict    ADHD (attention deficit hyperactivity disorder)     Anxiety     Asthma     Bipolar 1 disorder     Cervical pain     Chronic constipation     Chronic diarrhea     Depression     Fibromyalgia     Hallucination     History of psychiatric hospitalization     Hx of psychiatric care     Seroquel    Magalis     Polysubstance dependence     Psychiatric exam requested by authority     Psychiatric problem     Auditory hallucinations    PTSD (post-traumatic stress disorder)     Schizoaffective disorder     Sleep difficulties     Spina bifida     Spinal stenosis of lumbar region     Substance abuse     Suicide attempt     Once by hanging, once by overdose    Syringomyelia     Syrinx of spinal cord     Therapy     [facility]    Trigger finger     Withdrawal symptoms, drug or narcotic            PSYCHOTROPIC MEDICATIONS   Scheduled Meds:   buPROPion  150 mg Oral Daily    cephALEXin  500 mg Oral Q8H    cholecalciferol (vitamin D3)  50,000 Units Oral Q7 Days    divalproex ER  500 mg Oral QHS    folic acid  1 mg Oral Daily    multivitamin  1 tablet Oral Daily    risperiDONE  1 mg Oral QHS     Continuous Infusions:  PRN Meds:.acetaminophen, aluminum-magnesium hydroxide-simethicone, docusate sodium, hydrOXYzine pamoate, loperamide, nicotine, OLANZapine **AND** OLANZapine        EXAMINATION    VITALS   Vitals:    10/28/19 0754   BP: (!) 114/54   Pulse: 79   Resp: 20   Temp: 97.5 °F (36.4 °C)     Body mass index is 36.75 kg/m².      CONSTITUTIONAL  General Appearance: WF, in casual attire, obese; NAD     MUSCULOSKELETAL  Muscle Strength and Tone:  normal  Abnormal Involuntary Movements:  none  Gait and Station:  normal; non-ataxic     PSYCHIATRIC   Level of Consciousness: awake, alert  Orientation: p/p/t/s  Grooming:  inadequate to circumstances, decreased  Psychomotor Behavior: some PMA, no  "PMR  Speech: nl r/t/v/s  Language: able to repeat words English fluent  Mood: "not good"  Affect: dysphoric, irritable/labile, anxious, decreased range  Thought Process:  linear and organized  Associations:  intact; no SHAGUFTA  Thought Content: +SI, +AVH, +delusions, no HI  Memory:  intact to recent and remote events  Attention:  intact to conversation; not distractible   Fund of Knowledge:  age and education appropriate  Estimate if Intelligence:  average based on work/education history, vocabulary and mental status exam  Insight:  good- seeks help, understands illness  Judgment:   good- no bx issues, compliant and cooperative         DIAGNOSTIC TESTING   Laboratory Results  No results found for this or any previous visit (from the past 24 hour(s)).      ASSESSMENT      IMPRESSION   Schizoaffective Disorder, depressed type, severe  JORDIN  Panic without agoraphobia   PTSD  Fibromyalgia      Polysubstance dependence (Methamphetamines, cannabis, alcohol, narcotics; methamphetamines, current, continuous, severe, without physiological dependence)  Nicotine Dependence     Psychosocial stressors     Amphetamine Withdrawal     Iron Deficient Microcytic Anemia  Vitamin D deficiency   UTI       MEDICAL DECISION MAKING     PROBLEM LIST AND MANAGEMENT PLANS    PRESCRIPTION DRUG MANAGEMENT  Compliance: yes  Side Effects: no  Regimen Adjustments:      Psychosis: pt counseled  -Resumed risperdal at 1mg QHS- increase to 2 mg po q HS with plan to titrate higher as tolerated (last stabilized on 3 mg po q HS); depakote adjunctive (off-label) as below     Depression: pt counseled  -Resumed/continue Wellbutrin  mg po q day     Anxiety/PTSD: pt counseled  -depakote off-label as below; wellbutrin off-label;   -vistaril prn     Pain: pt counseled  -Resumed DepakoteER at  500 mg po q HS (off-label)- increase to to 1000 mg po q HS;  -check level 4 days with CBC and CMP     Substance use: pt counseled;   -pt is not interested in inpatient " rehab  -outpatient tx options will be provided     Nicotine use: pt counseled; nicotine patch 14 mg dermal daily; wellbutrin as above     Psychosocial stressors: pt counseled; SW consulted to assist with resources     Iron Deficient Microcytic Anemia: pt counseled; checked ferritin and iron panel- +iron deficiency  -Start Iron 325 mg po BID     Vitamin D deficiency: pt counseled  tarted/continue Vitamin D3 50,000 units po q week x 8 weeks (1/8 completed)      UTI: pt counseled; FM consult completed  -Started/Continue Cephalexin 500 mg p[o BID for 5 days; on day 2 of 5     DIAGNOSTIC TESTING  Labs reviewed with the patient     Disposition:  -SW to assist with aftercare planning and collateral  -Once stable discharge home with outpatient follow up care and/or rehab  -Continue inpatient treatment under a PEC and/or CEC for danger to self as evident by depression with voiced suicidal ideation in the context of heavy substance abuse and withdrawal, psychosocial stressors, and psychosis.          NEED FOR CONTINUED HOSPITALIZATION  Psychiatric illness continues to pose a potential threat to life or bodily function, of self or others, thereby requiring the need for continued inpatient psychiatric hospitalization: Yes    Protective inpatient pyschiatric hospitalization required while a safe disposition plan is enacted: Yes    Patient stabilized and ready for discharge from inpatient psychiatric unit: No      STAFF:   Salomón Nettles MD  Psychiatry

## 2019-10-28 NOTE — PLAN OF CARE
Plan of care reviewed with patient, patient agrees with plan. Denies suicidal ideations and homicidal ideations at this time. Responds to internal stimuli at times. Resting in room between care, encouraged participation in group activities. Compliant with medication. Accepts meals and snacks. Gait steady, no falls. Environment clear and clutter-free. Promoted an individualized safety plan, effective coping skills, improved sleep patterns, mood improvement, resolution of suicidal ideations, clarity of thought and reality-based interactions. Will continue to monitor for safety.

## 2019-10-28 NOTE — PROGRESS NOTES
"   10/28/19 1155   [facility] Group Therapy   Group Name Leisure Education   Specific Interventions Coping Skills Training   Participation Level Minimal   Participation Quality Cooperative   Insight/Motivation Improved   Affect/Mood Display Depressed   Cognition Alert   Psychomotor WNL   Patient states she did not attend group due to "I'm tired from taking all the medication." Patient reports feeling "alright, my head hurt." Patient shared "leisure time is extra time on your hand. I've been doing speed(crystal meth) lately." Patient reports she does not participate in any healthy activities. Patient encouraged to participate in activities that promote good physical and good mental health.  "

## 2019-10-29 PROCEDURE — 25000003 PHARM REV CODE 250: Performed by: PSYCHIATRY & NEUROLOGY

## 2019-10-29 PROCEDURE — 90833 PSYTX W PT W E/M 30 MIN: CPT | Mod: AF,HB,S$PBB, | Performed by: PSYCHIATRY & NEUROLOGY

## 2019-10-29 PROCEDURE — 90833 PR PSYCHOTHERAPY W/PATIENT W/E&M, 30 MIN (ADD ON): ICD-10-PCS | Mod: AF,HB,S$PBB, | Performed by: PSYCHIATRY & NEUROLOGY

## 2019-10-29 PROCEDURE — 25000003 PHARM REV CODE 250: Performed by: INTERNAL MEDICINE

## 2019-10-29 PROCEDURE — 99233 PR SUBSEQUENT HOSPITAL CARE,LEVL III: ICD-10-PCS | Mod: AF,HB,S$PBB, | Performed by: PSYCHIATRY & NEUROLOGY

## 2019-10-29 PROCEDURE — 11400000 HC PSYCH PRIVATE ROOM

## 2019-10-29 PROCEDURE — 99233 SBSQ HOSP IP/OBS HIGH 50: CPT | Mod: AF,HB,S$PBB, | Performed by: PSYCHIATRY & NEUROLOGY

## 2019-10-29 PROCEDURE — S4991 NICOTINE PATCH NONLEGEND: HCPCS | Performed by: PSYCHIATRY & NEUROLOGY

## 2019-10-29 RX ORDER — RISPERIDONE 3 MG/1
3 TABLET ORAL NIGHTLY
Status: DISCONTINUED | OUTPATIENT
Start: 2019-10-29 | End: 2019-10-31

## 2019-10-29 RX ADMIN — THERA TABS 1 TABLET: TAB at 09:10

## 2019-10-29 RX ADMIN — Medication 1 PATCH: at 09:10

## 2019-10-29 RX ADMIN — CEPHALEXIN 500 MG: 500 CAPSULE ORAL at 02:10

## 2019-10-29 RX ADMIN — ACETAMINOPHEN 650 MG: 325 TABLET ORAL at 02:10

## 2019-10-29 RX ADMIN — FOLIC ACID 1 MG: 1 TABLET ORAL at 09:10

## 2019-10-29 RX ADMIN — DIVALPROEX SODIUM 1000 MG: 500 TABLET, FILM COATED, EXTENDED RELEASE ORAL at 08:10

## 2019-10-29 RX ADMIN — RISPERIDONE 3 MG: 3 TABLET ORAL at 08:10

## 2019-10-29 RX ADMIN — HYDROXYZINE PAMOATE 50 MG: 50 CAPSULE ORAL at 08:10

## 2019-10-29 RX ADMIN — CEPHALEXIN 500 MG: 500 CAPSULE ORAL at 06:10

## 2019-10-29 RX ADMIN — FERROUS SULFATE TAB 325 MG (65 MG ELEMENTAL FE) 325 MG: 325 (65 FE) TAB at 09:10

## 2019-10-29 RX ADMIN — BUPROPION HYDROCHLORIDE 150 MG: 150 TABLET, EXTENDED RELEASE ORAL at 09:10

## 2019-10-29 RX ADMIN — FERROUS SULFATE TAB 325 MG (65 MG ELEMENTAL FE) 325 MG: 325 (65 FE) TAB at 08:10

## 2019-10-29 RX ADMIN — CEPHALEXIN 500 MG: 500 CAPSULE ORAL at 09:10

## 2019-10-29 NOTE — NURSING
"Pt. Has been out and about on the unit, took a shower, hadHS snack. Pt. Told staff she realizes she gave the day shift nurse ," a difficult time" today. States she is feeling better. Mood and affect is pleasant and coop. Without problems toed  "

## 2019-10-29 NOTE — PLAN OF CARE
Plan of care reviewed with patient, patient agrees with plan. Denies suicidal ideations and homicidal ideations at this time. Denies internal stimuli. Resting in room between care, encouraged participation in group activities. Mood improved today, smiling and interacting more. Compliant with medication. Accepts meals and snacks. Gait steady, no falls. Environment clear and clutter-free. Promoted an individualized safety plan, effective coping skills, improved sleep patterns, mood improvement, clarity of thought and reality-based interactions. Will continue to monitor for safety.

## 2019-10-29 NOTE — PROGRESS NOTES
"   10/29/19 7937   [facility] Group Therapy   Group Name Leisure Skills Training   Specific Interventions Cognitive Stimulation Training  (1:1 attempt)   Participation Level None   Participation Quality Refused   Patient in bed resting, covers pulled over her face. Patient says "I'm just very tired. I just want to sleep." Staff will continue to monitor and document progress made.  "

## 2019-10-29 NOTE — PLAN OF CARE
"  Problem: Adult Behavioral Health Plan of Care  Goal: Optimized Coping Skills in Response to Life Stressors  Intervention: Promote Effective Coping Strategies  Flowsheets (Taken 10/29/2019 1018)  Supportive Measures: active listening utilized; positive reinforcement provided; verbalization of feelings encouraged; counseling provided; problem solving facilitated; relaxation techniques promoted; self-care encouraged; self-reflection promoted; self-responsibility promoted; journaling promoted; goal setting facilitated; decision-making supported     Behavior: Patient did not attend psychotherapeutic group and has not since admit.     Intervention:  will engage patients in a process group focused on wellness- physical, emotional, intellectual, spiritual, interpersonal, and environmental. The group is designed to stimulate self-reflection and facilitate a foundation for goal setting. Patients will be given time to express thoughts, concerns and goals for treatment and discharge, as well as perceived barriers to progress.    Response: Patient remained isolated in her room. She claims that she is physically hurting and "will make it to the next group, I promise."    Plan:  will engage patient individually during assessment and continue to encourage patient to explore and verbalize emotions, set goals to aid in preventing re-hospitalization, attend psychotherapeutic group, and follow up with aftercare appointments.    "

## 2019-10-29 NOTE — PLAN OF CARE
Pt.  Slept 8.5  Hours  so far this shift without problems noted. q 15 min safety checks done this shift. Safety and comfort maintained. Cont. Plan.

## 2019-10-29 NOTE — PROGRESS NOTES
"PSYCHIATRY DAILY INPATIENT PROGRESS NOTE  SUBSEQUENT HOSPITAL VISIT    ENCOUNTER DATE: 10/29/2019  SITE: [facility]    DATE OF ADMISSION: 10/26/2019  8:06 PM  LENGTH OF STAY: 3 days      HISTORY    CHIEF COMPLAINT   Maria Yancey is a 34 y.o. female, seen during daily manzo rounds on the inpatient unit.  Maria Yancey presents with the chief complaint of depression and suicidal ideation, "I am doing very bad."    HPI   (Elements: Location, Quality, Severity, Duration, Timing, Content, Modifying Factors, Associated Signs & Symptoms)    The patient was seen and examined. The chart was reviewed.     Reviewed notes by RNs, LMSW, CTRS and RD from the last 24 hours.    The patient's case was discussed with the treatment team and care providers today, including RNs, specialty services, CTRS and LMSW.    Staff reports no behavioral or management issues.     The patient has been compliant with treatment. The patient denied any side effects.    Psychosocial stressors include minimal social support, financial distress, and substance use.    Symptoms persist mildly changed from yesterday as documented below.     Continued but less Symptoms of Depression: +diminished mood or loss of interest/anhedonia; less irritability, +diminished energy, less change in sleep, less change in appetite, +diminished concentration or cognition or indecisiveness, no PMA/R, +excessive guilt or hopelessness or worthlessness, +suicidal ideations     Continued but less Changes in Sleep: less trouble with initiation/maintenance, no early morning awakening with inability to return to sleep     Continued Suicidal/(no)Homicidal ideations: +active/passive ideations, +organized plans, no future intentions     Continued but less Symptoms of psychosis: less hallucinations, +delusions, no disorganized thinking, no disorganized behavior or abnormal motor behavior, no negative symptoms     Denied past or current Symptoms of esther or hypomania: no elevated, " expansive, or irritable mood with no increased energy or activity; with no inflated self-esteem or grandiosity, decreased need for sleep, increased rate of speech, FOI or racing thoughts, distractibility, increased goal directed activity or PMA, or risky/disinhibited behavior     Continued Symptoms of JORDIN: +excessive anxiety/worry/fear, no panic attacks since admission; with less agoraphobia (attended some group activities     Continued Symptoms of PTSD: +h/o trauma; +re-experiencing/intrusive symptoms, +avoidant behavior, +negative alterations in cognition or mood, +hyperarousal symptoms; without dissociative symptoms     +Methamphetamine withdrawal persists; she is uncertain as to what kind of outpatient addiction treatment she would want.     PSYCHOTHERAPY ADD-ON +50080   30 (16-37*) minutes    Time: 16 minutes  Participants: Met with patient    Therapeutic Intervention Type: behavior modifying psychotherapy, supportive psychotherapy  Why chosen therapy is appropriate versus another modality: relevant to diagnosis, patient responds to this modality, evidence based practice    Target symptoms: substance abuse, mood disorder, psychosis  Primary focus: mood and psychosocial stressors; substance use  Psychotherapeutic techniques: supportive and problem solving and motivational techniques; psycho-education;     Outcome monitoring methods: self-report, observation    Patient's response to intervention:  The patient's response to intervention is accepting.    Progress toward goals:  The patient's progress toward goals is fair .          ROS  General ROS: negative  Ophthalmic ROS: negative  ENT ROS: negative  Allergy and Immunology ROS: negative  Hematological and Lymphatic ROS: negative  Endocrine ROS: negative  Respiratory ROS: no cough, shortness of breath, or wheezing  Cardiovascular ROS: no chest pain or dyspnea on exertion  Gastrointestinal ROS: no abdominal pain, change in bowel habits, or black or bloody  stools  Genito-Urinary ROS: no dysuria, trouble voiding, or hematuria  Musculoskeletal ROS: negative  Neurological ROS: no TIA or stroke symptoms  Dermatological ROS: negative    PAST MEDICAL HISTORY   Past Medical History:   Diagnosis Date    Addiction to drug     recovering drug addict    ADHD (attention deficit hyperactivity disorder)     Alcohol abuse     Anxiety     Asthma     Bipolar 1 disorder     Cervical pain     Chronic constipation     Chronic diarrhea     Depression     Fibromyalgia     Hallucination     History of psychiatric hospitalization     pt reports 10 total- [facility] 10/2019 and 3/2019    Hx of psychiatric care     Seroquel    Magalis     Panic disorder     Polysubstance dependence     Psychiatric exam requested by authority     Psychiatric problem     Auditory hallucinations    PTSD (post-traumatic stress disorder)     Schizoaffective disorder     Sleep difficulties     Spina bifida     Spinal stenosis of lumbar region     Substance abuse     Suicide attempt     Once by hanging, once by overdose    Syringomyelia     Syrinx of spinal cord     Therapy     [facility]    Trigger finger     Withdrawal symptoms, drug or narcotic            PSYCHOTROPIC MEDICATIONS   Scheduled Meds:   buPROPion  150 mg Oral Daily    cephALEXin  500 mg Oral Q8H    cholecalciferol (vitamin D3)  50,000 Units Oral Q7 Days    divalproex ER  1,000 mg Oral QHS    ferrous sulfate  325 mg Oral BID    folic acid  1 mg Oral Daily    multivitamin  1 tablet Oral Daily    risperiDONE  2 mg Oral QHS     Continuous Infusions:  PRN Meds:.acetaminophen, aluminum-magnesium hydroxide-simethicone, docusate sodium, hydrOXYzine pamoate, loperamide, nicotine, OLANZapine **AND** OLANZapine        EXAMINATION    VITALS   Vitals:    10/29/19 0749   BP: (!) 97/52   Pulse: 71   Resp: 18   Temp: 96.1 °F (35.6 °C)     Body mass index is 36.67 kg/m².      CONSTITUTIONAL  General Appearance: WF, in  "casual attire, obese; NAD     MUSCULOSKELETAL  Muscle Strength and Tone:  normal  Abnormal Involuntary Movements:  none  Gait and Station:  normal; non-ataxic     PSYCHIATRIC   Level of Consciousness: awake, alert  Orientation: p/p/t/s  Grooming:  less inadequate to circumstances, less decreased  Psychomotor Behavior: some PMA, no PMR  Speech: nl r/t/v/s  Language: able to repeat words English fluent  Mood: "not as bad"  Affect: dysphoric, less irritable/labile, anxious, decreased range  Thought Process:  linear and organized  Associations:  intact; no SHAGUFTA  Thought Content: +SI, less AVH, +delusions, no HI  Memory:  intact to recent and remote events  Attention:  intact to conversation; not distractible   Fund of Knowledge:  age and education appropriate  Estimate if Intelligence:  average based on work/education history, vocabulary and mental status exam  Insight:  good- seeks help, understands illness  Judgment:   good- no bx issues, compliant and cooperative         DIAGNOSTIC TESTING   Laboratory Results  No results found for this or any previous visit (from the past 24 hour(s)).      ASSESSMENT      IMPRESSION   Schizoaffective Disorder, depressed type, severe  JORDIN  Panic without agoraphobia   PTSD  Fibromyalgia      Polysubstance dependence (Methamphetamines, cannabis, alcohol, narcotics; methamphetamines, current, continuous, severe, without physiological dependence)  Nicotine Dependence     Psychosocial stressors     Amphetamine Withdrawal     Iron Deficient Microcytic Anemia  Vitamin D deficiency   UTI       MEDICAL DECISION MAKING     PROBLEM LIST AND MANAGEMENT PLANS    PRESCRIPTION DRUG MANAGEMENT  Compliance: yes  Side Effects: no  Regimen Adjustments:      Psychosis: pt counseled  -Resumed risperdal at 1mg QHS- increase to 2 mg po q HS- increase to 3 mg po q HS-  will titrate higher if needed; depakote adjunctive (off-label) as below     Depression: pt counseled  -Resumed/continue Wellbutrin  mg " po q day     Anxiety/PTSD: pt counseled  -depakote off-label as below; wellbutrin off-label;   -vistaril prn     Pain: pt counseled  -Resumed Depakote ER at  500 mg po q HS (off-label)- increase to to 1000 mg po q HS;  -check level in 3 days with CBC and CMP     Substance use: pt counseled;   -pt is not interested in inpatient rehab  -outpatient tx options will be provided     Nicotine use: pt counseled; continue nicotine patch 14 mg dermal daily; wellbutrin as above     Psychosocial stressors: pt counseled; SW consulted and assisting with resources     Iron Deficient Microcytic Anemia: pt counseled; checked ferritin and iron panel- +iron deficiency  -Started/continue Iron 325 mg po BID     Vitamin D deficiency: pt counseled  tarted/continue Vitamin D3 50,000 units po q week x 8 weeks (1/8 completed)      UTI: pt counseled; FM consult completed  -Started/Continue Cephalexin 500 mg p[o BID for 5 days; on day 3 of 5     DIAGNOSTIC TESTING  Labs reviewed with the patient; checking labs on Friday AM     Disposition:  -SW to assist with aftercare planning and collateral  -Once stable discharge home with outpatient follow up care and/or rehab  -Continue inpatient treatment under a P[facility] and/or [facility] for danger to self as evident by depression with voiced suicidal ideation in the context of heavy substance abuse and withdrawal, psychosocial stressors, and psychosis.          NEED FOR CONTINUED HOSPITALIZATION  Psychiatric illness continues to pose a potential threat to life or bodily function, of self or others, thereby requiring the need for continued inpatient psychiatric hospitalization: Yes    Protective inpatient pyschiatric hospitalization required while a safe disposition plan is enacted: Yes    Patient stabilized and ready for discharge from inpatient psychiatric unit: No      STAFF:   Salomón Nettles MD  Psychiatry
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
## 2019-10-30 PROCEDURE — 99233 SBSQ HOSP IP/OBS HIGH 50: CPT | Mod: AF,HB,S$PBB, | Performed by: PSYCHIATRY & NEUROLOGY

## 2019-10-30 PROCEDURE — 11400000 HC PSYCH PRIVATE ROOM

## 2019-10-30 PROCEDURE — 99233 PR SUBSEQUENT HOSPITAL CARE,LEVL III: ICD-10-PCS | Mod: AF,HB,S$PBB, | Performed by: PSYCHIATRY & NEUROLOGY

## 2019-10-30 PROCEDURE — 25000003 PHARM REV CODE 250: Performed by: INTERNAL MEDICINE

## 2019-10-30 PROCEDURE — 25000003 PHARM REV CODE 250: Performed by: PSYCHIATRY & NEUROLOGY

## 2019-10-30 RX ADMIN — FERROUS SULFATE TAB 325 MG (65 MG ELEMENTAL FE) 325 MG: 325 (65 FE) TAB at 08:10

## 2019-10-30 RX ADMIN — DIVALPROEX SODIUM 1000 MG: 500 TABLET, FILM COATED, EXTENDED RELEASE ORAL at 08:10

## 2019-10-30 RX ADMIN — CEPHALEXIN 500 MG: 500 CAPSULE ORAL at 09:10

## 2019-10-30 RX ADMIN — BUPROPION HYDROCHLORIDE 150 MG: 150 TABLET, EXTENDED RELEASE ORAL at 08:10

## 2019-10-30 RX ADMIN — THERA TABS 1 TABLET: TAB at 08:10

## 2019-10-30 RX ADMIN — RISPERIDONE 3 MG: 3 TABLET ORAL at 08:10

## 2019-10-30 RX ADMIN — FOLIC ACID 1 MG: 1 TABLET ORAL at 08:10

## 2019-10-30 RX ADMIN — CEPHALEXIN 500 MG: 500 CAPSULE ORAL at 01:10

## 2019-10-30 RX ADMIN — CEPHALEXIN 500 MG: 500 CAPSULE ORAL at 05:10

## 2019-10-30 NOTE — PLAN OF CARE
Patient is calm and cooperative with staff and took PM medication with no problem.  She states that she took her nicotine patch off because it was making her have bad dreams.  Patient denies SI, HI, or Plan.  However, she does admit to being depressed, anxious, and having visual and auditory hallucinations.  She states that the television and other sounds make the hallucinations worse,and that is why she stays in her room most of the day.

## 2019-10-30 NOTE — PLAN OF CARE
Shift note : patient is eating all meals and taking all ordered medications . She isolates in her room because , she states , when she is around noise , she hears voices . She is cooperative

## 2019-10-30 NOTE — PROGRESS NOTES
"PSYCHIATRY DAILY INPATIENT PROGRESS NOTE  SUBSEQUENT HOSPITAL VISIT    ENCOUNTER DATE: 10/30/2019  SITE: [facility]    DATE OF ADMISSION: 10/26/2019  8:06 PM  LENGTH OF STAY: 4 days      HISTORY    CHIEF COMPLAINT   Maria Yancey is a 34 y.o. female, seen during daily manzo rounds on the inpatient unit.  Maria Yancey presents with the chief complaint of depression and suicidal ideation, "I am doing very bad."    HPI   (Elements: Location, Quality, Severity, Duration, Timing, Content, Modifying Factors, Associated Signs & Symptoms)    The patient was seen and examined. The chart was reviewed.     Reviewed notes by RNs, LMSW, CTRS and RD from the last 24 hours.    The patient's case was discussed with the treatment team and care providers today, including RNs, specialty services, CTRS and LMSW.    Staff reports no behavioral or management issues.     The patient has been compliant with treatment. The patient denied any side effects.    Psychosocial stressors include minimal social support, financial distress, and substance use.    Symptoms persist mildly changed from yesterday as documented below- sleep has improved and DI has lessened. .     Continued but less Symptoms of Depression: +diminished mood or loss of interest/anhedonia; less irritability, +diminished energy, less change in sleep, less change in appetite, +diminished concentration or cognition or indecisiveness, no PMA/R, +excessive guilt or hopelessness or worthlessness, +suicidal ideations     Continued but less Changes in Sleep: less trouble with initiation/maintenance, no early morning awakening with inability to return to sleep     Continued but less Suicidal/(no)Homicidal ideations: less active/passive ideations, less organized plans, no future intentions     Continued but less Symptoms of psychosis: less hallucinations, less delusions, no disorganized thinking, no disorganized behavior or abnormal motor behavior, no negative " symptoms     Denied past or current Symptoms of magalis or hypomania: no elevated, expansive, or irritable mood with no increased energy or activity; with no inflated self-esteem or grandiosity, decreased need for sleep, increased rate of speech, FOI or racing thoughts, distractibility, increased goal directed activity or PMA, or risky/disinhibited behavior     Continued Symptoms of JORDIN: +excessive anxiety/worry/fear, no panic attacks since admission; with less agoraphobia (attended some group activities     Continued Symptoms of PTSD: +h/o trauma; +re-experiencing/intrusive symptoms, +avoidant behavior, +negative alterations in cognition or mood, +hyperarousal symptoms; without dissociative symptoms     +Methamphetamine withdrawal persists; she is considering rehab once stable      ROS  General ROS: negative  Ophthalmic ROS: negative  ENT ROS: negative  Allergy and Immunology ROS: negative  Hematological and Lymphatic ROS: negative  Endocrine ROS: negative  Respiratory ROS: no cough, shortness of breath, or wheezing  Cardiovascular ROS: no chest pain or dyspnea on exertion  Gastrointestinal ROS: no abdominal pain, change in bowel habits, or black or bloody stools  Genito-Urinary ROS: no dysuria, trouble voiding, or hematuria  Musculoskeletal ROS: negative  Neurological ROS: no TIA or stroke symptoms  Dermatological ROS: negative    PAST MEDICAL HISTORY   Past Medical History:   Diagnosis Date    Addiction to drug     recovering drug addict    ADHD (attention deficit hyperactivity disorder)     Alcohol abuse     Anxiety     Asthma     Bipolar 1 disorder     Cervical pain     Chronic constipation     Chronic diarrhea     Depression     Fatigue     Fibromyalgia     Hallucination     Headache     History of psychiatric hospitalization     pt reports 10 total- [facility] 10/2019 and 3/2019    Hx of psychiatric care     Seroquel    Magalis     Panic disorder     Polysubstance dependence     Psychiatric exam  "requested by authority     Psychiatric problem     Auditory hallucinations    PTSD (post-traumatic stress disorder)     Schizoaffective disorder     Sleep difficulties     Spina bifida     Spinal stenosis of lumbar region     Substance abuse     Suicide attempt     Once by hanging, once by overdose    Syringomyelia     Syrinx of spinal cord     Therapy     [facility]    Trigger finger     Withdrawal symptoms, alcohol     Withdrawal symptoms, drug or narcotic            PSYCHOTROPIC MEDICATIONS   Scheduled Meds:   buPROPion  150 mg Oral Daily    cephALEXin  500 mg Oral Q8H    cholecalciferol (vitamin D3)  50,000 Units Oral Q7 Days    divalproex ER  1,000 mg Oral QHS    ferrous sulfate  325 mg Oral BID    folic acid  1 mg Oral Daily    multivitamin  1 tablet Oral Daily    risperiDONE  3 mg Oral QHS     Continuous Infusions:  PRN Meds:.acetaminophen, aluminum-magnesium hydroxide-simethicone, docusate sodium, hydrOXYzine pamoate, loperamide, nicotine, OLANZapine **AND** OLANZapine        EXAMINATION    VITALS   Vitals:    10/30/19 0756   BP: (!) 118/56   Pulse: 82   Resp: 16   Temp: 96.8 °F (36 °C)     Body mass index is 37.28 kg/m².      CONSTITUTIONAL  General Appearance: WF, in casual attire, obese; NAD     MUSCULOSKELETAL  Muscle Strength and Tone:  normal  Abnormal Involuntary Movements:  none  Gait and Station:  normal; non-ataxic     PSYCHIATRIC   Level of Consciousness: awake, alert  Orientation: p/p/t/s  Grooming:  less inadequate to circumstances, less decreased  Psychomotor Behavior: some PMA, no PMR  Speech: nl r/t/v/s  Language: able to repeat words English fluent  Mood: "not as bad as yesterday"  Affect: less dysphoric, less irritable/labile, less anxious, decreased range  Thought Process:  linear and organized, goal directed  Associations:  intact; no SHAGUFTA  Thought Content: less SI, less AVH, +delusions, no HI  Memory:  intact to recent and remote events  Attention:  " intact to conversation; not distractible   Fund of Knowledge:  age and education appropriate  Estimate if Intelligence:  average based on work/education history, vocabulary and mental status exam  Insight:  good- seeks help, understands illness  Judgment:   good- no bx issues, compliant and cooperative       DIAGNOSTIC TESTING   Laboratory Results  No results found for this or any previous visit (from the past 24 hour(s)).      ASSESSMENT      IMPRESSION   Schizoaffective Disorder, depressed type, severe  JORDIN  Panic without agoraphobia   PTSD  Fibromyalgia      Polysubstance dependence (Methamphetamines, cannabis, alcohol, narcotics; methamphetamines, current, continuous, severe, without physiological dependence)  Nicotine Dependence     Psychosocial stressors     Amphetamine Withdrawal     Iron Deficient Microcytic Anemia  Vitamin D deficiency   UTI       MEDICAL DECISION MAKING     PROBLEM LIST AND MANAGEMENT PLANS; PRESCRIPTION DRUG MANAGEMENT  Compliance: yes  Side Effects: no  Regimen Adjustments:      Psychosis: pt counseled  -Resumed risperdal at 1mg QHS- increase to 2 mg po q HS- increased to 3 mg po q HS-  will titrate higher if needed; depakote adjunctive (off-label) as below     Depression: pt counseled  -Resumed/continue Wellbutrin  mg po q day     Anxiety/PTSD: pt counseled  -depakote off-label as below; wellbutrin off-label;   -vistaril prn     Pain: pt counseled  -Resumed Depakote ER at  500 mg po q HS (off-label)- increased to to 1000 mg po q HS;  -check level in 2 days with CBC and CMP     Substance use: pt counseled  -pt is now interested in inpatient rehab  -outpatient tx options will be provided     Nicotine use: pt counseled; continue nicotine patch 14 mg dermal daily; wellbutrin as above     Psychosocial stressors: pt counseled; SW consulted and assisting with resources     Iron Deficient Microcytic Anemia: pt counseled; checked ferritin and iron panel- +iron deficiency  -Started/continue  Iron 325 mg po BID     Vitamin D deficiency: pt counseled  tarted/continue Vitamin D3 50,000 units po q week x 8 weeks (1/8 completed)      UTI: pt counseled; FM consult completed  -Started/Continue Cephalexin 500 mg p[o BID for 5 days; on day 4 of 5     DIAGNOSTIC TESTING  Labs reviewed with the patient; checking labs on Friday AM     Disposition:  -SW to assist with aftercare planning and collateral  -Once stable discharge home with outpatient follow up care and/or rehab  -Continue inpatient treatment under a PEC and/or CEC for danger to self as evident by depression with voiced suicidal ideation in the context of heavy substance abuse and withdrawal, psychosocial stressors, and psychosis.          NEED FOR CONTINUED HOSPITALIZATION  Psychiatric illness continues to pose a potential threat to life or bodily function, of self or others, thereby requiring the need for continued inpatient psychiatric hospitalization: Yes    Protective inpatient pyschiatric hospitalization required while a safe disposition plan is enacted: Yes    Patient stabilized and ready for discharge from inpatient psychiatric unit: No      STAFF:   Salomón Nettles MD  Psychiatry

## 2019-10-30 NOTE — PLAN OF CARE
Problem: Adult Behavioral Health Plan of Care  Goal: Optimized Coping Skills in Response to Life Stressors  Intervention: Promote Effective Coping Strategies  Flowsheets (Taken 10/30/2019 1024)  Supportive Measures: active listening utilized; positive reinforcement provided; verbalization of feelings encouraged; counseling provided; problem solving facilitated; decision-making supported; relaxation techniques promoted; self-care encouraged; self-reflection promoted; self-responsibility promoted; goal setting facilitated; journaling promoted     Behavior: Patient did not attend psychotherapeutic group but remained in her room. Pt stated that she did not feel well and was encouraged to attend.      Intervention:  will engage patients in a process group based on strengths perspective and supportive therapy. Patients will be encouraged to express concerns and goals for treatment and discharge, as well as perceived barriers to progress.  will aid patients in prioritizing and making realistic goals, identifying strengths that will aid in transition, and verbalizing emotions.       Response: Patient did not attend group.      Plan:  will continue to encourage patient to explore and verbalize emotions, set goals to aid in preventing re-hospitalization, attend psychotherapeutic group, and follow up with aftercare appointments.

## 2019-10-30 NOTE — PLAN OF CARE
"  Problem: Adult Behavioral Health Plan of Care  Goal: Optimized Coping Skills in Response to Life Stressors  Intervention: Promote Effective Coping Strategies  10/30/2019 1108 by Cyndi Chamberlain LMSW  Flowsheets (Taken 10/30/2019 1108)  Supportive Measures: active listening utilized; positive reinforcement provided; counseling provided; problem solving facilitated; verbalization of feelings encouraged; relaxation techniques promoted; self-care encouraged; self-reflection promoted; self-responsibility promoted; decision-making supported     Behavior: Patient was isolated in room, rapped in blanket.      Intervention: Patient was engaged individually because she did not attend psychotherapeutic group session.  will encourage patient to express perceived barriers to engaging in treatment, concerns, and goals.       Response: Patient's speech was vague, and she stated that she did not call her step mom yet regarding discharge disposition, but says that she may be willing to pursue rehab "definitely, I'll start looking into it." Social  informed that patient is considering placement at a residential substance use rehab.       Plan:  will continue to encourage patient to engage and take personal responsibility for treatment, explore and verbalize emotions, set goals to aid in preventing re-hospitalization, attend psychotherapeutic group, and follow up with aftercare appointments.  "

## 2019-10-30 NOTE — PROGRESS NOTES
"   10/30/19 1030   [facility] Group Therapy   Group Name Leisure Education   Specific Interventions Coping Skills Training   Participation Level None   Participation Quality Refused   Patient states "I'm hurting all over. I don't feel good." Staff will continue monitor and document progress.  "

## 2019-10-31 PROCEDURE — 25000003 PHARM REV CODE 250: Performed by: PSYCHIATRY & NEUROLOGY

## 2019-10-31 PROCEDURE — 11400000 HC PSYCH PRIVATE ROOM

## 2019-10-31 PROCEDURE — 99233 SBSQ HOSP IP/OBS HIGH 50: CPT | Mod: AF,HB,S$PBB, | Performed by: PSYCHIATRY & NEUROLOGY

## 2019-10-31 PROCEDURE — 90833 PSYTX W PT W E/M 30 MIN: CPT | Mod: AF,HB,S$PBB, | Performed by: PSYCHIATRY & NEUROLOGY

## 2019-10-31 PROCEDURE — 99233 PR SUBSEQUENT HOSPITAL CARE,LEVL III: ICD-10-PCS | Mod: AF,HB,S$PBB, | Performed by: PSYCHIATRY & NEUROLOGY

## 2019-10-31 PROCEDURE — 25000003 PHARM REV CODE 250: Performed by: INTERNAL MEDICINE

## 2019-10-31 PROCEDURE — 90833 PR PSYCHOTHERAPY W/PATIENT W/E&M, 30 MIN (ADD ON): ICD-10-PCS | Mod: AF,HB,S$PBB, | Performed by: PSYCHIATRY & NEUROLOGY

## 2019-10-31 RX ORDER — BUPROPION HYDROCHLORIDE 300 MG/1
300 TABLET ORAL DAILY
Status: DISCONTINUED | OUTPATIENT
Start: 2019-11-01 | End: 2019-11-04 | Stop reason: HOSPADM

## 2019-10-31 RX ORDER — RISPERIDONE 2 MG/1
4 TABLET ORAL NIGHTLY
Status: DISCONTINUED | OUTPATIENT
Start: 2019-10-31 | End: 2019-11-04 | Stop reason: HOSPADM

## 2019-10-31 RX ADMIN — DIVALPROEX SODIUM 1000 MG: 500 TABLET, FILM COATED, EXTENDED RELEASE ORAL at 08:10

## 2019-10-31 RX ADMIN — FERROUS SULFATE TAB 325 MG (65 MG ELEMENTAL FE) 325 MG: 325 (65 FE) TAB at 08:10

## 2019-10-31 RX ADMIN — BUPROPION HYDROCHLORIDE 150 MG: 150 TABLET, EXTENDED RELEASE ORAL at 08:10

## 2019-10-31 RX ADMIN — FOLIC ACID 1 MG: 1 TABLET ORAL at 08:10

## 2019-10-31 RX ADMIN — CEPHALEXIN 500 MG: 500 CAPSULE ORAL at 01:10

## 2019-10-31 RX ADMIN — CEPHALEXIN 500 MG: 500 CAPSULE ORAL at 05:10

## 2019-10-31 RX ADMIN — CEPHALEXIN 500 MG: 500 CAPSULE ORAL at 09:10

## 2019-10-31 RX ADMIN — HYDROXYZINE PAMOATE 50 MG: 50 CAPSULE ORAL at 08:10

## 2019-10-31 RX ADMIN — RISPERIDONE 4 MG: 2 TABLET ORAL at 08:10

## 2019-10-31 RX ADMIN — THERA TABS 1 TABLET: TAB at 08:10

## 2019-10-31 NOTE — PSYCH
The patient has signed a release of in formation to [facility] and [facility]; therefore, a packet has been faxed to both facilities.

## 2019-10-31 NOTE — PLAN OF CARE
Plan of care reviewed.  Denies intent to harm self or others or hallucinations at this time.  Accepts snacks and medications.  Gait steady, no falls.  Pleasantly interacting with staff and peers when out of room but was mostly in room since 1900. When interacting with staff pt is smiling and pleasant.  No psychosis noted. Promoted an individualized safety plan, reality-based interactions, effective coping strategies, and impulse control.  Will continue to monitor for safety.

## 2019-10-31 NOTE — PLAN OF CARE
Pt is calm and cooperative.  Denies any S/I or H/I at this time.  Agrees to go to rehab now.  Encouraged pt to continue complying with treatment plan and to report any issues to staff, understanding verbalized.  No acute distress apparent, will continue to monitor.

## 2019-10-31 NOTE — PROGRESS NOTES
"PSYCHIATRY DAILY INPATIENT PROGRESS NOTE  SUBSEQUENT HOSPITAL VISIT    ENCOUNTER DATE: 10/31/2019  SITE: [facility]    DATE OF ADMISSION: 10/26/2019  8:06 PM  LENGTH OF STAY: 5 days    HISTORY    CHIEF COMPLAINT   Maria Yancey is a 34 y.o. female, seen during daily manzo rounds on the inpatient unit.  Maria Yancey presents with the chief complaint of depression and suicidal ideation, "I am doing very bad."    HPI   (Elements: Location, Quality, Severity, Duration, Timing, Content, Modifying Factors, Associated Signs & Symptoms)    The patient was seen and examined. The chart was reviewed.     Reviewed notes by RNs, LMSW, and CTRS from the last 24 hours.    The patient's case was discussed with the treatment team and care providers today, including RNs, specialty services, CTRS and LMSW.    Staff reports no behavioral or management issues.     The patient has been compliant with treatment. The patient denied any side effects.    Psychosocial stressors include minimal social support, financial distress, and substance use.    Symptoms persist again mildly changed from yesterday as documented below- sleep has improved and SI has lessened. .     Continued but lessening Symptoms of Depression: less diminished mood or loss of interest/anhedonia; less irritability, +diminished energy, less change in sleep, less change in appetite, less diminished concentration or cognition or indecisiveness, no PMA/R, less excessive guilt or hopelessness or worthlessness, less suicidal ideations     Continued but less Changes in Sleep: less trouble with initiation/maintenance, no early morning awakening with inability to return to sleep     Continued but less Suicidal/(no)Homicidal ideations: less active/passive ideations, less organized plans, no future intentions     Continued but less Symptoms of psychosis: less hallucinations, less delusions, no disorganized thinking, no disorganized behavior or abnormal motor behavior, no " negative symptoms     Denied past or current Symptoms of esther or hypomania: no elevated, expansive, or irritable mood with no increased energy or activity; with no inflated self-esteem or grandiosity, decreased need for sleep, increased rate of speech, FOI or racing thoughts, distractibility, increased goal directed activity or PMA, or risky/disinhibited behavior     Continued but less Symptoms of JORDIN: less excessive anxiety/worry/fear, no panic attacks since admission; with less agoraphobia (attended some group activities)     Continued but less Symptoms of PTSD: +h/o trauma; less re-experiencing/intrusive symptoms, less avoidant behavior, less negative alterations in cognition or mood, less hyperarousal symptoms; without dissociative symptoms     Less Methamphetamine withdrawal; she is strongly considering rehab once stable    PSYCHOTHERAPY ADD-ON +[id number]   16-37 minutes    Time: 16 minutes  Participants: Met with patient    Therapeutic Intervention Type: insight oriented psychotherapy, behavior modifying psychotherapy, supportive psychotherapy, interactive psychotherapy  Why chosen therapy is appropriate versus another modality: relevant to diagnosis, patient responds to this modality, evidence based practice    Target symptoms: anxiety , substance abuse, mood disorder, psychosis  Primary focus: mood disorder, psychosis, substance use  Psychotherapeutic techniques: supportive and behavioral and motivational techniques;  psycho-education; reasons to obtain sobriety    Outcome monitoring methods: self-report, observation    Patient's response to intervention:  The patient's response to intervention is accepting.    Progress toward goals:  The patient's progress toward goals is fair .      ROS  General ROS: negative  Ophthalmic ROS: negative  ENT ROS: negative  Allergy and Immunology ROS: negative  Hematological and Lymphatic ROS: negative  Endocrine ROS: negative  Respiratory ROS: no cough, shortness of breath, or  wheezing  Cardiovascular ROS: no chest pain or dyspnea on exertion  Gastrointestinal ROS: no abdominal pain, change in bowel habits, or black or bloody stools  Genito-Urinary ROS: no dysuria, trouble voiding, or hematuria  Musculoskeletal ROS: negative  Neurological ROS: no TIA or stroke symptoms  Dermatological ROS: negative    PAST MEDICAL HISTORY   Past Medical History:   Diagnosis Date    Addiction to drug     recovering drug addict    ADHD (attention deficit hyperactivity disorder)     Alcohol abuse     Anxiety     Asthma     Bipolar 1 disorder     Cervical pain     Chronic constipation     Chronic diarrhea     Depression     Fatigue     Fibromyalgia     Hallucination     Headache     History of psychiatric hospitalization     pt reports 10 total- [facility] 10/2019 and 3/2019    Hx of psychiatric care     Seroquel    Magalis     Panic disorder     Polysubstance dependence     Psychiatric exam requested by authority     Psychiatric problem     Auditory hallucinations    PTSD (post-traumatic stress disorder)     Schizoaffective disorder     Sleep difficulties     Spina bifida     Spinal stenosis of lumbar region     Substance abuse     Suicide attempt     Once by hanging, once by overdose    Syringomyelia     Syrinx of spinal cord     Therapy     [facility]    Trigger finger     Withdrawal symptoms, alcohol     Withdrawal symptoms, drug or narcotic            PSYCHOTROPIC MEDICATIONS   Scheduled Meds:   buPROPion  150 mg Oral Daily    cephALEXin  500 mg Oral Q8H    cholecalciferol (vitamin D3)  50,000 Units Oral Q7 Days    divalproex ER  1,000 mg Oral QHS    ferrous sulfate  325 mg Oral BID    folic acid  1 mg Oral Daily    multivitamin  1 tablet Oral Daily    risperiDONE  3 mg Oral QHS     Continuous Infusions:  PRN Meds:.acetaminophen, aluminum-magnesium hydroxide-simethicone, docusate sodium, hydrOXYzine pamoate, loperamide, nicotine, OLANZapine **AND**  "OLANZapine        EXAMINATION    VITALS   Vitals:    10/31/19 0759   BP: 112/64   Pulse: 76   Resp: 16   Temp: 96 °F (35.6 °C)     Body mass index is 37.28 kg/m².      CONSTITUTIONAL  General Appearance: WF, in casual attire, obese; NAD     MUSCULOSKELETAL  Muscle Strength and Tone:  normal  Abnormal Involuntary Movements:  none  Gait and Station:  normal; non-ataxic     PSYCHIATRIC   Level of Consciousness: awake, alert  Orientation: p/p/t/s  Grooming:  less inadequate to circumstances, less decreased  Psychomotor Behavior: less PMA, no PMR  Speech: nl r/t/v/s  Language: able to repeat words English fluent  Mood: "alright.. I  Don't know"  Affect: less dysphoric, less irritable/labile, less anxious, less decreased range  Thought Process:  linear and organized, goal directed  Associations:  intact; no SHAGUFTA  Thought Content: less SI, less AVH, less delusions, no HI  Memory:  intact to recent and remote events  Attention:  intact to conversation; not distractible   Fund of Knowledge:  age and education appropriate  Estimate if Intelligence:  average based on work/education history, vocabulary and mental status exam  Insight:  good- seeks help, understands illness  Judgment:   good- no bx issues, compliant and cooperative       DIAGNOSTIC TESTING   Laboratory Results  No results found for this or any previous visit (from the past 24 hour(s)).      ASSESSMENT      IMPRESSION   Schizoaffective Disorder, depressed type, severe  JORDIN  Panic without agoraphobia   PTSD  Fibromyalgia      Polysubstance dependence (Methamphetamines, cannabis, alcohol, narcotics; methamphetamines, current, continuous, severe, without physiological dependence)  Nicotine Dependence     Psychosocial stressors     Amphetamine Withdrawal     Iron Deficient Microcytic Anemia  Vitamin D deficiency   UTI       MEDICAL DECISION MAKING     PROBLEM LIST AND MANAGEMENT PLANS; PRESCRIPTION DRUG MANAGEMENT  Compliance: yes  Side Effects: no  Regimen " Adjustments:      Psychosis: pt counseled  -Resumed risperdal at 1mg QHS- increase to 2 mg po q HS- increased to 3 mg po q HS-  Increase to 4 mg po q HSwill titrate higher if needed; depakote adjunctive (off-label) as below     Depression: pt counseled  -Resumed/continue Wellbutrin  mg po q day- increase to 300 mg po q Day     Anxiety/PTSD: pt counseled  -depakote off-label as below; wellbutrin off-label;   -vistaril prn     Pain: pt counseled  -Resumed Depakote ER at  500 mg po q HS (off-label)- increased to to 1000 mg po q HS;  -check level in 1day with CBC and CMP     Substance use: pt counseled  -pt is now interested in inpatient rehab  -outpatient tx options will be provided     Nicotine use: pt counseled; continue nicotine patch 14 mg dermal daily; wellbutrin as above     Psychosocial stressors: pt counseled; SW consulted and assisting with resources     Iron Deficient Microcytic Anemia: pt counseled; checked ferritin and iron panel- +iron deficiency  -Started/continue Iron 325 mg po BID     Vitamin D deficiency: pt counseled  tarted/continue Vitamin D3 50,000 units po q week x 8 weeks (1/8 completed)      UTI: pt counseled; FM consult completed  -Started/Continue Cephalexin 500 mg po BID for 5 days; on day 5 of 5     DIAGNOSTIC TESTING  Labs reviewed with the patient; checking labs on Friday AM     Disposition:  -SW to assist with aftercare planning and collateral  -Once stable discharge home with outpatient follow up care and/or rehab  -Continue inpatient treatment under a PEC and/or CEC for danger to self as evident by depression with voiced suicidal ideation in the context of heavy substance abuse and withdrawal, psychosocial stressors, and psychosis.          NEED FOR CONTINUED HOSPITALIZATION  Psychiatric illness continues to pose a potential threat to life or bodily function, of self or others, thereby requiring the need for continued inpatient psychiatric hospitalization: Yes    Protective  inpatient pyschiatric hospitalization required while a safe disposition plan is enacted: Yes    Patient stabilized and ready for discharge from inpatient psychiatric unit: No      STAFF:   Salomón Nettles MD  Psychiatry

## 2019-10-31 NOTE — PLAN OF CARE
Problem: Adult Behavioral Health Plan of Care  Goal: Optimized Coping Skills in Response to Life Stressors  Intervention: Promote Effective Coping Strategies  10/31/2019 1458 by Cyndi Chamberlain LMSW  Flowsheets (Taken 10/31/2019 1458)  Supportive Measures: active listening utilized; positive reinforcement provided; verbalization of feelings encouraged; counseling provided; problem solving facilitated; decision-making supported; self-care encouraged; relaxation techniques promoted; goal setting facilitated; self-reflection promoted; self-responsibility promoted     Behavior: Patient was dressed in personal clothing, isolated in room, with groggy, dysphoric, congruent affect and mood.       Intervention: Patient was engaged individually because she did not attend psychotherapeutic group session.  will encourage patient to express perceived barriers to engaging in treatment, concerns, and goals.       Response: Patient's speech was dismissive and pressured. She stated that she went to some of [facility]'s group and that she is feeling depressed about missing Halloween. She states that she would like to go to a long term rehab and will try to call her supports at 4 o'clock phone time.      Plan:  will continue to encourage patient to engage and take personal responsibility for treatment, explore and verbalize emotions, set goals to aid in preventing re-hospitalization, attend psychotherapeutic group, and follow up with aftercare appointments.

## 2019-10-31 NOTE — PROGRESS NOTES
"   10/31/19 1040   [facility] Group Therapy   Group Name Leisure Skills Training   Specific Interventions Cognitive Stimulation Training   Participation Level Minimal   Participation Quality Cooperative;Requires Prompting;Lack of Interest   Insight/Motivation Improved   Affect/Mood Display Irritable   Cognition Alert   Psychomotor WNL   Patient isolated in room needed encouragement to come out. Patient upset and states "I can't go home for Halloween and I got to come to group."  "

## 2019-10-31 NOTE — PLAN OF CARE
Problem: Adult Behavioral Health Plan of Care  Goal: Optimized Coping Skills in Response to Life Stressors  Intervention: Promote Effective Coping Strategies  Flowsheets (Taken 10/31/2019 110)  Supportive Measures: active listening utilized; positive reinforcement provided; verbalization of feelings encouraged; relaxation techniques promoted; self-care encouraged; self-reflection promoted; self-responsibility promoted     Behavior: Patient did not attend psychotherapeutic group despite encouragement and has not since admit.    Intervention:  will engage patients in a process group focused on locus of control and coping skills, designed to stimulate self-reflection and facilitate a foundation for goal setting. This conversation will give patient's an opportunity to prioritize problems and discuss benefits of implementing actions to change, while facilitating progression towards taking personal responsibility. Patients will be given time to express thoughts, concerns and goals for treatment and discharge, as well as perceived barriers to progress.    Response: Patient did not attend group.    Plan:  will engage patient one on one and continue to encourage patient to explore and verbalize emotions, set goals to aid in preventing re-hospitalization, attend psychotherapeutic group, and follow up with aftercare appointments.

## 2019-10-31 NOTE — PLAN OF CARE
Lying quiet in bed, eyes closed, respiration even and unlabored, appearing asleep.  Slept well all shift.  Safety and precautions maintained with rounds every 15 minutes, bed is fixed in a low position and pathways kept clear.  No fall occurred.

## 2019-11-01 LAB
ALBUMIN SERPL BCP-MCNC: 3.4 G/DL (ref 3.5–5.2)
ALP SERPL-CCNC: 72 U/L (ref 55–135)
ALT SERPL W/O P-5'-P-CCNC: 13 U/L (ref 10–44)
ANION GAP SERPL CALC-SCNC: 8 MMOL/L (ref 8–16)
AST SERPL-CCNC: 12 U/L (ref 10–40)
BASOPHILS # BLD AUTO: 0.04 K/UL (ref 0–0.2)
BASOPHILS NFR BLD: 0.6 % (ref 0–1.9)
BILIRUB SERPL-MCNC: 0.1 MG/DL (ref 0.1–1)
BUN SERPL-MCNC: 10 MG/DL (ref 6–20)
CALCIUM SERPL-MCNC: 9.1 MG/DL (ref 8.7–10.5)
CHLORIDE SERPL-SCNC: 105 MMOL/L (ref 95–110)
CO2 SERPL-SCNC: 28 MMOL/L (ref 23–29)
CREAT SERPL-MCNC: 0.7 MG/DL (ref 0.5–1.4)
DIFFERENTIAL METHOD: ABNORMAL
EOSINOPHIL # BLD AUTO: 0.1 K/UL (ref 0–0.5)
EOSINOPHIL NFR BLD: 1.9 % (ref 0–8)
ERYTHROCYTE [DISTWIDTH] IN BLOOD BY AUTOMATED COUNT: 15.9 % (ref 11.5–14.5)
EST. GFR  (AFRICAN AMERICAN): >60 ML/MIN/1.73 M^2
EST. GFR  (NON AFRICAN AMERICAN): >60 ML/MIN/1.73 M^2
GLUCOSE SERPL-MCNC: 79 MG/DL (ref 70–110)
HCT VFR BLD AUTO: 36.1 % (ref 37–48.5)
HGB BLD-MCNC: 10.7 G/DL (ref 12–16)
IMM GRANULOCYTES # BLD AUTO: 0.04 K/UL (ref 0–0.04)
IMM GRANULOCYTES NFR BLD AUTO: 0.6 % (ref 0–0.5)
LYMPHOCYTES # BLD AUTO: 2.6 K/UL (ref 1–4.8)
LYMPHOCYTES NFR BLD: 35.6 % (ref 18–48)
MCH RBC QN AUTO: 24.2 PG (ref 27–31)
MCHC RBC AUTO-ENTMCNC: 29.6 G/DL (ref 32–36)
MCV RBC AUTO: 82 FL (ref 82–98)
MONOCYTES # BLD AUTO: 0.5 K/UL (ref 0.3–1)
MONOCYTES NFR BLD: 6.4 % (ref 4–15)
NEUTROPHILS # BLD AUTO: 4 K/UL (ref 1.8–7.7)
NEUTROPHILS NFR BLD: 54.9 % (ref 38–73)
NRBC BLD-RTO: 0 /100 WBC
PLATELET # BLD AUTO: 291 K/UL (ref 150–350)
PMV BLD AUTO: 12 FL (ref 9.2–12.9)
POTASSIUM SERPL-SCNC: 4.2 MMOL/L (ref 3.5–5.1)
PROT SERPL-MCNC: 6.8 G/DL (ref 6–8.4)
RBC # BLD AUTO: 4.42 M/UL (ref 4–5.4)
SODIUM SERPL-SCNC: 141 MMOL/L (ref 136–145)
VALPROATE SERPL-MCNC: 48.1 UG/ML (ref 50–100)
WBC # BLD AUTO: 7.2 K/UL (ref 3.9–12.7)

## 2019-11-01 PROCEDURE — 99233 SBSQ HOSP IP/OBS HIGH 50: CPT | Mod: AF,HB,, | Performed by: PSYCHIATRY & NEUROLOGY

## 2019-11-01 PROCEDURE — 25000003 PHARM REV CODE 250

## 2019-11-01 PROCEDURE — 80164 ASSAY DIPROPYLACETIC ACD TOT: CPT

## 2019-11-01 PROCEDURE — 11400000 HC PSYCH PRIVATE ROOM

## 2019-11-01 PROCEDURE — 99233 PR SUBSEQUENT HOSPITAL CARE,LEVL III: ICD-10-PCS | Mod: AF,HB,, | Performed by: PSYCHIATRY & NEUROLOGY

## 2019-11-01 PROCEDURE — 80053 COMPREHEN METABOLIC PANEL: CPT

## 2019-11-01 PROCEDURE — 90833 PSYTX W PT W E/M 30 MIN: CPT | Mod: AF,HB,, | Performed by: PSYCHIATRY & NEUROLOGY

## 2019-11-01 PROCEDURE — 90833 PR PSYCHOTHERAPY W/PATIENT W/E&M, 30 MIN (ADD ON): ICD-10-PCS | Mod: AF,HB,, | Performed by: PSYCHIATRY & NEUROLOGY

## 2019-11-01 PROCEDURE — 85025 COMPLETE CBC W/AUTO DIFF WBC: CPT

## 2019-11-01 PROCEDURE — 25000003 PHARM REV CODE 250: Performed by: PSYCHIATRY & NEUROLOGY

## 2019-11-01 PROCEDURE — 36415 COLL VENOUS BLD VENIPUNCTURE: CPT

## 2019-11-01 RX ORDER — DOCUSATE SODIUM 100 MG/1
100 CAPSULE, LIQUID FILLED ORAL 2 TIMES DAILY
Status: DISCONTINUED | OUTPATIENT
Start: 2019-11-01 | End: 2019-11-04 | Stop reason: HOSPADM

## 2019-11-01 RX ORDER — ADHESIVE BANDAGE
30 BANDAGE TOPICAL DAILY PRN
Status: DISCONTINUED | OUTPATIENT
Start: 2019-11-01 | End: 2019-11-04 | Stop reason: HOSPADM

## 2019-11-01 RX ORDER — CEPHALEXIN 500 MG/1
CAPSULE ORAL
Status: COMPLETED
Start: 2019-11-01 | End: 2019-11-01

## 2019-11-01 RX ORDER — DIVALPROEX SODIUM 500 MG/1
1500 TABLET, FILM COATED, EXTENDED RELEASE ORAL NIGHTLY
Status: DISCONTINUED | OUTPATIENT
Start: 2019-11-01 | End: 2019-11-04 | Stop reason: HOSPADM

## 2019-11-01 RX ADMIN — FERROUS SULFATE TAB 325 MG (65 MG ELEMENTAL FE) 325 MG: 325 (65 FE) TAB at 08:11

## 2019-11-01 RX ADMIN — THERA TABS 1 TABLET: TAB at 08:11

## 2019-11-01 RX ADMIN — CEPHALEXIN 500 MG: 500 CAPSULE ORAL at 06:11

## 2019-11-01 RX ADMIN — RISPERIDONE 4 MG: 2 TABLET ORAL at 08:11

## 2019-11-01 RX ADMIN — DOCUSATE SODIUM 100 MG: 100 CAPSULE, LIQUID FILLED ORAL at 10:11

## 2019-11-01 RX ADMIN — DOCUSATE SODIUM 100 MG: 100 CAPSULE, LIQUID FILLED ORAL at 08:11

## 2019-11-01 RX ADMIN — DIVALPROEX SODIUM 1500 MG: 500 TABLET, FILM COATED, EXTENDED RELEASE ORAL at 08:11

## 2019-11-01 RX ADMIN — BUPROPION HYDROCHLORIDE 300 MG: 300 TABLET, FILM COATED, EXTENDED RELEASE ORAL at 08:11

## 2019-11-01 RX ADMIN — FOLIC ACID 1 MG: 1 TABLET ORAL at 08:11

## 2019-11-01 NOTE — PROGRESS NOTES
"PSYCHIATRY DAILY INPATIENT PROGRESS NOTE  SUBSEQUENT HOSPITAL VISIT    ENCOUNTER DATE: 11/1/2019  SITE: [facility]    DATE OF ADMISSION: 10/26/2019  8:06 PM  LENGTH OF STAY: 6 days    HISTORY    CHIEF COMPLAINT   Maria Yancey is a 34 y.o. female, seen during daily manzo rounds on the inpatient unit.  Maria Yancey presents with the chief complaint of depression and suicidal ideation, "I am doing very bad."    HPI   (Elements: Location, Quality, Severity, Duration, Timing, Content, Modifying Factors, Associated Signs & Symptoms)    The patient was seen and examined. The chart was reviewed.     Reviewed notes by RNs, LMSW, speciality services, and CTRS from the last 24 hours.    The patient's case was discussed with the treatment team and care providers today, including RNs, specialty services, CTRS and LMSW.    Staff reports no behavioral or management issues.     The patient has been compliant with treatment. The patient denied any side effects.    Psychosocial stressors include minimal social support, financial distress, and substance use.    Symptoms persist again mildly changed from yesterday as documented below- sleep has improved and SI has lessened. She still feels overwhelmed with depression and her stressors.      Continued but lessening Symptoms of Depression: less diminished mood or loss of interest/anhedonia; less irritability, less diminished energy, no change in sleep, no change in appetite, less diminished concentration or cognition or indecisiveness, no PMA/R, less excessive guilt or hopelessness or worthlessness, less suicidal ideations     Improving Changes in Sleep: no trouble with initiation/maintenance, no early morning awakening with inability to return to sleep; slept 9 hours last night     Continued but lessening Suicidal/(no)Homicidal ideations: less active/passive ideations, no organized plans, no future intentions     Continued but lessening Symptoms of psychosis: no hallucinations, " less delusions, no disorganized thinking, no disorganized behavior or abnormal motor behavior, no negative symptoms     Denied past or current Symptoms of esther or hypomania: no elevated, expansive, or irritable mood with no increased energy or activity; with no inflated self-esteem or grandiosity, decreased need for sleep, increased rate of speech, FOI or racing thoughts, distractibility, increased goal directed activity or PMA, or risky/disinhibited behavior     Continued but lessening Symptoms of JORDIN: less excessive anxiety/worry/fear, no panic attacks since admission; with less agoraphobia (attended some group activities)     Continued but lessening Symptoms of PTSD: +h/o trauma; less re-experiencing/intrusive symptoms, less avoidant behavior, less negative alterations in cognition or mood, less hyperarousal symptoms; without dissociative symptoms     Less Methamphetamine withdrawal; she is strongly considering rehab once stable and has completed applications for placement.    She reports that she started having some vaginal itchiness which she feels to be a yeast infection (often gets when on antibiotics)     PSYCHOTHERAPY ADD-ON +[id number]   16-37 minutes    Time: 16 minutes  Participants: Met with patient    Therapeutic Intervention Type: insight oriented psychotherapy, behavior modifying psychotherapy, supportive psychotherapy, interactive psychotherapy  Why chosen therapy is appropriate versus another modality: relevant to diagnosis, patient responds to this modality, evidence based practice    Target symptoms: anxiety , substance abuse, mood disorder, psychosis  Primary focus: mood disorder, psychosis, substance use  Psychotherapeutic techniques: supportive and behavioral and motivational techniques;  psycho-education; reasons to obtain sobriety; psychodynamic techniques exploring replacement for meth    Outcome monitoring methods: self-report, observation    Patient's response to intervention:  The patient's  response to intervention is accepting.    Progress toward goals:  The patient's progress toward goals is fair .      ROS  General ROS: negative  Ophthalmic ROS: negative  ENT ROS: negative  Allergy and Immunology ROS: negative  Hematological and Lymphatic ROS: negative  Endocrine ROS: negative  Respiratory ROS: no cough, shortness of breath, or wheezing  Cardiovascular ROS: no chest pain or dyspnea on exertion  Gastrointestinal ROS: no abdominal pain, change in bowel habits, or black or bloody stools  Genito-Urinary ROS: no dysuria, trouble voiding, or hematuria  Musculoskeletal ROS: negative  Neurological ROS: no TIA or stroke symptoms  Dermatological ROS: negative    PAST MEDICAL HISTORY   Past Medical History:   Diagnosis Date    Addiction to drug     recovering drug addict    ADHD (attention deficit hyperactivity disorder)     Alcohol abuse     Anxiety     Asthma     Bipolar 1 disorder     Cervical pain     Chronic constipation     Chronic diarrhea     Depression     Fatigue     Fibromyalgia     Hallucination     Headache     History of psychiatric hospitalization     pt reports 10 total- [facility] 10/2019 and 3/2019    Hx of psychiatric care     Seroquel    Magalis     Panic disorder     Polysubstance dependence     Psychiatric exam requested by authority     Psychiatric problem     Auditory hallucinations    PTSD (post-traumatic stress disorder)     Schizoaffective disorder     Sleep difficulties     Spina bifida     Spinal stenosis of lumbar region     Substance abuse     Suicide attempt     Once by hanging, once by overdose    Syringomyelia     Syrinx of spinal cord     Therapy     [facility]    Trigger finger     Withdrawal symptoms, alcohol     Withdrawal symptoms, drug or narcotic            PSYCHOTROPIC MEDICATIONS   Scheduled Meds:   buPROPion  300 mg Oral Daily    cholecalciferol (vitamin D3)  50,000 Units Oral Q7 Days    divalproex ER  1,000 mg Oral QHS  "   ferrous sulfate  325 mg Oral BID    folic acid  1 mg Oral Daily    multivitamin  1 tablet Oral Daily    risperiDONE  4 mg Oral QHS     Continuous Infusions:  PRN Meds:.acetaminophen, aluminum-magnesium hydroxide-simethicone, docusate sodium, hydrOXYzine pamoate, loperamide, nicotine, OLANZapine **AND** OLANZapine        EXAMINATION    VITALS   Vitals:    11/01/19 0853   BP: 107/61   Pulse: 94   Resp: 20   Temp: 96.2 °F (35.7 °C)     Body mass index is 37.28 kg/m².      CONSTITUTIONAL  General Appearance: WF, in casual attire, obese; NAD     MUSCULOSKELETAL  Muscle Strength and Tone:  normal  Abnormal Involuntary Movements:  none  Gait and Station:  normal; non-ataxic     PSYCHIATRIC   Level of Consciousness: awake, alert  Orientation: p/p/t/s  Grooming:  less inadequate to circumstances, less decreased  Psychomotor Behavior: less PMA, no PMR  Speech: nl r/t/v/s  Language: able to repeat words English fluent  Mood: "ok.. Not sure.. Alright I guess"  Affect: less dysphoric, less irritable/labile, less anxious, less decreased range  Thought Process:  linear and organized, goal directed  Associations:  intact; no SHAGUFTA  Thought Content: less SI, less AVH, less delusions, no HI  Memory:  intact to recent and remote events  Attention:  intact to conversation; not distractible   Fund of Knowledge:  age and education appropriate  Estimate if Intelligence:  average based on work/education history, vocabulary and mental status exam  Insight:  good- seeks help, understands illness  Judgment:   good- no bx issues, compliant and cooperative       DIAGNOSTIC TESTING   Laboratory Results  Recent Results (from the past 24 hour(s))   CBC auto differential    Collection Time: 11/01/19  6:15 AM   Result Value Ref Range    WBC 7.20 3.90 - 12.70 K/uL    RBC 4.42 4.00 - 5.40 M/uL    Hemoglobin 10.7 (L) 12.0 - 16.0 g/dL    Hematocrit 36.1 (L) 37.0 - 48.5 %    Mean Corpuscular Volume 82 82 - 98 fL    Mean Corpuscular Hemoglobin 24.2 " (L) 27.0 - 31.0 pg    Mean Corpuscular Hemoglobin Conc 29.6 (L) 32.0 - 36.0 g/dL    RDW 15.9 (H) 11.5 - 14.5 %    Platelets 291 150 - 350 K/uL    MPV 12.0 9.2 - 12.9 fL    Immature Granulocytes 0.6 (H) 0.0 - 0.5 %    Gran # (ANC) 4.0 1.8 - 7.7 K/uL    Immature Grans (Abs) 0.04 0.00 - 0.04 K/uL    Lymph # 2.6 1.0 - 4.8 K/uL    Mono # 0.5 0.3 - 1.0 K/uL    Eos # 0.1 0.0 - 0.5 K/uL    Baso # 0.04 0.00 - 0.20 K/uL    nRBC 0 0 /100 WBC    Gran% 54.9 38.0 - 73.0 %    Lymph% 35.6 18.0 - 48.0 %    Mono% 6.4 4.0 - 15.0 %    Eosinophil% 1.9 0.0 - 8.0 %    Basophil% 0.6 0.0 - 1.9 %    Differential Method Automated    Comprehensive metabolic panel    Collection Time: 11/01/19  6:15 AM   Result Value Ref Range    Sodium 141 136 - 145 mmol/L    Potassium 4.2 3.5 - 5.1 mmol/L    Chloride 105 95 - 110 mmol/L    CO2 28 23 - 29 mmol/L    Glucose 79 70 - 110 mg/dL    BUN, Bld 10 6 - 20 mg/dL    Creatinine 0.7 0.5 - 1.4 mg/dL    Calcium 9.1 8.7 - 10.5 mg/dL    Total Protein 6.8 6.0 - 8.4 g/dL    Albumin 3.4 (L) 3.5 - 5.2 g/dL    Total Bilirubin 0.1 0.1 - 1.0 mg/dL    Alkaline Phosphatase 72 55 - 135 U/L    AST 12 10 - 40 U/L    ALT 13 10 - 44 U/L    Anion Gap 8 8 - 16 mmol/L    eGFR if African American >60 >60 mL/min/1.73 m^2    eGFR if non African American >60 >60 mL/min/1.73 m^2   Valproic Acid    Collection Time: 11/01/19  6:15 AM   Result Value Ref Range    Valproic Acid Level 48.1 (L) 50.0 - 100.0 ug/mL         ASSESSMENT      IMPRESSION   Schizoaffective Disorder, depressed type, severe  JORDIN  Panic without agoraphobia   PTSD  Fibromyalgia      Polysubstance dependence (Methamphetamines, cannabis, alcohol, narcotics; methamphetamines, current, continuous, severe, without physiological dependence)  Nicotine Dependence     Psychosocial stressors     Amphetamine Withdrawal     Iron Deficient Microcytic Anemia  Vitamin D deficiency   UTI  Constipation   Possible yeast infection        MEDICAL DECISION MAKING     PROBLEM LIST AND  MANAGEMENT PLANS; PRESCRIPTION DRUG MANAGEMENT  Compliance: yes  Side Effects: no  Regimen Adjustments:      Psychosis: pt counseled  -Resumed risperdal at 1mg QHS- increase to 2 mg po q HS- increased to 3 mg po q HS-  Increased to 4 mg po q HS; will titrate higher if needed; depakote adjunctive (off-label) as below     Depression: pt counseled  -Resumed/continue Wellbutrin  mg po q day- increased to 300 mg po q Day starting today     Anxiety/PTSD: pt counseled  -depakote off-label as below; wellbutrin off-label;   -vistaril prn     Pain: pt counseled  -Resumed Depakote ER at  500 mg po q HS (off-label)- increased to to 1000 mg po q HS (level was 48.1)- increase to 1500 mg po q HS  -check level in 4 days with CBC and CMP     Substance use: pt counseled  -pt is now interested in inpatient rehab  -outpatient tx options will be provided     Nicotine use: pt counseled; continue nicotine patch 14 mg dermal daily; wellbutrin as above     Psychosocial stressors: pt counseled; SW consulted and assisting with resources     Iron Deficient Microcytic Anemia: pt counseled; checked ferritin and iron panel- +iron deficiency  -Started/continue Iron 325 mg po BID  -levels are stable; microcytosis improved     Vitamin D deficiency: pt counseled  tarted/continue Vitamin D3 50,000 units po q week x 8 weeks (1/8 completed)      UTI: pt counseled; FM consult completed  -Started/Continue Cephalexin 500 mg po BID for 5 days- ciourse completed; pt asymptomatic at this time    Constipation: start colace 100 mg po BID    Possible yeast infection: FM consulted     DIAGNOSTIC TESTING  Labs reviewed with the patient; checking labs on Monday     Disposition:  -SW to assist with aftercare planning and collateral  -Once stable discharge home with outpatient follow up care and/or rehab  -Continue inpatient treatment under a P[facility] and/or [facility] for danger to self as evident by depression with voiced suicidal ideation in the context of heavy  substance abuse and withdrawal, psychosocial stressors, and psychosis.          NEED FOR CONTINUED HOSPITALIZATION  Psychiatric illness continues to pose a potential threat to life or bodily function, of self or others, thereby requiring the need for continued inpatient psychiatric hospitalization: Yes    Protective inpatient pyschiatric hospitalization required while a safe disposition plan is enacted: Yes    Patient stabilized and ready for discharge from inpatient psychiatric unit: No      STAFF:   Salomón Nettles MD  Psychiatry

## 2019-11-01 NOTE — PLAN OF CARE
"  Problem: Adult Behavioral Health Plan of Care  Goal: Optimized Coping Skills in Response to Life Stressors  Intervention: Promote Effective Coping Strategies  Flowsheets (Taken 11/1/2019 1200)  Supportive Measures: active listening utilized;counseling provided;positive reinforcement provided;verbalization of feelings encouraged;relaxation techniques promoted;self-care encouraged;self-reflection promoted;self-responsibility promoted;problem solving facilitated;decision-making supported;goal setting facilitated     Behavior: Patient attended psychotherapeutic group towards the end, dressed in hospital scrubs, disheveled with dysphoric, groggy, congruent affect and mood, relaxed posture, and intermittent eye contact.     Intervention:  will engage patients in a discussion about the stages of recovery and a progressive muscle relaxation exercise. Patients will be given time to express thoughts, concerns and goals for treatment and discharge, as well as perceived barriers to progress.    Response: Patient came to group towards the end and provided emotional support for a patient, stating that she too has to stay a while to get her medications straight. Patient stated that she is ready to go to a "long term" rehab and that she is no longer depressed or suicidal.    Plan:  will continue to encourage patient to explore and verbalize emotions, set goals to aid in preventing re-hospitalization, attend psychotherapeutic group, and follow up with aftercare appointments.  "

## 2019-11-01 NOTE — PLAN OF CARE
"Plan of care reviewed.  Denies intent to harm self or others at this time. Initially was in bed stating that she was depressed because she was not discharged home to be with 15 yo son for Halloween.  Encouraged pt to focus on getting the help to learn further coping skills and go to an inpt rehab to stay away from the illicit drugs.  Pt states "I don't do that around my son and if I had my son then I wouldn't do it." Pt lacks insight and judgement. Pt did get up and take shower prior to accepting snacks and medications.  Gait steady, no falls.  Pleasantly interacting with staff and peers.once our of room. Promoted an individualized safety plan, reality-based interactions, effective coping strategies, and impulse control.  Will continue to monitor for safety.        "

## 2019-11-01 NOTE — PLAN OF CARE
Pt is cooperative.  Still withdrawn and guarded but does interact a little on unit.  Denies any S/I or H/I at this time.  Encouraged pt to continue following treatment plan and encouraged pt decision to go to rehab from here.  Pt verbalized understanding, will continue to monitor.

## 2019-11-01 NOTE — PROGRESS NOTES
"   11/01/19 6729   [facility] Group Therapy   Group Name Leisure Education   Specific Interventions Coping Skills Training  (1:1 attempt, healthy leisure outlets&benefits)   Participation Level None   Patient in bed resting, covers pulled over her face. Patient complain of her "stomach hurt, I took some medicine, don't feel like talking right now." Staff will continue to monitor and document progress.  "

## 2019-11-01 NOTE — PSYCH
The patient has been accepted at [facility] for 11/6/2019.  Spoke with the  at [facility] program, who related to call back on Monday to speak with the intake person.

## 2019-11-01 NOTE — PLAN OF CARE
Lying quiet in bed, eyes closed, respiration even and unlabored, appearing asleep.  Slept well all shift except up to bathroom once.  Safety and precautions maintained with rounds every 15 minutes, bed is fixed in a low position and pathways kept clear.  No fall occurred.

## 2019-11-02 PROCEDURE — 90833 PSYTX W PT W E/M 30 MIN: CPT | Mod: AF,HB,, | Performed by: PSYCHIATRY & NEUROLOGY

## 2019-11-02 PROCEDURE — 11400000 HC PSYCH PRIVATE ROOM

## 2019-11-02 PROCEDURE — 99233 SBSQ HOSP IP/OBS HIGH 50: CPT | Mod: AF,HB,, | Performed by: PSYCHIATRY & NEUROLOGY

## 2019-11-02 PROCEDURE — 90833 PR PSYCHOTHERAPY W/PATIENT W/E&M, 30 MIN (ADD ON): ICD-10-PCS | Mod: AF,HB,, | Performed by: PSYCHIATRY & NEUROLOGY

## 2019-11-02 PROCEDURE — 99233 PR SUBSEQUENT HOSPITAL CARE,LEVL III: ICD-10-PCS | Mod: AF,HB,, | Performed by: PSYCHIATRY & NEUROLOGY

## 2019-11-02 PROCEDURE — 25000003 PHARM REV CODE 250: Performed by: PSYCHIATRY & NEUROLOGY

## 2019-11-02 RX ADMIN — DOCUSATE SODIUM 100 MG: 100 CAPSULE, LIQUID FILLED ORAL at 08:11

## 2019-11-02 RX ADMIN — DOCUSATE SODIUM 100 MG: 100 CAPSULE, LIQUID FILLED ORAL at 09:11

## 2019-11-02 RX ADMIN — FOLIC ACID 1 MG: 1 TABLET ORAL at 09:11

## 2019-11-02 RX ADMIN — BUPROPION HYDROCHLORIDE 300 MG: 300 TABLET, FILM COATED, EXTENDED RELEASE ORAL at 09:11

## 2019-11-02 RX ADMIN — DIVALPROEX SODIUM 1500 MG: 500 TABLET, FILM COATED, EXTENDED RELEASE ORAL at 08:11

## 2019-11-02 RX ADMIN — RISPERIDONE 4 MG: 2 TABLET ORAL at 08:11

## 2019-11-02 RX ADMIN — FERROUS SULFATE TAB 325 MG (65 MG ELEMENTAL FE) 325 MG: 325 (65 FE) TAB at 09:11

## 2019-11-02 RX ADMIN — FERROUS SULFATE TAB 325 MG (65 MG ELEMENTAL FE) 325 MG: 325 (65 FE) TAB at 08:11

## 2019-11-02 RX ADMIN — THERA TABS 1 TABLET: TAB at 09:11

## 2019-11-02 NOTE — PLAN OF CARE
Pt mostly in room resting/sleeping today.Pt currently up in day room watching tv.Pt reports her depression is much improved.Pt eating well.Grooming poor.Medication compliant.

## 2019-11-02 NOTE — NURSING
Pt is sleeping at the present time and has slept 8.5 hrs so far.   NAD.  Resp even & unlabored.  Pathways clear and bed in low position.  Safety checks ongoing.  All precautions maintained.

## 2019-11-02 NOTE — PROGRESS NOTES
"PSYCHIATRY DAILY INPATIENT PROGRESS NOTE  SUBSEQUENT HOSPITAL VISIT    ENCOUNTER DATE: 11/2/2019  [facility]    DATE OF ADMISSION: 10/26/2019  8:06 PM  LENGTH OF STAY: 7 days    HISTORY    CHIEF COMPLAINT   Maria Yancey is a 34 y.o. female, seen during daily manzo rounds on the inpatient unit.  Maria Yancey presents with the chief complaint of depression and suicidal ideation, "I am doing very bad."    HPI   (Elements: Location, Quality, Severity, Duration, Timing, Content, Modifying Factors, Associated Signs & Symptoms)    The patient was seen and examined. The chart was reviewed.     Reviewed notes by RNs, LMSW, speciality services, and CTRS from the last 24 hours.    The patient's case was discussed with the treatment team and care providers today, including RNs.    Staff reports no behavioral or management issues.     The patient has been compliant with treatment. The patient denied any side effects.    Psychosocial stressors include minimal social support, financial distress, and substance use.She is better able to identify social support (primarliy her mother). She has a court date oin 11/5/19 and plans to go to rehab on 11/6/19 (was accepted to [facility] for that day).     Symptoms persist moderately changed from yesterday as documented below- sleep has improved and SI has improved. She feels less  overwhelmed with depression and her stressors and more capable of handling them.      Lessening Symptoms of Depression: less diminished mood or loss of interest/anhedonia; no irritability, no diminished energy, no change in sleep, no change in appetite, no diminished concentration or cognition or indecisiveness, no PMA/R, less excessive guilt or hopelessness or worthlessness, less suicidal ideations     Improving Changes in Sleep: no trouble with initiation/maintenance, no early morning awakening with inability to return to sleep; slept 9 hours last night     Improving Suicidal/(no)Homicidal ideations: " no active/passive ideations, no organized plans, no future intentions; she is more future oriented and goal directed; she is able to discuss both short and long term goals.     Improving Symptoms of psychosis: no hallucinations, less delusions, no disorganized thinking, no disorganized behavior or abnormal motor behavior, no negative symptoms     Denied past or current Symptoms of esther or hypomania: no elevated, expansive, or irritable mood with no increased energy or activity; with no inflated self-esteem or grandiosity, decreased need for sleep, increased rate of speech, FOI or racing thoughts, distractibility, increased goal directed activity or PMA, or risky/disinhibited behavior     Improving Symptoms of JORDIN: less excessive anxiety/worry/fear, no panic attacks since admission; with less agoraphobia (attended more group activities)     Improving Symptoms of PTSD: +h/o trauma; less re-experiencing/intrusive symptoms, less avoidant behavior, less negative alterations in cognition or mood, less hyperarousal symptoms; without dissociative symptoms     Improving Methamphetamine withdrawal; she is willing to attned rehab once stable.       PSYCHOTHERAPY ADD-ON +[id number]   16-37 minutes    Time: 16 minutes  Participants: Met with patient    Therapeutic Intervention Type: insight oriented psychotherapy, behavior modifying psychotherapy, supportive psychotherapy, interactive psychotherapy  Why chosen therapy is appropriate versus another modality: relevant to diagnosis, patient responds to this modality, evidence based practice    Target symptoms: anxiety , substance abuse, mood disorder, psychosis  Primary focus: mood disorder, psychosis, substance use  Psychotherapeutic techniques: supportive and behavioral and motivational techniques;  psycho-education; reasons to obtain sobriety; psychodynamic techniques exploring replacement for meth; motivational therapy to increase compliance     Outcome monitoring methods:  self-report, observation    Patient's response to intervention:  The patient's response to intervention is accepting.    Progress toward goals:  The patient's progress toward goals is fair .      ROS  General ROS: negative  Ophthalmic ROS: negative  ENT ROS: negative  Allergy and Immunology ROS: negative  Hematological and Lymphatic ROS: negative  Endocrine ROS: negative  Respiratory ROS: no cough, shortness of breath, or wheezing  Cardiovascular ROS: no chest pain or dyspnea on exertion  Gastrointestinal ROS: no abdominal pain, change in bowel habits, or black or bloody stools  Genito-Urinary ROS: no dysuria, trouble voiding, or hematuria  Musculoskeletal ROS: negative  Neurological ROS: no TIA or stroke symptoms  Dermatological ROS: negative    PAST MEDICAL HISTORY   Past Medical History:   Diagnosis Date    Addiction to drug     recovering drug addict    ADHD (attention deficit hyperactivity disorder)     Alcohol abuse     Anxiety     Asthma     Bipolar 1 disorder     Cervical pain     Chronic constipation     Chronic diarrhea     Depression     Fatigue     Fibromyalgia     Hallucination     Headache     History of psychiatric hospitalization     pt reports 10 total- [facility] 10/2019 and 3/2019    Hx of psychiatric care     Seroquel    Magalis     Panic disorder     Polysubstance dependence     Psychiatric exam requested by authority     Psychiatric problem     Auditory hallucinations    PTSD (post-traumatic stress disorder)     Schizoaffective disorder     Sleep difficulties     Spina bifida     Spinal stenosis of lumbar region     Substance abuse     Suicide attempt     Once by hanging, once by overdose    Syringomyelia     Syrinx of spinal cord     Therapy     [facility]    Trigger finger     Withdrawal symptoms, alcohol     Withdrawal symptoms, drug or narcotic            PSYCHOTROPIC MEDICATIONS   Scheduled Meds:   buPROPion  300 mg Oral Daily     "cholecalciferol (vitamin D3)  50,000 Units Oral Q7 Days    divalproex ER  1,500 mg Oral QHS    docusate sodium  100 mg Oral BID    ferrous sulfate  325 mg Oral BID    folic acid  1 mg Oral Daily    multivitamin  1 tablet Oral Daily    risperiDONE  4 mg Oral QHS     Continuous Infusions:  PRN Meds:.acetaminophen, aluminum-magnesium hydroxide-simethicone, docusate sodium, hydrOXYzine pamoate, loperamide, magnesium hydroxide 400 mg/5 ml, nicotine, OLANZapine **AND** OLANZapine        EXAMINATION    VITALS   Vitals:    11/02/19 0752   BP: (!) 119/56   Pulse: 86   Resp: 16   Temp: 97.3 °F (36.3 °C)     Body mass index is 37.28 kg/m².      CONSTITUTIONAL  General Appearance: WF, in casual attire, obese; NAD     MUSCULOSKELETAL  Muscle Strength and Tone:  normal  Abnormal Involuntary Movements:  none  Gait and Station:  normal; non-ataxic     PSYCHIATRIC   Level of Consciousness: awake, alert  Orientation: p/p/t/s  Grooming:  more adequate to circumstances, improving  Psychomotor Behavior: no PMA, no PMR  Speech: nl r/t/v/s  Language: able to repeat words English fluent  Mood: "getting better"  Affect: less dysphoric, less irritable/labile, less anxious, less decreased range  Thought Process:  linear and organized, goal directed  Associations:  intact; no SHAGUFTA  Thought Content: no SI, no AVH, less delusions, no HI  Memory:  intact to recent and remote events  Attention:  intact to conversation; not distractible   Fund of Knowledge:  age and education appropriate  Estimate if Intelligence:  average based on work/education history, vocabulary and mental status exam  Insight:  good- seeks help, understands illness  Judgment:   good- no bx issues, compliant and cooperative       DIAGNOSTIC TESTING   Laboratory Results  No results found for this or any previous visit (from the past 24 hour(s)).      ASSESSMENT      IMPRESSION   Schizoaffective Disorder, depressed type, severe  JORDIN  Panic without agoraphobia "   PTSD  Fibromyalgia      Polysubstance dependence (Methamphetamines, cannabis, alcohol, narcotics; methamphetamines, current, continuous, severe, without physiological dependence)  Nicotine Dependence     Psychosocial stressors     Amphetamine Withdrawal     Iron Deficient Microcytic Anemia  Vitamin D deficiency   UTI  Constipation   Possible yeast infection        MEDICAL DECISION MAKING     PROBLEM LIST AND MANAGEMENT PLANS; PRESCRIPTION DRUG MANAGEMENT  Compliance: yes  Side Effects: no  Regimen Adjustments:      Psychosis: pt counseled  -Resumed risperdal at 1mg QHS- increase to 2 mg po q HS- increased to 3 mg po q HS-  Increased to 4 mg po q HS; will titrate higher if needed; depakote adjunctive (off-label) as below     Depression: pt counseled  -Resumed/continue Wellbutrin  mg po q day- increased to 300 mg po q Day     Anxiety/PTSD: pt counseled  -depakote off-label as below; wellbutrin off-label;   -vistaril prn     Pain: pt counseled  -Resumed Depakote ER at  500 mg po q HS (off-label)- increased to to 1000 mg po q HS (level was 48.1)- increase to 1500 mg po q HS  -check level Monday AM with CBC and CMP     Substance use: pt counseled  -pt is now interested in inpatient rehab  -outpatient tx options will be provided     Nicotine use: pt counseled; continue nicotine patch 14 mg dermal daily; wellbutrin as above     Psychosocial stressors: pt counseled; SW consulted and assisting with resources     Iron Deficient Microcytic Anemia: pt counseled; checked ferritin and iron panel- +iron deficiency  -Started/continue Iron 325 mg po BID  -levels are stable; microcytosis improved     Vitamin D deficiency: pt counseled  tarted/continue Vitamin D3 50,000 units po q week x 8 weeks (1/8 completed)      UTI: pt counseled; FM consult completed  -Started and completed course of Cephalexin 500 mg po BID (completed 5/5 days); pt asymptomatic at this time    Constipation: start colace 100 mg po BID       DIAGNOSTIC  TESTING  Labs reviewed with the patient; checking labs on Monday     Disposition:  -SW to assist with aftercare planning and collateral  -Once stable discharge home with outpatient follow up care and/or rehab  -Continue inpatient treatment under a PEC and/or CEC for danger to self as evident by depression with voiced suicidal ideation in the context of heavy substance abuse and withdrawal, psychosocial stressors, and psychosis.   Pt is making improvements and should be stable for discharge home Monday.       NEED FOR CONTINUED HOSPITALIZATION  Psychiatric illness continues to pose a potential threat to life or bodily function, of self or others, thereby requiring the need for continued inpatient psychiatric hospitalization: Yes    Protective inpatient pyschiatric hospitalization required while a safe disposition plan is enacted: Yes    Patient stabilized and ready for discharge from inpatient psychiatric unit: No      STAFF:   Salomón Nettles MD  Psychiatry

## 2019-11-02 NOTE — PLAN OF CARE
Following POC.  Makes needs known.  Denies pain.  Denies SI/HI.  States she is feeling better.  Encouraged substance free life style.  Encouraged rehab.  Interacting with peers and staff appropriately.  Out in dayroom.  Sleeping well.  Appetite is good.  Medication complaint.  Free of fall/injury.

## 2019-11-03 PROBLEM — N30.00 ACUTE CYSTITIS WITHOUT HEMATURIA: Status: RESOLVED | Noted: 2019-10-27 | Resolved: 2019-11-03

## 2019-11-03 PROBLEM — S86.892A LEFT MEDIAL TIBIAL STRESS SYNDROME: Status: RESOLVED | Noted: 2019-01-19 | Resolved: 2019-11-03

## 2019-11-03 PROBLEM — F25.8 OTHER SCHIZOAFFECTIVE DISORDERS: Status: RESOLVED | Noted: 2019-10-26 | Resolved: 2019-11-03

## 2019-11-03 PROBLEM — M79.606 LEG PAIN: Status: RESOLVED | Noted: 2019-01-19 | Resolved: 2019-11-03

## 2019-11-03 PROCEDURE — 11400000 HC PSYCH PRIVATE ROOM

## 2019-11-03 PROCEDURE — 99233 PR SUBSEQUENT HOSPITAL CARE,LEVL III: ICD-10-PCS | Mod: AF,HB,, | Performed by: PSYCHIATRY & NEUROLOGY

## 2019-11-03 PROCEDURE — 99233 SBSQ HOSP IP/OBS HIGH 50: CPT | Mod: AF,HB,, | Performed by: PSYCHIATRY & NEUROLOGY

## 2019-11-03 PROCEDURE — 25000003 PHARM REV CODE 250: Performed by: PSYCHIATRY & NEUROLOGY

## 2019-11-03 RX ORDER — ASPIRIN 325 MG
50000 TABLET, DELAYED RELEASE (ENTERIC COATED) ORAL
Status: ON HOLD | COMMUNITY
Start: 2019-11-03 | End: 2020-01-14 | Stop reason: HOSPADM

## 2019-11-03 RX ORDER — IBUPROFEN 200 MG
1 TABLET ORAL DAILY
COMMUNITY
Start: 2019-11-03 | End: 2020-01-04

## 2019-11-03 RX ORDER — RISPERIDONE 4 MG/1
4 TABLET ORAL NIGHTLY
Qty: 30 TABLET | Refills: 2 | Status: ON HOLD | OUTPATIENT
Start: 2019-11-03 | End: 2020-01-14 | Stop reason: HOSPADM

## 2019-11-03 RX ORDER — DOCUSATE SODIUM 100 MG/1
100 CAPSULE, LIQUID FILLED ORAL 2 TIMES DAILY
Refills: 0 | Status: ON HOLD | COMMUNITY
Start: 2019-11-03 | End: 2020-01-14 | Stop reason: HOSPADM

## 2019-11-03 RX ORDER — DIVALPROEX SODIUM 500 MG/1
1500 TABLET, FILM COATED, EXTENDED RELEASE ORAL NIGHTLY
Qty: 90 TABLET | Refills: 2 | Status: ON HOLD | OUTPATIENT
Start: 2019-11-03 | End: 2020-01-14 | Stop reason: SDUPTHER

## 2019-11-03 RX ORDER — BUPROPION HYDROCHLORIDE 300 MG/1
300 TABLET ORAL DAILY
Qty: 30 TABLET | Refills: 2 | Status: ON HOLD | OUTPATIENT
Start: 2019-11-03 | End: 2020-01-14 | Stop reason: SDUPTHER

## 2019-11-03 RX ORDER — FERROUS SULFATE 325(65) MG
325 TABLET ORAL 2 TIMES DAILY
Refills: 0 | Status: ON HOLD | COMMUNITY
Start: 2019-11-03 | End: 2020-01-14 | Stop reason: HOSPADM

## 2019-11-03 RX ADMIN — DOCUSATE SODIUM 100 MG: 100 CAPSULE, LIQUID FILLED ORAL at 09:11

## 2019-11-03 RX ADMIN — BUPROPION HYDROCHLORIDE 300 MG: 300 TABLET, FILM COATED, EXTENDED RELEASE ORAL at 09:11

## 2019-11-03 RX ADMIN — FERROUS SULFATE TAB 325 MG (65 MG ELEMENTAL FE) 325 MG: 325 (65 FE) TAB at 09:11

## 2019-11-03 RX ADMIN — DOCUSATE SODIUM 100 MG: 100 CAPSULE, LIQUID FILLED ORAL at 08:11

## 2019-11-03 RX ADMIN — FOLIC ACID 1 MG: 1 TABLET ORAL at 09:11

## 2019-11-03 RX ADMIN — DIVALPROEX SODIUM 1500 MG: 500 TABLET, FILM COATED, EXTENDED RELEASE ORAL at 08:11

## 2019-11-03 RX ADMIN — RISPERIDONE 4 MG: 2 TABLET ORAL at 08:11

## 2019-11-03 RX ADMIN — OFLOXACIN 50000 UNITS: 300 TABLET, COATED ORAL at 09:11

## 2019-11-03 RX ADMIN — THERA TABS 1 TABLET: TAB at 09:11

## 2019-11-03 RX ADMIN — FERROUS SULFATE TAB 325 MG (65 MG ELEMENTAL FE) 325 MG: 325 (65 FE) TAB at 08:11

## 2019-11-03 NOTE — PROGRESS NOTES
"PSYCHIATRY DAILY INPATIENT PROGRESS NOTE  SUBSEQUENT HOSPITAL VISIT    ENCOUNTER DATE: 11/3/2019  [facility]    DATE OF ADMISSION: 10/26/2019  8:06 PM  LENGTH OF STAY: 8 days    HISTORY    CHIEF COMPLAINT   Maria Yancey is a 34 y.o. female, seen during daily manzo rounds on the inpatient unit.  Maria Yancey presents with the chief complaint of depression and suicidal ideation, "I am doing very bad."    HPI   (Elements: Location, Quality, Severity, Duration, Timing, Content, Modifying Factors, Associated Signs & Symptoms)    The patient was seen and examined. The chart was reviewed.     Reviewed notes by RNs and LPN from the last 24 hours.    The patient's case was discussed with the treatment team and care providers today, including RNs.    Staff reports no behavioral or management issues.     The patient has been compliant with treatment. The patient denied any side effects.    Psychosocial stressors include minimal social support, financial distress, and substance use.She is better able to identify social support (primarliy her mother). She has a court date oin 11/5/19 and plans to go to rehab on 11/6/19 (was accepted to [facility] for that day). She feels that she can remains sober and attend to the following dates with the assistance of her mother.     Symptoms are improving since yesterday/admission as documented below- sleep has improved and SI has improved. She feels much less overwhelmed with depression and her stressors and more capable of handling them.  She is more hopeful and future oriented.     Improving Symptoms of Depression: less diminished mood or loss of interest/anhedonia; no irritability, no diminished energy, no change in sleep, no change in appetite, no diminished concentration or cognition or indecisiveness, no PMA/R, less excessive guilt or hopelessness or worthlessness, no suicidal ideations     Improved Changes in Sleep: no trouble with initiation/maintenance, no early morning " awakening with inability to return to sleep; slept 9 hours last night     Improved Suicidal/(no)Homicidal ideations: no active/passive ideations, no organized plans, no future intentions; she is more future oriented and goal directed; she is able to discuss both short and long term goals.     Improved Symptoms of psychosis: no hallucinations, no delusions, no disorganized thinking, no disorganized behavior or abnormal motor behavior, no negative symptoms     Denied past or current Symptoms of esther or hypomania: no elevated, expansive, or irritable mood with no increased energy or activity; with no inflated self-esteem or grandiosity, decreased need for sleep, increased rate of speech, FOI or racing thoughts, distractibility, increased goal directed activity or PMA, or risky/disinhibited behavior     Improved Symptoms of JORDIN: no excessive anxiety/worry/fear, no panic attacks since admission; with less agoraphobia (attended more group activities)     Improving Symptoms of PTSD: +h/o trauma; less re-experiencing/intrusive symptoms, less avoidant behavior, less negative alterations in cognition or mood, less hyperarousal symptoms; without dissociative symptoms     Improved Methamphetamine withdrawal; she is willing to attned rehab once stable, but she want to resolve her legal stressors first.    ROS  General ROS: negative  Ophthalmic ROS: negative  ENT ROS: negative  Allergy and Immunology ROS: negative  Hematological and Lymphatic ROS: negative  Endocrine ROS: negative  Respiratory ROS: no cough, shortness of breath, or wheezing  Cardiovascular ROS: no chest pain or dyspnea on exertion  Gastrointestinal ROS: no abdominal pain, change in bowel habits, or black or bloody stools  Genito-Urinary ROS: no dysuria, trouble voiding, or hematuria  Musculoskeletal ROS: negative  Neurological ROS: no TIA or stroke symptoms  Dermatological ROS: negative    PAST MEDICAL HISTORY   Past Medical History:   Diagnosis Date     Addiction to drug     recovering drug addict    ADHD (attention deficit hyperactivity disorder)     Alcohol abuse     Anxiety     Asthma     Bipolar 1 disorder     Cervical pain     Chronic constipation     Chronic diarrhea     Depression     Fatigue     Fibromyalgia     Hallucination     Headache     History of psychiatric hospitalization     pt reports 10 total- [facility] 10/2019 and 3/2019    Hx of psychiatric care     Seroquel    Magalis     Panic disorder     Polysubstance dependence     Psychiatric exam requested by authority     Psychiatric problem     Auditory hallucinations    PTSD (post-traumatic stress disorder)     Schizoaffective disorder     Sleep difficulties     Spina bifida     Spinal stenosis of lumbar region     Substance abuse     Suicide attempt     Once by hanging, once by overdose    Syringomyelia     Syrinx of spinal cord     Therapy     [facility]    Trigger finger     Withdrawal symptoms, alcohol     Withdrawal symptoms, drug or narcotic            PSYCHOTROPIC MEDICATIONS   Scheduled Meds:   buPROPion  300 mg Oral Daily    cholecalciferol (vitamin D3)  50,000 Units Oral Q7 Days    divalproex ER  1,500 mg Oral QHS    docusate sodium  100 mg Oral BID    ferrous sulfate  325 mg Oral BID    folic acid  1 mg Oral Daily    multivitamin  1 tablet Oral Daily    risperiDONE  4 mg Oral QHS     Continuous Infusions:  PRN Meds:.acetaminophen, aluminum-magnesium hydroxide-simethicone, docusate sodium, hydrOXYzine pamoate, loperamide, magnesium hydroxide 400 mg/5 ml, nicotine, OLANZapine **AND** OLANZapine        EXAMINATION    VITALS   Vitals:    11/03/19 0745   BP: (!) 96/49   Pulse: 82   Resp: 16   Temp: 97.2 °F (36.2 °C)     Body mass index is 37.8 kg/m².      CONSTITUTIONAL  General Appearance: WF, in casual attire, obese; NAD     MUSCULOSKELETAL  Muscle Strength and Tone:  normal  Abnormal Involuntary Movements:  none  Gait and Station:  normal;  "non-ataxic     PSYCHIATRIC   Level of Consciousness: awake, alert  Orientation: p/p/t/s  Grooming:  more adequate to circumstances, improving  Psychomotor Behavior: no PMA, no PMR  Speech: nl r/t/v/s  Language: able to repeat words English fluent  Mood: "getting better each day"  Affect: less dysphoric, less irritable/labile, less anxious, less decreased range  Thought Process:  linear and organized, goal directed  Associations:  intact; no SHAGUFTA  Thought Content: no SI, no AVH, denied delusions, no HI  Memory:  intact to recent and remote events  Attention:  intact to conversation; not distractible   Fund of Knowledge:  age and education appropriate  Estimate if Intelligence:  average based on work/education history, vocabulary and mental status exam  Insight:  good- seeks help, understands illness  Judgment:   good- no bx issues, compliant and cooperative       DIAGNOSTIC TESTING   Laboratory Results  No results found for this or any previous visit (from the past 24 hour(s)).      ASSESSMENT      IMPRESSION   Schizoaffective Disorder, depressed type, severe  JORDIN  Panic without agoraphobia   PTSD  Fibromyalgia      Polysubstance dependence (Methamphetamines, cannabis, alcohol, narcotics; methamphetamines, current, continuous, severe, without physiological dependence)  Nicotine Dependence     Psychosocial stressors     Amphetamine Withdrawal     Iron Deficient Microcytic Anemia  Vitamin D deficiency   UTI  Constipation   Possible yeast infection        MEDICAL DECISION MAKING     PROBLEM LIST AND MANAGEMENT PLANS; PRESCRIPTION DRUG MANAGEMENT  Compliance: yes  Side Effects: no  Regimen Adjustments:      Psychosis: pt counseled  -Resumed risperdal at 1mg QHS- increase to 2 mg po q HS- increased to 3 mg po q HS-  Increased to 4 mg po q HS; will titrate higher if needed; depakote adjunctive (off-label) as below     Depression: pt counseled  -Resumed/continue Wellbutrin  mg po q day- increased to 300 mg po q " Day     Anxiety/PTSD: pt counseled  -depakote off-label as below; wellbutrin off-label;   -vistaril prn     Pain: pt counseled  -Resumed Depakote ER at  500 mg po q HS (off-label)- increased to to 1000 mg po q HS (level was 48.1)- increase to 1500 mg po q HS  -check level Monday AM with CBC and CMP     Substance use: pt counseled  -pt is now interested in inpatient rehab  -outpatient tx options will be provided     Nicotine use: pt counseled; continue nicotine patch 14 mg dermal daily; wellbutrin as above     Psychosocial stressors: pt counseled; SW consulted and assisting with resources     Iron Deficient Microcytic Anemia: pt counseled; checked ferritin and iron panel- +iron deficiency  -Started/continue Iron 325 mg po BID  -levels are stable; microcytosis improved     Vitamin D deficiency: pt counseled  tarted/continue Vitamin D3 50,000 units po q week x 8 weeks (1/8 completed)      UTI: pt counseled; FM consult completed  -Started and completed course of Cephalexin 500 mg po BID (completed 5/5 days); pt asymptomatic at this time    Constipation: started/continue colace 100 mg po BID       DIAGNOSTIC TESTING  Labs reviewed with the patient; checking labs on Monday     Disposition:  -SW to assist with aftercare planning and collateral  -Once stable discharge home with outpatient follow up care and/or rehab  -Continue inpatient treatment under a PEC and/or CEC for danger to self as evident by depression with voiced suicidal ideation in the context of heavy substance abuse and withdrawal, psychosocial stressors, and psychosis.   Pt is making daily improvements and should be stable for discharge home tomorrow/Monday and transitioning to outpatient care.        NEED FOR CONTINUED HOSPITALIZATION  Psychiatric illness continues to pose a potential threat to life or bodily function, of self or others, thereby requiring the need for continued inpatient psychiatric hospitalization: Yes    Protective inpatient pyschiatric  hospitalization required while a safe disposition plan is enacted: Yes    Patient stabilized and ready for discharge from inpatient psychiatric unit: No      STAFF:   Salomón Nettles MD  Psychiatry

## 2019-11-03 NOTE — PLAN OF CARE
Up and about the unit.  Spending time in the dayroom talking with peers and watching TV.  No complaints.  Pt denies SI/HI.  Tolerating meds.  Interacting appropriately with peers and staff.  All precautions maintained.

## 2019-11-03 NOTE — PLAN OF CARE
Pt in room majority of shift.Out on unit off and on and appropriate interaction.Mood improved and more spontaneous.Grooming remains poor.Appetite good.Medication compliant.

## 2019-11-04 VITALS
RESPIRATION RATE: 16 BRPM | DIASTOLIC BLOOD PRESSURE: 55 MMHG | WEIGHT: 213.38 LBS | HEART RATE: 82 BPM | HEIGHT: 63 IN | OXYGEN SATURATION: 98 % | TEMPERATURE: 97 F | SYSTOLIC BLOOD PRESSURE: 93 MMHG | BODY MASS INDEX: 37.81 KG/M2

## 2019-11-04 LAB
ALBUMIN SERPL BCP-MCNC: 3.2 G/DL (ref 3.5–5.2)
ALP SERPL-CCNC: 67 U/L (ref 55–135)
ALT SERPL W/O P-5'-P-CCNC: 22 U/L (ref 10–44)
ANION GAP SERPL CALC-SCNC: 9 MMOL/L (ref 8–16)
AST SERPL-CCNC: 17 U/L (ref 10–40)
BASOPHILS # BLD AUTO: 0.02 K/UL (ref 0–0.2)
BASOPHILS NFR BLD: 0.3 % (ref 0–1.9)
BILIRUB SERPL-MCNC: 0.2 MG/DL (ref 0.1–1)
BUN SERPL-MCNC: 13 MG/DL (ref 6–20)
CALCIUM SERPL-MCNC: 8.7 MG/DL (ref 8.7–10.5)
CHLORIDE SERPL-SCNC: 107 MMOL/L (ref 95–110)
CO2 SERPL-SCNC: 25 MMOL/L (ref 23–29)
CREAT SERPL-MCNC: 0.7 MG/DL (ref 0.5–1.4)
DIFFERENTIAL METHOD: ABNORMAL
EOSINOPHIL # BLD AUTO: 0.2 K/UL (ref 0–0.5)
EOSINOPHIL NFR BLD: 1.9 % (ref 0–8)
ERYTHROCYTE [DISTWIDTH] IN BLOOD BY AUTOMATED COUNT: 18.2 % (ref 11.5–14.5)
EST. GFR  (AFRICAN AMERICAN): >60 ML/MIN/1.73 M^2
EST. GFR  (NON AFRICAN AMERICAN): >60 ML/MIN/1.73 M^2
GLUCOSE SERPL-MCNC: 82 MG/DL (ref 70–110)
HCT VFR BLD AUTO: 34.3 % (ref 37–48.5)
HGB BLD-MCNC: 10.3 G/DL (ref 12–16)
IMM GRANULOCYTES # BLD AUTO: 0.04 K/UL (ref 0–0.04)
IMM GRANULOCYTES NFR BLD AUTO: 0.5 % (ref 0–0.5)
LYMPHOCYTES # BLD AUTO: 2.2 K/UL (ref 1–4.8)
LYMPHOCYTES NFR BLD: 27.8 % (ref 18–48)
MCH RBC QN AUTO: 24.8 PG (ref 27–31)
MCHC RBC AUTO-ENTMCNC: 30 G/DL (ref 32–36)
MCV RBC AUTO: 83 FL (ref 82–98)
MONOCYTES # BLD AUTO: 0.5 K/UL (ref 0.3–1)
MONOCYTES NFR BLD: 6.7 % (ref 4–15)
NEUTROPHILS # BLD AUTO: 4.8 K/UL (ref 1.8–7.7)
NEUTROPHILS NFR BLD: 62.8 % (ref 38–73)
NRBC BLD-RTO: 0 /100 WBC
PLATELET # BLD AUTO: 263 K/UL (ref 150–350)
PMV BLD AUTO: 11.7 FL (ref 9.2–12.9)
POTASSIUM SERPL-SCNC: 3.9 MMOL/L (ref 3.5–5.1)
PROT SERPL-MCNC: 6.4 G/DL (ref 6–8.4)
RBC # BLD AUTO: 4.16 M/UL (ref 4–5.4)
SODIUM SERPL-SCNC: 141 MMOL/L (ref 136–145)
VALPROATE SERPL-MCNC: 82.8 UG/ML (ref 50–100)
WBC # BLD AUTO: 7.72 K/UL (ref 3.9–12.7)

## 2019-11-04 PROCEDURE — 80053 COMPREHEN METABOLIC PANEL: CPT

## 2019-11-04 PROCEDURE — 85025 COMPLETE CBC W/AUTO DIFF WBC: CPT

## 2019-11-04 PROCEDURE — 99239 PR HOSPITAL DISCHARGE DAY,>30 MIN: ICD-10-PCS | Mod: AF,HB,, | Performed by: PSYCHIATRY & NEUROLOGY

## 2019-11-04 PROCEDURE — 99239 HOSP IP/OBS DSCHRG MGMT >30: CPT | Mod: AF,HB,, | Performed by: PSYCHIATRY & NEUROLOGY

## 2019-11-04 PROCEDURE — 90833 PSYTX W PT W E/M 30 MIN: CPT | Mod: AF,HB,, | Performed by: PSYCHIATRY & NEUROLOGY

## 2019-11-04 PROCEDURE — 90833 PR PSYCHOTHERAPY W/PATIENT W/E&M, 30 MIN (ADD ON): ICD-10-PCS | Mod: AF,HB,, | Performed by: PSYCHIATRY & NEUROLOGY

## 2019-11-04 PROCEDURE — 36415 COLL VENOUS BLD VENIPUNCTURE: CPT

## 2019-11-04 PROCEDURE — 80164 ASSAY DIPROPYLACETIC ACD TOT: CPT

## 2019-11-04 PROCEDURE — 25000003 PHARM REV CODE 250: Performed by: PSYCHIATRY & NEUROLOGY

## 2019-11-04 RX ADMIN — FOLIC ACID 1 MG: 1 TABLET ORAL at 08:11

## 2019-11-04 RX ADMIN — DOCUSATE SODIUM 100 MG: 100 CAPSULE, LIQUID FILLED ORAL at 08:11

## 2019-11-04 RX ADMIN — THERA TABS 1 TABLET: TAB at 08:11

## 2019-11-04 RX ADMIN — BUPROPION HYDROCHLORIDE 300 MG: 300 TABLET, FILM COATED, EXTENDED RELEASE ORAL at 08:11

## 2019-11-04 RX ADMIN — FERROUS SULFATE TAB 325 MG (65 MG ELEMENTAL FE) 325 MG: 325 (65 FE) TAB at 08:11

## 2019-11-04 NOTE — PROGRESS NOTES
"   11/04/19 1120   [facility] Group Therapy   Group Name Leisure Education   Specific Interventions Coping Skills Training   Participation Level None   Participation Quality Refused   Patient states "I don't want to participate. I'm going and waiting on my ride."  "

## 2019-11-04 NOTE — PSYCH
Patient will be going to [facility] at [address] [place] [address] in [place], [phone].  Patient is being discharged today for a court date tomorrow.  ÁNGELA Steele will pick patient up at her residence to attend the 28 day program on 11/6/2019.  Patient will receive tobacco cessation therapy along with substance abuse therapy due to a positive UDS on admit.  AVS was faxed to [phone] at 11:47 am on 11/4/2019.

## 2019-11-04 NOTE — PLAN OF CARE
Problem: Adult Behavioral Health Plan of Care  Goal: Develops/Participates in Therapeutic [place] to Support Successful Transition  Intervention: Foster Therapeutic [place]  Flowsheets (Taken 11/4/2019 1117)  Trust Relationship/Rapport: care explained; thoughts/feelings acknowledged; choices provided; emotional support provided; empathic listening provided; questions answered; questions encouraged; reassurance provided     Patient confirmed with her mother in front of , that she would be welcome at her mother's home with a ride from her mother. She called Neville to give her home address for their transportation to pick her up on Wednesday. The address is [address] [place] [id number].

## 2019-11-04 NOTE — NURSING
Patient resting quietly in bed with eyes closed.  Respirations even and unlabored appears asleep.  Slept well for 10.5 hours.  Safety maintained with rounds every 15 minutes. Bed fixed at lowest position.  Path ways kept clear.  No fall occured .

## 2019-11-04 NOTE — PLAN OF CARE
Up and about the unit.  Spent some time in assigned room and most of the evening in the dayroom watching TV with peers.  Pt is appropriate in behavior and in interactions with peers and staff.  Good appetite.  Compliant with/tolerating meds.  Denies SI/HI.  Demonstrating a reality based orientation.  All precautions maintained.

## 2019-11-04 NOTE — NURSING
Discharge note : patient is cooperative . avs reviewed with her and she expresses understanding . Denies suicidal , homicidal ideations . Has all belongings . Family will take her home .

## 2019-11-04 NOTE — PROGRESS NOTES
PSYCHOTHERAPY ADD-ON +[id number]   16-37 minutes     Time: 16 minutes  Participants: Met with patient     Therapeutic Intervention Type: insight oriented psychotherapy, behavior modifying psychotherapy, supportive psychotherapy, interactive psychotherapy  Why chosen therapy is appropriate versus another modality: relevant to diagnosis, patient responds to this modality, evidence based practice     Target symptoms: anxiety , substance abuse, mood disorder, psychosis  Primary focus: mood disorder, psychosis, substance use  Psychotherapeutic techniques: supportive and behavioral and motivational techniques;  psycho-education; reasons to obtain sobriety; psychodynamic techniques exploring replacement for meth; motivational therapy to increase compliance; safety plana nd wrap up session.      Outcome monitoring methods: self-report, observation     Patient's response to intervention:  The patient's response to intervention is accepting.     Progress toward goals:  The patient's progress toward goals is good.    Salomón Nettles MD  Psychiatry
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
## 2020-01-04 ENCOUNTER — HOSPITAL ENCOUNTER (INPATIENT)
Facility: HOSPITAL | Age: 35
LOS: 11 days | Discharge: HOME | DRG: 885 | End: 2020-01-15
Attending: PSYCHIATRY & NEUROLOGY | Admitting: PSYCHIATRY & NEUROLOGY
Payer: MEDICAID

## 2020-01-04 DIAGNOSIS — F25.1 SCHIZOAFFECTIVE DISORDER, DEPRESSIVE TYPE: Primary | ICD-10-CM

## 2020-01-04 DIAGNOSIS — F32.A DEPRESSION WITH SUICIDAL IDEATION: ICD-10-CM

## 2020-01-04 DIAGNOSIS — R45.851 DEPRESSION WITH SUICIDAL IDEATION: ICD-10-CM

## 2020-01-04 PROCEDURE — 11400000 HC PSYCH PRIVATE ROOM

## 2020-01-04 PROCEDURE — 25000003 PHARM REV CODE 250: Performed by: PSYCHIATRY & NEUROLOGY

## 2020-01-04 RX ORDER — OLANZAPINE 10 MG/1
10 TABLET ORAL EVERY 4 HOURS PRN
Status: DISCONTINUED | OUTPATIENT
Start: 2020-01-04 | End: 2020-01-15 | Stop reason: HOSPADM

## 2020-01-04 RX ORDER — DIAZEPAM 5 MG/1
5 TABLET ORAL 3 TIMES DAILY
Status: DISCONTINUED | OUTPATIENT
Start: 2020-01-04 | End: 2020-01-07

## 2020-01-04 RX ORDER — OLANZAPINE 10 MG/2ML
10 INJECTION, POWDER, FOR SOLUTION INTRAMUSCULAR EVERY 4 HOURS PRN
Status: DISCONTINUED | OUTPATIENT
Start: 2020-01-04 | End: 2020-01-15 | Stop reason: HOSPADM

## 2020-01-04 RX ORDER — LOPERAMIDE HYDROCHLORIDE 2 MG/1
2 CAPSULE ORAL
Status: DISCONTINUED | OUTPATIENT
Start: 2020-01-04 | End: 2020-01-15 | Stop reason: HOSPADM

## 2020-01-04 RX ORDER — DIVALPROEX SODIUM 500 MG/1
500 TABLET, FILM COATED, EXTENDED RELEASE ORAL NIGHTLY
Status: DISCONTINUED | OUTPATIENT
Start: 2020-01-04 | End: 2020-01-05

## 2020-01-04 RX ORDER — HYDROXYZINE PAMOATE 50 MG/1
50 CAPSULE ORAL EVERY 6 HOURS PRN
Status: DISCONTINUED | OUTPATIENT
Start: 2020-01-04 | End: 2020-01-15 | Stop reason: HOSPADM

## 2020-01-04 RX ORDER — MAG HYDROX/ALUMINUM HYD/SIMETH 200-200-20
30 SUSPENSION, ORAL (FINAL DOSE FORM) ORAL EVERY 6 HOURS PRN
Status: DISCONTINUED | OUTPATIENT
Start: 2020-01-04 | End: 2020-01-15 | Stop reason: HOSPADM

## 2020-01-04 RX ORDER — RISPERIDONE 0.5 MG/1
1 TABLET ORAL NIGHTLY
Status: DISCONTINUED | OUTPATIENT
Start: 2020-01-04 | End: 2020-01-05

## 2020-01-04 RX ORDER — ACETAMINOPHEN 325 MG/1
650 TABLET ORAL EVERY 6 HOURS PRN
Status: DISCONTINUED | OUTPATIENT
Start: 2020-01-04 | End: 2020-01-15 | Stop reason: HOSPADM

## 2020-01-04 RX ORDER — DOCUSATE SODIUM 100 MG/1
100 CAPSULE, LIQUID FILLED ORAL DAILY PRN
Status: DISCONTINUED | OUTPATIENT
Start: 2020-01-04 | End: 2020-01-15 | Stop reason: HOSPADM

## 2020-01-04 RX ORDER — IBUPROFEN 200 MG
1 TABLET ORAL DAILY PRN
Status: DISCONTINUED | OUTPATIENT
Start: 2020-01-04 | End: 2020-01-15 | Stop reason: HOSPADM

## 2020-01-04 RX ORDER — FOLIC ACID 1 MG/1
1 TABLET ORAL DAILY
Status: DISCONTINUED | OUTPATIENT
Start: 2020-01-05 | End: 2020-01-15 | Stop reason: HOSPADM

## 2020-01-04 RX ORDER — BUPROPION HYDROCHLORIDE 150 MG/1
150 TABLET ORAL DAILY
Status: DISCONTINUED | OUTPATIENT
Start: 2020-01-05 | End: 2020-01-10

## 2020-01-04 RX ADMIN — DIVALPROEX SODIUM 500 MG: 500 TABLET, FILM COATED, EXTENDED RELEASE ORAL at 09:01

## 2020-01-04 RX ADMIN — DIAZEPAM 5 MG: 5 TABLET ORAL at 09:01

## 2020-01-04 RX ADMIN — RISPERIDONE 1 MG: 0.5 TABLET ORAL at 09:01

## 2020-01-05 LAB
ESTIMATED AVG GLUCOSE: 94 MG/DL (ref 68–131)
HBA1C MFR BLD HPLC: 4.9 % (ref 4–5.6)

## 2020-01-05 PROCEDURE — 36415 COLL VENOUS BLD VENIPUNCTURE: CPT

## 2020-01-05 PROCEDURE — 83036 HEMOGLOBIN GLYCOSYLATED A1C: CPT

## 2020-01-05 PROCEDURE — 90833 PSYTX W PT W E/M 30 MIN: CPT | Mod: AF,HB,, | Performed by: PSYCHIATRY & NEUROLOGY

## 2020-01-05 PROCEDURE — 99223 PR INITIAL HOSPITAL CARE,LEVL III: ICD-10-PCS | Mod: AF,HB,, | Performed by: PSYCHIATRY & NEUROLOGY

## 2020-01-05 PROCEDURE — 99223 1ST HOSP IP/OBS HIGH 75: CPT | Mod: AF,HB,, | Performed by: PSYCHIATRY & NEUROLOGY

## 2020-01-05 PROCEDURE — 11400000 HC PSYCH PRIVATE ROOM

## 2020-01-05 PROCEDURE — 99231 SBSQ HOSP IP/OBS SF/LOW 25: CPT | Mod: ,,, | Performed by: FAMILY MEDICINE

## 2020-01-05 PROCEDURE — 99231 PR SUBSEQUENT HOSPITAL CARE,LEVL I: ICD-10-PCS | Mod: ,,, | Performed by: FAMILY MEDICINE

## 2020-01-05 PROCEDURE — 90833 PR PSYCHOTHERAPY W/PATIENT W/E&M, 30 MIN (ADD ON): ICD-10-PCS | Mod: AF,HB,, | Performed by: PSYCHIATRY & NEUROLOGY

## 2020-01-05 PROCEDURE — 25000003 PHARM REV CODE 250: Performed by: PSYCHIATRY & NEUROLOGY

## 2020-01-05 RX ORDER — DIVALPROEX SODIUM 500 MG/1
1000 TABLET, FILM COATED, EXTENDED RELEASE ORAL NIGHTLY
Status: DISCONTINUED | OUTPATIENT
Start: 2020-01-05 | End: 2020-01-06

## 2020-01-05 RX ORDER — RISPERIDONE 0.5 MG/1
1 TABLET ORAL 2 TIMES DAILY
Status: DISCONTINUED | OUTPATIENT
Start: 2020-01-05 | End: 2020-01-06

## 2020-01-05 RX ADMIN — BUPROPION HYDROCHLORIDE 150 MG: 150 TABLET, EXTENDED RELEASE ORAL at 08:01

## 2020-01-05 RX ADMIN — OLANZAPINE 10 MG: 10 TABLET, FILM COATED ORAL at 11:01

## 2020-01-05 RX ADMIN — DIAZEPAM 5 MG: 5 TABLET ORAL at 02:01

## 2020-01-05 RX ADMIN — FOLIC ACID 1 MG: 1 TABLET ORAL at 08:01

## 2020-01-05 RX ADMIN — THERA TABS 1 TABLET: TAB at 08:01

## 2020-01-05 RX ADMIN — RISPERIDONE 1 MG: 0.5 TABLET ORAL at 10:01

## 2020-01-05 RX ADMIN — DIVALPROEX SODIUM 1000 MG: 500 TABLET, FILM COATED, EXTENDED RELEASE ORAL at 08:01

## 2020-01-05 RX ADMIN — RISPERIDONE 1 MG: 0.5 TABLET ORAL at 08:01

## 2020-01-05 RX ADMIN — DIAZEPAM 5 MG: 5 TABLET ORAL at 08:01
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
## 2020-01-05 NOTE — PLAN OF CARE
Shift note : patient is labile , tearful , in withdrawal , disorganized . Prn medications , zyprexa given for psychosis from meth . She is now sleeping . She is eating and taking all ordered medications .

## 2020-01-05 NOTE — CONSULTS
History & Physical      SUBJECTIVE:     History of Present Illness:  Patient is a 34 y.o. female presents with substance and Suicide attempt. Admitted to [facility].    No past medical history other than psych. No complaints today.    PTA Medications   Medication Sig    benzphetamine 50 mg Tab Take 1 tablet by mouth three times daily as needed.    buPROPion (WELLBUTRIN XL) 300 MG 24 hr tablet Take 1 tablet (300 mg total) by mouth once daily.    cholecalciferol, vitamin D3, 50,000 unit capsule Take 1 capsule (50,000 Units total) by mouth every 7 days.    divalproex ER (DEPAKOTE) 500 MG Tb24 Take 3 tablets (1,500 mg total) by mouth every evening.    docusate sodium (COLACE) 100 MG capsule Take 1 capsule (100 mg total) by mouth 2 (two) times daily.    ferrous sulfate (FEOSOL) 325 mg (65 mg iron) Tab tablet Take 1 tablet (325 mg total) by mouth 2 (two) times daily.    risperiDONE (RISPERDAL) 4 MG tablet Take 1 tablet (4 mg total) by mouth every evening.       Review of patient's allergies indicates:  No Known Allergies    Past Medical History:   Diagnosis Date    Addiction to drug     recovering drug addict    ADHD (attention deficit hyperactivity disorder)     Alcohol abuse     Anxiety     Asthma     Bipolar 1 disorder     Cervical pain     Chronic constipation     Chronic diarrhea     Depression     Fatigue     Fibromyalgia     Hallucination     Headache     History of psychiatric hospitalization     pt reports 10 total- [facility] 10/2019 and 3/2019    Hx of psychiatric care     Seroquel    Magalis     Panic disorder     Polysubstance dependence     Psychiatric exam requested by authority     Psychiatric problem     Auditory hallucinations    PTSD (post-traumatic stress disorder)     Schizoaffective disorder     Sleep difficulties     Spina bifida     Spinal stenosis of lumbar region     Substance abuse     Suicide attempt     Once by hanging, once by overdose    Syringomyelia     Syrinx of  spinal cord     Therapy     [facility]    Trigger finger     Withdrawal symptoms, alcohol     Withdrawal symptoms, drug or narcotic      Past Surgical History:   Procedure Laterality Date    CARPAL TUNNEL RELEASE       SECTION, CLASSIC      siactica      spinal bifida      spinalsonois       Family History   Problem Relation Age of Onset    Hypertension Mother     Diabetes Mother     Hyperlipidemia Mother     Hypertension Father     Arthritis Father     Diabetes Father     Hyperlipidemia Father     Breast cancer Cousin     Colon cancer Neg Hx     Ovarian cancer Neg Hx      Social History     Tobacco Use    Smoking status: Current Every Day Smoker     Packs/day: 0.50     Years: 15.00     Pack years: 7.50     Types: Cigarettes     Start date: 10/29/1997    Smokeless tobacco: Never Used    Tobacco comment: smoking cessation handout provided and reviewed with pt   Substance Use Topics    Alcohol use: Yes     Alcohol/week: 0.0 standard drinks     Frequency: Monthly or less     Comment: socially 1-3 glass of fruity drink about every other month- hx of heavy consumption/ problematic about 6 months ago    Drug use: Yes     Types: Methamphetamines     Comment: recent relapse one week ago        Review of Systems:  Constitutional: no fever or chills  Respiratory: no cough or shorness of breath  Cardiovascular: no chest pain or palpitations  Gastrointestinal: no nausea or vomiting, no abdominal pain or change in bowel habits  Musculoskeletal: no arthralgias or myalgias  Psych:Meth abuse and suicide attempt by cutting her wrist  OBJECTIVE:     Vital Signs (Most Recent)  Temp: 96.3 °F (35.7 °C) (20)  Pulse: 79 (20)  Resp: 18 (20)  BP: (!) 115/57 (20)    Physical Exam:  General: well developed, well nourished  Lungs:  clear to auscultation bilaterally and normal respiratory effort  Cardiovascular: Heart: regular rate and rhythm, S1, S2  normal, no murmur, click, rub or gallop. Chest Wall: no tenderness. Extremities: no cyanosis or edema, or clubbing. Pulses: 2+ and symmetric.  Abdomen/Rectal: Abdomen: soft, non-tender non-distented; bowel sounds normal; no masses,  no organomegaly. Rectal: not examined  Skin: Skin color, texture, turgor normal. No rashes or lesions  Musculoskeletal:normal gait  Psych:Calm  Neuro: Cranial nerves:  CN II Visual fields full to confrontation.   CN III, IV, VI Pupils are equal, round, and reactive to light.  CN III: no palsy  Nystagmus: none   Diplopia: none  Ophthalmoparesis: none  CN V Facial sensation intact.   CN VII Facial expression full, symmetric.   CN VIII normal.   CN IX normal.   CN X normal.   CN XI normal.   CN XII normal.        Laboratory  CBC: No results for input(s): WBC, RBC, HGB, HCT, PLT, MCV, MCH, MCHC in the last 168 hours.  CMP: No results for input(s): GLU, CALCIUM, ALBUMIN, PROT, NA, K, CO2, CL, BUN, CREATININE, ALKPHOS, ALT, AST, BILITOT in the last 168 hours.  No results for input(s): COLORU, CLARITYU, SPECGRAV, PHUR, PROTEINUA, GLUCOSEU, BILIRUBINCON, BLOODU, WBCU, RBCU, BACTERIA, MUCUS, NITRITE, LEUKOCYTESUR, UROBILINOGEN, HYALINECASTS in the last 168 hours.  TSH   Date Value Ref Range Status   10/26/2019 2.540 0.400 - 4.000 uIU/mL Final     No results found for this or any previous visit.  Alcohol, Medical, Serum   Date Value Ref Range Status   10/26/2019 <10 <10 mg/dL Final     Acetaminophen (Tylenol), Serum   Date Value Ref Range Status   10/26/2019 <3.0 (L) 10.0 - 20.0 ug/mL Final     Comment:     Toxic Levels:  Adults (4 hr post-ingestion).........>150 ug/mL  Adults (12 hr post-ingestion)........>40 ug/mL  Peds (2 hr post-ingestion, liquid)...>225 ug/mL       Results for orders placed or performed during the hospital encounter of 10/26/19   Salicylate level   Result Value Ref Range    Salicylate Lvl <5.0 (L) 15.0 - 30.0 mg/dL     Results for orders placed or performed during the  hospital encounter of 10/26/19   Drug screen panel, emergency   Result Value Ref Range    Benzodiazepines Negative     Methadone metabolites Negative     Cocaine (Metab.) Negative     Opiate Scrn, Ur Negative     Barbiturate Screen, Ur Negative     Amphetamine Screen, Ur Presumptive Positive     THC Negative     Phencyclidine Negative     Creatinine, Random Ur 68.8 15.0 - 325.0 mg/dL    Toxicology Information SEE COMMENT      Preg Test, Ur   Date Value Ref Range Status   10/26/2019 Negative  Final       ASSESSMENT/PLAN:     Active Hospital Problems    Diagnosis  POA    *Schizoaffective disorder, depressive type [F25.1]  Yes    Depression with suicidal ideation [F32.9, R45.851]  Not Applicable    Cigarette nicotine dependence without complication [F17.210]  Yes    PTSD (post-traumatic stress disorder) [F43.10]  Yes    Polysubstance dependence [F19.20]  Yes    Fibromyalgia [M79.7]  Yes    Anxiety [F41.9]  Yes      Resolved Hospital Problems   No resolved problems to display.       Plan: Further orders for psych

## 2020-01-05 NOTE — NURSING
"ADMIT NOTE:  35 yo WF (very familiar to our unit) arrived to 2nd floor in a wheelchair with security and .  Was able to stand and was tearful.  wanded and no metal was detected. Ambulated onto unit.  VS,ht and wt obtained, VS were WDL. Body assessment done witnessed by MHT, noted several tattoos and bandage around left wrist.  Completed admit assessment. Pt is extremely psychotic and thinks her whole family has been murdered and is lying in a sewage tank somewhere, so she may as well be dead.  The left wrist bandage was in place due to pt attempting to cut self in suicide attempt. Pt states "I kept trying but the knife wouldn't go through". Admits to not taking home meds in 4 days and doing crystal meth every 3 days or so.  Also admits to smoking 1/2 pack of cigarettes per day and wants to quit.  Smoking cessation handout given and informed of using the nicotine patch here for smoking cessation.  Voiced understanding. Pt  Continued to be tearful through out interview. Was able to contract for safety and voice understanding of unit protocol and rules.  Took PM meds and went to bed.  Will continue to monitor.  Safety and precautions maintained, bed low and pathway kept clear.  "

## 2020-01-06 PROCEDURE — 99233 PR SUBSEQUENT HOSPITAL CARE,LEVL III: ICD-10-PCS | Mod: AF,HB,, | Performed by: PSYCHIATRY & NEUROLOGY

## 2020-01-06 PROCEDURE — 11400000 HC PSYCH PRIVATE ROOM

## 2020-01-06 PROCEDURE — 25000003 PHARM REV CODE 250: Performed by: PSYCHIATRY & NEUROLOGY

## 2020-01-06 PROCEDURE — 99233 SBSQ HOSP IP/OBS HIGH 50: CPT | Mod: AF,HB,, | Performed by: PSYCHIATRY & NEUROLOGY

## 2020-01-06 RX ORDER — DIVALPROEX SODIUM 500 MG/1
1500 TABLET, FILM COATED, EXTENDED RELEASE ORAL NIGHTLY
Status: DISCONTINUED | OUTPATIENT
Start: 2020-01-06 | End: 2020-01-15 | Stop reason: HOSPADM

## 2020-01-06 RX ORDER — RISPERIDONE 2 MG/1
2 TABLET ORAL 2 TIMES DAILY
Status: DISCONTINUED | OUTPATIENT
Start: 2020-01-06 | End: 2020-01-08

## 2020-01-06 RX ADMIN — RISPERIDONE 1 MG: 0.5 TABLET ORAL at 09:01

## 2020-01-06 RX ADMIN — DIAZEPAM 5 MG: 5 TABLET ORAL at 08:01

## 2020-01-06 RX ADMIN — RISPERIDONE 2 MG: 2 TABLET ORAL at 08:01

## 2020-01-06 RX ADMIN — BUPROPION HYDROCHLORIDE 150 MG: 150 TABLET, EXTENDED RELEASE ORAL at 09:01

## 2020-01-06 RX ADMIN — DIAZEPAM 5 MG: 5 TABLET ORAL at 09:01

## 2020-01-06 RX ADMIN — DIVALPROEX SODIUM 1500 MG: 500 TABLET, FILM COATED, EXTENDED RELEASE ORAL at 08:01

## 2020-01-06 RX ADMIN — FOLIC ACID 1 MG: 1 TABLET ORAL at 09:01

## 2020-01-06 RX ADMIN — THERA TABS 1 TABLET: TAB at 09:01

## 2020-01-06 RX ADMIN — DIAZEPAM 5 MG: 5 TABLET ORAL at 03:01

## 2020-01-06 NOTE — PLAN OF CARE
"  Problem: Adult Behavioral Health Plan of Care  Goal: Rounds/Family Conference  Outcome: Ongoing, Progressing  Flowsheets (Taken 1/6/2020 0135)  Participants: therapeutic recreation; patient; other (see comments); psychiatrist; social work; nursing (social )  Summary: review reason for admit and patient care plan     Chief Complaint:  Patient is presenting with psychosis- thoughts of whole family being murdered- reporting she cut her wrists to kill herself before they killed er-, suicidal ideations, and depression with positive UTOX for amphetamines.         Current:  Patient presented to treatment team refused to see treatment team due to being "too tired." Treatment team will attempt to meet with patient tomorrow.      Plan:  Patient will be encouraged to engage in psychotherapeutic groups and recreational therapy. Patient's medication will be monitored and adjusted as needed. Patient will be encouraged to follow up with aftercare appointments.  "

## 2020-01-06 NOTE — PLAN OF CARE
Pt has slept 10 hours so far without any interruptions.  NAD.  Resp even & unlabored.  Pathways clear and bed is low.  Q 15 minute safety checks ongoing.  All precautions maintained.

## 2020-01-06 NOTE — PLAN OF CARE
Care plan reviewed with patient, patient agrees with plan. Denies suicidal ideations and homicidal ideations. Patient is irritable, mumbles under breath  and curses. Stays in bed between care and meals. Accepted breakfast and medication. Gait steady, no falls. Pathways and environment clear and clutter-free. Promoted an individualized safety plan, effective coping skills, a drug-free lifestyle, impulse control and mood improvement. Will continue to monitor for safety.

## 2020-01-06 NOTE — PLAN OF CARE
Pt has been in assigned bed all evening.  NAD.  Resp even & unlabored.  Sleeping off and on.  Pt took meds when brought to her bedside.  Calm and cooperative.  All precautions maintained.

## 2020-01-06 NOTE — PLAN OF CARE
"  Problem: Adult Behavioral Health Plan of Care  Goal: Optimized Coping Skills in Response to Life Stressors  Intervention: Promote Effective Coping Strategies  Flowsheets (Taken 1/6/2020 1517)  Supportive Measures: active listening utilized; counseling provided; positive reinforcement provided; verbalization of feelings encouraged; problem solving facilitated; decision-making supported; relaxation techniques promoted; goal setting facilitated; self-care encouraged; self-reflection promoted; self-responsibility promoted; journaling promoted        Behavior: Patient attended psychotherapeutic group for a short period of time and left. She is in personal clothing with disheveled hygiene and dysphoric, guarded mood.     Intervention:  will engage patients in a CBT based process group with a focus on cognitive distortions and discovering ways to "untangle thinking." Patients will be encouraged to apply the common cognitive distortions to some area of their life and acknowledge tools they can implement to counteract and restructure. Patients will be given time to express thoughts, concerns and goals for treatment and discharge, as well as perceived barriers to progress.    Response: Patient remains guarded, did not participate, nor stay long enough to gain therapeutic value.     Plan:  will continue to encourage patient to explore and verbalize emotions, set goals to aid in preventing re-hospitalization, attend psychotherapeutic group, and follow up with aftercare appointments.  "

## 2020-01-06 NOTE — PROGRESS NOTES
"PSYCHIATRY DAILY INPATIENT PROGRESS NOTE  SUBSEQUENT HOSPITAL VISIT    ENCOUNTER DATE: 1/6/2020  SITE: [facility]    DATE OF ADMISSION: 1/4/2020  9:00 PM  LENGTH OF STAY: 2 days      HISTORY    CHIEF COMPLAINT   Maria Yancey is a 34 y.o. female, seen during daily manzo rounds on the inpatient unit.  Maria Yancey presents with the chief complaint of depression, psychosis, and suicidal ideation, "I need someone to tell me what's going on"    HPI   (Elements: Location, Quality, Severity, Duration, Timing, Content, Modifying Factors, Associated Signs & Symptoms)    The patient was seen and examined. The chart was reviewed.     Reviewed notes by LMSW, LPN, RN and MD from the last 24 hours.    The patient's case was discussed with the treatment team and care providers today, including RNs, CTRS, LMSW and [facility].    Staff reports no behavioral or management issues.     The patient has been compliant with treatment. The patient denied any side effects.    Symptoms persist unchanged from yesterday. She would not participate with the interview this AM.     Continued Symptoms of Depression: +diminished mood or loss of interest/anhedonia; +irritability, +diminished energy, +change in sleep, +change in appetite, +diminished concentration or cognition or indecisiveness, no PMA/R, +excessive guilt or hopelessness or worthlessness, +suicidal ideations     Continued  Changes in Sleep: +trouble with initiation/maintenance, no early morning awakening with inability to return to sleep     Continued  Suicidal/(no)Homicidal ideations: +active/passive ideations, +organized plans, no future intentions     Continued Symptoms of psychosis: +hallucinations, +delusions, no disorganized thinking, no disorganized behavior or abnormal motor behavior, no negative symptoms     Denied past or current Symptoms of esther or hypomania: no elevated, expansive, or irritable mood with no increased energy or activity; with no inflated " self-esteem or grandiosity, decreased need for sleep, increased rate of speech, FOI or racing thoughts, distractibility, increased goal directed activity or PMA, or risky/disinhibited behavior     Continued Symptoms of JORDIN: +excessive anxiety/worry/fear, +more days than not, not about numerous issues, +difficult to control, with +restlessness, +fatigue, +poor concentration, +irritability, +muscle tension, +sleep disturbance; +causes functionally impairing distress      Continued  Symptoms of Panic Disorder: +recurrent panic attacks, may be precipitated or un-precipitated, note source of worry and/or behavioral changes secondary; withagoraphobia     Continued Symptoms of PTSD: +h/o trauma; +re-experiencing/intrusive symptoms, +avoidant behavior, +negative alterations in cognition or mood, +hyperarousal symptoms; without dissociative symptoms     Amphetamine withdrawals are developing.     ROS  General ROS: negative  Ophthalmic ROS: negative  ENT ROS: negative  Allergy and Immunology ROS: negative  Hematological and Lymphatic ROS: negative  Endocrine ROS: negative  Respiratory ROS: no cough, shortness of breath, or wheezing  Cardiovascular ROS: no chest pain or dyspnea on exertion  Gastrointestinal ROS: no abdominal pain, change in bowel habits, or black or bloody stools  Genito-Urinary ROS: no dysuria, trouble voiding, or hematuria  Musculoskeletal ROS: negative  Neurological ROS: no TIA or stroke symptoms  Dermatological ROS: negative    PAST MEDICAL HISTORY   Past Medical History:   Diagnosis Date    Addiction to drug     recovering drug addict    ADHD (attention deficit hyperactivity disorder)     Alcohol abuse     Anxiety     Asthma     Bipolar 1 disorder     Cervical pain     Chronic constipation     Chronic diarrhea     Depression     Fatigue     Fibromyalgia     Hallucination     Headache     History of psychiatric hospitalization     pt reports 10 total- [facility] 10/2019 and 3/2019    Hx of  "psychiatric care     Seroquel    Magalis     Panic disorder     Polysubstance dependence     Psychiatric exam requested by authority     Psychiatric problem     Auditory hallucinations    PTSD (post-traumatic stress disorder)     Schizoaffective disorder     Sleep difficulties     Spina bifida     Spinal stenosis of lumbar region     Substance abuse     Suicide attempt     Once by hanging, once by overdose    Syringomyelia     Syrinx of spinal cord     Therapy     [facility]    Trigger finger     Withdrawal symptoms, alcohol     Withdrawal symptoms, drug or narcotic            PSYCHOTROPIC MEDICATIONS   Scheduled Meds:   buPROPion  150 mg Oral Daily    diazePAM  5 mg Oral TID    divalproex ER  1,000 mg Oral QHS    folic acid  1 mg Oral Daily    multivitamin  1 tablet Oral Daily    risperiDONE  1 mg Oral BID     Continuous Infusions:  PRN Meds:.acetaminophen, aluminum-magnesium hydroxide-simethicone, docusate sodium, hydrOXYzine pamoate, loperamide, nicotine, OLANZapine **AND** OLANZapine        EXAMINATION    VITALS   Vitals:    01/06/20 0745   BP: 125/60   Pulse: 74   Resp: 18   Temp: 97.4 °F (36.3 °C)     Body mass index is 37.51 kg/m².      CONSTITUTIONAL  General Appearance: WF, in hospital garb, obese, NAD, tearful     MUSCULOSKELETAL  Muscle Strength and Tone:  normal  Abnormal Involuntary Movements:  none  Gait and Station:  normal; non-ataxic     PSYCHIATRIC   Level of Consciousness: awake, alert  Orientation: p/p/t/s  Grooming:  Disheveled, inadequate to circumstances  Psychomotor Behavior: some PMA  Speech: nl r/t/v/s  Language: able to repeat words English fluent  Mood: "I wish I could get a shot that would kill me"  Affect: dysphoric, irritable, anxious, decreased range, tearful  Thought Process:  linear and organized  Associations:  intact; no SHAGUFTA  Thought Content: +SI, +AVH, +delusions, no HI  Memory:  intact to recent and remote events  Attention:  intact to " conversation; not distractible   Fund of Knowledge:  age and education appropriate  Estimate if Intelligence:  average based on work/education history, vocabulary and mental status exam  Insight: impaired- partially seeks help and understands illness  Judgment: impaired- +recent bx issues ans s/p SA, compliant and cooperative           DIAGNOSTIC TESTING   Laboratory Results  No results found for this or any previous visit (from the past 24 hour(s)).      ASSESSMENT      IMPRESSION   Schizoaffective Disorder, depressed type, severe  JORDIN  Panic without agoraphobia   PTSD  Fibromyalgia      Polysubstance dependence (Methamphetamines, cannabis, alcohol, narcotics; methamphetamines, current, continuous, severe, without physiological dependence)  Nicotine Dependence     Psychosocial stressors     Amphetamine Withdrawal     Obesity         MEDICAL DECISION MAKING     PROBLEM LIST AND MANAGEMENT PLANS; PRESCRIPTION DRUG MANAGEMENT  Compliance: yes  Side Effects: no  Regimen Adjustments:      Psychosis: pt counseled  -Resumed risperdal at 1mg QHS- increase to 1 mg po BID- increase to 1 mg po q AM and 2 mg po  HS today and then 2 mg po BID tomorrow; plan to titrate as indicated  -depakote adjunctive (off-label) as below     Depression: pt counseled  -Resumed/continue Wellbutrin XL at 150 mg po q day     Anxiety/PTSD: pt counseled  -depakote off-label as below; wellbutrin off-label as above  -vistaril prn     Pain: pt counseled  -Restart Depakote at higher dose 500 mg po q HS- increase to 1000 mg po q HS- increase to 1500 mg po QHS tonight; check level in 4 days     Substance use: pt counseled; will attempt to place in inpatient rehab if willing;   -valium taper started at 5 mg po TID to assist with mood related detox symptoms; will decrease as agitation and psychosis symptoms improve     Nicotine use: pt counseled;  - nicotine patch 14 mg patch dermal daily;   -wellbutrin as above     Psychosocial stressors: pt counseled;    -SW consulted and assisting with resources     Obesity: pt counseled     DIAGNOSTIC TESTING  Labs reviewed with the patient; follow up pending labs      Disposition:  -SW to assist with aftercare planning and collateral  -Once stable discharge home with outpatient follow up care and/or rehab  -Continue inpatient treatment under a PEC and/or CEC for danger to self as evident by depression with voiced suicidal ideation in the context of heavy substance abuse and withdrawal, psychosocial stressors, and psychosis.  She is s/p SA.      NEED FOR CONTINUED HOSPITALIZATION  Psychiatric illness continues to pose a potential threat to life or bodily function, of self or others, thereby requiring the need for continued inpatient psychiatric hospitalization: Yes    Protective inpatient pyschiatric hospitalization required while a safe disposition plan is enacted: Yes    Patient stabilized and ready for discharge from inpatient psychiatric unit: No      STAFF:   Salomón Nettles MD  Psychiatry

## 2020-01-06 NOTE — PLAN OF CARE
Recommendation:   Continue regular diet as ordered;    - encourage adequate intake    Goals: Pt to conintue to intake >75% by f/u  Nutrition Goal Status: new  Nutrition Discharge Planning: Regular Diet

## 2020-01-06 NOTE — CONSULTS
"  [facility]  Adult Nutrition  Consult Note    SUMMARY     Recommendations    Recommendation:   Continue regular diet as ordered;    - encourage adequate intake    Goals: Pt to conintue to intake >75% by f/u  Nutrition Goal Status: new  Communication of RD Recs: other (comment)(POC)    Reason for Assessment    Reason For Assessment: consult  Diagnosis: psychological disorder  Relevant Medical History: No past medical history other than psych      General Information Comments: Normal weight fluctuations over the past 6 months. Meeting about % EEN. NFPE not completed per psych status.    Nutrition Discharge Planning: Regular Diet    Nutrition Risk Screen    Nutrition Risk Screen: no indicators present    Nutrition/Diet History    Spiritual, Cultural Beliefs, Quaker Practices, Values that Affect Care: no  Food Allergies: NKFA    Anthropometrics    Temp: 97.4 °F (36.3 °C)  Height Method: Stated  Height: 5' 3" (160 cm)  Height (inches): 63 in  Weight Method: Standard Scale  Weight: 96.1 kg (211 lb 12 oz)  Weight (lb): 211.75 lb  Ideal Body Weight (IBW), Female: 115 lb  % Ideal Body Weight, Female (lb): 184.13 %  BMI (Calculated): 37.5  BMI Grade: 35 - 39.9 - obesity - grade II     Lab/Procedures/Meds    Pertinent Labs Reviewed: reviewed  Pertinent Labs Comments: WNL  Pertinent Medications Reviewed: reviewed  Pertinent Medications Comments: multivitamin, folic acid    Estimated/Assessed Needs    Weight Used For Calorie Calculations: 95.7 kg (211 lb)  Energy Calorie Requirements (kcal): 1616  Energy Need Method: [place]-St Jeor(no AF)  Protein Requirements: 41-52 g(.8-1 g/kg IBW)  Weight Used For Protein Calculations: 52.2 kg (115 lb)(IBW)     Estimated Fluid Requirement Method: RDA Method  RDA Method (mL): 1616     Nutrition Prescription Ordered    Current Diet Order: Regular Diet    Evaluation of Received Nutrient/Fluid Intake    Fluid Required: meeting needs  % Intake of Estimated Energy " Needs: 75 - 100 %  % Meal Intake: 75 - 100 %    Nutrition Risk    Level of Risk/Frequency of Follow-up: (1x/wk)     Assessment and Plan  Nutrition Problem:  Obese class II    Related to (etiology):   Excessive caloric intake    Signs and Symptoms (as evidenced by):   BMI 37.51    Interventions:  Collaboration with other providers    Nutrition Diagnosis Status:   New  Monitor and Evaluation    Food and Nutrient Intake: energy intake, food and beverage intake  Physical Activity and Function: nutrition-related ADLs and IADLs  Nutrition-Focused Physical Findings: overall appearance     Nutrition Follow-Up    RD Follow-up?: Yes

## 2020-01-06 NOTE — PROGRESS NOTES
"   01/06/20 1444   [facility] Group Therapy   Group Name Leisure Skills Training   Specific Interventions Cognitive Stimulation Training  (1:1)   Participation Level None   Participation Quality Refused   Patient states "I'm tired, the medicine is making me sleepy. I'll talk tomorrow." Staff will continue to monitor and document progress.  "

## 2020-01-07 PROCEDURE — 25000003 PHARM REV CODE 250: Performed by: PSYCHIATRY & NEUROLOGY

## 2020-01-07 PROCEDURE — 90833 PSYTX W PT W E/M 30 MIN: CPT | Mod: AF,HB,, | Performed by: PSYCHIATRY & NEUROLOGY

## 2020-01-07 PROCEDURE — 11400000 HC PSYCH PRIVATE ROOM

## 2020-01-07 PROCEDURE — 90833 PR PSYCHOTHERAPY W/PATIENT W/E&M, 30 MIN (ADD ON): ICD-10-PCS | Mod: AF,HB,, | Performed by: PSYCHIATRY & NEUROLOGY

## 2020-01-07 PROCEDURE — 99233 PR SUBSEQUENT HOSPITAL CARE,LEVL III: ICD-10-PCS | Mod: AF,HB,, | Performed by: PSYCHIATRY & NEUROLOGY

## 2020-01-07 PROCEDURE — 99233 SBSQ HOSP IP/OBS HIGH 50: CPT | Mod: AF,HB,, | Performed by: PSYCHIATRY & NEUROLOGY

## 2020-01-07 RX ORDER — DIAZEPAM 2 MG/1
2 TABLET ORAL 3 TIMES DAILY
Status: DISCONTINUED | OUTPATIENT
Start: 2020-01-07 | End: 2020-01-09

## 2020-01-07 RX ADMIN — DIAZEPAM 2 MG: 2 TABLET ORAL at 09:01

## 2020-01-07 RX ADMIN — RISPERIDONE 2 MG: 2 TABLET ORAL at 09:01

## 2020-01-07 RX ADMIN — DIVALPROEX SODIUM 1500 MG: 500 TABLET, FILM COATED, EXTENDED RELEASE ORAL at 09:01

## 2020-01-07 NOTE — PLAN OF CARE
Pt.  Slept  9 Hours  so far this shift without problems noted. q 15 min safety checks done this shift. Safety and comfort maintained. Cont. Plan.

## 2020-01-07 NOTE — PROGRESS NOTES
"   01/07/20 1300   Assessment   Patient's Identification of the Problem Patient presents tired, cooperative and "tired, the valium makes me sleepy" mood. Patient admit is due to depression, psychosis and suicidal thoughts. Patient states her admit is due to "I was having psychosis, thinking about people killing my family." Patient states "I cut my wrist because I thought I'd be better off dead. Patient reports she hasn't taken her medicine in 4 days. Patient admits to negative leisure lifestyle of cigarettes and crystal meth. Patient reports she is divorce, single, has 2 children, 2 years of college, is unemployed, [employer]ist, lives with her mother.   Leisure Interest Watching TV;Listen to Music;Walk;Yard Work;Gardening;Sleep;Enjoy Family/Friends;Yazdanism   Leisure Barriers Lack of Transportation;Loss of Interest;Cognitive Skill Level;Lack of Finances;Energy Level;Self Confidence;Drug Use;Poor Social Tolerance;Fears/Phobias  (fear public places, dyslexia)   Treatment Focus To Improve Reality Orientation;To Increase Motivation;Increase Self Confidence;To Improve Leisure Awareness/Lifestyle/Interest;To Improve Coping Skills;To Increase Energy Level;To Promote Social Skills/Tolerance/Interaction;To Educate and Increase Awareness of Sober Free Activities   Patient reports she has "dyslexia" and is not good with reading and writing.    Treatment Recommendation:   1:1 Intervention (as needed)    Reality Orientation Skilled Activity  Cognitive Stimulation Skilled Activity  Creative Expression Skilled Activity  Mild Exercises Skilled Activity  Coping Skilled Activity  Leisure Education and Awareness Skilled Activity    Treatment Goal(s):  Long Term Goals Refer To Master Treatment Plan    Short Term Treatment Goal(s)  Patient Will:  Exhibit Improvement in Mood  Demonstrate Improvement in Thought Processes / Awareness  Demonstrate Constructive Expression of Feelings and Behavior  Identify (+) Leisure / Recreation Activities that " Promote Wellness and Promote a Sober Lifestyle    Discharge Recommendations:  Encourage Patient to Actively Utilize Available Community Resources to Increase Leisure Involvement to Decrease Signs and Symptoms of Illness  Encourage Patient to Utilize Coping Skills on a Regular Basis to Reduce the Risk of Decompensating and Re-Hospitalizations  AA/NA Meetings  Follow Up with After Care Appointments  Continue with Current Leisure Activities

## 2020-01-07 NOTE — PLAN OF CARE
Care plan reviewed with patient, patient agrees with plan. Denies intent to harm self or others. Patient is irritable, mumbles under breath, curses, poor eye contact. Stays in bed between care and meals. Accepted breakfast. Gait steady, no falls. Pathways and environment clear and clutter-free. Refused medications this morning, states medication makes her 'sleepy', Dr. Nettles is aware of this.  Promoted an individualized safety plan, effective coping skills, medication compliance, a drug-free lifestyle, impulse control and mood improvement. Will continue to monitor for safety.

## 2020-01-07 NOTE — PLAN OF CARE
Problem: Adult Behavioral Health Plan of Care  Goal: Develops/Participates in Therapeutic Crook to Support Successful Transition  Intervention: Foster Therapeutic Crook  1/7/2020 1243 by Cyndi Chamberlain LMSW  Flowsheets (Taken 1/7/2020 1243)  Trust Relationship/Rapport: care explained; choices provided; thoughts/feelings acknowledged; emotional support provided; empathic listening provided; questions answered; questions encouraged; reassurance provided     The patient was not willing to participate in assessment with  at this time. She says that she is not feeling well and cannot wake up; she claims it is due to the medications but could be exacerbated by withdrawal symptoms. The  completed the assessment based on clinical interpretation, collateral with her aunt and her cousin, treatment team meetings, chart review, and facility report from other staff members.  will re attempt to meet with the patient individually.

## 2020-01-07 NOTE — PROGRESS NOTES
"PSYCHIATRY DAILY INPATIENT PROGRESS NOTE  SUBSEQUENT HOSPITAL VISIT    ENCOUNTER DATE: 1/7/2020  SITE: [facility]    DATE OF ADMISSION: 1/4/2020  9:00 PM  LENGTH OF STAY: 3 days      HISTORY    CHIEF COMPLAINT   Maria Yancey is a 34 y.o. female, seen during daily manzo rounds on the inpatient unit.  Maria Yancey presents with the chief complaint of depression, psychosis, and suicidal ideation, "I need someone to tell me what's going on"    HPI   (Elements: Location, Quality, Severity, Duration, Timing, Content, Modifying Factors, Associated Signs & Symptoms)    The patient was seen and examined. The chart was reviewed.     Reviewed notes by LMSW, CTRS, LPN, RN and RD from the last 24 hours.    The patient's case was discussed with the treatment team and care providers today, including RNs, CTRS, LMSW and [facility].    Staff reports no behavioral or management issues.     The patient has been compliant with treatment. The patient denied any side effects.    Symptoms persist unchanged from yesterday/admission. She would better participate with the interview this AM. She is not attending groups and minimally interacts with peers and staff. She is somewhat backing off of the idea of her family having been murder, "I don't know if it happened or not."    Continued Symptoms of Depression: +diminished mood or loss of interest/anhedonia; +irritability, +diminished energy, +change in sleep, +change in appetite, +diminished concentration or cognition or indecisiveness, no PMA/R, +excessive guilt or hopelessness or worthlessness, +suicidal ideations     Continued  Changes in Sleep: +trouble with initiation/maintenance, no early morning awakening with inability to return to sleep     Continued  Suicidal/(no)Homicidal ideations: +active/passive ideations, +organized plans, no future intentions     Continued Symptoms of psychosis: +hallucinations, +delusions, no disorganized thinking, no disorganized behavior " or abnormal motor behavior, no negative symptoms     Denied past or current Symptoms of esther or hypomania: no elevated, expansive, or irritable mood with no increased energy or activity; with no inflated self-esteem or grandiosity, decreased need for sleep, increased rate of speech, FOI or racing thoughts, distractibility, increased goal directed activity or PMA, or risky/disinhibited behavior     Continued Symptoms of JORDIN: +excessive anxiety/worry/fear, +more days than not, not about numerous issues, +difficult to control, with +restlessness, +fatigue, +poor concentration, +irritability, +muscle tension, +sleep disturbance; +causes functionally impairing distress      Continued  Symptoms of Panic Disorder: +recurrent panic attacks, may be precipitated or un-precipitated, note source of worry and/or behavioral changes secondary; withagoraphobia     Continued Symptoms of PTSD: +h/o trauma; +re-experiencing/intrusive symptoms, +avoidant behavior, +negative alterations in cognition or mood, +hyperarousal symptoms; without dissociative symptoms     Amphetamine withdrawals are occurring and exacerbating her symptomatology.    PSYCHOTHERAPY ADD-ON +[id number]   16-37 minutes      Time: 16 minutes  Participants: Met with patient    Therapeutic Intervention Type: insight oriented psychotherapy, behavior modifying psychotherapy, supportive psychotherapy, interactive psychotherapy  Why chosen therapy is appropriate versus another modality: relevant to diagnosis, patient responds to this modality, evidence based practice    Target symptoms: depression, mood disorder, psychosis  Primary focus: psychosis, substance use  Psychotherapeutic techniques: supportive and motivational techniques; psycho-education    Outcome monitoring methods: self-report, observation    Patient's response to intervention:  The patient's response to intervention is guarded, reluctant.    Progress toward goals:  The patient's progress toward goals is  limited, poor.        ROS  General ROS: negative  Ophthalmic ROS: negative  ENT ROS: negative  Allergy and Immunology ROS: negative  Hematological and Lymphatic ROS: negative  Endocrine ROS: negative  Respiratory ROS: no cough, shortness of breath, or wheezing  Cardiovascular ROS: no chest pain or dyspnea on exertion  Gastrointestinal ROS: no abdominal pain, change in bowel habits, or black or bloody stools  Genito-Urinary ROS: no dysuria, trouble voiding, or hematuria  Musculoskeletal ROS: negative  Neurological ROS: no TIA or stroke symptoms  Dermatological ROS: negative    PAST MEDICAL HISTORY   Past Medical History:   Diagnosis Date    Addiction to drug     recovering drug addict    ADHD (attention deficit hyperactivity disorder)     Alcohol abuse     Anxiety     Asthma     Bipolar 1 disorder     Cervical pain     Chronic constipation     Chronic diarrhea     Depression     Fatigue     Fibromyalgia     Hallucination     Headache     History of psychiatric hospitalization     pt reports 10 total- [facility] 10/2019 and 3/2019    Hx of psychiatric care     Seroquel    Magalis     Panic disorder     Polysubstance dependence     Psychiatric exam requested by authority     Psychiatric problem     Auditory hallucinations    PTSD (post-traumatic stress disorder)     Schizoaffective disorder     Sleep difficulties     Spina bifida     Spinal stenosis of lumbar region     Substance abuse     Suicide attempt     Once by hanging, once by overdose    Syringomyelia     Syrinx of spinal cord     Therapy     [facility]    Trigger finger     Withdrawal symptoms, alcohol     Withdrawal symptoms, drug or narcotic            PSYCHOTROPIC MEDICATIONS   Scheduled Meds:   buPROPion  150 mg Oral Daily    diazePAM  5 mg Oral TID    divalproex ER  1,500 mg Oral QHS    folic acid  1 mg Oral Daily    multivitamin  1 tablet Oral Daily    risperiDONE  2 mg Oral BID     Continuous  "Infusions:  PRN Meds:.acetaminophen, aluminum-magnesium hydroxide-simethicone, docusate sodium, hydrOXYzine pamoate, loperamide, nicotine, OLANZapine **AND** OLANZapine        EXAMINATION    VITALS   Vitals:    01/07/20 0737   BP: 108/66   Pulse: 77   Resp: 18   Temp: 97.2 °F (36.2 °C)     Body mass index is 37.51 kg/m².      CONSTITUTIONAL  General Appearance: WF, in hospital garb, obese, NAD, less tearful     MUSCULOSKELETAL  Muscle Strength and Tone: normal  Abnormal Involuntary Movements:  none  Gait and Station:  normal; non-ataxic     PSYCHIATRIC   Level of Consciousness: awake, alert  Orientation: p/p/t/s  Grooming:  Disheveled, inadequate to circumstances  Psychomotor Behavior: some PMA, no PMR  Speech: nl r/t/v/s  Language: able to repeat words, English fluent  Mood: "OK.. psychotic"  Affect: dysphoric, irritable, anxious, decreased range, less tearful  Thought Process:  linear and organized, goal directed  Associations:  intact; no SHAGUFTA  Thought Content: +SI, +AVH, +delusions, no HI  Memory:  intact to recent and remote events  Attention:  intact to conversation; not distractible   Fund of Knowledge:  age and education appropriate  Estimate if Intelligence:  average based on work/education history, vocabulary and mental status exam  Insight: impaired- partially seeks help and understands illness  Judgment: impaired- +recent bx issues ans s/p SA, compliant and cooperative           DIAGNOSTIC TESTING   Laboratory Results  No results found for this or any previous visit (from the past 24 hour(s)).      ASSESSMENT      IMPRESSION   Schizoaffective Disorder, depressed type, severe  JORDIN  Panic without agoraphobia   PTSD  Fibromyalgia      Polysubstance dependence (Methamphetamines, cannabis, alcohol, narcotics; methamphetamines, current, continuous, severe, without physiological dependence)  Nicotine Dependence     Psychosocial stressors     Amphetamine Withdrawal     Obesity         MEDICAL DECISION " MAKING     PROBLEM LIST AND MANAGEMENT PLANS; PRESCRIPTION DRUG MANAGEMENT  Compliance: yes  Side Effects: no  Regimen Adjustments:      Psychosis: pt counseled  -Resumed risperdal at 1mg QHS- increase to 1 mg po BID- increase to 1 mg po q AM and 2 mg po  HS- increased to 2 mg po BID today; plan to titrate as indicated  -Will plan to start REZA if pt willing  -depakote adjunctive (off-label) as below     Depression: pt counseled  -Resumed/continue Wellbutrin XL at 150 mg po q day     Anxiety/PTSD: pt counseled  -depakote off-label as below; wellbutrin off-label as above  -vistaril prn     Pain: pt counseled  -Restart Depakote at higher dose 500 mg po q HS- increase to 1000 mg po q HS- increase to 1500 mg po QHS tonight; check level in 3 days     Substance use: pt counseled; will attempt to place in inpatient rehab if willing;   -valium taper started at 5 mg po TID to assist with mood related detox symptoms- decrease to 2 mg po TID; will decrease as agitation and psychosis symptoms improve     Nicotine use: pt counseled;  - nicotine patch 14 mg patch dermal daily;   -wellbutrin as above     Psychosocial stressors: pt counseled;   -SW consulted and assisting with resources     Obesity: pt counseled     DIAGNOSTIC TESTING  Labs reviewed with the patient; follow up pending labs      Disposition:  -SW to assist with aftercare planning and collateral  -Once stable discharge home with outpatient follow up care and/or ACT if pt meets crieria  -Continue inpatient treatment under a [facility] and/or [facility] for danger to self as evident by depression with voiced suicidal ideation in the context of heavy substance abuse and withdrawal, psychosocial stressors, and psychosis.  She is s/p SA.      NEED FOR CONTINUED HOSPITALIZATION  Psychiatric illness continues to pose a potential threat to life or bodily function, of self or others, thereby requiring the need for continued inpatient psychiatric hospitalization: Yes    Protective inpatient  pyschiatric hospitalization required while a safe disposition plan is enacted: Yes    Patient stabilized and ready for discharge from inpatient psychiatric unit: No      STAFF:   Salomón Nettles MD  Psychiatry

## 2020-01-07 NOTE — PLAN OF CARE
"  Problem: Adult Behavioral Health Plan of Care  Goal: Rounds/Family Conference  Outcome: Ongoing, Progressing  Flowsheets (Taken 1/7/2020 0335)  Participants: psychiatrist; social work; other (see comments); therapeutic recreation; patient; nursing (social )  Summary: review reason for admit and patient care plan     Chief Complaint:  Patient is presenting with psychosis, depression, and suicidal ideation.        Current:  Patient presented to treatment team, after refusing yesterday, dressed in hospital scrubs, disheveled, swaying, avoidant eye contact, with "tired," paranoid mood. Pt says "I was having psychosis, things about people killing my children and harming my family. I am not sure if it was real or unreal." She says that she has not been in touch with them since she got here. She endorses having cut her wrist "I thought it would be better for everybody if I was dead. I was walking down the street and I saw a nuce. They keep leaving them every where for me to kill myself." Pt says that she stayed well by "taking my medicine and doing my every day activity. This has been going on for two years now. I been getting threatened and no body believes me. It seems to be getting worse now." She is not currently with a psychiatrist due to not having transportation, seeing . Pt says she has not been with an ACT team before, which will be facilitated. Pt says the relapse is due to being around her cousin "we did a little bit. I wasn't doing it everyday or anything like that." Pt plans on going back to live with her cousin upon discharge. Pt acknowledges that she was still intoxicated on amphetamines upon admission. Discussed medications and treatment plan. Pt is willing to take the invega shot. Pt goal is to "stop thinking stuff is going on when it is not." She gave permission for staff to contact her aunt and her cousin.      Plan:  Patient will be encouraged to engage in " psychotherapeutic groups and recreational therapy. Patient's medication will be monitored and adjusted as needed. Patient will be encouraged to follow up with aftercare appointments.

## 2020-01-07 NOTE — PSYCH
The patient has signed a release of information for [facility]; therefore, a referral packet has been faxed for their review
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
## 2020-01-07 NOTE — PLAN OF CARE
Care plan reviewed with patient,  Denies suicidal ideations and homicidal ideations. Patient is irritable, Stays in bed between care and meals. Gait steady, no falls. Pathways and environment clear and clutter-free. Promoted an individualized safety plan, effective coping skills, a drug-free lifestyle, impulse control and mood improvement. Will continue to monitor for safety.

## 2020-01-08 PROCEDURE — 99233 SBSQ HOSP IP/OBS HIGH 50: CPT | Mod: AF,HB,, | Performed by: PSYCHIATRY & NEUROLOGY

## 2020-01-08 PROCEDURE — 25000003 PHARM REV CODE 250: Performed by: PSYCHIATRY & NEUROLOGY

## 2020-01-08 PROCEDURE — 90833 PR PSYCHOTHERAPY W/PATIENT W/E&M, 30 MIN (ADD ON): ICD-10-PCS | Mod: AF,HB,, | Performed by: PSYCHIATRY & NEUROLOGY

## 2020-01-08 PROCEDURE — 90833 PSYTX W PT W E/M 30 MIN: CPT | Mod: AF,HB,, | Performed by: PSYCHIATRY & NEUROLOGY

## 2020-01-08 PROCEDURE — 99233 PR SUBSEQUENT HOSPITAL CARE,LEVL III: ICD-10-PCS | Mod: AF,HB,, | Performed by: PSYCHIATRY & NEUROLOGY

## 2020-01-08 PROCEDURE — 11400000 HC PSYCH PRIVATE ROOM

## 2020-01-08 RX ORDER — RISPERIDONE 3 MG/1
3 TABLET ORAL 2 TIMES DAILY
Status: DISCONTINUED | OUTPATIENT
Start: 2020-01-08 | End: 2020-01-15 | Stop reason: HOSPADM

## 2020-01-08 RX ADMIN — DIAZEPAM 2 MG: 2 TABLET ORAL at 08:01

## 2020-01-08 RX ADMIN — THERA TABS 1 TABLET: TAB at 08:01

## 2020-01-08 RX ADMIN — FOLIC ACID 1 MG: 1 TABLET ORAL at 08:01

## 2020-01-08 RX ADMIN — DIVALPROEX SODIUM 1500 MG: 500 TABLET, FILM COATED, EXTENDED RELEASE ORAL at 09:01

## 2020-01-08 RX ADMIN — RISPERIDONE 3 MG: 3 TABLET ORAL at 09:01

## 2020-01-08 RX ADMIN — BUPROPION HYDROCHLORIDE 150 MG: 150 TABLET, EXTENDED RELEASE ORAL at 08:01

## 2020-01-08 RX ADMIN — RISPERIDONE 2 MG: 2 TABLET ORAL at 08:01

## 2020-01-08 RX ADMIN — DIAZEPAM 2 MG: 2 TABLET ORAL at 09:01

## 2020-01-08 NOTE — PLAN OF CARE
Problem: Adult Behavioral Health Plan of Care  Goal: Optimized Coping Skills in Response to Life Stressors  Intervention: Promote Effective Coping Strategies  1/8/2020 1534 by Cyndi Chamberlain LMSW  Flowsheets (Taken 1/8/2020 1534)  Supportive Measures: active listening utilized; positive reinforcement provided; verbalization of feelings encouraged; counseling provided; problem solving facilitated; relaxation techniques promoted; decision-making supported; self-care encouraged; self-reflection promoted; self-responsibility promoted; journaling promoted     Behavior: Patient was dressed in hospital scrubs, isolated in room, with guarded, irritable, paranoid, withdrawn mood.      Intervention: Patient was engaged individually because she did not attend psychotherapeutic group session.  will encourage patient to express perceived barriers to engaging in treatment, concerns, and goals.       Response: Patient did not wish to engage with . She says she was angry about what she saw on television.       Plan:  will continue to encourage patient to engage and take personal responsibility for treatment, explore and verbalize emotions, set goals to aid in preventing re-hospitalization, attend psychotherapeutic group, and follow up with aftercare appointments.

## 2020-01-08 NOTE — PLAN OF CARE
"  Problem: Adult Behavioral Health Plan of Care  Goal: Develops/Participates in Therapeutic [place] to Support Successful Transition  Intervention: Foster Therapeutic [place]  Flowsheets (Taken 1/8/2020 1034)  Trust Relationship/Rapport: care explained; choices provided; thoughts/feelings acknowledged; emotional support provided; empathic listening provided; questions answered; questions encouraged; reassurance provided      met with patient individually to re attempt assessment. She remained in her bed, wrapped in covers, with drowsy, withdrawn mood and vague slurred speech. She had a hard time maintaining conversation and staying awake. She completed the C-SSRS and depression inventory. She says that she is noticing improvements in the hallucinations "they are not as much." She also says that the suicidal thoughts are emerging less as well. She was unable to elaborate.     "
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
## 2020-01-08 NOTE — PROGRESS NOTES
"PSYCHIATRY DAILY INPATIENT PROGRESS NOTE  SUBSEQUENT HOSPITAL VISIT    ENCOUNTER DATE: 1/8/2020  SITE: [facility]    DATE OF ADMISSION: 1/4/2020  9:00 PM  LENGTH OF STAY: 4 days      HISTORY    CHIEF COMPLAINT   Maria Yancey is a 34 y.o. female, seen during daily manzo rounds on the inpatient unit.  Maria Yancey presents with the chief complaint of depression, psychosis, and suicidal ideation, "I need someone to tell me what's going on"    HPI   (Elements: Location, Quality, Severity, Duration, Timing, Content, Modifying Factors, Associated Signs & Symptoms)    The patient was seen and examined. The chart was reviewed.     Reviewed notes by LMSW, CTRS, Speciality Services and RNs  from the last 24 hours.    The patient's case was discussed with the treatment team and care providers today, including RNs, CTRS, LMSW and Speciality Services.    Staff reports no behavioral or management issues. She has been isolative and mostly staying to herself fin her room    The patient has been compliant with treatment. The patient denied any side effects.    Symptoms persist minimally changed from yesterday/admission. She would better participate with the interview this AM. She is not attending groups and minimally interacts with peers and staff. She is somewhat backing off of the idea of her family having been murder, "I don't know if it happened or not.. It's not bothering me right now." She remains depressed, anxious and psychotic.     Continued Symptoms of Depression: +diminished mood or loss of interest/anhedonia; +irritability, +diminished energy, +change in sleep, +change in appetite, +diminished concentration or cognition or indecisiveness, no PMA/R, +excessive guilt or hopelessness or worthlessness, +suicidal ideations     Continued  Changes in Sleep: +trouble with initiation/maintenance, no early morning awakening with inability to return to sleep     Continued  Suicidal/(no)Homicidal ideations: +active/passive " ideations, +organized plans, no future intentions     Continued Symptoms of psychosis: +hallucinations, +delusions, no disorganized thinking, no disorganized behavior or abnormal motor behavior, no negative symptoms     Denied past or current Symptoms of esther or hypomania: no elevated, expansive, or irritable mood with no increased energy or activity; with no inflated self-esteem or grandiosity, decreased need for sleep, increased rate of speech, FOI or racing thoughts, distractibility, increased goal directed activity or PMA, or risky/disinhibited behavior     Continued Symptoms of JORDIN: +excessive anxiety/worry/fear, +more days than not, not about numerous issues, +difficult to control, with +restlessness, +fatigue, +poor concentration, +irritability, +muscle tension, +sleep disturbance; +causes functionally impairing distress      Continued  Symptoms of Panic Disorder: +recurrent panic attacks, may be precipitated or un-precipitated, note source of worry and/or behavioral changes secondary; withagoraphobia     Continued Symptoms of PTSD: +h/o trauma; +re-experiencing/intrusive symptoms, +avoidant behavior, +negative alterations in cognition or mood, +hyperarousal symptoms; without dissociative symptoms     Amphetamine withdrawals are occurring and continue exacerbating her symptomatology.    PSYCHOTHERAPY ADD-ON +[id number]   16-37 minutes      Time: 16 minutes  Participants: Met with patient    Therapeutic Intervention Type: insight oriented psychotherapy, behavior modifying psychotherapy, supportive psychotherapy, interactive psychotherapy  Why chosen therapy is appropriate versus another modality: relevant to diagnosis, patient responds to this modality, evidence based practice    Target symptoms: depression, mood disorder, psychosis  Primary focus: psychosis, substance use  Psychotherapeutic techniques: supportive and motivational techniques; psycho-education; understanding triggers for cravings/drug  use    Outcome monitoring methods: self-report, observation    Patient's response to intervention:  The patient's response to intervention is guarded, reluctant.    Progress toward goals:  The patient's progress toward goals is limited, poor.        ROS  General ROS: negative  Ophthalmic ROS: negative  ENT ROS: negative  Allergy and Immunology ROS: negative  Hematological and Lymphatic ROS: negative  Endocrine ROS: negative  Respiratory ROS: no cough, shortness of breath, or wheezing  Cardiovascular ROS: no chest pain or dyspnea on exertion  Gastrointestinal ROS: no abdominal pain, change in bowel habits, or black or bloody stools  Genito-Urinary ROS: no dysuria, trouble voiding, or hematuria  Musculoskeletal ROS: negative  Neurological ROS: no TIA or stroke symptoms  Dermatological ROS: negative    PAST MEDICAL HISTORY   Past Medical History:   Diagnosis Date    Addiction to drug     recovering drug addict    ADHD (attention deficit hyperactivity disorder)     Alcohol abuse     Anxiety     Asthma     Bipolar 1 disorder     Cervical pain     Chronic constipation     Chronic diarrhea     Depression     Fatigue     Fibromyalgia     Hallucination     Headache     History of psychiatric hospitalization     pt reports 10 total- [facility] 10/2019 and 3/2019, 1/2020    Hx of psychiatric care     Seroquel    Magalis     Panic disorder     Polysubstance dependence     Psychiatric exam requested by authority     Psychiatric problem     Auditory hallucinations    PTSD (post-traumatic stress disorder)     Schizoaffective disorder     Sleep difficulties     Spina bifida     Spinal stenosis of lumbar region     Substance abuse     Suicide attempt     Once by hanging, once by overdose, once by cutting wrists     Syringomyelia     Syrinx of spinal cord     Therapy     [facility]    Trigger finger     Withdrawal symptoms, alcohol     Withdrawal symptoms, drug or narcotic   "          PSYCHOTROPIC MEDICATIONS   Scheduled Meds:   buPROPion  150 mg Oral Daily    diazePAM  2 mg Oral TID    divalproex ER  1,500 mg Oral QHS    folic acid  1 mg Oral Daily    multivitamin  1 tablet Oral Daily    risperiDONE  2 mg Oral BID     Continuous Infusions:  PRN Meds:.acetaminophen, aluminum-magnesium hydroxide-simethicone, docusate sodium, hydrOXYzine pamoate, loperamide, nicotine, OLANZapine **AND** OLANZapine        EXAMINATION    VITALS   Vitals:    01/08/20 0731   BP: 119/64   Pulse: 63   Resp: 18   Temp: 96.6 °F (35.9 °C)     Body mass index is 37.8 kg/m².      CONSTITUTIONAL  General Appearance: WF, in hospital garb, obese, NAD, not currently tearful     MUSCULOSKELETAL  Muscle Strength and Tone: normal  Abnormal Involuntary Movements:  none  Gait and Station:  normal; non-ataxic     PSYCHIATRIC   Level of Consciousness: awake, alert  Orientation: p/p/t/s  Grooming:  Disheveled, inadequate to circumstances  Psychomotor Behavior: no PMA, no PMR  Speech: nl r/t/v/s  Language: able to repeat words, English fluent  Mood: "OK.. psychotic"  Affect: dysphoric, irritable, anxious, decreased range, no longer tearful  Thought Process:  linear and organized, goal directed  Associations:  intact; no SHAGUFTA  Thought Content: +SI, +AVH, +delusions, no HI  Memory:  intact to recent and remote events  Attention:  intact to conversation; not distractible   Fund of Knowledge:  age and education appropriate  Estimate if Intelligence:  average based on work/education history, vocabulary and mental status exam  Insight: impaired- partially seeks help and understands illness  Judgment: impaired- +recent bx issues ans s/p SA, compliant and cooperative           DIAGNOSTIC TESTING   Laboratory Results  No results found for this or any previous visit (from the past 24 hour(s)).      ASSESSMENT      IMPRESSION   Schizoaffective Disorder, depressed type, severe  JORDIN  Panic without agoraphobia   PTSD  Fibromyalgia "      Polysubstance dependence (Methamphetamines, cannabis, alcohol, narcotics; methamphetamines, current, continuous, severe, without physiological dependence)  Nicotine Dependence     Psychosocial stressors     Amphetamine Withdrawal     Obesity         MEDICAL DECISION MAKING     PROBLEM LIST AND MANAGEMENT PLANS; PRESCRIPTION DRUG MANAGEMENT  Compliance: yes  Side Effects: no  Regimen Adjustments:      Psychosis: pt counseled  -Resumed risperdal at 1mg QHS- increased to 1 mg po BID- increase to 1 mg po q AM and 2 mg po  HS- increased to 2 mg po BID- increase to 2/3 today then 3 mg po BID tomorrow; plan to titrate as indicated  -Will plan to start REZA if pt willing  -depakote adjunctive (off-label) as below     Depression: pt counseled  -Resumed/continue Wellbutrin XL at 150 mg po q day- consider optimizing further      Anxiety/PTSD: pt counseled  -depakote off-label as below; wellbutrin off-label as above  -vistaril prn     Pain: pt counseled  -Restart Depakote at higher dose 500 mg po q HS- increase to 1000 mg po q HS- increase to 1500 mg po QHS tonight; check level in 2 days     Substance use: pt counseled; will attempt to place in inpatient rehab if willing;   -valium taper started at 5 mg po TID to assist with mood related detox symptoms- decreased to 2 mg po TID; will decrease as agitation and psychosis symptoms improve     Nicotine use: pt counseled;  - nicotine patch 14 mg patch dermal daily;   -wellbutrin as above     Psychosocial stressors: pt counseled;   -SW consulted and assisting with resources     Obesity: pt counseled     DIAGNOSTIC TESTING  Labs reviewed with the patient; follow up pending labs      Disposition:  -SW to assist with aftercare planning and collateral  -Once stable discharge home with outpatient follow up care and/or ACT if pt meets crieria  -Continue inpatient treatment under a [facility] and/or [facility] for danger to self as evident by depression with voiced suicidal ideation in the context of  heavy substance abuse and withdrawal, psychosocial stressors, and psychosis.  She is s/p SA.      NEED FOR CONTINUED HOSPITALIZATION  Psychiatric illness continues to pose a potential threat to life or bodily function, of self or others, thereby requiring the need for continued inpatient psychiatric hospitalization: Yes    Protective inpatient pyschiatric hospitalization required while a safe disposition plan is enacted: Yes    Patient stabilized and ready for discharge from inpatient psychiatric unit: No      STAFF:   Salomón Nettles MD  Psychiatry

## 2020-01-08 NOTE — PLAN OF CARE
"Shift note : patient is eating all meals and is taking all ordered medications . She came up to the window to get her morning medications . She stated that they have her feeling , "all messed up " . And that she will talk to the doctor about it . Returned to bed after taking her medications .   "

## 2020-01-08 NOTE — PROGRESS NOTES
"   01/08/20 1130   [facility] Group Therapy   Group Name Leisure Skills Training   Specific Interventions Coping Skills Training   Participation Level None   Patient in bed half sleep and states "the valium makes me sleepy." Staff will continue to encourage group participation, monitor and document progress.  "

## 2020-01-09 PROCEDURE — 25000003 PHARM REV CODE 250: Performed by: PSYCHIATRY & NEUROLOGY

## 2020-01-09 PROCEDURE — 11400000 HC PSYCH PRIVATE ROOM

## 2020-01-09 PROCEDURE — 90833 PSYTX W PT W E/M 30 MIN: CPT | Mod: AF,HB,, | Performed by: PSYCHIATRY & NEUROLOGY

## 2020-01-09 PROCEDURE — 99233 PR SUBSEQUENT HOSPITAL CARE,LEVL III: ICD-10-PCS | Mod: AF,HB,, | Performed by: PSYCHIATRY & NEUROLOGY

## 2020-01-09 PROCEDURE — 99233 SBSQ HOSP IP/OBS HIGH 50: CPT | Mod: AF,HB,, | Performed by: PSYCHIATRY & NEUROLOGY

## 2020-01-09 PROCEDURE — 90833 PR PSYCHOTHERAPY W/PATIENT W/E&M, 30 MIN (ADD ON): ICD-10-PCS | Mod: AF,HB,, | Performed by: PSYCHIATRY & NEUROLOGY

## 2020-01-09 RX ORDER — DIAZEPAM 2 MG/1
2 TABLET ORAL 2 TIMES DAILY
Status: DISCONTINUED | OUTPATIENT
Start: 2020-01-09 | End: 2020-01-10

## 2020-01-09 RX ORDER — DIAZEPAM 2 MG/1
2 TABLET ORAL 2 TIMES DAILY
Status: DISCONTINUED | OUTPATIENT
Start: 2020-01-09 | End: 2020-01-09

## 2020-01-09 RX ADMIN — RISPERIDONE 3 MG: 3 TABLET ORAL at 09:01

## 2020-01-09 RX ADMIN — DIAZEPAM 2 MG: 2 TABLET ORAL at 08:01

## 2020-01-09 RX ADMIN — DIVALPROEX SODIUM 1500 MG: 500 TABLET, FILM COATED, EXTENDED RELEASE ORAL at 08:01

## 2020-01-09 RX ADMIN — RISPERIDONE 3 MG: 3 TABLET ORAL at 08:01

## 2020-01-09 RX ADMIN — THERA TABS 1 TABLET: TAB at 09:01

## 2020-01-09 RX ADMIN — BUPROPION HYDROCHLORIDE 150 MG: 150 TABLET, EXTENDED RELEASE ORAL at 09:01

## 2020-01-09 RX ADMIN — FOLIC ACID 1 MG: 1 TABLET ORAL at 09:01

## 2020-01-09 RX ADMIN — DIAZEPAM 2 MG: 2 TABLET ORAL at 09:01

## 2020-01-09 NOTE — PLAN OF CARE
Out and about in unit for snack and dinner. Spent majority of time in bed. Mood depressed. Avoids social contact. Poor eye contact. Compliant with medications. Safety measures maintained. Will continue to monitor.

## 2020-01-09 NOTE — PLAN OF CARE
Shift note : patient is eating all meals and is taking all ordered medications . She is on a decreasing valium taper . She is unkempt . She is in better spirits today . She is in bed sleeping .

## 2020-01-09 NOTE — PROGRESS NOTES
01/09/20 1130   [facility] Group Therapy   Group Name Leisure Skills Training   Specific Interventions Coping Skills Training   Participation Level None   Participation Quality Sleeping   Staff will continue to monitor and document progress made.

## 2020-01-09 NOTE — PLAN OF CARE
Pt is sleeping at this time and has slept 8.5 hours with a couple of awakenings.  NAD.  Resp even & unlabored.  Pathways clear and bed in low position.  Q 15 minute safety checks ongoing.  All precautions maintained

## 2020-01-09 NOTE — PLAN OF CARE
"  Problem: Adult Behavioral Health Plan of Care  Goal: Optimized Coping Skills in Response to Life Stressors  Intervention: Promote Effective Coping Strategies  1/9/2020 1503 by Cyndi Chamberlain LMSW  Flowsheets (Taken 1/9/2020 1503)  Supportive Measures: active listening utilized; counseling provided; positive reinforcement provided; verbalization of feelings encouraged; problem solving facilitated; decision-making supported; relaxation techniques promoted; goal setting facilitated; self-care encouraged; guided imagery facilitated; self-reflection promoted; self-responsibility promoted; journaling promoted     Behavior: Patient was dressed in hospital scrubs, isolated in room, with irritable, guarded, dysphoric mood.       Intervention: Patient was engaged individually because she did not attend psychotherapeutic group session.  will encourage patient to express perceived barriers to engaging in treatment, concerns, and goals.       Response: Patient's speech was pressured and dismissive. She stated that she did not want to talk today due to side effects of xanax "kicking her butt." She says she is unable to wake up and concentrate. She was encouraged to express her concerns to the doctor and attend group tomorrow to get out of her room.      Plan:  will continue to encourage patient to engage and take personal responsibility for treatment, explore and verbalize emotions, set goals to aid in preventing re-hospitalization, attend psychotherapeutic group, and follow up with aftercare appointments.  "

## 2020-01-09 NOTE — PROGRESS NOTES
"PSYCHIATRY DAILY INPATIENT PROGRESS NOTE  SUBSEQUENT HOSPITAL VISIT    ENCOUNTER DATE: 1/9/2020  SITE: [facility]    DATE OF ADMISSION: 1/4/2020  9:00 PM  LENGTH OF STAY: 5 days      HISTORY    CHIEF COMPLAINT   Maria Yancey is a 34 y.o. female, seen during daily manzo rounds on the inpatient unit.  Maria Yancey presents with the chief complaint of depression, psychosis, and suicidal ideation, "I need someone to tell me what's going on"    HPI   (Elements: Location, Quality, Severity, Duration, Timing, Content, Modifying Factors, Associated Signs & Symptoms)    The patient was seen and examined. The chart was reviewed.     Reviewed notes by LMSW, CTRS, and RNs  from the last 24 hours.    The patient's case was discussed with the treatment team and care providers today, including RNs, CTRS, LMSW and [facility].    Staff reports no behavioral or management issues. She has been isolative and mostly staying to herself in her room with continued sedation/withdrawal.    The patient has been compliant with treatment. The patient denied any side effects.    Symptoms persist minimally to mildly changed from yesterday/admission. She would better participate with the interview this AM and was less labile. She is still not attending groups and minimally interacts with peers and staff. She is somewhat backing off of the idea of her family having been murder, "I don't know if it happened or not.. It's not bothering me right now." She remains depressed, anxious and psychotic with mild improvement to date.     Continued Symptoms of Depression: +diminished mood or loss of interest/anhedonia; +irritability, +diminished energy, +change in sleep, +change in appetite, +diminished concentration or cognition or indecisiveness, no PMA/R, +excessive guilt or hopelessness or worthlessness, +suicidal ideations     Continued  Changes in Sleep: +trouble with initiation/maintenance, no early morning awakening with inability to " return to sleep     Continued  Suicidal/(no)Homicidal ideations: +active/passive ideations, +organized plans, no future intentions     Continued Symptoms of psychosis: +hallucinations, +delusions, no disorganized thinking, no disorganized behavior or abnormal motor behavior, no negative symptoms     Denied past or current Symptoms of esther or hypomania: no elevated, expansive, or irritable mood with no increased energy or activity; with no inflated self-esteem or grandiosity, decreased need for sleep, increased rate of speech, FOI or racing thoughts, distractibility, increased goal directed activity or PMA, or risky/disinhibited behavior     Continued Symptoms of JORDIN: +excessive anxiety/worry/fear, +more days than not, not about numerous issues, +difficult to control, with +restlessness, +fatigue, +poor concentration, +irritability, +muscle tension, +sleep disturbance; +causes functionally impairing distress      Continued  Symptoms of Panic Disorder: +recurrent panic attacks, may be precipitated or un-precipitated, note source of worry and/or behavioral changes secondary; withagoraphobia     Continued Symptoms of PTSD: +h/o trauma; +re-experiencing/intrusive symptoms, +avoidant behavior, +negative alterations in cognition or mood, +hyperarousal symptoms; without dissociative symptoms     Amphetamine withdrawals are occurring and continue exacerbating her symptomatology.  -She is not interested in rehab but is interested with outpatient treatment/12-step meetings    PSYCHOTHERAPY ADD-ON +[id number]   16-37 minutes    Time: 16 minutes  Participants: Met with patient    Therapeutic Intervention Type: insight oriented psychotherapy, behavior modifying psychotherapy, supportive psychotherapy, interactive psychotherapy  Why chosen therapy is appropriate versus another modality: relevant to diagnosis, patient responds to this modality, evidence based practice    Target symptoms: depression, mood disorder, psychosis  Primary  focus: psychosis, substance use  Psychotherapeutic techniques: supportive and motivational techniques; psycho-education; analyzing delusions and fears- coping with psychosis    Outcome monitoring methods: self-report, observation    Patient's response to intervention:  The patient's response to intervention is guarded, reluctant.    Progress toward goals:  The patient's progress toward goals is poor.        ROS  General ROS: negative  Ophthalmic ROS: negative  ENT ROS: negative  Allergy and Immunology ROS: negative  Hematological and Lymphatic ROS: negative  Endocrine ROS: negative  Respiratory ROS: no cough, shortness of breath, or wheezing  Cardiovascular ROS: no chest pain or dyspnea on exertion  Gastrointestinal ROS: no abdominal pain, change in bowel habits, or black or bloody stools  Genito-Urinary ROS: no dysuria, trouble voiding, or hematuria  Musculoskeletal ROS: negative  Neurological ROS: no TIA or stroke symptoms  Dermatological ROS: negative    PAST MEDICAL HISTORY   Past Medical History:   Diagnosis Date    Addiction to drug     recovering drug addict    ADHD (attention deficit hyperactivity disorder)     Alcohol abuse     Anxiety     Asthma     Bipolar 1 disorder     Cervical pain     Chronic constipation     Chronic diarrhea     Depression     Fatigue     Fibromyalgia     Hallucination     Headache     History of psychiatric hospitalization     pt reports 10 total- [facility] 10/2019 and 3/2019, 1/2020    Hx of psychiatric care     Seroquel    Magalis     Panic disorder     Polysubstance dependence     Psychiatric exam requested by authority     Psychiatric problem     Auditory hallucinations    PTSD (post-traumatic stress disorder)     Schizoaffective disorder     Sleep difficulties     Spina bifida     Spinal stenosis of lumbar region     Substance abuse     Suicide attempt     Once by hanging, once by overdose, once by cutting wrists     Syringomyelia     Syrinx of spinal  "cord     Therapy     [facility]    Trigger finger     Withdrawal symptoms, alcohol     Withdrawal symptoms, drug or narcotic            PSYCHOTROPIC MEDICATIONS   Scheduled Meds:   buPROPion  150 mg Oral Daily    diazePAM  2 mg Oral TID    divalproex ER  1,500 mg Oral QHS    folic acid  1 mg Oral Daily    multivitamin  1 tablet Oral Daily    risperiDONE  3 mg Oral BID     Continuous Infusions:  PRN Meds:.acetaminophen, aluminum-magnesium hydroxide-simethicone, docusate sodium, hydrOXYzine pamoate, loperamide, nicotine, OLANZapine **AND** OLANZapine        EXAMINATION    VITALS   Vitals:    01/09/20 0728   BP: 123/65   Pulse: 78   Resp: 18   Temp: 97.2 °F (36.2 °C)     Body mass index is 37.8 kg/m².      CONSTITUTIONAL  General Appearance: WF, in casual garb, obese, NAD     MUSCULOSKELETAL  Muscle Strength and Tone: normal  Abnormal Involuntary Movements:  none  Gait and Station:  normal; non-ataxic     PSYCHIATRIC   Level of Consciousness: awake, alert  Orientation: p/p/t/s  Grooming:  less Disheveled, more adequate to circumstances  Psychomotor Behavior: no PMA, no PMR  Speech: nl r/t/v/s  Language: able to repeat words, English fluent  Mood: "alright.. tired"  Affect: dysphoric, irritable, anxious, decreased range, less labile  Thought Process:  linear and organized, goal directed  Associations:  intact; no SHAGUFTA  Thought Content: less SI, +AVH, +delusions, no HI  Memory:  intact to recent and remote events  Attention:  intact to conversation; not distractible   Fund of Knowledge:  age and education appropriate  Estimate if Intelligence:  average based on work/education history, vocabulary and mental status exam  Insight: impaired- partially seeks help and understands illness, better admits addiction issues  Judgment: impaired/improving- less bx issues, more compliant and cooperative           DIAGNOSTIC TESTING   Laboratory Results  No results found for this or any previous visit (from the " past 24 hour(s)).      ASSESSMENT      IMPRESSION   Schizoaffective Disorder, depressed type, severe  JORDIN  Panic without agoraphobia   PTSD  Fibromyalgia      Polysubstance dependence (Methamphetamines, cannabis, alcohol, narcotics; methamphetamines, current, continuous, severe, without physiological dependence)  Nicotine Dependence     Psychosocial stressors     Amphetamine Withdrawal     Obesity         MEDICAL DECISION MAKING     PROBLEM LIST AND MANAGEMENT PLANS; PRESCRIPTION DRUG MANAGEMENT  Compliance: yes  Side Effects: no  Regimen Adjustments:      Psychosis: pt counseled  -Resumed risperdal at 1mg QHS- increased to 1 mg po BID- increase to 1 mg po q AM and 2 mg po  HS- increased to 2 mg po BID- increased to 2/3 yesterday then 3 mg po BID today; plan to titrate as indicated  -Will plan to initiate REZA if pt willing  -depakote adjunctive (off-label) as below     Depression: pt counseled  -Resumed/continue Wellbutrin XL at 150 mg po q day- consider optimizing further      Anxiety/PTSD: pt counseled  -depakote off-label as below; wellbutrin off-label as above  -vistaril prn     Pain: pt counseled  -Restart Depakote at higher dose 500 mg po q HS- increase to 1000 mg po q HS- increase to 1500 mg po QHS; check level in 1 day     Substance use: pt counseled; will attempt to place in inpatient rehab if willing;   -valium taper started at 5 mg po TID to assist with mood related detox symptoms- decreased to 2 mg po TID- decrease to 2 mg po BID; will decrease/continue to taper off as agitation and psychosis symptoms improves     Nicotine use: pt counseled;  - nicotine patch 14 mg patch dermal daily;   -wellbutrin as above     Psychosocial stressors: pt counseled;   -VERNON consulted and assisting with resources     Obesity: pt counseled     DIAGNOSTIC TESTING  Labs reviewed with the patient; follow up pending labs      Disposition:  -SW to assist with aftercare planning and collateral  -Once stable discharge home with  outpatient follow up care and/or ACT  -Continue inpatient treatment under a PEC and/or C[facility] for danger to self as evident by depression with voiced suicidal ideation in the context of heavy substance abuse and withdrawal, psychosocial stressors, and psychosis.  She is s/p SA.      NEED FOR CONTINUED HOSPITALIZATION  Psychiatric illness continues to pose a potential threat to life or bodily function, of self or others, thereby requiring the need for continued inpatient psychiatric hospitalization: Yes    Protective inpatient pyschiatric hospitalization required while a safe disposition plan is enacted: Yes    Patient stabilized and ready for discharge from inpatient psychiatric unit: No      STAFF:   Salomón Nettles MD  Psychiatry

## 2020-01-09 NOTE — PLAN OF CARE
Problem: Adult Behavioral Health Plan of Care  Goal: Optimized Coping Skills in Response to Life Stressors  Intervention: Promote Effective Coping Strategies  Flowsheets (Taken 1/9/2020 1342)  Supportive Measures: active listening utilized; counseling provided; verbalization of feelings encouraged; positive reinforcement provided; problem solving facilitated; decision-making supported; relaxation techniques promoted; goal setting facilitated; self-care encouraged; guided imagery facilitated; self-reflection promoted; journaling promoted; self-responsibility promoted     Behavior: Patient did not attend psychotherapeutic group despite encouragement. She appears irritable and drowsy. She has been isolative and minimally engaged in treatment since admit.     Intervention:  engaged patients in a relaxation exercise involving 6 minutes of progressive muscle relaxation, followed by an exercise in practicing gratitude using Pradip's suggested five types of questions for social workers to ask in order to assess strengths in their clients: survival, support, exception, possibility, and esteem questions. This will allow patient's to add another coping skill in their tool box of skills and explore their strengths while intentionally breaking negative thought processes. Patients will be given time to express thoughts, concerns and goals for treatment and discharge, as well as perceived barriers to progress.    Response: Patient did not attend group.    Plan:  will continue to encourage patient to explore and verbalize emotions, set goals to aid in preventing re-hospitalization, attend psychotherapeutic group, and follow up with aftercare appointments.

## 2020-01-10 LAB
ALBUMIN SERPL BCP-MCNC: 3.5 G/DL (ref 3.5–5.2)
ALP SERPL-CCNC: 67 U/L (ref 55–135)
ALT SERPL W/O P-5'-P-CCNC: 19 U/L (ref 10–44)
ANION GAP SERPL CALC-SCNC: 12 MMOL/L (ref 8–16)
AST SERPL-CCNC: 14 U/L (ref 10–40)
BASOPHILS # BLD AUTO: 0.02 K/UL (ref 0–0.2)
BASOPHILS NFR BLD: 0.4 % (ref 0–1.9)
BILIRUB SERPL-MCNC: 0.2 MG/DL (ref 0.1–1)
BUN SERPL-MCNC: 13 MG/DL (ref 6–20)
CALCIUM SERPL-MCNC: 9 MG/DL (ref 8.7–10.5)
CHLORIDE SERPL-SCNC: 105 MMOL/L (ref 95–110)
CO2 SERPL-SCNC: 23 MMOL/L (ref 23–29)
CREAT SERPL-MCNC: 0.7 MG/DL (ref 0.5–1.4)
DIFFERENTIAL METHOD: ABNORMAL
EOSINOPHIL # BLD AUTO: 0.1 K/UL (ref 0–0.5)
EOSINOPHIL NFR BLD: 2.2 % (ref 0–8)
ERYTHROCYTE [DISTWIDTH] IN BLOOD BY AUTOMATED COUNT: 15.3 % (ref 11.5–14.5)
EST. GFR  (AFRICAN AMERICAN): >60 ML/MIN/1.73 M^2
EST. GFR  (NON AFRICAN AMERICAN): >60 ML/MIN/1.73 M^2
GLUCOSE SERPL-MCNC: 84 MG/DL (ref 70–110)
HCT VFR BLD AUTO: 40.4 % (ref 37–48.5)
HGB BLD-MCNC: 12.9 G/DL (ref 12–16)
IMM GRANULOCYTES # BLD AUTO: 0.02 K/UL (ref 0–0.04)
IMM GRANULOCYTES NFR BLD AUTO: 0.4 % (ref 0–0.5)
LYMPHOCYTES # BLD AUTO: 2.2 K/UL (ref 1–4.8)
LYMPHOCYTES NFR BLD: 44.2 % (ref 18–48)
MCH RBC QN AUTO: 26.9 PG (ref 27–31)
MCHC RBC AUTO-ENTMCNC: 31.9 G/DL (ref 32–36)
MCV RBC AUTO: 84 FL (ref 82–98)
MONOCYTES # BLD AUTO: 0.4 K/UL (ref 0.3–1)
MONOCYTES NFR BLD: 6.9 % (ref 4–15)
NEUTROPHILS # BLD AUTO: 2.3 K/UL (ref 1.8–7.7)
NEUTROPHILS NFR BLD: 45.9 % (ref 38–73)
NRBC BLD-RTO: 0 /100 WBC
PLATELET # BLD AUTO: 221 K/UL (ref 150–350)
PMV BLD AUTO: 11.7 FL (ref 9.2–12.9)
POTASSIUM SERPL-SCNC: 4.1 MMOL/L (ref 3.5–5.1)
PROT SERPL-MCNC: 7 G/DL (ref 6–8.4)
RBC # BLD AUTO: 4.79 M/UL (ref 4–5.4)
SODIUM SERPL-SCNC: 140 MMOL/L (ref 136–145)
VALPROATE SERPL-MCNC: 90.5 UG/ML (ref 50–100)
WBC # BLD AUTO: 5.07 K/UL (ref 3.9–12.7)

## 2020-01-10 PROCEDURE — 11400000 HC PSYCH PRIVATE ROOM

## 2020-01-10 PROCEDURE — 36415 COLL VENOUS BLD VENIPUNCTURE: CPT

## 2020-01-10 PROCEDURE — 25000003 PHARM REV CODE 250: Performed by: PSYCHIATRY & NEUROLOGY

## 2020-01-10 PROCEDURE — 85025 COMPLETE CBC W/AUTO DIFF WBC: CPT

## 2020-01-10 PROCEDURE — 80164 ASSAY DIPROPYLACETIC ACD TOT: CPT

## 2020-01-10 PROCEDURE — 99233 SBSQ HOSP IP/OBS HIGH 50: CPT | Mod: AF,HB,S$PBB, | Performed by: PSYCHIATRY & NEUROLOGY

## 2020-01-10 PROCEDURE — 80053 COMPREHEN METABOLIC PANEL: CPT

## 2020-01-10 PROCEDURE — 99233 PR SUBSEQUENT HOSPITAL CARE,LEVL III: ICD-10-PCS | Mod: AF,HB,S$PBB, | Performed by: PSYCHIATRY & NEUROLOGY

## 2020-01-10 RX ORDER — BUPROPION HYDROCHLORIDE 300 MG/1
300 TABLET ORAL DAILY
Status: DISCONTINUED | OUTPATIENT
Start: 2020-01-11 | End: 2020-01-15 | Stop reason: HOSPADM

## 2020-01-10 RX ADMIN — RISPERIDONE 3 MG: 3 TABLET ORAL at 08:01

## 2020-01-10 RX ADMIN — DIAZEPAM 2 MG: 2 TABLET ORAL at 08:01

## 2020-01-10 RX ADMIN — BUPROPION HYDROCHLORIDE 150 MG: 150 TABLET, EXTENDED RELEASE ORAL at 08:01

## 2020-01-10 RX ADMIN — FOLIC ACID 1 MG: 1 TABLET ORAL at 08:01

## 2020-01-10 RX ADMIN — THERA TABS 1 TABLET: TAB at 08:01

## 2020-01-10 RX ADMIN — DIVALPROEX SODIUM 1500 MG: 500 TABLET, FILM COATED, EXTENDED RELEASE ORAL at 08:01

## 2020-01-10 NOTE — PROGRESS NOTES
"   01/10/20 1040   [facility] Group Therapy   Group Name Therapeutic Recreation   Specific Interventions Coping Skills Training   Participation Level None   Patient did not attend group due to "the medicine keep making me sleepy. I can't hold my hear up , can't keep my eyes open." Patient remained in bed in her assigned room.   "

## 2020-01-10 NOTE — PLAN OF CARE
Pt calm and cooperative, took meds, showered, had snack, denies SI at this time, pt isolates to room, avoids social contact, safety precautions maintained, will continue to monitor

## 2020-01-10 NOTE — PLAN OF CARE
Pt is sleeping at this time and has slept 6.5 hours with one awakening.  NAD.  Resp even & unlabored.  Pathways clear and bed in low position.  Q 15 minute safety checks ongoing.  All precautions maintained

## 2020-01-10 NOTE — PROGRESS NOTES
"PSYCHIATRY DAILY INPATIENT PROGRESS NOTE  SUBSEQUENT HOSPITAL VISIT    ENCOUNTER DATE: 1/10/2020  SITE: [facility]    DATE OF ADMISSION: 1/4/2020  9:00 PM  LENGTH OF STAY: 6 days      HISTORY    CHIEF COMPLAINT   Maria Yancey is a 34 y.o. female, seen during daily manzo rounds on the inpatient unit.  Maria Yancey presents with the chief complaint of depression, psychosis, and suicidal ideation, "I need someone to tell me what's going on"    HPI   (Elements: Location, Quality, Severity, Duration, Timing, Content, Modifying Factors, Associated Signs & Symptoms)    The patient was seen and examined. The chart was reviewed.     Reviewed notes by LMSW, CTRS, and RNs  from the last 24 hours.    The patient's case was discussed with the treatment team and care providers today, including RNs, CTRS, LMSW and [facility].    Staff reports no behavioral or management issues. She remainsn isolative and mostly staying to herself in her room with continued sedation/withdrawal.    The patient has been compliant with treatment. The patient denied any side effects.    Symptoms persist minimally to mildly changed from yesterday/admission. She would better participate with the interview this AM and was less labile. She is still not attending groups and minimally interacts with peers and staff. She is somewhat backing off of the idea of her family having been murder, "I don't know if it happened or not.. It's not bothering me right now." She remains depressed, anxious and psychotic with mild improvement to date.     She would only minimally participate with the interview this AM secondary to to feeling tired.     Continued Symptoms of Depression: +diminished mood or loss of interest/anhedonia; +irritability, +diminished energy, +change in sleep, +change in appetite, +diminished concentration or cognition or indecisiveness, no PMA/R, +excessive guilt or hopelessness or worthlessness, +suicidal ideations     Continued  " Changes in Sleep: +trouble with initiation/maintenance, no early morning awakening with inability to return to sleep     Continued  Suicidal/(no)Homicidal ideations: +active/passive ideations, +organized plans, no future intentions     Continued Symptoms of psychosis: +hallucinations, +delusions, no disorganized thinking, no disorganized behavior or abnormal motor behavior, no negative symptoms     Denied past or current Symptoms of esther or hypomania: no elevated, expansive, or irritable mood with no increased energy or activity; with no inflated self-esteem or grandiosity, decreased need for sleep, increased rate of speech, FOI or racing thoughts, distractibility, increased goal directed activity or PMA, or risky/disinhibited behavior     Continued Symptoms of JORDIN: +excessive anxiety/worry/fear, +more days than not, not about numerous issues, +difficult to control, with +restlessness, +fatigue, +poor concentration, +irritability, +muscle tension, +sleep disturbance; +causes functionally impairing distress      Continued  Symptoms of Panic Disorder: +recurrent panic attacks, may be precipitated or un-precipitated, note source of worry and/or behavioral changes secondary; withagoraphobia     Continued Symptoms of PTSD: +h/o trauma; +re-experiencing/intrusive symptoms, +avoidant behavior, +negative alterations in cognition or mood, +hyperarousal symptoms; without dissociative symptoms     Amphetamine withdrawals are occurring and continue exacerbating her symptomatology.  -She is not interested in rehab but is interested with outpatient treatment/12-step meetings        ROS  General ROS: negative  Ophthalmic ROS: negative  ENT ROS: negative  Allergy and Immunology ROS: negative  Hematological and Lymphatic ROS: negative  Endocrine ROS: negative  Respiratory ROS: no cough, shortness of breath, or wheezing  Cardiovascular ROS: no chest pain or dyspnea on exertion  Gastrointestinal ROS: no abdominal pain, change in  bowel habits, or black or bloody stools  Genito-Urinary ROS: no dysuria, trouble voiding, or hematuria  Musculoskeletal ROS: negative  Neurological ROS: no TIA or stroke symptoms  Dermatological ROS: negative    PAST MEDICAL HISTORY   Past Medical History:   Diagnosis Date    Addiction to drug     recovering drug addict    ADHD (attention deficit hyperactivity disorder)     Alcohol abuse     Anxiety     Asthma     Bipolar 1 disorder     Cervical pain     Chronic constipation     Chronic diarrhea     Depression     Fatigue     Fibromyalgia     Hallucination     Headache     History of psychiatric hospitalization     pt reports 10 total- [facility] 10/2019 and 3/2019, 1/2020    Hx of psychiatric care     Seroquel    Magalis     Panic disorder     Polysubstance dependence     Psychiatric exam requested by authority     Psychiatric problem     Auditory hallucinations    PTSD (post-traumatic stress disorder)     Schizoaffective disorder     Sleep difficulties     Spina bifida     Spinal stenosis of lumbar region     Substance abuse     Suicide attempt     Once by hanging, once by overdose, once by cutting wrists     Syringomyelia     Syrinx of spinal cord     Therapy     [facility]    Trigger finger     Withdrawal symptoms, alcohol     Withdrawal symptoms, drug or narcotic            PSYCHOTROPIC MEDICATIONS   Scheduled Meds:   buPROPion  150 mg Oral Daily    diazePAM  2 mg Oral BID    divalproex ER  1,500 mg Oral QHS    folic acid  1 mg Oral Daily    multivitamin  1 tablet Oral Daily    risperiDONE  3 mg Oral BID     Continuous Infusions:  PRN Meds:.acetaminophen, aluminum-magnesium hydroxide-simethicone, docusate sodium, hydrOXYzine pamoate, loperamide, nicotine, OLANZapine **AND** OLANZapine        EXAMINATION    VITALS   Vitals:    01/10/20 0800   BP: 119/67   Pulse: 91   Resp: 18   Temp: 97.2 °F (36.2 °C)     Body mass index is 39.03  "kg/m².      CONSTITUTIONAL  General Appearance: WF, in casual garb, obese, NAD     MUSCULOSKELETAL  Muscle Strength and Tone: normal  Abnormal Involuntary Movements:  none  Gait and Station:  normal; non-ataxic     PSYCHIATRIC   Level of Consciousness: awake, alert  Orientation: p/p/t/s  Grooming:  less Disheveled, more adequate to circumstances  Psychomotor Behavior: no PMA, no PMR  Speech: nl r/t/v/s  Language: able to repeat words, English fluent  Mood: "very tired"  Affect: dysphoric, irritable, anxious, decreased range, less labile  Thought Process:  linear and organized, goal directed  Associations:  intact; no SHAGUFTA  Thought Content: less SI, +AVH, +delusions, no HI  Memory:  intact to recent and remote events  Attention:  intact to conversation; not distractible   Fund of Knowledge:  age and education appropriate  Estimate if Intelligence:  average based on work/education history, vocabulary and mental status exam  Insight: impaired- partially seeks help and understands illness, better admits addiction issues  Judgment: impaired/improving- less bx issues, more compliant and cooperative           DIAGNOSTIC TESTING   Laboratory Results  Recent Results (from the past 24 hour(s))   CBC auto differential    Collection Time: 01/10/20  6:15 AM   Result Value Ref Range    WBC 5.07 3.90 - 12.70 K/uL    RBC 4.79 4.00 - 5.40 M/uL    Hemoglobin 12.9 12.0 - 16.0 g/dL    Hematocrit 40.4 37.0 - 48.5 %    Mean Corpuscular Volume 84 82 - 98 fL    Mean Corpuscular Hemoglobin 26.9 (L) 27.0 - 31.0 pg    Mean Corpuscular Hemoglobin Conc 31.9 (L) 32.0 - 36.0 g/dL    RDW 15.3 (H) 11.5 - 14.5 %    Platelets 221 150 - 350 K/uL    MPV 11.7 9.2 - 12.9 fL    Immature Granulocytes 0.4 0.0 - 0.5 %    Gran # (ANC) 2.3 1.8 - 7.7 K/uL    Immature Grans (Abs) 0.02 0.00 - 0.04 K/uL    Lymph # 2.2 1.0 - 4.8 K/uL    Mono # 0.4 0.3 - 1.0 K/uL    Eos # 0.1 0.0 - 0.5 K/uL    Baso # 0.02 0.00 - 0.20 K/uL    nRBC 0 0 /100 WBC    Gran% 45.9 38.0 - " 73.0 %    Lymph% 44.2 18.0 - 48.0 %    Mono% 6.9 4.0 - 15.0 %    Eosinophil% 2.2 0.0 - 8.0 %    Basophil% 0.4 0.0 - 1.9 %    Differential Method Automated    Comprehensive metabolic panel    Collection Time: 01/10/20  6:15 AM   Result Value Ref Range    Sodium 140 136 - 145 mmol/L    Potassium 4.1 3.5 - 5.1 mmol/L    Chloride 105 95 - 110 mmol/L    CO2 23 23 - 29 mmol/L    Glucose 84 70 - 110 mg/dL    BUN, Bld 13 6 - 20 mg/dL    Creatinine 0.7 0.5 - 1.4 mg/dL    Calcium 9.0 8.7 - 10.5 mg/dL    Total Protein 7.0 6.0 - 8.4 g/dL    Albumin 3.5 3.5 - 5.2 g/dL    Total Bilirubin 0.2 0.1 - 1.0 mg/dL    Alkaline Phosphatase 67 55 - 135 U/L    AST 14 10 - 40 U/L    ALT 19 10 - 44 U/L    Anion Gap 12 8 - 16 mmol/L    eGFR if African American >60 >60 mL/min/1.73 m^2    eGFR if non African American >60 >60 mL/min/1.73 m^2   Valproic Acid    Collection Time: 01/10/20  6:15 AM   Result Value Ref Range    Valproic Acid Level 90.5 50.0 - 100.0 ug/mL         ASSESSMENT      IMPRESSION   Schizoaffective Disorder, depressed type, severe  JORDIN  Panic without agoraphobia   PTSD  Fibromyalgia      Polysubstance dependence (Methamphetamines, cannabis, alcohol, narcotics; methamphetamines, current, continuous, severe, without physiological dependence)  Nicotine Dependence     Psychosocial stressors     Amphetamine Withdrawal     Obesity         MEDICAL DECISION MAKING     PROBLEM LIST AND MANAGEMENT PLANS; PRESCRIPTION DRUG MANAGEMENT  Compliance: yes  Side Effects: no  Regimen Adjustments:      Psychosis: pt counseled  -Resumed risperdal at 1mg QHS- increased to 1 mg po BID- increase to 1 mg po q AM and 2 mg po  HS- increased to 2 mg po BID- increased to 3 mg po BID today; plan to titrate as indicated  -Will plan to initiate REZA if pt willing  -depakote adjunctive (off-label) as below     Depression: pt counseled  -Resumed/continue Wellbutrin XL at 150 mg po q day- increase to 300 mg po q day; consider optimizing further       Anxiety/PTSD: pt counseled  -depakote off-label as below; wellbutrin off-label as above  -vistaril prn     Pain: pt counseled  -Restart Depakote at higher dose 500 mg po q HS- increase to 1000 mg po q HS- increase to 1500 mg po QHS     Substance use: pt counseled; will attempt to place in inpatient rehab if willing;   -valium taper started at 5 mg po TID to assist with mood related detox symptoms- decreased to 2 mg po TID- decrease to 2 mg po BID- decrease to q day then stop; will decrease/continue to taper off as agitation and psychosis symptoms improves     Nicotine use: pt counseled;  - nicotine patch 14 mg patch dermal daily;   -wellbutrin as above     Psychosocial stressors: pt counseled;   -SW consulted and assisting with resources     Obesity: pt counseled     DIAGNOSTIC TESTING  Labs reviewed with the patient; follow up pending labs      Disposition:  -SW to assist with aftercare planning and collateral  -Once stable discharge home with outpatient follow up care and/or ACT  -Continue inpatient treatment under a PEC and/or CEC for danger to self as evident by depression with voiced suicidal ideation in the context of heavy substance abuse and withdrawal, psychosocial stressors, and psychosis.  She is s/p SA.      NEED FOR CONTINUED HOSPITALIZATION  Psychiatric illness continues to pose a potential threat to life or bodily function, of self or others, thereby requiring the need for continued inpatient psychiatric hospitalization: Yes    Protective inpatient pyschiatric hospitalization required while a safe disposition plan is enacted: Yes    Patient stabilized and ready for discharge from inpatient psychiatric unit: No      STAFF:   Salomón Nettles MD  Psychiatry

## 2020-01-10 NOTE — PLAN OF CARE
Shift note : patient is eating all of her meals and is taking all of her ordered medications . She isolates in her room and sleeps . She is unkempt and is unfocused and not alert . She blams it on the valium she is prescribed . Her mood is better .

## 2020-01-10 NOTE — PLAN OF CARE
Problem: Adult Behavioral Health Plan of Care  Goal: Optimized Coping Skills in Response to Life Stressors  Intervention: Promote Effective Coping Strategies  Flowsheets (Taken 1/10/2020 1517)  Supportive Measures: active listening utilized; positive reinforcement provided; verbalization of feelings encouraged; problem solving facilitated; relaxation techniques promoted; self-care encouraged; self-reflection promoted; self-responsibility promoted       Behavior: Patient attended psychotherapeutic very briefly group dressed in hospital scrubs, disheveled with irritable, drowsy, guarded mood, tense posture, and avoidant eye contact. Patient did not participate.     Intervention:  will engage patients in a CBT based activity about chaos versus calm to encourage internal locus of control and gain perspective. Patient's will also be engaged in a guided relaxation exercise. Patients will be given time to express thoughts, concerns and goals for treatment and discharge, as well as perceived barriers to progress.    Response: Patient left after a short period and did not participate. She says that she is still unable to remain awake and feels foggy due to the medications. She says that she feels an improvement in her mood.    Plan:  will continue to encourage patient to explore and verbalize emotions, set goals to aid in preventing re-hospitalization, attend psychotherapeutic group, and follow up with aftercare appointments.

## 2020-01-11 PROCEDURE — 99233 PR SUBSEQUENT HOSPITAL CARE,LEVL III: ICD-10-PCS | Mod: AF,HB,S$PBB, | Performed by: PSYCHIATRY & NEUROLOGY

## 2020-01-11 PROCEDURE — 63600175 PHARM REV CODE 636 W HCPCS: Performed by: PSYCHIATRY & NEUROLOGY

## 2020-01-11 PROCEDURE — 96372 THER/PROPH/DIAG INJ SC/IM: CPT

## 2020-01-11 PROCEDURE — 11400000 HC PSYCH PRIVATE ROOM

## 2020-01-11 PROCEDURE — 99233 SBSQ HOSP IP/OBS HIGH 50: CPT | Mod: AF,HB,S$PBB, | Performed by: PSYCHIATRY & NEUROLOGY

## 2020-01-11 PROCEDURE — 25000003 PHARM REV CODE 250: Performed by: PSYCHIATRY & NEUROLOGY

## 2020-01-11 RX ADMIN — RISPERIDONE 3 MG: 3 TABLET ORAL at 09:01

## 2020-01-11 RX ADMIN — THERA TABS 1 TABLET: TAB at 09:01

## 2020-01-11 RX ADMIN — DIVALPROEX SODIUM 1500 MG: 500 TABLET, FILM COATED, EXTENDED RELEASE ORAL at 09:01

## 2020-01-11 RX ADMIN — PALIPERIDONE PALMITATE 234 MG: 234 INJECTION INTRAMUSCULAR at 11:01

## 2020-01-11 RX ADMIN — BUPROPION HYDROCHLORIDE 300 MG: 300 TABLET, FILM COATED, EXTENDED RELEASE ORAL at 09:01

## 2020-01-11 RX ADMIN — FOLIC ACID 1 MG: 1 TABLET ORAL at 09:01

## 2020-01-11 NOTE — PROGRESS NOTES
"PSYCHIATRY DAILY INPATIENT PROGRESS NOTE  SUBSEQUENT HOSPITAL VISIT    ENCOUNTER DATE: 1/11/2020  [facility]    DATE OF ADMISSION: 1/4/2020  9:00 PM  LENGTH OF STAY: 7 days      HISTORY    CHIEF COMPLAINT   Maria Yancey is a 34 y.o. female, seen during daily manzo rounds on the inpatient unit.  Maria Yancey presents with the chief complaint of depression, psychosis, and suicidal ideation, "I need someone to tell me what's going on"    HPI   (Elements: Location, Quality, Severity, Duration, Timing, Content, Modifying Factors, Associated Signs & Symptoms)    The patient was seen and examined. The chart was reviewed.     Reviewed notes by LMSW, CTRS, and RNs  from the last 24 hours.    The patient's case was discussed with the treatment team and care providers today, including RNs.    Staff reports no behavioral or management issues. She remains isolative and mostly staying to herself in her room with continued sedation/amphetamine withdrawal.    The patient has been compliant with treatment. The patient denied any side effects.    Symptoms persist again minimally to mildly changed from yesterday/admission. She would better participate with the interview this AM and was less labile. She is still not attending groups and minimally interacts with peers and staff. She continues backing off of the idea of her family having been murder- delusions are decreasing in intensity. She remains depressed, anxious and psychotic with mild improvement to date.     Continued Symptoms of Depression: +diminished mood or loss of interest/anhedonia; +irritability, +diminished energy, +change in sleep, +change in appetite, +diminished concentration or cognition or indecisiveness, no PMA/R, +excessive guilt or hopelessness or worthlessness, +suicidal ideations     Continued  Changes in Sleep: +trouble with initiation/maintenance, no early morning awakening with inability to return to sleep     Continued  Suicidal/(no)Homicidal " ideations: +active/passive ideations, +organized plans, no future intentions     Continued Symptoms of psychosis: +hallucinations, +delusions, no disorganized thinking, no disorganized behavior or abnormal motor behavior, no negative symptoms     Denied past or current Symptoms of esther or hypomania: no elevated, expansive, or irritable mood with no increased energy or activity; with no inflated self-esteem or grandiosity, decreased need for sleep, increased rate of speech, FOI or racing thoughts, distractibility, increased goal directed activity or PMA, or risky/disinhibited behavior     Continued Symptoms of JORDIN: +excessive anxiety/worry/fear, +more days than not, not about numerous issues, +difficult to control, with +restlessness, +fatigue, +poor concentration, +irritability, +muscle tension, +sleep disturbance; +causes functionally impairing distress      Continued  Symptoms of Panic Disorder: +recurrent panic attacks, may be precipitated or un-precipitated, note source of worry and/or behavioral changes secondary; withagoraphobia     Continued Symptoms of PTSD: +h/o trauma; +re-experiencing/intrusive symptoms, +avoidant behavior, +negative alterations in cognition or mood, +hyperarousal symptoms; without dissociative symptoms     Amphetamine withdrawals are occurring and continue exacerbating her symptomatology.  -She is not interested in rehab but is interested with outpatient treatment/12-step meetings        ROS  General ROS: negative  Ophthalmic ROS: negative  ENT ROS: negative  Allergy and Immunology ROS: negative  Hematological and Lymphatic ROS: negative  Endocrine ROS: negative  Respiratory ROS: no cough, shortness of breath, or wheezing  Cardiovascular ROS: no chest pain or dyspnea on exertion  Gastrointestinal ROS: no abdominal pain, change in bowel habits, or black or bloody stools  Genito-Urinary ROS: no dysuria, trouble voiding, or hematuria  Musculoskeletal ROS: negative  Neurological ROS: no TIA  or stroke symptoms  Dermatological ROS: negative    PAST MEDICAL HISTORY   Past Medical History:   Diagnosis Date    Addiction to drug     recovering drug addict    ADHD (attention deficit hyperactivity disorder)     Alcohol abuse     Anxiety     Asthma     Bipolar 1 disorder     Cervical pain     Chronic constipation     Chronic diarrhea     Depression     Fatigue     Fibromyalgia     Hallucination     Headache     History of psychiatric hospitalization     pt reports 10 total- [facility] 10/2019 and 3/2019, 1/2020    Hx of psychiatric care     Seroquel    Magalis     Panic disorder     Polysubstance dependence     Psychiatric exam requested by authority     Psychiatric problem     Auditory hallucinations    PTSD (post-traumatic stress disorder)     Schizoaffective disorder     Sleep difficulties     Spina bifida     Spinal stenosis of lumbar region     Substance abuse     Suicide attempt     Once by hanging, once by overdose, once by cutting wrists     Syringomyelia     Syrinx of spinal cord     Therapy     [facility]    Trigger finger     Withdrawal symptoms, alcohol     Withdrawal symptoms, drug or narcotic            PSYCHOTROPIC MEDICATIONS   Scheduled Meds:   buPROPion  300 mg Oral Daily    divalproex ER  1,500 mg Oral QHS    folic acid  1 mg Oral Daily    multivitamin  1 tablet Oral Daily    risperiDONE  3 mg Oral BID     Continuous Infusions:  PRN Meds:.acetaminophen, aluminum-magnesium hydroxide-simethicone, docusate sodium, hydrOXYzine pamoate, loperamide, nicotine, OLANZapine **AND** OLANZapine        EXAMINATION    VITALS   Vitals:    01/11/20 0800   BP: 106/60   Pulse: 77   Resp: 18   Temp: 97.3 °F (36.3 °C)     Body mass index is 39.03 kg/m².      CONSTITUTIONAL  General Appearance: WF, in casual garb, obese, NAD     MUSCULOSKELETAL  Muscle Strength and Tone: normal  Abnormal Involuntary Movements:  none  Gait and Station:  normal;  "non-ataxic     PSYCHIATRIC   Level of Consciousness: awake, alert  Orientation: p/p/t/s  Grooming:  less Disheveled, more adequate to circumstances- improving  Psychomotor Behavior: no PMA, no PMR  Speech: nl r/t/v/s  Language: able to repeat words, English fluent  Mood: "still really tired"  Affect: dysphoric, irritable, anxious, decreased range, less labile  Thought Process:  linear and organized, goal directed  Associations:  intact; no SHAGUFTA  Thought Content: less SI, less AVH, less delusions, no HI  Memory:  intact to recent and remote events  Attention:  intact to conversation; not distractible   Fund of Knowledge:  age and education appropriate  Estimate if Intelligence:  average based on work/education history, vocabulary and mental status exam  Insight: impaired- partially seeks help and understands illness, better admits addiction issues  Judgment: impaired/improving- less bx issues, more compliant and cooperative           DIAGNOSTIC TESTING   Laboratory Results  No results found for this or any previous visit (from the past 24 hour(s)).      ASSESSMENT      IMPRESSION   Schizoaffective Disorder, depressed type, severe  JORDIN  Panic without agoraphobia   PTSD  Fibromyalgia      Polysubstance dependence (Methamphetamines, cannabis, alcohol, narcotics; methamphetamines, current, continuous, severe, without physiological dependence)  Nicotine Dependence     Psychosocial stressors     Amphetamine Withdrawal     Obesity         MEDICAL DECISION MAKING     PROBLEM LIST AND MANAGEMENT PLANS; PRESCRIPTION DRUG MANAGEMENT  Compliance: yes  Side Effects: no  Regimen Adjustments:      Psychosis: pt counseled  -Resumed risperdal at 1mg QHS- increased to 1 mg po BID- increase to 1 mg po q AM and 2 mg po  HS- increased to 2 mg po BID- increased to 3 mg po BID; plan to titrate as indicated  -Start Invega REZA: 1st dose of 234 mg IM x 1 on 1/11/20; 2nd dose of 156 mg IM x 1 due on 1/14/20; 3rd dose will be due on " approximately 2/14/20  -depakote adjunctive (off-label) as below     Depression: pt counseled  -Resumed/continue Wellbutrin XL at 150 mg po q day- increased to/conitue at 300 mg po q day; consider optimizing further      Anxiety/PTSD: pt counseled  -depakote off-label as below; wellbutrin off-label as above  -vistaril prn     Pain: pt counseled  -Restart Depakote at higher dose 500 mg po q HS- increase to 1000 mg po q HS- increase to 1500 mg po QHS; check level in 3 days     Substance use: pt counseled; will attempt to place in inpatient rehab if willing;   -valium taper started at 5 mg po TID to assist with mood related detox symptoms- decreased to 2 mg po TID- decrease to 2 mg po BID- decrease to q day then stopped; will decrease/continue to taper off as agitation and psychosis symptoms improves     Nicotine use: pt counseled;  - nicotine patch 14 mg patch dermal daily;   -wellbutrin as above     Psychosocial stressors: pt counseled;   -SW consulted and assisting with resources     Obesity: pt counseled     DIAGNOSTIC TESTING  Labs reviewed with the patient; follow up pending labs- checking labs in 4 days     Disposition:  -SW to assist with aftercare planning and collateral  -Once stable discharge home with outpatient follow up care and/or ACT  -Continue inpatient treatment under a PEC and/or CEC for danger to self as evident by depression with voiced suicidal ideation in the context of heavy substance abuse and withdrawal, psychosocial stressors, and psychosis.  She is s/p SA.      NEED FOR CONTINUED HOSPITALIZATION  Psychiatric illness continues to pose a potential threat to life or bodily function, of self or others, thereby requiring the need for continued inpatient psychiatric hospitalization: Yes    Protective inpatient pyschiatric hospitalization required while a safe disposition plan is enacted: Yes    Patient stabilized and ready for discharge from inpatient psychiatric unit: No      STAFF:   Salomón NAJERA  MD Tho  Psychiatry

## 2020-01-11 NOTE — PLAN OF CARE
Care plan reviewed with patient, patient in agreement with plan. Denies intent to harm self or others. Cut on left forearm is healing. Patient's mood is improved, but maintains poor eye contact. Stays in bed between care and meals. Accepted breakfast today. Gait steady, no falls. Pathways and environment clear and clutter-free. Accepted medication this morning, consents to receiving Invega 234 mg injection as ordered later today.  Promoted an individualized safety plan, effective coping skills, medication compliance, a drug-free lifestyle, impulse control and mood improvement. Will continue to monitor for safety.

## 2020-01-11 NOTE — PLAN OF CARE
Pt avoids social contact, isolates to room, appetite good, med compliant, Denies SI at this time, safety precautions maintained, will continue to monitor

## 2020-01-12 PROCEDURE — 99233 PR SUBSEQUENT HOSPITAL CARE,LEVL III: ICD-10-PCS | Mod: AF,HB,S$PBB, | Performed by: PSYCHIATRY & NEUROLOGY

## 2020-01-12 PROCEDURE — 99233 SBSQ HOSP IP/OBS HIGH 50: CPT | Mod: AF,HB,S$PBB, | Performed by: PSYCHIATRY & NEUROLOGY

## 2020-01-12 PROCEDURE — 11400000 HC PSYCH PRIVATE ROOM

## 2020-01-12 PROCEDURE — 25000003 PHARM REV CODE 250: Performed by: PSYCHIATRY & NEUROLOGY

## 2020-01-12 RX ADMIN — DIVALPROEX SODIUM 1500 MG: 500 TABLET, FILM COATED, EXTENDED RELEASE ORAL at 08:01

## 2020-01-12 RX ADMIN — THERA TABS 1 TABLET: TAB at 08:01

## 2020-01-12 RX ADMIN — RISPERIDONE 3 MG: 3 TABLET ORAL at 08:01

## 2020-01-12 RX ADMIN — FOLIC ACID 1 MG: 1 TABLET ORAL at 08:01

## 2020-01-12 RX ADMIN — BUPROPION HYDROCHLORIDE 300 MG: 300 TABLET, FILM COATED, EXTENDED RELEASE ORAL at 08:01

## 2020-01-12 NOTE — PROGRESS NOTES
"PSYCHIATRY DAILY INPATIENT PROGRESS NOTE  SUBSEQUENT HOSPITAL VISIT    ENCOUNTER DATE: 1/12/2020  [facility]    DATE OF ADMISSION: 1/4/2020  9:00 PM  LENGTH OF STAY: 8 days      HISTORY    CHIEF COMPLAINT   Maria Yancey is a 34 y.o. female, seen during daily manzo rounds on the inpatient unit.  Maria Yancey presents with the chief complaint of depression, psychosis, and suicidal ideation, "I need someone to tell me what's going on"    HPI   (Elements: Location, Quality, Severity, Duration, Timing, Content, Modifying Factors, Associated Signs & Symptoms)    The patient was seen and examined. The chart was reviewed.     Reviewed notes by LPN and RNs  from the last 24 hours.    The patient's case was discussed with the treatment team and care providers today, including RNs.    Staff reports no behavioral or management issues. She remains isolative and mostly staying to herself in her room with continued sedation/amphetamine withdrawal.    The patient has been compliant with treatment. The patient denied any side effects. She receieved her first dose of the REZA without incident    Symptoms persist again minimally to mildly changed from yesterday/admission. She is still not attending groups and minimally interacts with peers and staff. She continues backing off of the idea of her family having been murder- delusions are decreasing in intensity. She remains depressed, anxious and psychotic with mild improvement since yesterday/admission.     Continued but less Symptoms of Depression: +diminished mood or loss of interest/anhedonia; less irritability, +diminished energy, +change in sleep, less change in appetite, +diminished concentration or cognition or indecisiveness, no PMA/R, +excessive guilt or hopelessness or worthlessness, less suicidal ideations     Continued Changes in Sleep: +trouble with initiation/maintenance, no early morning awakening with inability to return to sleep     Continued but less " Suicidal/(no)Homicidal ideations: less active/passive ideations, no organized plans, no future intentions     Continued but less Symptoms of psychosis: less hallucinations, less delusions, no disorganized thinking, no disorganized behavior or abnormal motor behavior, no negative symptoms     Denied past or current Symptoms of esther or hypomania: no elevated, expansive, or irritable mood with no increased energy or activity; with no inflated self-esteem or grandiosity, decreased need for sleep, increased rate of speech, FOI or racing thoughts, distractibility, increased goal directed activity or PMA, or risky/disinhibited behavior     Continued Symptoms of JORDIN: +excessive anxiety/worry/fear, +more days than not, not about numerous issues, +difficult to control, with +restlessness, +fatigue, +poor concentration, +irritability, +muscle tension, +sleep disturbance; +causes functionally impairing distress      Improving Symptoms of Panic Disorder: no panic attacks since admission, may be precipitated or un-precipitated, note source of worry and/or behavioral changes secondary; withagoraphobia     Continued but less Symptoms of PTSD: +h/o trauma; less symptoms of re-experiencing/intrusive symptoms, avoidant behavior, negative alterations in cognition or mood, and hyperarousal symptoms; without dissociative symptoms     Amphetamine withdrawals are occurring and continue exacerbating her symptomatology.  -She is not interested in rehab but is interested with outpatient treatment/12-step meetings        ROS  General ROS: negative  Ophthalmic ROS: negative  ENT ROS: negative  Allergy and Immunology ROS: negative  Hematological and Lymphatic ROS: negative  Endocrine ROS: negative  Respiratory ROS: no cough, shortness of breath, or wheezing  Cardiovascular ROS: no chest pain or dyspnea on exertion  Gastrointestinal ROS: no abdominal pain, change in bowel habits, or black or bloody stools  Genito-Urinary ROS: no dysuria, trouble  voiding, or hematuria  Musculoskeletal ROS: negative  Neurological ROS: no TIA or stroke symptoms  Dermatological ROS: negative    PAST MEDICAL HISTORY   Past Medical History:   Diagnosis Date    Addiction to drug     recovering drug addict    ADHD (attention deficit hyperactivity disorder)     Alcohol abuse     Anxiety     Asthma     Bipolar 1 disorder     Cervical pain     Chronic constipation     Chronic diarrhea     Depression     Fatigue     Fibromyalgia     Hallucination     Headache     History of psychiatric hospitalization     pt reports 10 total- [facility] 10/2019 and 3/2019, 1/2020    Hx of psychiatric care     Seroquel    Magalis     Panic disorder     Polysubstance dependence     Psychiatric exam requested by authority     Psychiatric problem     Auditory hallucinations    PTSD (post-traumatic stress disorder)     Schizoaffective disorder     Sleep difficulties     Spina bifida     Spinal stenosis of lumbar region     Substance abuse     Suicide attempt     Once by hanging, once by overdose, once by cutting wrists     Syringomyelia     Syrinx of spinal cord     Therapy     [facility]    Trigger finger     Withdrawal symptoms, alcohol     Withdrawal symptoms, drug or narcotic            PSYCHOTROPIC MEDICATIONS   Scheduled Meds:   buPROPion  300 mg Oral Daily    divalproex ER  1,500 mg Oral QHS    folic acid  1 mg Oral Daily    multivitamin  1 tablet Oral Daily    [START ON 1/14/2020] paliperidone palmitate  156 mg Intramuscular Once    risperiDONE  3 mg Oral BID     Continuous Infusions:  PRN Meds:.acetaminophen, aluminum-magnesium hydroxide-simethicone, docusate sodium, hydrOXYzine pamoate, loperamide, nicotine, OLANZapine **AND** OLANZapine        EXAMINATION    VITALS   Vitals:    01/12/20 0839   BP: (!) 90/55   Pulse: 79   Resp: 18   Temp: 97 °F (36.1 °C)     Body mass index is 39.31 kg/m².      CONSTITUTIONAL  General Appearance: WF, in casual  "garb, obese, NAD     MUSCULOSKELETAL  Muscle Strength and Tone: normal  Abnormal Involuntary Movements:  none  Gait and Station:  normal; non-ataxic     PSYCHIATRIC   Level of Consciousness: awake, alert  Orientation: p/p/t/s  Grooming:   adequate to circumstances- improved  Psychomotor Behavior: no PMA, no PMR  Speech: nl r/t/v/s  Language: able to repeat words, English fluent  Mood: "ok.. I don't know"  Affect: dysphoric, less irritable, anxious, less decreased range, less labile  Thought Process:  linear and organized, goal directed  Associations:  intact; no SHAGUFTA  Thought Content: less SI, less AVH, less delusions, no HI  Memory:  intact to recent and remote events  Attention:  intact to conversation; not distractible   Fund of Knowledge:  age and education appropriate  Estimate if Intelligence:  average based on work/education history, vocabulary and mental status exam  Insight: impaired but improving- partially seeks help and understands illness, better admits addiction issues  Judgment: impaired/improving- less bx issues, more compliant and cooperative           DIAGNOSTIC TESTING   Laboratory Results  No results found for this or any previous visit (from the past 24 hour(s)).      ASSESSMENT      IMPRESSION   Schizoaffective Disorder, depressed type, severe  JORDIN  Panic without agoraphobia   PTSD  Fibromyalgia      Polysubstance dependence (Methamphetamines, cannabis, alcohol, narcotics; methamphetamines, current, continuous, severe, without physiological dependence)  Nicotine Dependence     Psychosocial stressors     Amphetamine Withdrawal     Obesity         MEDICAL DECISION MAKING     PROBLEM LIST AND MANAGEMENT PLANS; PRESCRIPTION DRUG MANAGEMENT  Compliance: yes  Side Effects: no  Regimen Adjustments:      Psychosis: pt counseled  -Resumed risperdal at 1mg QHS- increased to 1 mg po BID- increase to 1 mg po q AM and 2 mg po  HS- increased to 2 mg po BID- increased to/coninue  3 mg po BID; plan to taper off " as REZA reaches therapeutic levels  -Start Invega REZA: 1st dose of 234 mg IM x 1 on 1/11/20; 2nd dose of 156 mg IM x 1 due on 1/14/20; 3rd dose will be due on approximately 2/14/20  -depakote adjunctive (off-label) as below     Depression: pt counseled  -Resumed/continue Wellbutrin XL at 150 mg po q day- increased to/conitue at 300 mg po q day; consider optimizing further      Anxiety/PTSD: pt counseled  -depakote off-label as below; wellbutrin off-label as above  -vistaril prn     Pain: pt counseled  -Restart Depakote at higher dose 500 mg po q HS- increase to 1000 mg po q HS- increase to 1500 mg po QHS; check level in 2 days     Substance use: pt counseled; pt does not want inpatient rehab if willing;   -valium taper completed (was started at 5 mg po TID to assist with mood related detox symptoms- decreased to 2 mg po TID- decrease to 2 mg po BID- decrease to q day then stopped);      Nicotine use: pt counseled;  - nicotine patch 14 mg patch dermal daily;   -wellbutrin as above     Psychosocial stressors: pt counseled;   -SW consulted and assisting with resources     Obesity: pt counseled     DIAGNOSTIC TESTING  Labs reviewed with the patient; follow up pending labs- checking labs in 2 days     Disposition:  -SW to assist with aftercare planning and collateral  -Once stable discharge home with outpatient follow up care and/or ACT  -Continue inpatient treatment under a PEC and/or CEC for danger to self as evident by depression with voiced suicidal ideation in the context of heavy substance abuse and withdrawal, psychosocial stressors, and psychosis.  She is s/p SA.      NEED FOR CONTINUED HOSPITALIZATION  Psychiatric illness continues to pose a potential threat to life or bodily function, of self or others, thereby requiring the need for continued inpatient psychiatric hospitalization: Yes    Protective inpatient pyschiatric hospitalization required while a safe disposition plan is enacted: Yes    Patient stabilized  and ready for discharge from inpatient psychiatric unit: No      STAFF:   Salomón Nettles MD  Psychiatry

## 2020-01-12 NOTE — PLAN OF CARE
Care plan reviewed, patient agrees with plan. Does not want to harm self or others. Cut on left forearm is healing well. Patient's mood is improving. Continues to stay in bed between care and meals. Interactions improving in dayroom during mealtime. Gait steady, no falls. Pathways and environment clear and clutter-free. Accepts meals, snacks and medication. Promoted an individualized safety plan, effective coping skills, a drug-free lifestyle, impulse control and mood improvement. Will continue to monitor for safety.

## 2020-01-12 NOTE — PLAN OF CARE
Pt has slept 7 hours with 2 interruptions so far.  NAD.  Resp even & unlabored.  Pathways clear and bed is low.  Q 15 minute safety checks ongoing.  All precautions maintained.

## 2020-01-12 NOTE — PLAN OF CARE
Out and about the unit.  Spending time in the dayroom watching TV and talking with peers.  No complaints.  Interacting appropriately with peers and staff.  Denies SI/HI.  Compliant with meds.  Good appetite.  All precautions maintained.

## 2020-01-13 PROCEDURE — 25000003 PHARM REV CODE 250: Performed by: PSYCHIATRY & NEUROLOGY

## 2020-01-13 PROCEDURE — 90833 PSYTX W PT W E/M 30 MIN: CPT | Mod: AF,HB,S$PBB, | Performed by: PSYCHIATRY & NEUROLOGY

## 2020-01-13 PROCEDURE — 11400000 HC PSYCH PRIVATE ROOM

## 2020-01-13 PROCEDURE — 90833 PR PSYCHOTHERAPY W/PATIENT W/E&M, 30 MIN (ADD ON): ICD-10-PCS | Mod: AF,HB,S$PBB, | Performed by: PSYCHIATRY & NEUROLOGY

## 2020-01-13 PROCEDURE — 99233 SBSQ HOSP IP/OBS HIGH 50: CPT | Mod: AF,HB,S$PBB, | Performed by: PSYCHIATRY & NEUROLOGY

## 2020-01-13 PROCEDURE — 99233 PR SUBSEQUENT HOSPITAL CARE,LEVL III: ICD-10-PCS | Mod: AF,HB,S$PBB, | Performed by: PSYCHIATRY & NEUROLOGY

## 2020-01-13 RX ADMIN — THERA TABS 1 TABLET: TAB at 08:01

## 2020-01-13 RX ADMIN — RISPERIDONE 3 MG: 3 TABLET ORAL at 08:01

## 2020-01-13 RX ADMIN — DIVALPROEX SODIUM 1500 MG: 500 TABLET, FILM COATED, EXTENDED RELEASE ORAL at 08:01

## 2020-01-13 RX ADMIN — FOLIC ACID 1 MG: 1 TABLET ORAL at 08:01

## 2020-01-13 RX ADMIN — BUPROPION HYDROCHLORIDE 300 MG: 300 TABLET, FILM COATED, EXTENDED RELEASE ORAL at 08:01

## 2020-01-13 NOTE — PROGRESS NOTES
"PSYCHIATRY DAILY INPATIENT PROGRESS NOTE  SUBSEQUENT HOSPITAL VISIT    ENCOUNTER DATE: 1/13/2020  [facility]    DATE OF ADMISSION: 1/4/2020  9:00 PM  LENGTH OF STAY: 9 days      HISTORY    CHIEF COMPLAINT   Maria Yancey is a 34 y.o. female, seen during daily manzo rounds on the inpatient unit.  Maria Yancey presents with the chief complaint of depression, psychosis, and suicidal ideation, "I need someone to tell me what's going on"    HPI   (Elements: Location, Quality, Severity, Duration, Timing, Content, Modifying Factors, Associated Signs & Symptoms)    The patient was seen and examined. The chart was reviewed.     Reviewed notes by LPN, RD and RNs  from the last 24 hours.    The patient's case was discussed with the treatment team and care providers today, including RNs, CTRS, LMSW, and [facility].    Staff reports no behavioral or management issues. She remains isolative and mostly staying to herself in her room with continued sedation/amphetamine withdrawal.    The patient has been compliant with treatment. The patient denied any side effects. She receieved her first dose of the REZA without incident.     Symptoms persist but are mildly changed from yesterday/admission. She is still not attending groups and minimally interacts with peers and staff. She is no longer discussing delusions without prompting. She remains depressed and anxious though with mild improvement since yesterday/admission.     Continued but less Symptoms of Depression: less diminished mood or loss of interest/anhedonia; less irritability, +diminished energy, less change in sleep, less change in appetite, +diminished concentration or cognition or indecisiveness, no PMA/R, less excessive guilt or hopelessness or worthlessness, less suicidal ideations     Continued but less Changes in Sleep: less trouble with initiation/maintenance, no early morning awakening with inability to return to sleep     Continued but less " Suicidal/(no)Homicidal ideations: less active/passive ideations, no organized plans, no future intentions     Continued but lessening Symptoms of psychosis: less hallucinations, denied delusions, no disorganized thinking, no disorganized behavior or abnormal motor behavior, no negative symptoms     Denied past or current Symptoms of esther or hypomania: no elevated, expansive, or irritable mood with no increased energy or activity; with no inflated self-esteem or grandiosity, decreased need for sleep, increased rate of speech, FOI or racing thoughts, distractibility, increased goal directed activity or PMA, or risky/disinhibited behavior     Continued but less Symptoms of JORDIN: less excessive anxiety/worry/fear,  with less symptoms of restlessness, fatigue, poor concentration, irritability, muscle tension, and sleep disturbance      Improved Symptoms of Panic Disorder: no panic attacks since admission, may be precipitated or un-precipitated, note source of worry and/or behavioral changes secondary; withagoraphobia     Continued but less Symptoms of PTSD: +h/o trauma; less symptoms of re-experiencing/intrusive symptoms, avoidant behavior, negative alterations in cognition or mood, and hyperarousal symptoms; without dissociative symptoms     Amphetamine withdrawals are occurring and continue exacerbating her symptomatology.  -She is not interested in rehab but is interested with outpatient treatment/12-step meetings    PSYCHOTHERAPY ADD-ON +[id number]   16-37 minutes      Time: 16 minutes  Participants: Met with patient    Therapeutic Intervention Type: insight oriented psychotherapy, behavior modifying psychotherapy, supportive psychotherapy, interactive psychotherapy  Why chosen therapy is appropriate versus another modality: relevant to diagnosis, patient responds to this modality, evidence based practice    Target symptoms: substance abuse, mood disorder, psychosis  Primary focus: substance use, psychosocial  stressors  Psychotherapeutic techniques: supportive and motivational and psycho-dynamic techniques; psycho-education    Outcome monitoring methods: self-report, observation    Patient's response to intervention:  The patient's response to intervention is accepting.    Progress toward goals:  The patient's progress toward goals is limited.          ROS  General ROS: negative  Ophthalmic ROS: negative  ENT ROS: negative  Allergy and Immunology ROS: negative  Hematological and Lymphatic ROS: negative  Endocrine ROS: negative  Respiratory ROS: no cough, shortness of breath, or wheezing  Cardiovascular ROS: no chest pain or dyspnea on exertion  Gastrointestinal ROS: no abdominal pain, change in bowel habits, or black or bloody stools  Genito-Urinary ROS: no dysuria, trouble voiding, or hematuria  Musculoskeletal ROS: negative  Neurological ROS: no TIA or stroke symptoms  Dermatological ROS: negative    PAST MEDICAL HISTORY   Past Medical History:   Diagnosis Date    Addiction to drug     recovering drug addict    ADHD (attention deficit hyperactivity disorder)     Alcohol abuse     Anxiety     Asthma     Bipolar 1 disorder     Cervical pain     Chronic constipation     Chronic diarrhea     Depression     Fatigue     Fibromyalgia     Hallucination     Headache     History of psychiatric hospitalization     pt reports 10 total- [facility] 10/2019 and 3/2019, 1/2020    Hx of psychiatric care     Seroquel    Magalis     Panic disorder     Polysubstance dependence     Psychiatric exam requested by authority     Psychiatric problem     Auditory hallucinations    PTSD (post-traumatic stress disorder)     Schizoaffective disorder     Sleep difficulties     Spina bifida     Spinal stenosis of lumbar region     Substance abuse     Suicide attempt     Once by hanging, once by overdose, once by cutting wrists     Syringomyelia     Syrinx of spinal cord     Therapy     [facility]    Trigger  "finger     Withdrawal symptoms, alcohol     Withdrawal symptoms, drug or narcotic            PSYCHOTROPIC MEDICATIONS   Scheduled Meds:   buPROPion  300 mg Oral Daily    divalproex ER  1,500 mg Oral QHS    folic acid  1 mg Oral Daily    multivitamin  1 tablet Oral Daily    [START ON 1/14/2020] paliperidone palmitate  156 mg Intramuscular Once    risperiDONE  3 mg Oral BID     Continuous Infusions:  PRN Meds:.acetaminophen, aluminum-magnesium hydroxide-simethicone, docusate sodium, hydrOXYzine pamoate, loperamide, nicotine, OLANZapine **AND** OLANZapine        EXAMINATION    VITALS   Vitals:    01/13/20 0800   BP: 98/62   Pulse: 78   Resp: 18   Temp: 96.4 °F (35.8 °C)     Body mass index is 39.31 kg/m².      CONSTITUTIONAL  General Appearance: WF, in casual garb, obese, NAD     MUSCULOSKELETAL  Muscle Strength and Tone: normal  Abnormal Involuntary Movements:  none  Gait and Station:  normal; non-ataxic     PSYCHIATRIC   Level of Consciousness: awake, alert  Orientation: p/p/t/s  Grooming:   adequate to circumstances- improved  Psychomotor Behavior: no PMA, no PMR  Speech: nl r/t/v/s  Language: able to repeat words, English fluent  Mood: "better.. I'm not sure"  Affect: less dysphoric, less irritable, anxious, less decreased range, less labile  Thought Process:  linear and organized, goal directed  Associations:  intact; no SHAGUFTA  Thought Content: less SI, less AVH, denied delusions, no HI  Memory:  intact to recent and remote events  Attention:  intact to conversation; not distractible   Fund of Knowledge:  age and education appropriate  Estimate if Intelligence:  average based on work/education history, vocabulary and mental status exam  Insight: impaired but improving- partially seeks help and understands illness, better admits addiction issues  Judgment: impaired but improving- less bx issues, more compliant and cooperative           DIAGNOSTIC TESTING   Laboratory Results  No results found for this or any " previous visit (from the past 24 hour(s)).      ASSESSMENT      IMPRESSION   Schizoaffective Disorder, depressed type, severe  JORDIN  Panic without agoraphobia   PTSD  Fibromyalgia      Polysubstance dependence (Methamphetamines, cannabis, alcohol, narcotics; methamphetamines, current, continuous, severe, without physiological dependence)  Nicotine Dependence     Psychosocial stressors     Amphetamine Withdrawal     Obesity         MEDICAL DECISION MAKING     PROBLEM LIST AND MANAGEMENT PLANS; PRESCRIPTION DRUG MANAGEMENT  Compliance: yes  Side Effects: no  Regimen Adjustments:      Psychosis: pt counseled  -Resumed risperdal at 1mg QHS- increased to 1 mg po BID- increase to 1 mg po q AM and 2 mg po  HS- increased to 2 mg po BID- increased to/coninue  3 mg po BID; plan to taper off as REZA reaches therapeutic levels  -Start Invega REZA: 1st dose of 234 mg IM x 1 on 1/11/20; 2nd dose of 156 mg IM x 1 due on 1/14/20; 3rd dose will be due on approximately 2/14/20  -depakote adjunctive (off-label) as below     Depression: pt counseled  -Resumed/continue Wellbutrin XL at 150 mg po q day- increased to/conitue at 300 mg po q day; consider optimizing further      Anxiety/PTSD: pt counseled  -depakote off-label as below; wellbutrin off-label as above  -vistaril prn     Pain: pt counseled  -Restart Depakote at higher dose 500 mg po q HS- increase to 1000 mg po q HS- increase to 1500 mg po QHS; check level tomorrow      Substance use: pt counseled; pt does not want inpatient rehab if willing;   -valium taper completed (was started at 5 mg po TID to assist with mood related detox symptoms- decreased to 2 mg po TID- decrease to 2 mg po BID- decrease to q day; then stopped;      Nicotine use: pt counseled;  - nicotine patch 14 mg patch dermal daily;   -wellbutrin as above     Psychosocial stressors: pt counseled;   -SW consulted and assisting with resources     Obesity: pt counseled     DIAGNOSTIC TESTING  Labs reviewed with the  patient; follow up pending labs- checking labs tomorrow     Disposition:  -SW to assist with aftercare planning and collateral  -Once stable discharge home with outpatient follow up care and/or ACT  -Continue inpatient treatment under a PEC and/or CEC for danger to self as evident by depression with voiced suicidal ideation in the context of heavy substance abuse and withdrawal, psychosocial stressors, and psychosis.  She is s/p SA.      NEED FOR CONTINUED HOSPITALIZATION  Psychiatric illness continues to pose a potential threat to life or bodily function, of self or others, thereby requiring the need for continued inpatient psychiatric hospitalization: Yes    Protective inpatient pyschiatric hospitalization required while a safe disposition plan is enacted: Yes    Patient stabilized and ready for discharge from inpatient psychiatric unit: No      STAFF:   Salomón Nettles MD  Psychiatry

## 2020-01-13 NOTE — PROGRESS NOTES
"   01/13/20 1240   [facility] Group Therapy   Group Name Leisure Skills Training   Specific Interventions Coping Skills Training  (1:1)   Participation Level Appropriate   Participation Quality Drowsy;Requires Prompting   Insight/Motivation Improved   Affect/Mood Display Appropriate   Cognition Oriented   Psychomotor WNL   Patient did not attend group due to "my stomach hurts." Patient reports "the medicine makes me sleepy and I can't keep my eyes opened", questions repeated as needed. Staff oriented patient to skilled activities available on the unit to stimulate interest. Patient states "I like gardening and outdoor activities", but states she might try to do a 48 piece Jigsaw Puzzle or Word Search Puzzle another time. Staff will continue to encourage patient to attend group and come out of her room.  "

## 2020-01-13 NOTE — PROGRESS NOTES
"[facility]  Adult Nutrition  Progress Note    SUMMARY       Recommendations    Recommendation:   Continue regular diet as ordered; encourage adequate intake    Goals: Pt to conintue to intake >75% by f/u  Nutrition Goal Status: goal met, progressing towards goal  Communication of RD Recs: other (comment)(POC)    Reason for Assessment    Reason For Assessment: RD follow-up  Diagnosis: psychological disorder  Relevant Medical History: No past medical history other than psych      General Information Comments: Pt continues to meet nutritional needs with meal and snack intake. NFPE not completed per psych status.    Nutrition Discharge Planning: Regular Diet    Nutrition Risk Screen    Nutrition Risk Screen: no indicators present    Nutrition/Diet History    Spiritual, Cultural Beliefs, Nondenominational Practices, Values that Affect Care: no  Food Allergies: NKFA  Factors Affecting Nutritional Intake: None identified at this time    Anthropometrics    Temp: 96.5 °F (35.8 °C)  Height Method: Measured  Height: 5' 2" (157.5 cm)  Height (inches): 62 in  Weight Method: Standard Scale  Weight: 97.5 kg (214 lb 15.2 oz)  Weight (lb): 214.95 lb  Ideal Body Weight (IBW), Female: 110 lb  % Ideal Body Weight, Female (lb): 184.13 %  BMI (Calculated): 39.3  BMI Grade: 35 - 39.9 - obesity - grade II     Lab/Procedures/Meds    Pertinent Labs Reviewed: reviewed  Pertinent Labs Comments: WNL  Pertinent Medications Reviewed: reviewed  Pertinent Medications Comments: multivitamin, folic acid    Estimated/Assessed Needs    Weight Used For Calorie Calculations: 95.7 kg (211 lb)  Energy Calorie Requirements (kcal): 1616  Energy Need Method: [place]-St Jeor(no AF)  Protein Requirements: 41-52 g(.8-1 g/kg IBW)  Weight Used For Protein Calculations: 52.2 kg (115 lb)(IBW)     Estimated Fluid Requirement Method: RDA Method  RDA Method (mL): 1616     Nutrition Prescription Ordered    Current Diet Order: Regular Diet    Evaluation of " Received Nutrient/Fluid Intake    Fluid Required: meeting needs  % Intake of Estimated Energy Needs: 75 - 100 %  % Meal Intake: 75 - 100 %    Nutrition Risk    Level of Risk/Frequency of Follow-up: (1x/wk)     Assessment and Plan  Nutrition Problem:  Obese class II     Related to (etiology):   Excessive caloric intake     Signs and Symptoms (as evidenced by):   BMI 39.31     Interventions:  Collaboration with other providers     Nutrition Diagnosis Status:   continues  Monitor and Evaluation    Food and Nutrient Intake: energy intake, food and beverage intake  Physical Activity and Function: nutrition-related ADLs and IADLs  Nutrition-Focused Physical Findings: overall appearance     Nutrition Follow-Up    RD Follow-up?: Yes

## 2020-01-13 NOTE — PLAN OF CARE
"  Problem: Adult Behavioral Health Plan of Care  Goal: Optimized Coping Skills in Response to Life Stressors  Intervention: Promote Effective Coping Strategies  Flowsheets (Taken 1/13/2020 2331)  Supportive Measures: active listening utilized; positive reinforcement provided; verbalization of feelings encouraged; counseling provided; problem solving facilitated; decision-making supported; relaxation techniques promoted; goal setting facilitated; self-care encouraged; self-reflection promoted; self-responsibility promoted; journaling promoted     Behavior: Patient attended psychotherapeutic group dressed in hospital scrubs, disheveled with congruent affect and "numb," laughing, interacting mood, relaxed posture, and intermittent eye contact. Patient remained engaged and attentive.    Intervention:  will engage patients in a discussion about common reactions to trauma with a focus on how/if trauma affects there life, the negative coping mechanisms, and ones to replace the maladaptive ones. Afterwards, the patient will be engaged in an emotions game in which they are given an emotion on a card and given the instructions to: describe the emotion for group to guess, act it out, or tell of a time that they felt it. Patients will be given time to express thoughts, concerns and goals for treatment and discharge, as well as perceived barriers to progress.    Response: Patient remained attentive and engaged which shows evident progress, as this is the first group that she has participated in since admission. She described herself as feeling numb with the last feeling she remembers being when her  left her. She says that she tends to isolate herself for fear of connection with people.     Plan:  will continue to encourage patient to explore and verbalize emotions, set goals to aid in preventing re-hospitalization, attend psychotherapeutic group, and follow up with aftercare appointments.  "

## 2020-01-13 NOTE — PLAN OF CARE
Pt has slept 8 hours with one interruption so far.  NAD.  Resp even & unlabored.  Pathways clear and bed is low.  Q 15 minute safety checks ongoing.  All precautions maintained.

## 2020-01-13 NOTE — PLAN OF CARE
Up and about the unit.  Spent a little time in the dayroom for snack, talk with peers and watched TV.  Calm, cooperative, pleasant.  Compliant with unit rules.  Came to the nurses station for PM meds then went to assigned bed.  No complaints.  All precautions maintained.

## 2020-01-13 NOTE — PLAN OF CARE
Pt is still withdrawn and stays in the bed a lot but is doing better.  Denies any S/I or H/I at this time.  Will socialize a little at times.  No acute distress apparent at this time, will continue to monitor.

## 2020-01-13 NOTE — PLAN OF CARE
Recommendation:   Continue regular diet as ordered; encourage adequate intake    Goals: Pt to conintue to intake >75% by f/u  Nutrition Goal Status: goal met, progressing towards goal

## 2020-01-14 PROBLEM — R45.851 DEPRESSION WITH SUICIDAL IDEATION: Status: RESOLVED | Noted: 2020-01-04 | Resolved: 2020-01-14

## 2020-01-14 PROBLEM — F32.A DEPRESSION WITH SUICIDAL IDEATION: Status: RESOLVED | Noted: 2020-01-04 | Resolved: 2020-01-14

## 2020-01-14 LAB
ALBUMIN SERPL BCP-MCNC: 3.3 G/DL (ref 3.5–5.2)
ALP SERPL-CCNC: 64 U/L (ref 55–135)
ALT SERPL W/O P-5'-P-CCNC: 13 U/L (ref 10–44)
ANION GAP SERPL CALC-SCNC: 10 MMOL/L (ref 8–16)
AST SERPL-CCNC: 11 U/L (ref 10–40)
BASOPHILS # BLD AUTO: 0.03 K/UL (ref 0–0.2)
BASOPHILS NFR BLD: 0.5 % (ref 0–1.9)
BILIRUB SERPL-MCNC: 0.2 MG/DL (ref 0.1–1)
BUN SERPL-MCNC: 15 MG/DL (ref 6–20)
CALCIUM SERPL-MCNC: 8.8 MG/DL (ref 8.7–10.5)
CHLORIDE SERPL-SCNC: 105 MMOL/L (ref 95–110)
CO2 SERPL-SCNC: 26 MMOL/L (ref 23–29)
CREAT SERPL-MCNC: 0.7 MG/DL (ref 0.5–1.4)
DIFFERENTIAL METHOD: ABNORMAL
EOSINOPHIL # BLD AUTO: 0.1 K/UL (ref 0–0.5)
EOSINOPHIL NFR BLD: 1 % (ref 0–8)
ERYTHROCYTE [DISTWIDTH] IN BLOOD BY AUTOMATED COUNT: 15.2 % (ref 11.5–14.5)
EST. GFR  (AFRICAN AMERICAN): >60 ML/MIN/1.73 M^2
EST. GFR  (NON AFRICAN AMERICAN): >60 ML/MIN/1.73 M^2
GLUCOSE SERPL-MCNC: 80 MG/DL (ref 70–110)
HCT VFR BLD AUTO: 38.3 % (ref 37–48.5)
HGB BLD-MCNC: 12.2 G/DL (ref 12–16)
IMM GRANULOCYTES # BLD AUTO: 0.02 K/UL (ref 0–0.04)
IMM GRANULOCYTES NFR BLD AUTO: 0.3 % (ref 0–0.5)
LYMPHOCYTES # BLD AUTO: 2.3 K/UL (ref 1–4.8)
LYMPHOCYTES NFR BLD: 39.2 % (ref 18–48)
MCH RBC QN AUTO: 27.1 PG (ref 27–31)
MCHC RBC AUTO-ENTMCNC: 31.9 G/DL (ref 32–36)
MCV RBC AUTO: 85 FL (ref 82–98)
MONOCYTES # BLD AUTO: 0.6 K/UL (ref 0.3–1)
MONOCYTES NFR BLD: 10.1 % (ref 4–15)
NEUTROPHILS # BLD AUTO: 2.8 K/UL (ref 1.8–7.7)
NEUTROPHILS NFR BLD: 48.9 % (ref 38–73)
NRBC BLD-RTO: 0 /100 WBC
PLATELET # BLD AUTO: 188 K/UL (ref 150–350)
PMV BLD AUTO: 12.3 FL (ref 9.2–12.9)
POTASSIUM SERPL-SCNC: 4 MMOL/L (ref 3.5–5.1)
PROT SERPL-MCNC: 6.6 G/DL (ref 6–8.4)
RBC # BLD AUTO: 4.5 M/UL (ref 4–5.4)
SODIUM SERPL-SCNC: 141 MMOL/L (ref 136–145)
VALPROATE SERPL-MCNC: 71.9 UG/ML (ref 50–100)
WBC # BLD AUTO: 5.82 K/UL (ref 3.9–12.7)

## 2020-01-14 PROCEDURE — 36415 COLL VENOUS BLD VENIPUNCTURE: CPT

## 2020-01-14 PROCEDURE — 90833 PSYTX W PT W E/M 30 MIN: CPT | Mod: AF,HB,S$PBB, | Performed by: PSYCHIATRY & NEUROLOGY

## 2020-01-14 PROCEDURE — 99233 PR SUBSEQUENT HOSPITAL CARE,LEVL III: ICD-10-PCS | Mod: AF,HB,S$PBB, | Performed by: PSYCHIATRY & NEUROLOGY

## 2020-01-14 PROCEDURE — 63600175 PHARM REV CODE 636 W HCPCS: Performed by: PSYCHIATRY & NEUROLOGY

## 2020-01-14 PROCEDURE — 11400000 HC PSYCH PRIVATE ROOM

## 2020-01-14 PROCEDURE — 25000003 PHARM REV CODE 250: Performed by: PSYCHIATRY & NEUROLOGY

## 2020-01-14 PROCEDURE — 85025 COMPLETE CBC W/AUTO DIFF WBC: CPT

## 2020-01-14 PROCEDURE — 80053 COMPREHEN METABOLIC PANEL: CPT

## 2020-01-14 PROCEDURE — 90833 PR PSYCHOTHERAPY W/PATIENT W/E&M, 30 MIN (ADD ON): ICD-10-PCS | Mod: AF,HB,S$PBB, | Performed by: PSYCHIATRY & NEUROLOGY

## 2020-01-14 PROCEDURE — S4991 NICOTINE PATCH NONLEGEND: HCPCS | Performed by: PSYCHIATRY & NEUROLOGY

## 2020-01-14 PROCEDURE — 80164 ASSAY DIPROPYLACETIC ACD TOT: CPT

## 2020-01-14 PROCEDURE — 99233 SBSQ HOSP IP/OBS HIGH 50: CPT | Mod: AF,HB,S$PBB, | Performed by: PSYCHIATRY & NEUROLOGY

## 2020-01-14 RX ORDER — DIVALPROEX SODIUM 500 MG/1
1500 TABLET, FILM COATED, EXTENDED RELEASE ORAL NIGHTLY
Qty: 90 TABLET | Refills: 2 | Status: ON HOLD | OUTPATIENT
Start: 2020-01-14 | End: 2020-10-30 | Stop reason: HOSPADM

## 2020-01-14 RX ORDER — BUPROPION HYDROCHLORIDE 300 MG/1
300 TABLET ORAL DAILY
Qty: 30 TABLET | Refills: 2 | Status: ON HOLD | OUTPATIENT
Start: 2020-01-14 | End: 2020-10-30 | Stop reason: HOSPADM

## 2020-01-14 RX ORDER — IBUPROFEN 200 MG
1 TABLET ORAL DAILY
COMMUNITY
Start: 2020-01-14 | End: 2021-11-15

## 2020-01-14 RX ORDER — RISPERIDONE 3 MG/1
3 TABLET ORAL 2 TIMES DAILY
Qty: 60 TABLET | Refills: 0 | Status: ON HOLD | OUTPATIENT
Start: 2020-01-14 | End: 2020-10-30 | Stop reason: HOSPADM

## 2020-01-14 RX ADMIN — FOLIC ACID 1 MG: 1 TABLET ORAL at 08:01

## 2020-01-14 RX ADMIN — RISPERIDONE 3 MG: 3 TABLET ORAL at 08:01

## 2020-01-14 RX ADMIN — THERA TABS 1 TABLET: TAB at 08:01

## 2020-01-14 RX ADMIN — PALIPERIDONE PALMITATE 156 MG: 156 INJECTION INTRAMUSCULAR at 10:01

## 2020-01-14 RX ADMIN — DIVALPROEX SODIUM 1500 MG: 500 TABLET, FILM COATED, EXTENDED RELEASE ORAL at 08:01

## 2020-01-14 RX ADMIN — BUPROPION HYDROCHLORIDE 300 MG: 300 TABLET, FILM COATED, EXTENDED RELEASE ORAL at 08:01

## 2020-01-14 RX ADMIN — Medication 1 PATCH: at 06:01

## 2020-01-14 NOTE — PROGRESS NOTES
"PSYCHIATRY DAILY INPATIENT PROGRESS NOTE  SUBSEQUENT HOSPITAL VISIT    ENCOUNTER DATE: 1/14/2020  SITE: [facility]    DATE OF ADMISSION: 1/4/2020  9:00 PM  LENGTH OF STAY: 10 days      HISTORY    CHIEF COMPLAINT   Maria Yancey is a 34 y.o. female, seen during daily manzo rounds on the inpatient unit.  Maria Yancey presents with the chief complaint of depression, psychosis, and suicidal ideation, "I need someone to tell me what's going on"    HPI   (Elements: Location, Quality, Severity, Duration, Timing, Content, Modifying Factors, Associated Signs & Symptoms)    The patient was seen and examined. The chart was reviewed.     Reviewed notes by LPN, LMSW, CTRS, RD and RNs  from the last 24 hours.    The patient's case was discussed with the treatment team and care providers today, including RNs, CTRS, LMSW, and Speciality Services.    Staff reports no behavioral or management issues. She is less isolative and better attended/participated in grups and activities.    The patient has been compliant with treatment. The patient denied any side effects. She receieved her first dose of the REZA without incident. Her second dose is due today    Symptoms are making improvements since yesterday/admission. She better interacts with peers and staff. She is no longer discussing delusions without prompting and has gained insightin to them, now attributing them to the amphetmines. She is less depressed and anxiou since yesterday/admission.     Improving Symptoms of Depression: less diminished mood or loss of interest/anhedonia; no irritability, no diminished energy, no change in sleep, no change in appetite, no diminished concentration or cognition or indecisiveness, no PMA/R, less excessive guilt or hopelessness or worthlessness, no suicidal ideations     Improving Changes in Sleep: less trouble with initiation/maintenance, no early morning awakening with inability to return to sleep     Improving Suicidal/(no)Homicidal " ideations: no active/passive ideations, no organized plans, no future intentions; she is more hopeful and future oriented; she better discusses her short and long term goals     Improving Symptoms of psychosis: less hallucinations, denied delusions, no disorganized thinking, no disorganized behavior or abnormal motor behavior, no negative symptoms     Denied past or current Symptoms of esther or hypomania: no elevated, expansive, or irritable mood with no increased energy or activity; with no inflated self-esteem or grandiosity, decreased need for sleep, increased rate of speech, FOI or racing thoughts, distractibility, increased goal directed activity or PMA, or risky/disinhibited behavior     Improving Symptoms of JORDIN: less excessive anxiety/worry/fear,  with no current symptoms of restlessness, fatigue, poor concentration, irritability, muscle tension, or sleep disturbance      Improved Symptoms of Panic Disorder: no panic attacks since admission, may be precipitated or un-precipitated, note source of worry and/or behavioral changes secondary; withagoraphobia     Improving Symptoms of PTSD: +h/o trauma; less symptoms of re-experiencing/intrusive symptoms, avoidant behavior, negative alterations in cognition or mood, and hyperarousal symptoms; without dissociative symptoms     Amphetamine withdrawals are improving.  -She is not interested in rehab but is interested with outpatient treatment/12-step meetings    PSYCHOTHERAPY ADD-ON +[id number]   16-37 minutes      Time: 16 minutes  Participants: Met with patient    Therapeutic Intervention Type: insight oriented psychotherapy, behavior modifying psychotherapy, supportive psychotherapy, interactive psychotherapy  Why chosen therapy is appropriate versus another modality: relevant to diagnosis, patient responds to this modality, evidence based practice    Target symptoms: substance abuse, mood disorder, psychosis  Primary focus: substance use, psychosocial  stressors  Psychotherapeutic techniques: supportive and motivational and psycho-dynamic techniques; psycho-education; safety planning    Outcome monitoring methods: self-report, observation    Patient's response to intervention:  The patient's response to intervention is accepting.    Progress toward goals:  The patient's progress toward goals is good.          ROS  General ROS: negative  Ophthalmic ROS: negative  ENT ROS: negative  Allergy and Immunology ROS: negative  Hematological and Lymphatic ROS: negative  Endocrine ROS: negative  Respiratory ROS: no cough, shortness of breath, or wheezing  Cardiovascular ROS: no chest pain or dyspnea on exertion  Gastrointestinal ROS: no abdominal pain, change in bowel habits, or black or bloody stools  Genito-Urinary ROS: no dysuria, trouble voiding, or hematuria  Musculoskeletal ROS: negative  Neurological ROS: no TIA or stroke symptoms  Dermatological ROS: negative    PAST MEDICAL HISTORY   Past Medical History:   Diagnosis Date    Addiction to drug     recovering drug addict    ADHD (attention deficit hyperactivity disorder)     Alcohol abuse     Anxiety     Asthma     Bipolar 1 disorder     Cervical pain     Chronic constipation     Chronic diarrhea     Depression     Fatigue     Fibromyalgia     Hallucination     Headache     History of psychiatric hospitalization     pt reports 10 total- [facility] 10/2019 and 3/2019, 1/2020    Hx of psychiatric care     Seroquel    Magalis     Panic disorder     Polysubstance dependence     Psychiatric exam requested by authority     Psychiatric problem     Auditory hallucinations    PTSD (post-traumatic stress disorder)     Schizoaffective disorder     Sleep difficulties     Spina bifida     Spinal stenosis of lumbar region     Substance abuse     Suicide attempt     Once by hanging, once by overdose, once by cutting wrists     Syringomyelia     Syrinx of spinal cord     Therapy     [facility]  "Services    Trigger finger     Withdrawal symptoms, alcohol     Withdrawal symptoms, drug or narcotic            PSYCHOTROPIC MEDICATIONS   Scheduled Meds:   buPROPion  300 mg Oral Daily    divalproex ER  1,500 mg Oral QHS    folic acid  1 mg Oral Daily    multivitamin  1 tablet Oral Daily    paliperidone palmitate  156 mg Intramuscular Once    risperiDONE  3 mg Oral BID     Continuous Infusions:  PRN Meds:.acetaminophen, aluminum-magnesium hydroxide-simethicone, docusate sodium, hydrOXYzine pamoate, loperamide, nicotine, OLANZapine **AND** OLANZapine        EXAMINATION    VITALS   Vitals:    01/14/20 0800   BP: 105/63   Pulse: 90   Resp: 18   Temp: 96.3 °F (35.7 °C)     Body mass index is 39.31 kg/m².      CONSTITUTIONAL  General Appearance: WF, in casual garb, obese, NAD     MUSCULOSKELETAL  Muscle Strength and Tone: normal  Abnormal Involuntary Movements:  none  Gait and Station:  normal; non-ataxic     PSYCHIATRIC   Level of Consciousness: awake, alert  Orientation: p/p/t/s  Grooming:   adequate to circumstances- improved  Psychomotor Behavior: no PMA, no PMR  Speech: nl r/t/v/s  Language: able to repeat words, English fluent  Mood: "getting better"  Affect: less dysphoric/irritable/anxious, improving range,   Thought Process:  linear and organized, goal directed  Associations:  intact; no SHAGUFTA  Thought Content: denied SI, less AVH, denied delusions, no HI  Memory:  intact to recent and remote events  Attention:  intact to conversation; not distractible   Fund of Knowledge:  age and education appropriate  Estimate if Intelligence:  average based on work/education history, vocabulary and mental status exam  Insight:  improving-  seeks help and better understands illness, now admits addiction issues  Judgment: improving- no bx issues, more compliant and cooperative           DIAGNOSTIC TESTING   Laboratory Results  Recent Results (from the past 24 hour(s))   CBC auto differential    Collection Time: " 01/14/20  6:27 AM   Result Value Ref Range    WBC 5.82 3.90 - 12.70 K/uL    RBC 4.50 4.00 - 5.40 M/uL    Hemoglobin 12.2 12.0 - 16.0 g/dL    Hematocrit 38.3 37.0 - 48.5 %    Mean Corpuscular Volume 85 82 - 98 fL    Mean Corpuscular Hemoglobin 27.1 27.0 - 31.0 pg    Mean Corpuscular Hemoglobin Conc 31.9 (L) 32.0 - 36.0 g/dL    RDW 15.2 (H) 11.5 - 14.5 %    Platelets 188 150 - 350 K/uL    MPV 12.3 9.2 - 12.9 fL    Immature Granulocytes 0.3 0.0 - 0.5 %    Gran # (ANC) 2.8 1.8 - 7.7 K/uL    Immature Grans (Abs) 0.02 0.00 - 0.04 K/uL    Lymph # 2.3 1.0 - 4.8 K/uL    Mono # 0.6 0.3 - 1.0 K/uL    Eos # 0.1 0.0 - 0.5 K/uL    Baso # 0.03 0.00 - 0.20 K/uL    nRBC 0 0 /100 WBC    Gran% 48.9 38.0 - 73.0 %    Lymph% 39.2 18.0 - 48.0 %    Mono% 10.1 4.0 - 15.0 %    Eosinophil% 1.0 0.0 - 8.0 %    Basophil% 0.5 0.0 - 1.9 %    Differential Method Automated    Comprehensive metabolic panel    Collection Time: 01/14/20  6:27 AM   Result Value Ref Range    Sodium 141 136 - 145 mmol/L    Potassium 4.0 3.5 - 5.1 mmol/L    Chloride 105 95 - 110 mmol/L    CO2 26 23 - 29 mmol/L    Glucose 80 70 - 110 mg/dL    BUN, Bld 15 6 - 20 mg/dL    Creatinine 0.7 0.5 - 1.4 mg/dL    Calcium 8.8 8.7 - 10.5 mg/dL    Total Protein 6.6 6.0 - 8.4 g/dL    Albumin 3.3 (L) 3.5 - 5.2 g/dL    Total Bilirubin 0.2 0.1 - 1.0 mg/dL    Alkaline Phosphatase 64 55 - 135 U/L    AST 11 10 - 40 U/L    ALT 13 10 - 44 U/L    Anion Gap 10 8 - 16 mmol/L    eGFR if African American >60 >60 mL/min/1.73 m^2    eGFR if non African American >60 >60 mL/min/1.73 m^2   Valproic Acid    Collection Time: 01/14/20  6:27 AM   Result Value Ref Range    Valproic Acid Level 71.9 50.0 - 100.0 ug/mL         ASSESSMENT      IMPRESSION   Schizoaffective Disorder, depressed type, severe  JORDIN  Panic without agoraphobia   PTSD  Fibromyalgia      Polysubstance dependence (Methamphetamines, cannabis, alcohol, narcotics; methamphetamines, current, continuous, severe, without physiological  dependence)  Nicotine Dependence     Psychosocial stressors     Amphetamine Withdrawal     Obesity         MEDICAL DECISION MAKING     PROBLEM LIST AND MANAGEMENT PLANS; PRESCRIPTION DRUG MANAGEMENT  Compliance: yes  Side Effects: no  Regimen Adjustments:      Psychosis: pt counseled  -Resumed risperdal at 1mg QHS- increased to 1 mg po BID- increase to 1 mg po q AM and 2 mg po  HS- increased to 2 mg po BID- increased to/coninue  3 mg po BID; plan to taper off as an outpatient as REZA reaches therapeutic levels  -Start Invega REZA: 1st dose of 234 mg IM x 1 on 1/11/20; 2nd dose of 156 mg IM x 1 on 1/14/20; 3rd dose will be due on approximately 2/14/20  -depakote adjunctive (off-label) as below     Depression: pt counseled  -Resumed/continue Wellbutrin XL at 150 mg po q day- increased to/conitue at 300 mg po q day; consider optimizing further      Anxiety/PTSD: pt counseled  -depakote off-label as below; wellbutrin off-label as above  -vistaril prn     Pain: pt counseled  -Restart Depakote at higher dose 500 mg po q HS- increase to 1000 mg po q HS- increase to 1500 mg po QHS;      Substance use: pt counseled; pt does not want inpatient rehab if willing;   -valium taper completed (was started at 5 mg po TID to assist with mood related detox symptoms- decreased to 2 mg po TID- decrease to 2 mg po BID- decrease to q day; then stopped;      Nicotine use: pt counseled;  - nicotine patch 14 mg patch dermal daily;   -wellbutrin as above     Psychosocial stressors: pt counseled;   -VERNON consulted and assisted with resources     Obesity: pt counseled     DIAGNOSTIC TESTING  Labs reviewed with the patient      Disposition:  -SW to assist with aftercare planning and collateral  -Once stable discharge home with outpatient follow up care and/or ACT  -Continue inpatient treatment under a PEC and/or CEC for danger to self as evident by depression with voiced suicidal ideation in the context of heavy substance abuse and withdrawal,  psychosocial stressors, and psychosis.  She is s/p SA.  -The patient is improving and will be stable for discharge home tomorrow and transitioning to outpatient care with ACT team.      NEED FOR CONTINUED HOSPITALIZATION  Psychiatric illness continues to pose a potential threat to life or bodily function, of self or others, thereby requiring the need for continued inpatient psychiatric hospitalization: Yes    Protective inpatient pyschiatric hospitalization required while a safe disposition plan is enacted: Yes    Patient stabilized and ready for discharge from inpatient psychiatric unit: No      STAFF:   Salomón Nettles MD  Psychiatry

## 2020-01-14 NOTE — PLAN OF CARE
Shift note : patient is eating all meals and is taking all ordered medications . She is attending all groups to improve her coping skills . She isolates in her room but is out for meals and groups . She is going home tomorrow .

## 2020-01-14 NOTE — PROGRESS NOTES
"   01/14/20 1150   [facility] Group Therapy   Group Name Leisure Education   Specific Interventions Coping Skills Training  (1:1)   Participation Level Minimal   Participation Quality Drowsy   Patient said she was coming to group but remained in bed. After group patient says "I just got that shot today, right after you walked out, I got a shot. It makes me sleepy. Patient talking eyes closed and pulled covers over her head. Staff will continue to monitor and document progress.  "

## 2020-01-14 NOTE — PLAN OF CARE
Pt calm and cooperative, out on unit interacting good with staff and peers, med compliant, denies SI at this time, safety precautions maintained, will continue to monitor

## 2020-01-14 NOTE — PSYCH
"Problem: Adult Behavioral Health Plan of Care  Goal: Optimized Coping Skills in Response to Life Stressors  Intervention: Promote Effective Coping Strategies  Flowsheets (Taken 1/9/2020 5515)  Supportive Measures: active listening utilized; counseling provided; verbalization of feelings encouraged; positive reinforcement provided; problem solving facilitated; decision-making supported; relaxation techniques promoted; goal setting facilitated; self-care encouraged; guided imagery facilitated; self-reflection promoted; journaling promoted; self-responsibility promoted     Behavior: Patient attended social service group.  She appears pleasant. She has been cooperative yet minimally engaged in treatment since admit.      Intervention: Social  engaged patients in a relaxation exercise designed to encourage positive self talk. Hand out,"A LETTER TO MYSELF" was used to assist the patient's in their engagement of the group topic.  This will allow patient's to add another coping skill in their tool box of skills and explore their strengths while intentionally breaking negative thought processes. Patients will be given time to express thoughts, concerns and goals for treatment and discharge, as well as perceived barriers to progress.     Response: patient completed written work and expressed thoughts during the group. The patient was able to share positive self traits, "I am a hardworker and good person. In addition, the patient has shared that the one thing I would like to do better is taking care of my son, and I know I can accomplish this in time.       Plan: Social  will continue to encourage patient to explore and verbalize emotions, set goals to aid in preventing re-hospitalization, attend psychotherapeutic group, and follow up with aftercare appointments    "

## 2020-01-15 VITALS
HEART RATE: 78 BPM | RESPIRATION RATE: 20 BRPM | TEMPERATURE: 97 F | SYSTOLIC BLOOD PRESSURE: 93 MMHG | HEIGHT: 62 IN | BODY MASS INDEX: 39.94 KG/M2 | DIASTOLIC BLOOD PRESSURE: 55 MMHG | WEIGHT: 217.06 LBS

## 2020-01-15 PROCEDURE — 25000003 PHARM REV CODE 250: Performed by: PSYCHIATRY & NEUROLOGY

## 2020-01-15 PROCEDURE — 90833 PR PSYCHOTHERAPY W/PATIENT W/E&M, 30 MIN (ADD ON): ICD-10-PCS | Mod: AF,HB,S$PBB, | Performed by: PSYCHIATRY & NEUROLOGY

## 2020-01-15 PROCEDURE — 99239 HOSP IP/OBS DSCHRG MGMT >30: CPT | Mod: AF,HB,S$PBB, | Performed by: PSYCHIATRY & NEUROLOGY

## 2020-01-15 PROCEDURE — 99239 PR HOSPITAL DISCHARGE DAY,>30 MIN: ICD-10-PCS | Mod: AF,HB,S$PBB, | Performed by: PSYCHIATRY & NEUROLOGY

## 2020-01-15 PROCEDURE — 90833 PSYTX W PT W E/M 30 MIN: CPT | Mod: AF,HB,S$PBB, | Performed by: PSYCHIATRY & NEUROLOGY

## 2020-01-15 RX ADMIN — RISPERIDONE 3 MG: 3 TABLET ORAL at 08:01

## 2020-01-15 RX ADMIN — FOLIC ACID 1 MG: 1 TABLET ORAL at 08:01

## 2020-01-15 RX ADMIN — BUPROPION HYDROCHLORIDE 300 MG: 300 TABLET, FILM COATED, EXTENDED RELEASE ORAL at 08:01

## 2020-01-15 RX ADMIN — THERA TABS 1 TABLET: TAB at 08:01

## 2020-01-15 NOTE — PROGRESS NOTES
"   01/15/20 1045   [facility] Group Therapy   Group Name Leisure Skills Training   Specific Interventions Cognitive Stimulation Training   Participation Level None   Despite encouragement patient refused to attend group. Patient states "I'm to sleepy. I'm being discharge today." Patient remained in bed.   "

## 2020-01-15 NOTE — PSYCH
"Called [employer] transportation at 10:15 am for discharge ride. Spoke to Marguerite and was given ride number [id number].  They said that they would call when they found a ride company.  They called back at 11:30 am and "Shelby" said that [employer] would take the trip but they could not be here until about 5 or 6 pm.  We told them that we would accept that ride.  Radha called at 3:10 pm and informed writer that [employer] called her and informed her that they have a vehicle that broke down and they would not be able to accept the discharge ride today at all.  Called house supervisor to get the ok to do a hospital transport by [employer].  Awaiting answer.  "

## 2020-01-15 NOTE — PROGRESS NOTES
PSYCHOTHERAPY ADD-ON +[id number]   16-37 minutes      Time: 16 minutes  Participants: Met with patient     Therapeutic Intervention Type: insight oriented psychotherapy, behavior modifying psychotherapy, supportive psychotherapy, interactive psychotherapy  Why chosen therapy is appropriate versus another modality: relevant to diagnosis, patient responds to this modality, evidence based practice     Target symptoms: substance abuse, mood disorder, psychosis  Primary focus: substance use, psychosocial stressors  Psychotherapeutic techniques: supportive and motivational and problem-solving techniques; psycho-education; safety planning and wrap up session     Outcome monitoring methods: self-report, observation     Patient's response to intervention:  The patient's response to intervention is accepting.     Progress toward goals:  The patient's progress toward goals is good.    Salomón Nettles MD  Psychiatry

## 2020-01-15 NOTE — PLAN OF CARE
"  Problem: Adult Behavioral Health Plan of Care  Goal: Rounds/Family Conference  Outcome: Ongoing, Progressing  Flowsheets (Taken 1/15/2020 0802)  Participants: patient; therapeutic recreation; other (see comments); social work; psychiatrist; nursing (social )  Summary: review reason for admit and patient care plan      Treatment team update:    Chief Complaint:  Patient is presenting with depression, psychosis, and suicidal ideation with positive UTOX for amphetamines.        Current:  Patient presented to treatment team dressed in personal clothing, slightly disheveled, cooperative mood. Pt says she is discharging to her cousin's house and following up with [facility] with the long term goal of maintaining sobriety and getting housing. She says that she plans on going to 12 step meetings with her cousin that she will live with. Pt acknowledges that this is not the best option pertaining to sustaining sobriety. She says that she will seek rehab if she relapses and says she will ask [facility] to help her do so if that is this necessary. Pt says that her cousin and [facility] will help her manage her outpatient care and medications. She has insight into presentation, saying that her relapse and noncompliance with medications produced delusions about her family being killed and people stalking her. She is able to maintain a conversation. She says that her attempt at cutting her wrists was due to the command hallucinations, and she says that makes her fearful which motivates her to seek sobriety and better her life. She is motivated by wanting a different life, not wanting to be scared, be better for her family "I don't want my family to be afraid of me," and grandchild, and not wanting to be hospitalized again. She feels like this plan will hold her accountable to making better decisions.      Plan:  Patient will be encouraged to engage in psychotherapeutic groups and recreational therapy. Patient's " medication will be monitored and adjusted as needed. Patient will be encouraged to follow up with aftercare appointments.

## 2020-01-15 NOTE — PLAN OF CARE
Problem: Adult Behavioral Health Plan of Care  Goal: Optimized Coping Skills in Response to Life Stressors  Intervention: Promote Effective Coping Strategies  Flowsheets (Taken 1/15/2020 4515)  Supportive Measures: active listening utilized; positive reinforcement provided; verbalization of feelings encouraged; counseling provided; problem solving facilitated; relaxation techniques promoted; self-care encouraged; self-reflection promoted; self-responsibility promoted       Behavior: Patient attended psychotherapeutic group dressed in hospital scrubs, disheveled with anxious, guarded mood. She came for a brief time and did not participate.    Intervention:  will engage patients in a discussion an exercise about perspective based on dialectical behavioral therapy's concept of dialectical thinking. A brief discussion with educational information on the concept and ways of implementing in as a practice will be held. Followed, will be an exercise in which they will describe a situation or conflict from their perspective and from the other party's perspective. Then they will decipher what they took from acknowledging the other perspective.     Response: Patient says that she isn't feeling well. She did not participate. She is awaiting her ride to discharge.     Plan:  will engage patient one on one and continue to encourage patient to explore and verbalize emotions, set goals to aid in preventing re-hospitalization, attend psychotherapeutic group, and follow up with aftercare appointments.

## 2020-01-15 NOTE — PLAN OF CARE
Lying quiet in bed, eyes closed, respiration even and unlabored, appearing asleep.  Slept well all shift since 2030 with only one brief awakening but had no complaints.  Safety and precautions maintained with rounds every 15 minutes, bed is fixed in a low position and pathways kept clear.  No fall occurred.

## 2020-01-15 NOTE — NURSING
Discharge note : patient is cooperative . avs reviewed with p[atient and she expresses understanding . Denies suicidal , homicidal ideations and is not gravely disabled . Has all belongings . Medicaid transportion will bring her home .

## 2020-01-15 NOTE — PLAN OF CARE
Pt calm and cooperative, out on unit interacting good with staff and peers, appetite good, med compliant, safety precautions maintained will continue to monitor, denies SI at this time
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
## 2020-01-15 NOTE — PSYCH
Patient will be following up with [facility] at [address] [place] [address] in [place], [place].  Phone number is [phone]. They will begin seeing patient at her home on 1/16/2020 between 2 and 4 pm.  Patient will receive tobacco cessation therapy along with substance abuse therapy and addictions counseling due to a positive UDS on admit.  AVS faxed to [phone] on 1/15/2020 at 10:42 am.

## 2020-01-16 NOTE — PSYCH
Patient was discharged at 3:50 pm and brought home by [employer].  This ride was taken care of by hospital as Medicaid could not provide a transportation company.

## 2020-10-21 ENCOUNTER — HOSPITAL ENCOUNTER (EMERGENCY)
Facility: HOSPITAL | Age: 35
Discharge: PSYCHIATRIC OR SUBSTANCE USE FACILITY | End: 2020-10-22
Attending: SURGERY
Payer: MEDICAID

## 2020-10-21 VITALS
RESPIRATION RATE: 16 BRPM | TEMPERATURE: 98 F | OXYGEN SATURATION: 98 % | BODY MASS INDEX: 45.16 KG/M2 | SYSTOLIC BLOOD PRESSURE: 111 MMHG | WEIGHT: 246.94 LBS | DIASTOLIC BLOOD PRESSURE: 63 MMHG | HEART RATE: 85 BPM

## 2020-10-21 DIAGNOSIS — Z00.8 MEDICAL CLEARANCE FOR PSYCHIATRIC ADMISSION: Primary | ICD-10-CM

## 2020-10-21 LAB
ALBUMIN SERPL BCP-MCNC: 4.1 G/DL (ref 3.5–5.2)
ALP SERPL-CCNC: 103 U/L (ref 55–135)
ALT SERPL W/O P-5'-P-CCNC: 16 U/L (ref 10–44)
AMPHET+METHAMPHET UR QL: NORMAL
ANION GAP SERPL CALC-SCNC: 12 MMOL/L (ref 8–16)
APAP SERPL-MCNC: <3 UG/ML (ref 10–20)
AST SERPL-CCNC: 13 U/L (ref 10–40)
B-HCG UR QL: NEGATIVE
BACTERIA #/AREA URNS HPF: ABNORMAL /HPF
BARBITURATES UR QL SCN>200 NG/ML: NEGATIVE
BASOPHILS # BLD AUTO: 0.03 K/UL (ref 0–0.2)
BASOPHILS NFR BLD: 0.4 % (ref 0–1.9)
BENZODIAZ UR QL SCN>200 NG/ML: NEGATIVE
BILIRUB SERPL-MCNC: 0.4 MG/DL (ref 0.1–1)
BILIRUB UR QL STRIP: NEGATIVE
BUN SERPL-MCNC: 10 MG/DL (ref 6–20)
BZE UR QL SCN: NEGATIVE
CALCIUM SERPL-MCNC: 9.6 MG/DL (ref 8.7–10.5)
CANNABINOIDS UR QL SCN: NEGATIVE
CHLORIDE SERPL-SCNC: 105 MMOL/L (ref 95–110)
CLARITY UR: ABNORMAL
CO2 SERPL-SCNC: 23 MMOL/L (ref 23–29)
COLOR UR: YELLOW
CREAT SERPL-MCNC: 0.8 MG/DL (ref 0.5–1.4)
CREAT UR-MCNC: 270.5 MG/DL (ref 15–325)
DIFFERENTIAL METHOD: ABNORMAL
EOSINOPHIL # BLD AUTO: 0 K/UL (ref 0–0.5)
EOSINOPHIL NFR BLD: 0.6 % (ref 0–8)
ERYTHROCYTE [DISTWIDTH] IN BLOOD BY AUTOMATED COUNT: 13.9 % (ref 11.5–14.5)
EST. GFR  (AFRICAN AMERICAN): >60 ML/MIN/1.73 M^2
EST. GFR  (NON AFRICAN AMERICAN): >60 ML/MIN/1.73 M^2
ETHANOL SERPL-MCNC: <10 MG/DL
GLUCOSE SERPL-MCNC: 90 MG/DL (ref 70–110)
GLUCOSE UR QL STRIP: NEGATIVE
HCT VFR BLD AUTO: 40.9 % (ref 37–48.5)
HGB BLD-MCNC: 12.9 G/DL (ref 12–16)
HGB UR QL STRIP: ABNORMAL
HYALINE CASTS #/AREA URNS LPF: 2 /LPF
IMM GRANULOCYTES # BLD AUTO: 0.02 K/UL (ref 0–0.04)
IMM GRANULOCYTES NFR BLD AUTO: 0.3 % (ref 0–0.5)
KETONES UR QL STRIP: ABNORMAL
LEUKOCYTE ESTERASE UR QL STRIP: NEGATIVE
LYMPHOCYTES # BLD AUTO: 2.2 K/UL (ref 1–4.8)
LYMPHOCYTES NFR BLD: 31.5 % (ref 18–48)
MCH RBC QN AUTO: 26.3 PG (ref 27–31)
MCHC RBC AUTO-ENTMCNC: 31.5 G/DL (ref 32–36)
MCV RBC AUTO: 83 FL (ref 82–98)
METHADONE UR QL SCN>300 NG/ML: NEGATIVE
MICROSCOPIC COMMENT: ABNORMAL
MONOCYTES # BLD AUTO: 0.6 K/UL (ref 0.3–1)
MONOCYTES NFR BLD: 8.3 % (ref 4–15)
NEUTROPHILS # BLD AUTO: 4.1 K/UL (ref 1.8–7.7)
NEUTROPHILS NFR BLD: 58.9 % (ref 38–73)
NITRITE UR QL STRIP: NEGATIVE
NRBC BLD-RTO: 0 /100 WBC
OPIATES UR QL SCN: NEGATIVE
PCP UR QL SCN>25 NG/ML: NEGATIVE
PH UR STRIP: >8 [PH] (ref 5–8)
PLATELET # BLD AUTO: 329 K/UL (ref 150–350)
PMV BLD AUTO: 11.2 FL (ref 9.2–12.9)
POTASSIUM SERPL-SCNC: 3.9 MMOL/L (ref 3.5–5.1)
PROT SERPL-MCNC: 8.5 G/DL (ref 6–8.4)
PROT UR QL STRIP: ABNORMAL
RBC # BLD AUTO: 4.91 M/UL (ref 4–5.4)
RBC #/AREA URNS HPF: 5 /HPF (ref 0–4)
SALICYLATES SERPL-MCNC: <5 MG/DL (ref 15–30)
SARS-COV-2 RDRP RESP QL NAA+PROBE: NEGATIVE
SODIUM SERPL-SCNC: 140 MMOL/L (ref 136–145)
SP GR UR STRIP: 1.02 (ref 1–1.03)
SQUAMOUS #/AREA URNS HPF: 75 /HPF
TOXICOLOGY INFORMATION: NORMAL
TSH SERPL DL<=0.005 MIU/L-ACNC: 1.46 UIU/ML (ref 0.4–4)
URN SPEC COLLECT METH UR: ABNORMAL
UROBILINOGEN UR STRIP-ACNC: NEGATIVE EU/DL
WBC # BLD AUTO: 7.02 K/UL (ref 3.9–12.7)
WBC #/AREA URNS HPF: 2 /HPF (ref 0–5)

## 2020-10-21 PROCEDURE — 84443 ASSAY THYROID STIM HORMONE: CPT

## 2020-10-21 PROCEDURE — 36415 COLL VENOUS BLD VENIPUNCTURE: CPT

## 2020-10-21 PROCEDURE — U0002 COVID-19 LAB TEST NON-CDC: HCPCS

## 2020-10-21 PROCEDURE — 99285 EMERGENCY DEPT VISIT HI MDM: CPT | Mod: 25

## 2020-10-21 PROCEDURE — 63600175 PHARM REV CODE 636 W HCPCS: Performed by: SURGERY

## 2020-10-21 PROCEDURE — 80307 DRUG TEST PRSMV CHEM ANLYZR: CPT

## 2020-10-21 PROCEDURE — 85025 COMPLETE CBC W/AUTO DIFF WBC: CPT

## 2020-10-21 PROCEDURE — 81000 URINALYSIS NONAUTO W/SCOPE: CPT | Mod: 59

## 2020-10-21 PROCEDURE — 80320 DRUG SCREEN QUANTALCOHOLS: CPT

## 2020-10-21 PROCEDURE — 96372 THER/PROPH/DIAG INJ SC/IM: CPT

## 2020-10-21 PROCEDURE — 81025 URINE PREGNANCY TEST: CPT

## 2020-10-21 PROCEDURE — 80329 ANALGESICS NON-OPIOID 1 OR 2: CPT

## 2020-10-21 PROCEDURE — 80053 COMPREHEN METABOLIC PANEL: CPT

## 2020-10-21 RX ORDER — LORAZEPAM 2 MG/ML
2 INJECTION INTRAMUSCULAR EVERY 4 HOURS PRN
Status: DISCONTINUED | OUTPATIENT
Start: 2020-10-21 | End: 2020-10-22 | Stop reason: HOSPADM

## 2020-10-21 RX ORDER — DIPHENHYDRAMINE HYDROCHLORIDE 50 MG/ML
50 INJECTION INTRAMUSCULAR; INTRAVENOUS EVERY 4 HOURS PRN
Status: DISCONTINUED | OUTPATIENT
Start: 2020-10-21 | End: 2020-10-22 | Stop reason: HOSPADM

## 2020-10-21 RX ORDER — HALOPERIDOL 5 MG/ML
5 INJECTION INTRAMUSCULAR EVERY 4 HOURS PRN
Status: DISCONTINUED | OUTPATIENT
Start: 2020-10-21 | End: 2020-10-22 | Stop reason: HOSPADM

## 2020-10-21 RX ADMIN — HALOPERIDOL LACTATE 5 MG: 5 INJECTION, SOLUTION INTRAMUSCULAR at 03:10

## 2020-10-21 RX ADMIN — LORAZEPAM 2 MG: 2 INJECTION INTRAMUSCULAR; INTRAVENOUS at 03:10

## 2020-10-21 RX ADMIN — DIPHENHYDRAMINE HYDROCHLORIDE 50 MG: 50 INJECTION INTRAMUSCULAR; INTRAVENOUS at 03:10

## 2020-10-21 NOTE — ED NOTES
The patient is resting comfortably with eyes closed, easily arousable. Airway is open and patent, respirations are spontaneous, normal respiratory effort and rate noted, skin warm and dry, full ROM in all extremities, appearance: no apparent distress noted, resting comfortably.  No change from previous assessment.  Bed in low, locked position, SR x2, HOB 30 degrees.  Pt able to change position independently.  Sitter present at bedside.  Will continue to monitor.

## 2020-10-21 NOTE — ED NOTES
The patient is sitting in bed, crying. Airway is open and patent, respirations are spontaneous, normal respiratory effort and rate noted, skin warm and dry, full ROM in all extremities, appearance: no apparent distress noted.  No change from previous assessment.  Bed in low, locked position, SR x2, HOB 30 degrees.  Pt able to change position independently.  Sitter present at bedside.  Will continue to monitor.

## 2020-10-21 NOTE — ED NOTES
The patient is sitting on side of bed, crying, anxious. Airway is open and patent, respirations are spontaneous, normal respiratory effort and rate noted, skin warm and dry, full ROM in all extremities, appearance: no apparent distress noted.  No change from previous assessment.  Bed in low, locked position, SR x2, HOB 30 degrees.  Pt able to change position independently.  Sitter present at bedside.  Will continue to monitor.

## 2020-10-21 NOTE — ED TRIAGE NOTES
Pt reports she his a history of mental health problems. Pt also reports abusing meth at this time. She reports she currently having visual and auditory hallucinations. She hears a lot of screams and feels like someone is out to get her. Pt reports thoughts of hurting herself recently. She would OD on heroine.

## 2020-10-21 NOTE — ED NOTES
Meal tray provided. Patient currently asleep. RR even and unlabored. Bed rails up x 2, bed locked and low.

## 2020-10-21 NOTE — ED PROVIDER NOTES
Encounter Date: 10/21/2020       History     Chief Complaint   Patient presents with    Psychiatric Evaluation     Patient is 35-year-old white female with multiple previous hospitalizations for psychiatric care.  Presents today with hallucinations, family sizing about committing suicide with heroin overdose.  She is placed as a PEC.        Review of patient's allergies indicates:  No Known Allergies  Past Medical History:   Diagnosis Date    Addiction to drug     recovering drug addict    ADHD (attention deficit hyperactivity disorder)     Alcohol abuse     Anxiety     Asthma     Bipolar 1 disorder     Cervical pain     Chronic constipation     Chronic diarrhea     Depression     Fatigue     Fibromyalgia     Hallucination     Headache     History of psychiatric hospitalization     pt reports 10 total- [facility] 10/2019 and 3/2019, 2020    Hx of psychiatric care     Seroquel    Magalis     Panic disorder     Polysubstance dependence     Psychiatric exam requested by authority     Psychiatric problem     Auditory hallucinations    PTSD (post-traumatic stress disorder)     Schizoaffective disorder     Sleep difficulties     Spina bifida     Spinal stenosis of lumbar region     Substance abuse     Suicide attempt     Once by hanging, once by overdose, once by cutting wrists     Syringomyelia     Syrinx of spinal cord     Therapy     [facility]    Trigger finger     Withdrawal symptoms, alcohol     Withdrawal symptoms, drug or narcotic      Past Surgical History:   Procedure Laterality Date    CARPAL TUNNEL RELEASE       SECTION, CLASSIC      siactica      spinal bifida      spinalsonois       Family History   Problem Relation Age of Onset    Hypertension Mother     Diabetes Mother     Hyperlipidemia Mother     Hypertension Father     Arthritis Father     Diabetes Father     Hyperlipidemia Father     Breast cancer Cousin     Colon cancer Neg Hx      Ovarian cancer Neg Hx      Social History     Tobacco Use    Smoking status: Current Every Day Smoker     Packs/day: 0.50     Years: 15.00     Pack years: 7.50     Types: Cigarettes     Start date: 10/29/1997    Smokeless tobacco: Never Used    Tobacco comment: smoking cessation handout provided and reviewed with pt   Substance Use Topics    Alcohol use: Yes     Alcohol/week: 0.0 standard drinks     Frequency: Monthly or less     Comment: socially 1-3 glass of fruity drink about every other month- hx of heavy consumption/ problematic about 6 months ago    Drug use: Yes     Types: Methamphetamines     Comment: recent relapse one week ago     Review of Systems   Unable to perform ROS: Psychiatric disorder       Physical Exam     Initial Vitals [10/21/20 1438]   BP Pulse Resp Temp SpO2   130/82 102 18 98 °F (36.7 °C) 99 %      MAP       --         Physical Exam    Constitutional: She appears well-developed and well-nourished. No distress.   HENT:   Head: Normocephalic and atraumatic.   Nose: Nose normal.   Mouth/Throat: Oropharynx is clear and moist.   Eyes: Conjunctivae and EOM are normal. Pupils are equal, round, and reactive to light.   Neck: Neck supple.   Cardiovascular: Normal rate, regular rhythm, normal heart sounds and intact distal pulses.   Pulmonary/Chest: Breath sounds normal. No respiratory distress. She has no wheezes.   Abdominal: Soft. Bowel sounds are normal. There is no abdominal tenderness.   Musculoskeletal: Normal range of motion.   Neurological: She is alert and oriented to person, place, and time. She has normal strength. GCS score is 15. GCS eye subscore is 4. GCS verbal subscore is 5. GCS motor subscore is 6.   Skin: Skin is warm and dry.         ED Course   Procedures  Labs Reviewed   COMPREHENSIVE METABOLIC PANEL - Abnormal; Notable for the following components:       Result Value    Total Protein 8.5 (*)     All other components within normal limits   CBC W/ AUTO DIFFERENTIAL -  Abnormal; Notable for the following components:    Mean Corpuscular Hemoglobin 26.3 (*)     Mean Corpuscular Hemoglobin Conc 31.5 (*)     All other components within normal limits   ACETAMINOPHEN LEVEL - Abnormal; Notable for the following components:    Acetaminophen (Tylenol), Serum <3.0 (*)     All other components within normal limits   SALICYLATE LEVEL - Abnormal; Notable for the following components:    Salicylate Lvl <5.0 (*)     All other components within normal limits   URINALYSIS, REFLEX TO URINE CULTURE - Abnormal; Notable for the following components:    Appearance, UA Cloudy (*)     Protein, UA 1+ (*)     Ketones, UA Trace (*)     Occult Blood UA Trace (*)     All other components within normal limits    Narrative:     Specimen Source->Urine   URINALYSIS MICROSCOPIC - Abnormal; Notable for the following components:    RBC, UA 5 (*)     Hyaline Casts, UA 2 (*)     All other components within normal limits    Narrative:     Specimen Source->Urine   DRUG SCREEN PANEL, URINE EMERGENCY    Narrative:     Specimen Source->Urine   TSH   ALCOHOL,MEDICAL (ETHANOL)   PREGNANCY TEST, URINE RAPID    Narrative:     Specimen Source->Urine   SARS-COV-2 RNA AMPLIFICATION, QUAL          Imaging Results    None                                      Clinical Impression:     ICD-10-CM ICD-9-CM   1. Medical clearance for psychiatric admission  Z00.8 V70.8                      Disposition:   Disposition: Transferred  Condition: Stable                          Diogo Hernandez Jr., MD  10/21/20 4368

## 2020-10-22 PROBLEM — J45.20 MILD INTERMITTENT ASTHMA WITHOUT COMPLICATION: Status: ACTIVE | Noted: 2020-10-22

## 2020-10-22 PROBLEM — F29 PSYCHOSIS: Status: ACTIVE | Noted: 2019-10-26

## 2020-10-22 PROBLEM — R45.851 SUICIDE IDEATION: Status: ACTIVE | Noted: 2020-10-22

## 2020-10-22 PROBLEM — E66.01 MORBIDLY OBESE: Status: ACTIVE | Noted: 2020-10-22

## 2020-10-22 PROBLEM — L03.114 CELLULITIS OF LEFT UPPER EXTREMITY: Status: ACTIVE | Noted: 2020-10-22

## 2020-10-22 NOTE — ED NOTES
Report given to Karon Hall RN @ [facility]. Patient denies wanting next of kin notified of transfer.

## 2020-10-28 LAB
CHOLEST SERPL-MSCNC: 145 MG/DL (ref 0–200)
CHOLESTEROL, TOTAL: 145
HBA1C MFR BLD: 5.3 % (ref 4–6)
HDLC SERPL-MCNC: 38 MG/DL (ref 35–70)
HDLC SERPL-MCNC: 38 MG/DL (ref 35–70)
LDLC SERPL CALC-MCNC: 84 MG/DL (ref 0–160)
LDLC SERPL CALC-MCNC: 84 MG/DL (ref 0–160)
TRIGL SERPL-MCNC: 131 MG/DL
TRIGL SERPL-MCNC: 131 MG/DL (ref 40–160)
VLDLC SERPL-MCNC: 23 MG/DL
VLDLC SERPL-MCNC: 23 MG/DL

## 2020-10-30 PROBLEM — R45.851 SUICIDE IDEATION: Status: RESOLVED | Noted: 2020-10-22 | Resolved: 2020-10-30

## 2020-10-30 PROBLEM — F29 PSYCHOSIS: Status: RESOLVED | Noted: 2019-10-26 | Resolved: 2020-10-30

## 2021-11-15 ENCOUNTER — OFFICE VISIT (OUTPATIENT)
Dept: OBSTETRICS AND GYNECOLOGY | Facility: CLINIC | Age: 36
End: 2021-11-15
Payer: MEDICAID

## 2021-11-15 ENCOUNTER — TELEPHONE (OUTPATIENT)
Dept: OBSTETRICS AND GYNECOLOGY | Facility: CLINIC | Age: 36
End: 2021-11-15
Payer: MEDICAID

## 2021-11-15 VITALS
WEIGHT: 262 LBS | HEIGHT: 63 IN | BODY MASS INDEX: 46.42 KG/M2 | SYSTOLIC BLOOD PRESSURE: 102 MMHG | HEART RATE: 72 BPM | DIASTOLIC BLOOD PRESSURE: 67 MMHG

## 2021-11-15 DIAGNOSIS — N92.0 MENORRHAGIA WITH REGULAR CYCLE: Primary | ICD-10-CM

## 2021-11-15 PROCEDURE — 99395 PR PREVENTIVE VISIT,EST,18-39: ICD-10-PCS | Mod: S$PBB,,, | Performed by: OBSTETRICS & GYNECOLOGY

## 2021-11-15 PROCEDURE — 99999 PR PBB SHADOW E&M-EST. PATIENT-LVL IV: ICD-10-PCS | Mod: PBBFAC,,, | Performed by: OBSTETRICS & GYNECOLOGY

## 2021-11-15 PROCEDURE — 99999 PR PBB SHADOW E&M-EST. PATIENT-LVL IV: CPT | Mod: PBBFAC,,, | Performed by: OBSTETRICS & GYNECOLOGY

## 2021-11-15 PROCEDURE — 99214 OFFICE O/P EST MOD 30 MIN: CPT | Mod: PBBFAC | Performed by: OBSTETRICS & GYNECOLOGY

## 2021-11-15 PROCEDURE — 99395 PREV VISIT EST AGE 18-39: CPT | Mod: S$PBB,,, | Performed by: OBSTETRICS & GYNECOLOGY

## 2022-05-24 ENCOUNTER — TELEPHONE (OUTPATIENT)
Dept: OBSTETRICS AND GYNECOLOGY | Facility: CLINIC | Age: 37
End: 2022-05-24
Payer: MEDICAID

## 2022-05-24 NOTE — TELEPHONE ENCOUNTER
Hysteroscopic D&C/Ablation scheduled for 6/7/2022 with pre-op, pre-admit and post op appointments scheduled and discussed with patient.  Pt verbalized understanding.  Case request number [id number].  Jessica in surgery department notified of date and time.

## 2022-06-02 ENCOUNTER — OFFICE VISIT (OUTPATIENT)
Dept: OBSTETRICS AND GYNECOLOGY | Facility: CLINIC | Age: 37
End: 2022-06-02
Payer: MEDICAID

## 2022-06-02 ENCOUNTER — HOSPITAL ENCOUNTER (OUTPATIENT)
Dept: PREADMISSION TESTING | Facility: HOSPITAL | Age: 37
Discharge: HOME | End: 2022-06-02
Attending: OBSTETRICS & GYNECOLOGY
Payer: MEDICAID

## 2022-06-02 VITALS
BODY MASS INDEX: 46.25 KG/M2 | RESPIRATION RATE: 12 BRPM | HEART RATE: 86 BPM | DIASTOLIC BLOOD PRESSURE: 84 MMHG | WEIGHT: 261 LBS | OXYGEN SATURATION: 100 % | HEIGHT: 63 IN | SYSTOLIC BLOOD PRESSURE: 112 MMHG

## 2022-06-02 DIAGNOSIS — Z01.818 PREOP TESTING: ICD-10-CM

## 2022-06-02 DIAGNOSIS — N92.0 MENORRHAGIA WITH REGULAR CYCLE: Primary | ICD-10-CM

## 2022-06-02 PROCEDURE — 1159F MED LIST DOCD IN RCRD: CPT | Mod: CPTII,,, | Performed by: OBSTETRICS & GYNECOLOGY

## 2022-06-02 PROCEDURE — 3079F PR MOST RECENT DIASTOLIC BLOOD PRESSURE 80-89 MM HG: ICD-10-PCS | Mod: CPTII,,, | Performed by: OBSTETRICS & GYNECOLOGY

## 2022-06-02 PROCEDURE — 1160F PR REVIEW ALL MEDS BY PRESCRIBER/CLIN PHARMACIST DOCUMENTED: ICD-10-PCS | Mod: CPTII,,, | Performed by: OBSTETRICS & GYNECOLOGY

## 2022-06-02 PROCEDURE — 1159F PR MEDICATION LIST DOCUMENTED IN MEDICAL RECORD: ICD-10-PCS | Mod: CPTII,,, | Performed by: OBSTETRICS & GYNECOLOGY

## 2022-06-02 PROCEDURE — 3074F PR MOST RECENT SYSTOLIC BLOOD PRESSURE < 130 MM HG: ICD-10-PCS | Mod: CPTII,,, | Performed by: OBSTETRICS & GYNECOLOGY

## 2022-06-02 PROCEDURE — 1160F RVW MEDS BY RX/DR IN RCRD: CPT | Mod: CPTII,,, | Performed by: OBSTETRICS & GYNECOLOGY

## 2022-06-02 PROCEDURE — 3008F BODY MASS INDEX DOCD: CPT | Mod: CPTII,,, | Performed by: OBSTETRICS & GYNECOLOGY

## 2022-06-02 PROCEDURE — 99499 NO LOS: ICD-10-PCS | Mod: S$PBB,,, | Performed by: OBSTETRICS & GYNECOLOGY

## 2022-06-02 PROCEDURE — 99999 PR PBB SHADOW E&M-EST. PATIENT-LVL III: CPT | Mod: PBBFAC,,, | Performed by: OBSTETRICS & GYNECOLOGY

## 2022-06-02 PROCEDURE — 99499 UNLISTED E&M SERVICE: CPT | Mod: S$PBB,,, | Performed by: OBSTETRICS & GYNECOLOGY

## 2022-06-02 PROCEDURE — 3008F PR BODY MASS INDEX (BMI) DOCUMENTED: ICD-10-PCS | Mod: CPTII,,, | Performed by: OBSTETRICS & GYNECOLOGY

## 2022-06-02 PROCEDURE — 3079F DIAST BP 80-89 MM HG: CPT | Mod: CPTII,,, | Performed by: OBSTETRICS & GYNECOLOGY

## 2022-06-02 PROCEDURE — 99213 OFFICE O/P EST LOW 20 MIN: CPT | Mod: PBBFAC | Performed by: OBSTETRICS & GYNECOLOGY

## 2022-06-02 PROCEDURE — 3074F SYST BP LT 130 MM HG: CPT | Mod: CPTII,,, | Performed by: OBSTETRICS & GYNECOLOGY

## 2022-06-02 PROCEDURE — 99999 PR PBB SHADOW E&M-EST. PATIENT-LVL III: ICD-10-PCS | Mod: PBBFAC,,, | Performed by: OBSTETRICS & GYNECOLOGY

## 2022-06-02 RX ORDER — SODIUM CHLORIDE, SODIUM LACTATE, POTASSIUM CHLORIDE, CALCIUM CHLORIDE 600; 310; 30; 20 MG/100ML; MG/100ML; MG/100ML; MG/100ML
INJECTION, SOLUTION INTRAVENOUS CONTINUOUS
Status: CANCELLED | OUTPATIENT
Start: 2022-06-02

## 2022-06-02 NOTE — H&P (VIEW-ONLY)
Subjective:       Patient ID: Maria Yancey is a 36 y.o. female.    Chief Complaint:  Pre-op Exam      History of Present Illness  Patient presents for preop for D&C and ablation. Patient reports that she has been having very heavy menstrual cycles with 3 extremely heavy days and extremely heavy cramping.  Her cycles are becoming closer and closer together now sometimes every 2 or 3 weeks apart.  Patient states these problems have been going on for many months.  She does admit to being an occasional smoker.  Counseling on treatment options was done.  Patient would like to proceed with D&C and ablation along with hysteroscopy.  Informed consents obtained    Menstrual History:  OB History        2    Para   2    Term   2            AB        Living   2       SAB        IAB        Ectopic        Multiple        Live Births                    Menarche age:  Patient's last menstrual period was 2022 (approximate).         Review of Systems  Review of Systems   Constitutional: Negative for activity change, appetite change, chills, diaphoresis, fatigue, fever and unexpected weight change.   HENT: Negative for congestion, dental problem, drooling, ear discharge, ear pain, facial swelling, hearing loss, mouth sores, nosebleeds, postnasal drip, rhinorrhea, sinus pressure, sneezing, sore throat, tinnitus, trouble swallowing and voice change.    Eyes: Negative for photophobia, pain, discharge, redness, itching and visual disturbance.   Respiratory: Negative for apnea, cough, choking, chest tightness, shortness of breath, wheezing and stridor.    Cardiovascular: Negative for chest pain, palpitations and leg swelling.   Gastrointestinal: Negative for abdominal distention, abdominal pain, anal bleeding, blood in stool, constipation, diarrhea, nausea, rectal pain and vomiting.   Endocrine: Negative for cold intolerance, heat intolerance, polydipsia, polyphagia and polyuria.   Genitourinary: Positive for  menstrual problem. Negative for decreased urine volume, difficulty urinating, dyspareunia, dysuria, enuresis, flank pain, frequency, genital sores, hematuria, pelvic pain, urgency, vaginal bleeding, vaginal discharge and vaginal pain.   Musculoskeletal: Negative for arthralgias, back pain, gait problem, joint swelling, myalgias, neck pain and neck stiffness.   Skin: Negative for color change, pallor, rash and wound.   Allergic/Immunologic: Negative for environmental allergies, food allergies and immunocompromised state.   Neurological: Negative for dizziness, tremors, seizures, syncope, facial asymmetry, speech difficulty, weakness, light-headedness, numbness and headaches.   Hematological: Negative for adenopathy. Does not bruise/bleed easily.   Psychiatric/Behavioral: Negative for agitation, behavioral problems, confusion, decreased concentration, dysphoric mood, hallucinations, self-injury, sleep disturbance and suicidal ideas. The patient is not nervous/anxious and is not hyperactive.            Objective:      Physical Exam  Vitals and nursing note reviewed.   Constitutional:       Appearance: She is well-developed.   Neck:      Thyroid: No thyromegaly.   Cardiovascular:      Rate and Rhythm: Normal rate and regular rhythm.   Pulmonary:      Effort: Pulmonary effort is normal.      Breath sounds: Normal breath sounds.   Abdominal:      General: Bowel sounds are normal.      Palpations: Abdomen is soft. There is no mass.      Tenderness: There is no abdominal tenderness.      Hernia: There is no hernia in the left inguinal area.   Genitourinary:     Vagina: Normal. No foreign body. No vaginal discharge or tenderness.      Cervix: No cervical motion tenderness, discharge or friability.      Uterus: Not enlarged and not tender.       Adnexa:         Right: No mass, tenderness or fullness.          Left: No mass, tenderness or fullness.        Rectum: No external hemorrhoid.   Musculoskeletal:         General:  Normal range of motion.   Skin:     General: Skin is dry.   Neurological:      Mental Status: She is alert and oriented to person, place, and time.      Deep Tendon Reflexes: Reflexes are normal and symmetric.   Psychiatric:         Behavior: Behavior normal.         Thought Content: Thought content normal.         Judgment: Judgment normal.             Assessment:        1. Menorrhagia with regular cycle    2. Menorrhagia    3. Preop testing                Plan:         Maria was seen today for pre-op exam.    Diagnoses and all orders for this visit:    Menorrhagia with regular cycle  -     Full code; Standing  -     Insert peripheral IV; Standing  -     Barrios to Gravity; Standing  -     POCT glucose; Standing  -     Notify physician if BS > 180 for hysterectomy patients; Standing  -     Notify Physician/Vital Signs Parameters Urine output less than 0.5mL/kg/hr (with indwelling catheter) or 30 mL/hr (without indwelling catheter) or blood glucose greater than 200 mg/dL; Standing  -     Notify physician; Standing  -     Notify Physician - Potential Need of Opioid Reversal; Standing  -     Diet NPO; Standing  -     Place sequential compression device; Standing  -     Chlorohexidine Gluconate Bath; Standing  -     Place in Outpatient; Standing  -     CBC Auto Differential; Future  -     Pregnancy, urine rapid; Future  -     Type & Screen; Future    Menorrhagia    Preop testing  -     CBC Auto Differential; Future  -     Pregnancy, urine rapid; Future  -     Type & Screen; Future    Other orders  -     lactated ringers infusion  -     IP VTE HIGH RISK PATIENT; Standing

## 2022-06-02 NOTE — DISCHARGE INSTRUCTIONS
[facility]  Pre Admit Instructions    Day and Date of Procedure:      Call your doctor if you become ill, have an infection, or are taking antibiotics before your surgery  Someone will call you between 1 p.m. And 5 p.m. the workday before the procedure to give you an arrival time       - If your procedure is before 7 a.m. enter through Emergency Room door and check in with them       - 7 a.m. to 5 p.m. Enter through main entrance and check in with registration  You must have a responsible  to bring you home  Only one person will be allowed per patient due to Covid-19 restrictions  You must wear a mask at all times. If you do not have a mask, we will provide you with one. No Exception.   Cafeteria hours for guest: 7am to 10am; 11am to 1:30 pm.    Do NOT eat anything past:   Clear liquids until:  No dairy, creamers, gum or mints the morning of your procedure    Please    Do not wear makeup, jewelry, nail polish or body piercings  Bring containers/solution for contacts, dentures, bridges - these and hearing aids will be removed before your procedure  Do not bring cash, jewelry or valuables the day of your procedure   No smoking at least 24 hours before your procedure  Wear clothing that is comfortable and easy to take off and put on  Do NOT shave for at least 5 days before your surgery    PRE-OP Hibiclens bath Instructions:    Shower with Hibiclens the Night before and morning of the procedure.   Wash your face with your regular cleanser. DO NOT use Hibiclens on your face.  Thoroughly rinse your body with warm water from the neck down  Apply Hibiclens directly to your skin or on a wet washcloth and wash gently. Do not stand under the water while washing with Hibiclens as you will   remove the wash too soon.  Rinse thoroughly with warm water  DO NOT use your regular soap after you have bathed with the Hibiclens  Do not apply lotion or deodorant   Put on a clean pair of clothes after each  bath          Information about your stay (Please Review)    Before Surgery  Your doctor may order and review labs, x-rays, ECG or other tests as a pre-surgery workup and will call you if there is need for follow up.  If you did not get a Covid test before your procedure, one will be done when you arrive. You must have a negative Covid test result before your procedure.  No smoking inside or outside the hospital. You must leave the campus to smoke.  Wear clothing that is easy to take off and put on.  The hospital will provide you with a gown.  You may bring robe, slippers, nightwear, and toiletries (toothbrush, toothpaste, makeup) if needed.  If your doctor orders a Fleets Enema or other prep, follow package and/or doctors orders.  Brush your teeth and rinse your mouth the morning of surgery, but dont swallow the water.  The nurse will ask questions and check your condition.  The doctor may mariajose your surgical site.  Compression boots will be placed on your calves to reduce the risk of blood clots.  An IV will be started in pre-procedure area.  The doctor may order medicine to help you relax before surgery.  After Surgery  After you recover in post-procedure area you will go to the medicine floor to recover for a few more hours.  The nurse will check your temperature, breathing, blood pressure, heart rate, IV site, and surgery site.  A diet will be ordered-most start with ice chips and then advance slowly to other foods.  If you have IV fluids the IV pump will beep to let the nurse know that she needs to check it.  You may have a urinary catheter and staff may measure your oral intake and urine output.  Pain medication may be ordered by the doctor after surgery.  If you have a pain management device tell your caretakers not to press the button because of OVERDOSE RISK.  When the nurse or doctor tells you it is okay to get out of bed, ask for help until you are stable.  The nurse may ask you to turn, cough, and deep  breathe to prevent lung problems.  You can use a pillow to hold your incision when you deep breathe or cough to reduce pain.  The nurse will give you discharge instructions--incision care, symptoms to report to your doctor, and your follow-up appointment when you are discharged. You cannot drive yourself home.  Only one person is allowed during your stay due to Covid-19 restrictions. Visiting hours are 8 am to 6 pm.    Goals for Discharge from One Day Surgery  Control pain with an oral medication  Walk without feeling dizzy or weak  Tolerate liquids well  Urinate without difficulty    Things you can do to  Reduce the Risk of Infections or Complications  Wash Hands and use Waterless Hand Sanitizers  Wash hands frequently with soap and warm water for at least 15 seconds.   Use hand sanitizers (alcohol based) often at home and in public if hands are not visibly soiled  Take Antibiotic Exactly as Prescribed  Do not stop antibiotics too soon; you risk developing infection resistant to antibiotics  Take your antibiotic even if you are feeling better and even if they upset your stomach  Call the doctor if you cant tolerate the antibiotic or you have an allergic reaction  Stay Healthy  Take medicines as prescribed by your doctor  Keep your diabetes under control - diet and medication  Get enough rest, exercise and eat a healthy diet  Keep the Wound Clean and Dry  Wash hands before and after taking care of the incision (cut)  Wash hands when you remove a dressing, before you touch/apply a new dressing  Shower and clean incision with antibacterial soap and rinse well if the doctor approves  Allow the cut to dry completely before putting on a clean dressing  Do not touch the part of the bandage that will cover the incision  Do not use ointments unless your doctor tells you to-can promote bacterial growth  If ordered, put ointment directly on the dressing-do not touch the end of the tube  Do not scrub, remove scabs, or leave a  damp dressing on the incision  Do not use peroxide or alcohol to clean the incision unless the doctor tells you to   Do not let children, pets or anyone else contaminate the incision  Stop Smoking To Prevent Infection  Stop smoking-Centers for Disease Control recommends 30 days before surgery  Smokers get more infections after surgery-studies have shown 6 times the risk  Smokers have more scarring and heal slower-open wounds get infected easier  Prevent Respiratory complications  Stop smoking  Turn, cough, and deep breathe even if you have some pain when you do so.  Splint your incision with a pillow when you cough/deep breath, to help control pain.  Do not lie in one position for long periods of time.   Prevent Blood Clots  When you wake move your legs, flex your feet, rotate your ankles, wiggle your toes  Get up when the doctor says its ok.  Dangle your feet from the side of the bed  Report symptoms-leg pain, redness/swelling, warm to touch; fever; shortness of breath, chest pain, severe upper back pain.

## 2022-06-07 ENCOUNTER — HOSPITAL ENCOUNTER (OUTPATIENT)
Facility: HOSPITAL | Age: 37
Discharge: HOME | End: 2022-06-07
Attending: OBSTETRICS & GYNECOLOGY | Admitting: OBSTETRICS & GYNECOLOGY
Payer: MEDICAID

## 2022-06-07 VITALS — DIASTOLIC BLOOD PRESSURE: 71 MMHG | SYSTOLIC BLOOD PRESSURE: 139 MMHG

## 2022-06-07 DIAGNOSIS — N92.0 MENORRHAGIA: ICD-10-CM

## 2022-06-07 DIAGNOSIS — N92.0 MENORRHAGIA WITH REGULAR CYCLE: ICD-10-CM

## 2022-06-07 LAB
ABO + RH BLD: NORMAL
AMPHET+METHAMPHET UR QL: ABNORMAL
BARBITURATES UR QL SCN>200 NG/ML: NEGATIVE
BENZODIAZ UR QL SCN>200 NG/ML: NEGATIVE
BLD GP AB SCN CELLS X3 SERPL QL: NORMAL
BZE UR QL SCN: NEGATIVE
CANNABINOIDS UR QL SCN: NEGATIVE
CREAT UR-MCNC: 219.3 MG/DL (ref 15–325)
METHADONE UR QL SCN>300 NG/ML: NEGATIVE
OPIATES UR QL SCN: NEGATIVE
PCP UR QL SCN>25 NG/ML: NEGATIVE
TOXICOLOGY INFORMATION: ABNORMAL

## 2022-06-07 PROCEDURE — 86901 BLOOD TYPING SEROLOGIC RH(D): CPT | Performed by: OBSTETRICS & GYNECOLOGY

## 2022-06-07 PROCEDURE — 80307 DRUG TEST PRSMV CHEM ANLYZR: CPT | Performed by: OBSTETRICS & GYNECOLOGY

## 2022-06-07 RX ORDER — SODIUM CHLORIDE, SODIUM LACTATE, POTASSIUM CHLORIDE, CALCIUM CHLORIDE 600; 310; 30; 20 MG/100ML; MG/100ML; MG/100ML; MG/100ML
INJECTION, SOLUTION INTRAVENOUS CONTINUOUS
Status: DISCONTINUED | OUTPATIENT
Start: 2022-06-07 | End: 2022-06-07 | Stop reason: HOSPADM

## 2022-06-08 ENCOUNTER — TELEPHONE (OUTPATIENT)
Dept: OBSTETRICS AND GYNECOLOGY | Facility: CLINIC | Age: 37
End: 2022-06-08
Payer: MEDICAID

## 2022-06-09 NOTE — PROGRESS NOTES
Patient's operative procedure was canceled secondary to a positive urine tox screen.  Patient counseled and procedure will be rescheduled at a later date

## 2022-06-10 NOTE — TELEPHONE ENCOUNTER
"Attempted to contact patient via telephone; "number has been changed" with no new number provided.   Crysalint message sent.  Chart closed.   "

## 2022-06-13 ENCOUNTER — PATIENT OUTREACH (OUTPATIENT)
Dept: ADMINISTRATIVE | Facility: HOSPITAL | Age: 37
End: 2022-06-13
Payer: MEDICAID

## 2022-06-13 NOTE — PROGRESS NOTES
Chart reviewed, immunization record updated.  No new results noted on [facility] web site.  Uploaded Lipid Panel collected on 10/28/2020 from [facility], updated in .  Care Everywhere updated.   Patient care coordination note updated.  Patient has scheduled visit with Dr. Jg Gibson for post op on 6/27/2022.  Attempted to contact patient to discuss Cervical Cancer Screening and scheduling routine PCP visit, number no longer in service.

## 2023-01-05 ENCOUNTER — PATIENT OUTREACH (OUTPATIENT)
Dept: ADMINISTRATIVE | Facility: HOSPITAL | Age: 38
End: 2023-01-05
Payer: MEDICAID

## 2023-01-05 NOTE — PROGRESS NOTES
Chart reviewed, immunization record updated.  No new results noted on [facility] web site.  Uploaded Lipid Panel and Hemoglobin A1c collected on 10/28/2020 from [facility], updated to .  Care Everywhere updated.   Patient care coordination note  LOV with PCP 6/21/2016  Attempted to contact patient to discuss Cervical Cancer Screening and scheduling PCP visit, unable to contact (voicemail box has not been set up).

## 2023-04-13 ENCOUNTER — PATIENT OUTREACH (OUTPATIENT)
Dept: ADMINISTRATIVE | Facility: HOSPITAL | Age: 38
End: 2023-04-13
Payer: MEDICAID

## 2023-04-13 NOTE — PROGRESS NOTES
Chart reviewed, immunization record updated.  No new results noted on [facility] or [facility] web site.  Care Everywhere updated.   Patient care coordination note  LOV with PCP 06/21/2016  Attempted to contact patient to discuss scheduling PCP visit and Pap Smear, unable to contact. (Voicemail not set up)

## 2023-06-16 ENCOUNTER — OFFICE VISIT (OUTPATIENT)
Dept: OBSTETRICS AND GYNECOLOGY | Facility: CLINIC | Age: 38
End: 2023-06-16
Payer: MEDICAID

## 2023-06-16 VITALS
HEART RATE: 99 BPM | WEIGHT: 264 LBS | HEIGHT: 63 IN | DIASTOLIC BLOOD PRESSURE: 82 MMHG | SYSTOLIC BLOOD PRESSURE: 132 MMHG | BODY MASS INDEX: 46.78 KG/M2

## 2023-06-16 DIAGNOSIS — Z12.31 ENCOUNTER FOR SCREENING MAMMOGRAM FOR MALIGNANT NEOPLASM OF BREAST: Primary | ICD-10-CM

## 2023-06-16 DIAGNOSIS — Z30.09 ENCOUNTER FOR COUNSELING REGARDING CONTRACEPTION: ICD-10-CM

## 2023-06-16 DIAGNOSIS — N92.0 MENORRHAGIA WITH REGULAR CYCLE: ICD-10-CM

## 2023-06-16 PROCEDURE — 99999 PR PBB SHADOW E&M-EST. PATIENT-LVL III: CPT | Mod: PBBFAC,,, | Performed by: OBSTETRICS & GYNECOLOGY

## 2023-06-16 PROCEDURE — 1159F MED LIST DOCD IN RCRD: CPT | Mod: CPTII,,, | Performed by: OBSTETRICS & GYNECOLOGY

## 2023-06-16 PROCEDURE — 3008F BODY MASS INDEX DOCD: CPT | Mod: CPTII,,, | Performed by: OBSTETRICS & GYNECOLOGY

## 2023-06-16 PROCEDURE — 99999 PR PBB SHADOW E&M-EST. PATIENT-LVL III: ICD-10-PCS | Mod: PBBFAC,,, | Performed by: OBSTETRICS & GYNECOLOGY

## 2023-06-16 PROCEDURE — 99213 OFFICE O/P EST LOW 20 MIN: CPT | Mod: S$PBB,,, | Performed by: OBSTETRICS & GYNECOLOGY

## 2023-06-16 PROCEDURE — 99213 OFFICE O/P EST LOW 20 MIN: CPT | Mod: PBBFAC | Performed by: OBSTETRICS & GYNECOLOGY

## 2023-06-16 PROCEDURE — 3008F PR BODY MASS INDEX (BMI) DOCUMENTED: ICD-10-PCS | Mod: CPTII,,, | Performed by: OBSTETRICS & GYNECOLOGY

## 2023-06-16 PROCEDURE — 1159F PR MEDICATION LIST DOCUMENTED IN MEDICAL RECORD: ICD-10-PCS | Mod: CPTII,,, | Performed by: OBSTETRICS & GYNECOLOGY

## 2023-06-16 PROCEDURE — 1160F RVW MEDS BY RX/DR IN RCRD: CPT | Mod: CPTII,,, | Performed by: OBSTETRICS & GYNECOLOGY

## 2023-06-16 PROCEDURE — 1160F PR REVIEW ALL MEDS BY PRESCRIBER/CLIN PHARMACIST DOCUMENTED: ICD-10-PCS | Mod: CPTII,,, | Performed by: OBSTETRICS & GYNECOLOGY

## 2023-06-16 PROCEDURE — 3075F PR MOST RECENT SYSTOLIC BLOOD PRESS GE 130-139MM HG: ICD-10-PCS | Mod: CPTII,,, | Performed by: OBSTETRICS & GYNECOLOGY

## 2023-06-16 PROCEDURE — 3075F SYST BP GE 130 - 139MM HG: CPT | Mod: CPTII,,, | Performed by: OBSTETRICS & GYNECOLOGY

## 2023-06-16 PROCEDURE — 99213 PR OFFICE/OUTPT VISIT, EST, LEVL III, 20-29 MIN: ICD-10-PCS | Mod: S$PBB,,, | Performed by: OBSTETRICS & GYNECOLOGY

## 2023-06-16 PROCEDURE — 3079F PR MOST RECENT DIASTOLIC BLOOD PRESSURE 80-89 MM HG: ICD-10-PCS | Mod: CPTII,,, | Performed by: OBSTETRICS & GYNECOLOGY

## 2023-06-16 PROCEDURE — 3079F DIAST BP 80-89 MM HG: CPT | Mod: CPTII,,, | Performed by: OBSTETRICS & GYNECOLOGY

## 2023-06-16 NOTE — PROGRESS NOTES
Subjective:       Patient ID: Maria Yancey is a 37 y.o. female.    Chief Complaint:  Contraception (Pt here to discuss birth control, she states she went to have an ablation but couldn't dues to methamphetamines being in her system. She would like to get on something that would stop her cycles.  )      History of Present Illness  Patient presents complaining of very heavy menstrual bleeding.  Patient had been scheduled for a D&C and ablation but was canceled secondary to methamphetamines.  Patient states that her cycles are just 2 very heavy at this point.  Counseling on treatment options was done.  Patient would like a Nexplanon.  Patient is also due for a mammogram which will be ordered.  She is otherwise without gyn complaints.    Menstrual History:  OB History          2    Para   2    Term   2            AB        Living   2         SAB        IAB        Ectopic        Multiple        Live Births                    Menarche age:  Patient's last menstrual period was 2023.         Review of Systems  Review of Systems   Constitutional:  Positive for fatigue. Negative for activity change, appetite change, chills, diaphoresis, fever and unexpected weight change.   HENT:  Negative for congestion, dental problem, drooling, ear discharge, ear pain, facial swelling, hearing loss, mouth sores, nosebleeds, postnasal drip, rhinorrhea, sinus pressure, sneezing, sore throat, tinnitus, trouble swallowing and voice change.    Eyes:  Negative for photophobia, pain, discharge, redness, itching and visual disturbance.   Respiratory:  Negative for apnea, cough, choking, chest tightness, shortness of breath, wheezing and stridor.    Cardiovascular:  Negative for chest pain, palpitations and leg swelling.   Gastrointestinal:  Positive for abdominal pain. Negative for abdominal distention, anal bleeding, blood in stool, constipation, diarrhea, nausea, rectal pain and vomiting.   Endocrine: Negative for cold  intolerance, heat intolerance, polydipsia, polyphagia and polyuria.   Genitourinary:  Positive for menstrual problem, pelvic pain, vaginal bleeding and vaginal discharge. Negative for decreased urine volume, difficulty urinating, dyspareunia, dysuria, enuresis, flank pain, frequency, genital sores, hematuria, urgency and vaginal pain.   Musculoskeletal:  Positive for back pain. Negative for arthralgias, gait problem, joint swelling, myalgias, neck pain and neck stiffness.   Skin:  Negative for color change, pallor, rash and wound.   Allergic/Immunologic: Negative for environmental allergies, food allergies and immunocompromised state.   Neurological:  Positive for weakness. Negative for dizziness, tremors, seizures, syncope, facial asymmetry, speech difficulty, light-headedness, numbness and headaches.   Hematological:  Negative for adenopathy. Does not bruise/bleed easily.   Psychiatric/Behavioral:  Positive for sleep disturbance. Negative for agitation, behavioral problems, confusion, decreased concentration, dysphoric mood, hallucinations, self-injury and suicidal ideas. The patient is not nervous/anxious and is not hyperactive.          Objective:      Physical Exam  Vitals and nursing note reviewed.         Assessment:        1. Encounter for screening mammogram for malignant neoplasm of breast                Plan:         Maria was seen today for contraception.    Diagnoses and all orders for this visit:    Encounter for screening mammogram for malignant neoplasm of breast  -     Mammo Digital Screening Bilat w/ Vu; Future

## 2023-06-20 ENCOUNTER — PROCEDURE VISIT (OUTPATIENT)
Dept: OBSTETRICS AND GYNECOLOGY | Facility: CLINIC | Age: 38
End: 2023-06-20
Payer: MEDICAID

## 2023-06-20 VITALS
HEART RATE: 106 BPM | DIASTOLIC BLOOD PRESSURE: 78 MMHG | BODY MASS INDEX: 46.57 KG/M2 | HEIGHT: 63 IN | WEIGHT: 262.81 LBS | SYSTOLIC BLOOD PRESSURE: 108 MMHG

## 2023-06-20 DIAGNOSIS — Z30.430 ENCOUNTER FOR IUD INSERTION: ICD-10-CM

## 2023-06-20 DIAGNOSIS — Z01.812 PRE-PROCEDURE LAB EXAM: Primary | ICD-10-CM

## 2023-06-20 LAB
B-HCG UR QL: NEGATIVE
CTP QC/QA: YES

## 2023-06-20 PROCEDURE — 58300 INSERTION OF IUD-TODAY: ICD-10-PCS | Mod: S$PBB,,, | Performed by: OBSTETRICS & GYNECOLOGY

## 2023-06-20 PROCEDURE — 81025 URINE PREGNANCY TEST: CPT | Mod: PBBFAC | Performed by: OBSTETRICS & GYNECOLOGY

## 2023-06-20 PROCEDURE — 58300 INSERT INTRAUTERINE DEVICE: CPT | Mod: PBBFAC | Performed by: OBSTETRICS & GYNECOLOGY

## 2023-06-20 RX ORDER — ALBUTEROL SULFATE 90 UG/1
AEROSOL, METERED RESPIRATORY (INHALATION)
COMMUNITY

## 2023-06-20 RX ADMIN — LEVONORGESTREL 1 INTRA UTERINE DEVICE: 52 INTRAUTERINE DEVICE INTRAUTERINE at 03:06

## 2023-06-20 NOTE — PROCEDURES
Insertion of IUD-Today    Date/Time: 6/20/2023 3:15 PM  Performed by: Jg Gibson MD  Authorized by: Jg Gibson MD     Consent:     Consent obtained:  Written    Consent given by:  Patient    Patient questions answered: yes      Patient agrees, verbalizes understanding, and wants to proceed: yes      Educational handouts given: yes      Instructions and paperwork completed: yes    Procedure:     Pelvic exam performed: yes      Negative GC/chlamydia test: no      Negative urine pregnancy test: yes      Negative serum pregnancy test: no      Cervix cleaned and prepped: yes      Speculum placed in vagina: yes      Tenaculum applied to cervix: yes      Uterus sounded: yes      Uterus sound depth (cm):  8    IUD inserted with no complications: yes      Strings trimmed: yes    1 Intra Uterine Device levonorgestreL 21 mcg/24 hours (8 yrs) 52 mg     Post-procedure:     Patient tolerated procedure well: yes      Patient will follow up after next period: yes    Comments:      IUD PLACEMENT COUNSELING:  Contraceptive options were reviewed and the patient chooses an IUD.  The patient's history was reviewed and there are no contraindications to an IUD. The procedure and minimal risks of pain, bleeding, perforation and infection at the insertion site and spontaneous expulsion within the first two weeks was discussed. The benefits of amenorrhea and no systemic side effects were explained. All questions were answered and the patient agrees to proceed. Consent was signed.    EXAM:  Uterine Position: anteroflexed    PROCEDURE:  TIME OUT PERFORMED.  The cervix visualized with a speculum.  A single tooth tenaculum placed on the anterior lip.  The uterus sounded to 8cm using sterile technique.  A Mirena IUD was loaded and placed high in uterine fundus without difficulty using sterile technique.  The string was cut to 2cm length from exo cervix.  The tenaculum and speculum were removed. The patient tolerated the procedure  well.    ASSESSMENT:  1.  Maria was seen today for iud insertion.    Diagnoses and associated orders for this visit:    · Pre-procedure lab exam   POCT urine pregnancy    · Encounter for IUD insertion   Insertion of IUD-Today        POST IUD PLACEMENT COUNSELING:  Manage post IUD placement pain with NSAIDs, Tylenol or Rx given.  IUD danger signs and how to check the strings.  Removal in 5 years for Mirena IUD and in 10 years for Copper IUD.    Counseling lasted approximately 15 minutes and all her questions were answered.

## 2023-07-10 ENCOUNTER — PATIENT OUTREACH (OUTPATIENT)
Dept: ADMINISTRATIVE | Facility: HOSPITAL | Age: 38
End: 2023-07-10
Payer: MEDICAID

## 2023-07-10 NOTE — PROGRESS NOTES
Chart reviewed, immunization record updated.  No new results noted on [facility] or [facility] web site.  Care Everywhere updated.   Patient care coordination note  LOV with PCP 6/21/2016  Patient seen per Dr. Jg Gibson on 6/16/2023, pap smear not collected.  Attempted to contact patient to discuss scheduling appointment to reestablish care with Dr. Boyd, schedule MMG and discuss Colorectal Cancer Screening, unable to contact. (All numbers not working, either not in service or wrong number.)

## 2023-09-10 ENCOUNTER — HOSPITAL ENCOUNTER (INPATIENT)
Facility: HOSPITAL | Age: 38
LOS: 12 days | Discharge: NURSING, REHAB OR LONG-TERM CARE | DRG: 885 | End: 2023-09-22
Attending: STUDENT IN AN ORGANIZED HEALTH CARE EDUCATION/TRAINING PROGRAM | Admitting: STUDENT IN AN ORGANIZED HEALTH CARE EDUCATION/TRAINING PROGRAM
Payer: MEDICAID

## 2023-09-10 DIAGNOSIS — F19.90 SUBSTANCE USE DISORDER: ICD-10-CM

## 2023-09-10 DIAGNOSIS — R45.851 SUICIDAL IDEATIONS: ICD-10-CM

## 2023-09-10 DIAGNOSIS — F25.1 SCHIZOAFFECTIVE DISORDER, DEPRESSIVE TYPE: Primary | ICD-10-CM

## 2023-09-10 DIAGNOSIS — N89.8 VAGINAL ODOR: ICD-10-CM

## 2023-09-10 PROCEDURE — 25000003 PHARM REV CODE 250: Performed by: STUDENT IN AN ORGANIZED HEALTH CARE EDUCATION/TRAINING PROGRAM

## 2023-09-10 PROCEDURE — 11400000 HC PSYCH PRIVATE ROOM

## 2023-09-10 RX ORDER — ACETAMINOPHEN 325 MG/1
650 TABLET ORAL EVERY 6 HOURS PRN
Status: DISCONTINUED | OUTPATIENT
Start: 2023-09-10 | End: 2023-09-22 | Stop reason: HOSPADM

## 2023-09-10 RX ORDER — FOLIC ACID 1 MG/1
1 TABLET ORAL DAILY
Status: DISCONTINUED | OUTPATIENT
Start: 2023-09-11 | End: 2023-09-22 | Stop reason: HOSPADM

## 2023-09-10 RX ORDER — THIAMINE HCL 100 MG
100 TABLET ORAL DAILY
Status: DISCONTINUED | OUTPATIENT
Start: 2023-09-11 | End: 2023-09-22 | Stop reason: HOSPADM

## 2023-09-10 RX ORDER — ONDANSETRON 4 MG/1
4 TABLET, ORALLY DISINTEGRATING ORAL EVERY 8 HOURS PRN
Status: DISCONTINUED | OUTPATIENT
Start: 2023-09-10 | End: 2023-09-22 | Stop reason: HOSPADM

## 2023-09-10 RX ORDER — BENZTROPINE MESYLATE 1 MG/ML
2 INJECTION, SOLUTION INTRAMUSCULAR; INTRAVENOUS EVERY 8 HOURS PRN
Status: DISCONTINUED | OUTPATIENT
Start: 2023-09-10 | End: 2023-09-22 | Stop reason: HOSPADM

## 2023-09-10 RX ORDER — OLANZAPINE 10 MG/2ML
10 INJECTION, POWDER, FOR SOLUTION INTRAMUSCULAR EVERY 8 HOURS PRN
Status: DISCONTINUED | OUTPATIENT
Start: 2023-09-10 | End: 2023-09-22 | Stop reason: HOSPADM

## 2023-09-10 RX ORDER — TALC
6 POWDER (GRAM) TOPICAL NIGHTLY PRN
Status: DISCONTINUED | OUTPATIENT
Start: 2023-09-10 | End: 2023-09-22 | Stop reason: HOSPADM

## 2023-09-10 RX ORDER — IBUPROFEN 200 MG
1 TABLET ORAL DAILY PRN
Status: DISCONTINUED | OUTPATIENT
Start: 2023-09-10 | End: 2023-09-20

## 2023-09-10 RX ORDER — CALCIUM CARBONATE 200(500)MG
1000 TABLET,CHEWABLE ORAL EVERY 8 HOURS PRN
Status: DISCONTINUED | OUTPATIENT
Start: 2023-09-10 | End: 2023-09-22 | Stop reason: HOSPADM

## 2023-09-10 RX ORDER — HYDROXYZINE HYDROCHLORIDE 25 MG/1
50 TABLET, FILM COATED ORAL 3 TIMES DAILY PRN
Status: DISCONTINUED | OUTPATIENT
Start: 2023-09-10 | End: 2023-09-22 | Stop reason: HOSPADM

## 2023-09-10 RX ORDER — LOPERAMIDE HYDROCHLORIDE 2 MG/1
2 CAPSULE ORAL
Status: DISCONTINUED | OUTPATIENT
Start: 2023-09-10 | End: 2023-09-22 | Stop reason: HOSPADM

## 2023-09-10 RX ORDER — OLANZAPINE 10 MG/1
10 TABLET ORAL EVERY 8 HOURS PRN
Status: DISCONTINUED | OUTPATIENT
Start: 2023-09-10 | End: 2023-09-22 | Stop reason: HOSPADM

## 2023-09-10 RX ORDER — ALBUTEROL SULFATE 90 UG/1
2 AEROSOL, METERED RESPIRATORY (INHALATION) EVERY 4 HOURS PRN
Status: DISCONTINUED | OUTPATIENT
Start: 2023-09-10 | End: 2023-09-22 | Stop reason: HOSPADM

## 2023-09-10 RX ADMIN — ACETAMINOPHEN 650 MG: 325 TABLET ORAL at 10:09

## 2023-09-11 PROBLEM — D50.9 MICROCYTIC ANEMIA: Status: ACTIVE | Noted: 2023-09-11

## 2023-09-11 PROBLEM — F19.90 SUBSTANCE USE DISORDER: Status: ACTIVE | Noted: 2023-09-11

## 2023-09-11 PROCEDURE — 99232 SBSQ HOSP IP/OBS MODERATE 35: CPT | Mod: 95,SA,HB, | Performed by: PHYSICIAN ASSISTANT

## 2023-09-11 PROCEDURE — 90833 PSYTX W PT W E/M 30 MIN: CPT | Mod: AF,HB,, | Performed by: PSYCHIATRY & NEUROLOGY

## 2023-09-11 PROCEDURE — 25000003 PHARM REV CODE 250: Performed by: PSYCHIATRY & NEUROLOGY

## 2023-09-11 PROCEDURE — S4991 NICOTINE PATCH NONLEGEND: HCPCS | Performed by: STUDENT IN AN ORGANIZED HEALTH CARE EDUCATION/TRAINING PROGRAM

## 2023-09-11 PROCEDURE — 25000003 PHARM REV CODE 250: Performed by: STUDENT IN AN ORGANIZED HEALTH CARE EDUCATION/TRAINING PROGRAM

## 2023-09-11 PROCEDURE — 11400000 HC PSYCH PRIVATE ROOM

## 2023-09-11 PROCEDURE — 99223 1ST HOSP IP/OBS HIGH 75: CPT | Mod: AF,HB,, | Performed by: PSYCHIATRY & NEUROLOGY

## 2023-09-11 RX ORDER — DIVALPROEX SODIUM 500 MG/1
1000 TABLET, FILM COATED, EXTENDED RELEASE ORAL NIGHTLY
Status: DISCONTINUED | OUTPATIENT
Start: 2023-09-11 | End: 2023-09-13

## 2023-09-11 RX ORDER — BUPROPION HYDROCHLORIDE 150 MG/1
150 TABLET ORAL DAILY
Status: DISCONTINUED | OUTPATIENT
Start: 2023-09-12 | End: 2023-09-15

## 2023-09-11 RX ADMIN — DIVALPROEX SODIUM 1000 MG: 500 TABLET, FILM COATED, EXTENDED RELEASE ORAL at 08:09

## 2023-09-11 RX ADMIN — Medication 100 MG: at 08:09

## 2023-09-11 RX ADMIN — THERA TABS 1 TABLET: TAB at 08:09

## 2023-09-11 RX ADMIN — Medication 6 MG: at 08:09

## 2023-09-11 RX ADMIN — FOLIC ACID 1 MG: 1 TABLET ORAL at 08:09

## 2023-09-11 RX ADMIN — HYDROXYZINE HYDROCHLORIDE 50 MG: 25 TABLET, FILM COATED ORAL at 08:09

## 2023-09-11 RX ADMIN — NICOTINE 1 PATCH: 14 PATCH, EXTENDED RELEASE TRANSDERMAL at 08:09

## 2023-09-11 NOTE — PROGRESS NOTES
09/11/23 1430   [facility]   Group Name Le[facility] Education   Participation Level None     Despite encouragement patient isolating in room, in bed and refused to attend group.

## 2023-09-11 NOTE — CONSULTS
[facility] Hospital Medicine  Consult Note    Patient Name: Maria Yancey  MRN: [id number]  Admission Date: 9/10/2023  Hospital Length of Stay: 1 days  Attending Physician: Cachorro Conde III, MD   Primary Care Provider: [facility]           Patient information was obtained from patient and ER records.     Inpatient consult to [facility] for History and Physical  Consult performed by: Sheila Kovacs PA-C  Consult ordered by: Salomón Nettles MD        Subjective:     Principal Problem: Schizoaffective disorder, depressive type    Chief Complaint: No chief complaint on file.       HPI: Patient is a 38 year old female with medical history of schizoaffective disorder, PTSD, fibromyalgia, tobacco use and substance use disorder who was admitted for psychosis.  Hospital medicine consulted for medical management.            Found to have microcytic anemia.  She states she has been bleeding for 21 days straight and Is heavy.  IUD placed but symptoms have not resolved.  Followed by Dr. Gibson.        Past Medical History:   Diagnosis Date    Addiction to drug     recovering drug addict    ADHD (attention deficit hyperactivity disorder)     Alcohol abuse     Anxiety     Asthma     Bipolar 1 disorder     Cervical pain     Chronic constipation     Chronic diarrhea     Depression     Fatigue     Fibromyalgia     Hallucination     Headache     History of psychiatric hospitalization     pt reports 10 total- [facility] 10/2019 and 3/2019, 1/2020    Hx of psychiatric care     Seroquel    Magalis     Panic disorder     Polysubstance dependence     Psychiatric exam requested by authority     Psychiatric problem     Auditory hallucinations    PTSD (post-traumatic stress disorder)     Schizoaffective disorder     Sleep difficulties     Spina bifida     Spinal stenosis of lumbar region     Substance abuse     Suicide attempt     Once by hanging, once by overdose, once  by cutting wrists     Syringomyelia     Syrinx of spinal cord     Therapy     [facility]    Trigger finger     Withdrawal symptoms, alcohol     Withdrawal symptoms, drug or narcotic        Past Surgical History:   Procedure Laterality Date    CARPAL TUNNEL RELEASE       SECTION, CLASSIC      siactica      spinal bifida      spinalsonois         Review of patient's allergies indicates:  No Known Allergies    Current Facility-Administered Medications on File Prior to Encounter   Medication    [DISCONTINUED] diphenhydrAMINE injection 50 mg    [DISCONTINUED] diphenhydrAMINE injection 50 mg    [DISCONTINUED] haloperidol lactate injection 5 mg    [DISCONTINUED] LORazepam injection 2 mg    [DISCONTINUED] nicotine 21 mg/24 hr 1 patch     Current Outpatient Medications on File Prior to Encounter   Medication Sig    aripiprazole (ABILIFY IM) Inject 1 Dose into the muscle. monthly    albuterol (VENTOLIN HFA) 90 mcg/actuation inhaler Ventolin HFA 90 mcg/actuation aerosol inhaler   Inhale 1 puff 3 times a day by inhalation route as needed.    divalproex ER (DEPAKOTE) 500 MG Tb24 Take 2 tablets (1,000 mg total) by mouth nightly.     Family History       Problem Relation (Age of Onset)    Arthritis Father    Breast cancer Cousin    Diabetes Mother, Father    Hyperlipidemia Mother, Father    Hypertension Mother, Father          Tobacco Use    Smoking status: Every Day     Current packs/day: 0.50     Average packs/day: 0.5 packs/day for 25.9 years (12.9 ttl pk-yrs)     Types: Cigarettes     Start date: 10/29/1997    Smokeless tobacco: Never    Tobacco comments:     smokes 1-2 cig daily   Substance and Sexual Activity    Alcohol use: Not Currently     Alcohol/week: 0.0 standard drinks of alcohol     Comment: last use alcholhol 4 months ago    Drug use: Yes     Types: Methamphetamines     Comment: last used 2 days ago    Sexual activity: Yes     Partners: Male     Birth  control/protection: I.U.D.     Comment:      Review of Systems   Constitutional:  Positive for fatigue. Negative for chills and fever.   HENT:  Negative for congestion and drooling.    Respiratory:  Negative for cough and shortness of breath.    Cardiovascular:  Negative for chest pain and leg swelling.   Gastrointestinal:  Negative for constipation, diarrhea, nausea and vomiting.   Genitourinary:  Negative for difficulty urinating and dysuria.   Musculoskeletal:  Negative for arthralgias and gait problem.     Objective:     Vital Signs (Most Recent):  Temp: 97.4 °F (36.3 °C) (09/11/23 0800)  Pulse: 82 (09/11/23 0800)  Resp: 18 (09/11/23 0800)  BP: 123/71 (09/11/23 0800)  SpO2: 100 % (09/10/23 2145) Vital Signs (24h Range):  Temp:  [96.9 °F (36.1 °C)-98.3 °F (36.8 °C)] 97.4 °F (36.3 °C)  Pulse:  [82-95] 82  Resp:  [15-18] 18  SpO2:  [100 %] 100 %  BP: (123-130)/(70-76) 123/71     Weight: 116.7 kg (257 lb 4.4 oz)  Body mass index is 45.57 kg/m².     Physical Exam  Constitutional:       Appearance: Normal appearance.   HENT:      Head: Normocephalic and atraumatic.   Cardiovascular:      Rate and Rhythm: Normal rate and regular rhythm.   Pulmonary:      Effort: Pulmonary effort is normal. No respiratory distress.      Breath sounds: Normal breath sounds.   Abdominal:      General: Abdomen is flat. There is no distension.      Palpations: Abdomen is soft.   Musculoskeletal:      Right lower leg: No edema.      Left lower leg: No edema.   Skin:     General: Skin is warm and dry.   Neurological:      Mental Status: She is alert and oriented to person, place, and time. Mental status is at baseline.      Cranial Nerves: Cranial nerves 2-12 are intact.          Significant Labs: UPT  Results for orders placed or performed in visit on 06/20/23   POCT urine pregnancy   Result Value Ref Range    POC Preg Test, Ur Negative Negative     Acceptable Yes      U/A  Negative for UTI     UDS  Results for orders  placed or performed during the hospital encounter of 09/10/23   Drug screen panel, emergency   Result Value Ref Range    Benzodiazepines Negative Negative    Methadone metabolites Negative Negative    Cocaine (Metab.) Negative Negative    Opiate Scrn, Ur Negative Negative    Barbiturate Screen, Ur Negative Negative    Amphetamine Screen, Ur Presumptive Positive (A) Negative    THC Negative Negative    Phencyclidine Negative Negative    Creatinine, Urine 221.5 15.0 - 325.0 mg/dL    Toxicology Information SEE COMMENT      CBC  Results for orders placed or performed during the hospital encounter of 09/10/23   CBC auto differential   Result Value Ref Range    WBC 7.30 3.90 - 12.70 K/uL    RBC 4.50 4.00 - 5.40 M/uL    Hemoglobin 9.3 (L) 12.0 - 16.0 g/dL    Hematocrit 32.0 (L) 37.0 - 48.5 %    MCV 71 (L) 82 - 98 fL    MCH 20.7 (L) 27.0 - 31.0 pg    MCHC 29.1 (L) 32.0 - 36.0 g/dL    RDW 17.3 (H) 11.5 - 14.5 %    Platelets 318 150 - 450 K/uL    MPV 11.9 9.2 - 12.9 fL    Immature Granulocytes 0.4 0.0 - 0.5 %    Gran # (ANC) 5.1 1.8 - 7.7 K/uL    Immature Grans (Abs) 0.03 0.00 - 0.04 K/uL    Lymph # 1.6 1.0 - 4.8 K/uL    Mono # 0.4 0.3 - 1.0 K/uL    Eos # 0.2 0.0 - 0.5 K/uL    Baso # 0.05 0.00 - 0.20 K/uL    nRBC 0 0 /100 WBC    Gran % 69.1 38.0 - 73.0 %    Lymph % 22.1 18.0 - 48.0 %    Mono % 5.2 4.0 - 15.0 %    Eosinophil % 2.5 0.0 - 8.0 %    Basophil % 0.7 0.0 - 1.9 %    Differential Method Automated      CMP  Results for orders placed or performed during the hospital encounter of 09/10/23   Comprehensive metabolic panel   Result Value Ref Range    Sodium 142 136 - 145 mmol/L    Potassium 3.6 3.5 - 5.1 mmol/L    Chloride 109 95 - 110 mmol/L    CO2 22 (L) 23 - 29 mmol/L    Glucose 113 (H) 70 - 110 mg/dL    BUN 11 6 - 20 mg/dL    Creatinine 0.9 0.5 - 1.4 mg/dL    Calcium 9.2 8.7 - 10.5 mg/dL    Total Protein 7.9 6.0 - 8.4 g/dL    Albumin 4.0 3.5 - 5.2 g/dL    Total Bilirubin 0.5 0.1 - 1.0 mg/dL    Alkaline Phosphatase 97 55  - 135 U/L    AST 16 10 - 40 U/L    ALT 20 10 - 44 U/L    eGFR >60 >60 mL/min/1.73 m^2    Anion Gap 11 8 - 16 mmol/L     TSH  Results for orders placed or performed during the hospital encounter of 09/10/23   TSH   Result Value Ref Range    TSH 1.043 0.400 - 4.000 uIU/mL     ETOH  Results for orders placed or performed during the hospital encounter of 09/10/23   Ethanol   Result Value Ref Range    Alcohol, Serum <10 <10 mg/dL     Salicylate  Results for orders placed or performed during the hospital encounter of 09/10/23   Salicylate level   Result Value Ref Range    Salicylate Lvl <5.0 (L) 15.0 - 30.0 mg/dL     Acetaminophen  Results for orders placed or performed during the hospital encounter of 09/10/23   Acetaminophen level   Result Value Ref Range    Acetaminophen (Tylenol), Serum <3.0 (L) 10.0 - 20.0 ug/mL             Significant Imaging: none     Assessment/Plan:     * Schizoaffective disorder, depressive type  Defer to psych         Substance use disorder  +meth on UDS  Cessation resources at discharge       Microcytic anemia  Hg 9.3/ HCT 32.0  MCV 71      U/A with occult blood (suspect due to menstruation)  Iron studies pending but likely iron deficiency anemia    Menstruating for 21 days straight and heavy.  Had IUD placed.  Recommend repeat CBC 9/13 and gynecology consult.        Suicidal ideations  Defer to psych         VTE Risk Mitigation (From admission, onward)    None              Thank you for your consult. I will follow-up with patient. Please contact us if you have any additional questions.    Sheila Kovacs PA-C  Department of Hospital Medicine   [facility]

## 2023-09-11 NOTE — SUBJECTIVE & OBJECTIVE
Past Medical History:   Diagnosis Date    Addiction to drug     recovering drug addict    ADHD (attention deficit hyperactivity disorder)     Alcohol abuse     Anxiety     Asthma     Bipolar 1 disorder     Cervical pain     Chronic constipation     Chronic diarrhea     Depression     Fatigue     Fibromyalgia     Hallucination     Headache     History of psychiatric hospitalization     pt reports 10 total- [facility] 10/2019 and 3/2019, 2020    Hx of psychiatric care     Seroquel    Magalis     Panic disorder     Polysubstance dependence     Psychiatric exam requested by authority     Psychiatric problem     Auditory hallucinations    PTSD (post-traumatic stress disorder)     Schizoaffective disorder     Sleep difficulties     Spina bifida     Spinal stenosis of lumbar region     Substance abuse     Suicide attempt     Once by hanging, once by overdose, once by cutting wrists     Syringomyelia     Syrinx of spinal cord     Therapy     [facility]    Trigger finger     Withdrawal symptoms, alcohol     Withdrawal symptoms, drug or narcotic        Past Surgical History:   Procedure Laterality Date    CARPAL TUNNEL RELEASE       SECTION, CLASSIC      siactica      spinal bifida      spinalsonois         Review of patient's allergies indicates:  No Known Allergies    Current Facility-Administered Medications on File Prior to Encounter   Medication    [DISCONTINUED] diphenhydrAMINE injection 50 mg    [DISCONTINUED] diphenhydrAMINE injection 50 mg    [DISCONTINUED] haloperidol lactate injection 5 mg    [DISCONTINUED] LORazepam injection 2 mg    [DISCONTINUED] nicotine 21 mg/24 hr 1 patch     Current Outpatient Medications on File Prior to Encounter   Medication Sig    aripiprazole (ABILIFY IM) Inject 1 Dose into the muscle. monthly    albuterol (VENTOLIN HFA) 90 mcg/actuation inhaler Ventolin HFA 90 mcg/actuation aerosol inhaler   Inhale 1 puff 3 times a day by inhalation route as needed.    divalproex  ER (DEPAKOTE) 500 MG Tb24 Take 2 tablets (1,000 mg total) by mouth nightly.     Family History       Problem Relation (Age of Onset)    Arthritis Father    Breast cancer Cousin    Diabetes Mother, Father    Hyperlipidemia Mother, Father    Hypertension Mother, Father          Tobacco Use    Smoking status: Every Day     Current packs/day: 0.50     Average packs/day: 0.5 packs/day for 25.9 years (12.9 ttl pk-yrs)     Types: Cigarettes     Start date: 10/29/1997    Smokeless tobacco: Never    Tobacco comments:     smokes 1-2 cig daily   Substance and Sexual Activity    Alcohol use: Not Currently     Alcohol/week: 0.0 standard drinks of alcohol     Comment: last use alcholhol 4 months ago    Drug use: Yes     Types: Methamphetamines     Comment: last used 2 days ago    Sexual activity: Yes     Partners: Male     Birth control/protection: I.U.D.     Comment:      Review of Systems   Constitutional:  Positive for fatigue. Negative for chills and fever.   HENT:  Negative for congestion and drooling.    Respiratory:  Negative for cough and shortness of breath.    Cardiovascular:  Negative for chest pain and leg swelling.   Gastrointestinal:  Negative for constipation, diarrhea, nausea and vomiting.   Genitourinary:  Negative for difficulty urinating and dysuria.   Musculoskeletal:  Negative for arthralgias and gait problem.     Objective:     Vital Signs (Most Recent):  Temp: 97.4 °F (36.3 °C) (09/11/23 0800)  Pulse: 82 (09/11/23 0800)  Resp: 18 (09/11/23 0800)  BP: 123/71 (09/11/23 0800)  SpO2: 100 % (09/10/23 2145) Vital Signs (24h Range):  Temp:  [96.9 °F (36.1 °C)-98.3 °F (36.8 °C)] 97.4 °F (36.3 °C)  Pulse:  [82-95] 82  Resp:  [15-18] 18  SpO2:  [100 %] 100 %  BP: (123-130)/(70-76) 123/71     Weight: 116.7 kg (257 lb 4.4 oz)  Body mass index is 45.57 kg/m².     Physical Exam  Constitutional:       Appearance: Normal appearance.   HENT:      Head: Normocephalic and atraumatic.   Cardiovascular:      Rate and  Rhythm: Normal rate and regular rhythm.   Pulmonary:      Effort: Pulmonary effort is normal. No respiratory distress.      Breath sounds: Normal breath sounds.   Abdominal:      General: Abdomen is flat. There is no distension.      Palpations: Abdomen is soft.   Musculoskeletal:      Right lower leg: No edema.      Left lower leg: No edema.   Skin:     General: Skin is warm and dry.   Neurological:      Mental Status: She is alert and oriented to person, place, and time. Mental status is at baseline.      Cranial Nerves: Cranial nerves 2-12 are intact.          Significant Labs: UPT  Results for orders placed or performed in visit on 06/20/23   POCT urine pregnancy   Result Value Ref Range    POC Preg Test, Ur Negative Negative     Acceptable Yes      U/A  Negative for UTI     UDS  Results for orders placed or performed during the hospital encounter of 09/10/23   Drug screen panel, emergency   Result Value Ref Range    Benzodiazepines Negative Negative    Methadone metabolites Negative Negative    Cocaine (Metab.) Negative Negative    Opiate Scrn, Ur Negative Negative    Barbiturate Screen, Ur Negative Negative    Amphetamine Screen, Ur Presumptive Positive (A) Negative    THC Negative Negative    Phencyclidine Negative Negative    Creatinine, Urine 221.5 15.0 - 325.0 mg/dL    Toxicology Information SEE COMMENT      CBC  Results for orders placed or performed during the hospital encounter of 09/10/23   CBC auto differential   Result Value Ref Range    WBC 7.30 3.90 - 12.70 K/uL    RBC 4.50 4.00 - 5.40 M/uL    Hemoglobin 9.3 (L) 12.0 - 16.0 g/dL    Hematocrit 32.0 (L) 37.0 - 48.5 %    MCV 71 (L) 82 - 98 fL    MCH 20.7 (L) 27.0 - 31.0 pg    MCHC 29.1 (L) 32.0 - 36.0 g/dL    RDW 17.3 (H) 11.5 - 14.5 %    Platelets 318 150 - 450 K/uL    MPV 11.9 9.2 - 12.9 fL    Immature Granulocytes 0.4 0.0 - 0.5 %    Gran # (ANC) 5.1 1.8 - 7.7 K/uL    Immature Grans (Abs) 0.03 0.00 - 0.04 K/uL    Lymph # 1.6 1.0 - 4.8  K/uL    Mono # 0.4 0.3 - 1.0 K/uL    Eos # 0.2 0.0 - 0.5 K/uL    Baso # 0.05 0.00 - 0.20 K/uL    nRBC 0 0 /100 WBC    Gran % 69.1 38.0 - 73.0 %    Lymph % 22.1 18.0 - 48.0 %    Mono % 5.2 4.0 - 15.0 %    Eosinophil % 2.5 0.0 - 8.0 %    Basophil % 0.7 0.0 - 1.9 %    Differential Method Automated      CMP  Results for orders placed or performed during the hospital encounter of 09/10/23   Comprehensive metabolic panel   Result Value Ref Range    Sodium 142 136 - 145 mmol/L    Potassium 3.6 3.5 - 5.1 mmol/L    Chloride 109 95 - 110 mmol/L    CO2 22 (L) 23 - 29 mmol/L    Glucose 113 (H) 70 - 110 mg/dL    BUN 11 6 - 20 mg/dL    Creatinine 0.9 0.5 - 1.4 mg/dL    Calcium 9.2 8.7 - 10.5 mg/dL    Total Protein 7.9 6.0 - 8.4 g/dL    Albumin 4.0 3.5 - 5.2 g/dL    Total Bilirubin 0.5 0.1 - 1.0 mg/dL    Alkaline Phosphatase 97 55 - 135 U/L    AST 16 10 - 40 U/L    ALT 20 10 - 44 U/L    eGFR >60 >60 mL/min/1.73 m^2    Anion Gap 11 8 - 16 mmol/L     TSH  Results for orders placed or performed during the hospital encounter of 09/10/23   TSH   Result Value Ref Range    TSH 1.043 0.400 - 4.000 uIU/mL     ETOH  Results for orders placed or performed during the hospital encounter of 09/10/23   Ethanol   Result Value Ref Range    Alcohol, Serum <10 <10 mg/dL     Salicylate  Results for orders placed or performed during the hospital encounter of 09/10/23   Salicylate level   Result Value Ref Range    Salicylate Lvl <5.0 (L) 15.0 - 30.0 mg/dL     Acetaminophen  Results for orders placed or performed during the hospital encounter of 09/10/23   Acetaminophen level   Result Value Ref Range    Acetaminophen (Tylenol), Serum <3.0 (L) 10.0 - 20.0 ug/mL             Significant Imaging: none

## 2023-09-11 NOTE — PROGRESS NOTES
"   09/11/23 1000   [facility] Group Therapy   Group Name Therapeutic Recreation   Participation Level None     Despite encouragement patient resting in bed and did not attend group Patient reports a "tired, sleepy" mood. Patient was encouraged to attend the afternoon groups.  "

## 2023-09-11 NOTE — ASSESSMENT & PLAN NOTE
Hg 9.3/ HCT 32.0  MCV 71      U/A with occult blood (suspect due to menstruation)  Iron studies pending but likely iron deficiency anemia    Menstruating for 21 days straight and heavy.  Had IUD placed.  Recommend repeat CBC 9/13 and gynecology consult.

## 2023-09-11 NOTE — PLAN OF CARE
Lying quietly in bed, eyes closed, respirations even, unlabored. Apparently asleep. Slept 5.5 hours thus far with one interruption. Safety precautions maintained. Rounds done every 15 minutes. Bed is fixed in low position and room is uncluttered and pathways are clear.

## 2023-09-11 NOTE — PLAN OF CARE
Problem: Behavior Regulation Impairment (Psychotic Signs/Symptoms)  Goal: Improved Behavioral Control (Psychotic Signs/Symptoms)  Intervention: Manage Behavior  Flowsheets (Taken 9/11/2023 2404)  De-Escalation Techniques: 1:1 observation initiated       Process Group:    Pt is isolating in room, in bed refusing to attend group. Pt was in bed with covers over body. PLPC called out pt name several times and pt just turned over. PLPC provided 1:1 with pt to discuss process group.  Staff encouraged pt to meet with staff at a later time to initatie process group discussion.
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
## 2023-09-11 NOTE — PLAN OF CARE
"Patient flat affect with depressed mood. Patient guarded and withdrawn. Soft spoken with delayed responses. Patient reports on a scale of 1-10 with ten being the highest level of depression. Patient rating depression today a 6/10. On the same scale patient rating anxiety a 6/10. Encouraged patient to attend groups and activities.  Patient reports A/V hallucinations. Patient reports " I am hearing voices tell me that people are after me and my children". No interacting with internal stimuli noted. Patient denies SI/HI no self harming behavior displayed, no aggressive behavior displayed towards others. Patient contracted safety with staff and unit.   Encouraged patient to live a drug free life style.  Encouraged this patient to seek substance abuse rehab to gain insight into illness and to gain coping skills to help facilitate soberity.  Discussed healthy coping skills and techniques.  Educated, reviewed  and discussed plan of care and medication regiment with this patient. Discussed and educated with this patient any concerns that needed to be addressed along with the importance of medication compliance  1:1 with this patient. Patient voices understanding of all teachings.    " [id number]

## 2023-09-11 NOTE — HPI
Patient is a 38 year old female with medical history of schizoaffective disorder, PTSD, fibromyalgia, tobacco use and substance use disorder who was admitted for psychosis.  Hospital medicine consulted for medical management.            Found to have microcytic anemia.  She states she has been bleeding for 21 days straight and Is heavy.  IUD placed but symptoms have not resolved.  Followed by Dr. Gibson.    
[facility]
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
## 2023-09-12 PROCEDURE — 25000003 PHARM REV CODE 250: Performed by: PSYCHIATRY & NEUROLOGY

## 2023-09-12 PROCEDURE — 25000003 PHARM REV CODE 250: Performed by: PHYSICIAN ASSISTANT

## 2023-09-12 PROCEDURE — 11400000 HC PSYCH PRIVATE ROOM

## 2023-09-12 PROCEDURE — 99233 SBSQ HOSP IP/OBS HIGH 50: CPT | Mod: AF,HB,, | Performed by: PSYCHIATRY & NEUROLOGY

## 2023-09-12 PROCEDURE — 90833 PSYTX W PT W E/M 30 MIN: CPT | Mod: AF,HB,, | Performed by: PSYCHIATRY & NEUROLOGY

## 2023-09-12 RX ORDER — ARIPIPRAZOLE 2 MG/1
2 TABLET ORAL DAILY
Status: DISCONTINUED | OUTPATIENT
Start: 2023-09-12 | End: 2023-09-13

## 2023-09-12 RX ORDER — LANOLIN ALCOHOL/MO/W.PET/CERES
1 CREAM (GRAM) TOPICAL 2 TIMES DAILY
Status: DISCONTINUED | OUTPATIENT
Start: 2023-09-12 | End: 2023-09-22 | Stop reason: HOSPADM

## 2023-09-12 RX ADMIN — THERA TABS 1 TABLET: TAB at 08:09

## 2023-09-12 RX ADMIN — Medication 6 MG: at 08:09

## 2023-09-12 RX ADMIN — Medication 100 MG: at 08:09

## 2023-09-12 RX ADMIN — ARIPIPRAZOLE 2 MG: 2 TABLET ORAL at 09:09

## 2023-09-12 RX ADMIN — FOLIC ACID 1 MG: 1 TABLET ORAL at 08:09

## 2023-09-12 RX ADMIN — DIVALPROEX SODIUM 1000 MG: 500 TABLET, FILM COATED, EXTENDED RELEASE ORAL at 08:09

## 2023-09-12 RX ADMIN — FERROUS SULFATE TAB 325 MG (65 MG ELEMENTAL FE) 1 EACH: 325 (65 FE) TAB at 04:09

## 2023-09-12 RX ADMIN — BUPROPION HYDROCHLORIDE 150 MG: 150 TABLET, EXTENDED RELEASE ORAL at 08:09

## 2023-09-12 NOTE — PLAN OF CARE
Following POC.  Makes needs known.  Denies SI/HI, VH.  States the voices are still talking to her.  Flat, paucity of speech and thought.  Out on the unit for snacks and shower only.  Minimal interactions with staff and peers.  Medication complaint.  Appetite is good.  Encouraged out of room activities.  Free of fall.

## 2023-09-12 NOTE — PLAN OF CARE
Problem: Physiologic Impairment (Excessive Substance Use)  Goal: Improved Physiologic Symptoms (Excessive Substance Use)  Outcome: Ongoing, Progressing     Process Group:      Pt did not attend group today. [facility] met with pt individually. PLPC attempted to  discuss with pt on process group discussion. Pt was resting in bed quietly with covers over body and did not respond to PLPC , but to only move her foot. PLPC discussed with pt PLPC will come back at a later time and date to discuss group.

## 2023-09-12 NOTE — PLAN OF CARE
"Patient guarded and withdrawn. Patient flat affect with depressed mood. Patient has poor eye content. Slow thoughts with thought blocking. Patient reports auditory/visual hallucinations. Reports " I hear voices saying they are after my children. And I see my children spinning in a washing machine. The people are wanting to hurt my family". Patient paranoid and suspicious. Patient reports " I hear voices talking to me from the radio, microwave and TV. Patient denies SI/HI no self harming behavior displayed, no aggressive behavior displayed towards others. Patient contracted safety with staff and unit.  Discussed healthy coping skills and techniques.  Discussed and educated this patient on the importance of compliance of medications. Discussed healthy coping skills and techniques.   Encouraged patient to live a drug free life style.  Encouraged this patient to seek substance abuse rehab to gain insight into illness and to gain coping skills to help facilitate soberity.  Educated, reviewed  and discussed plan of care and medication regiment with this patient. Discussed and educated with this patient any concerns that needed to be addressed along with the importance of medication compliance  1:1 with this patient. Patient voices understanding of all teachings.    "

## 2023-09-12 NOTE — NURSING
Pt is sleeping at this time and has slept 8.5 hrs. Resp even & unlabored. Pathways clear. Q 15 minute safety checks ongoing. All precautions maintained.

## 2023-09-12 NOTE — PROGRESS NOTES
"   09/12/23 1000   [facility] Group Therapy   Group Name Therapeutic Recreation   Participation Level None     Despite encouragement patient isolated in assigned room, reports a "tired" mood, verbalized she is hearing voices and being around people increase her anxiety.  "

## 2023-09-12 NOTE — PROGRESS NOTES
"PSYCHIATRY DAILY INPATIENT PROGRESS NOTE  SUBSEQUENT HOSPITAL VISIT    ENCOUNTER DATE: 9/12/2023  SITE: [facility]    DATE OF ADMISSION: 9/10/2023  9:26 PM  LENGTH OF STAY: 2 days      CHIEF COMPLAINT   Maria Yancey is a 38 y.o. female, seen during daily manzo rounds on the inpatient unit.  Maria Yancey presented with the chief complaint of psychosis, "I don't really know."      The patient was seen and examined. The chart was reviewed.     Reviewed notes from Rns, PA, CTRS, and LPC and labs from the last 24 hours.    The patient's case was discussed with the treatment team/care providers today including Rns, Speciality services, and LPC    Staff reports no behavioral or management issues.     The patient has been compliant with treatment.      Subjective 09/12/2023       Today the patient reports that she feels "bad." She notes continued and minimally changed symptoms as documented below.  -she notes ongoing AH- the voices are threatening her family.  -she notes continued depression with SI. " Thought I may have to kill myself to keep the voices from hurting my family."    She remains a limited historian. She admits to amphetamine use. She is minimizing the severity of her addiction.   -she is likely having meth withdrawals      The patient denies any side effects to medications.          Psychiatric ROS (observed, reported, or endorsed/denied):  Depressed mood - variable  Interest/pleasure/anhedonia: variable  Guilt/hopelessness/worthlessness - variable  Changes in Sleep - variable  Changes in Appetite - variable  Changes in Concentration - variable  Changes in Energy - variable  PMA/R- variable  Suicidal- active/passive ideations - variable  Homicidal ideations: active/passive ideations - No    Hallucinations - Continuing  Delusions - Continuing  Disorganized behavior - variable  Disorganized speech - variable  Negative symptoms - variable    Elevated mood - No  Decreased need for sleep - " No  Grandiosity - No  Racing thoughts - No  Impulsivity - No  Irritability- No  Increased energy - No  Distractibility - No  Increase in goal-directed activity or PMA- No    Symptoms of JORDIN - Continuing  Symptoms of Panic Disorder- denies  Symptoms of PTSD - denies        Overall progress: Patient is showing no improvement on the Unit to date        Psychotherapy:  Target symptoms: depression, anxiety , substance abuse, mood disorder  Why chosen therapy is appropriate versus another modality: relevant to diagnosis, patient responds to this modality, evidence based practice  Outcome monitoring methods: self-report, observation  Therapeutic intervention type: insight oriented psychotherapy, behavior modifying psychotherapy, supportive psychotherapy, interactive psychotherapy  Topics discussed/themes: building skills sets for symptom management, symptom recognition  The patient's response to the intervention is accepting. The patient's progress toward treatment goals is limited.   Duration of intervention: 16 minutes.        Medical ROS  General ROS: negative  Ophthalmic ROS: negative  ENT ROS: negative  Allergy and Immunology ROS: negative  Hematological and Lymphatic ROS: negative  Endocrine ROS: negative  Respiratory ROS: no cough, shortness of breath, or wheezing  Cardiovascular ROS: no chest pain or dyspnea on exertion  Gastrointestinal ROS: no abdominal pain, change in bowel habits, or black or bloody stools  Genito-Urinary ROS: no dysuria, trouble voiding, or hematuria  Musculoskeletal ROS: positive for - joint pain and muscle pain  Neurological ROS: no TIA or stroke symptoms  Dermatological ROS: denied      PAST MEDICAL HISTORY   Past Medical History:   Diagnosis Date    Addiction to drug     recovering drug addict    ADHD (attention deficit hyperactivity disorder)     Alcohol abuse     Anxiety     Asthma     Bipolar 1 disorder     Cervical pain     Chronic constipation     Chronic diarrhea     Depression      "Fatigue     Fibromyalgia     Hallucination     Headache     History of psychiatric hospitalization     pt reports 10 total- [facility] 10/2019 and 3/2019, 1/2020    Hx of psychiatric care     Seroquel    Magalis     Panic disorder     Polysubstance dependence     Psychiatric exam requested by authority     Psychiatric problem     Auditory hallucinations    PTSD (post-traumatic stress disorder)     Schizoaffective disorder     Sleep difficulties     Spina bifida     Spinal stenosis of lumbar region     Substance abuse     Suicide attempt     Once by hanging, once by overdose, once by cutting wrists     Syringomyelia     Syrinx of spinal cord     Therapy     [facility]    Trigger finger     Withdrawal symptoms, alcohol     Withdrawal symptoms, drug or narcotic            PSYCHOTROPIC MEDICATIONS   Scheduled Meds:   buPROPion  150 mg Oral Daily    divalproex  1,000 mg Oral Nightly    folic acid  1 mg Oral Daily    multivitamin  1 tablet Oral Daily    thiamine  100 mg Oral Daily     Continuous Infusions:  PRN Meds:.acetaminophen, albuterol, benztropine mesylate, calcium carbonate, hydrOXYzine HCL, loperamide, melatonin, nicotine, OLANZapine **AND** OLANZapine, ondansetron        EXAMINATION    VITALS   Vitals:    09/10/23 2145 09/11/23 0800 09/11/23 1959 09/12/23 0745   BP: 125/76 123/71 (!) 104/57 111/68   BP Location: Left arm Right arm Right arm Left arm   Patient Position: Sitting Sitting Sitting Sitting   Pulse: 95 82 92 84   Resp: 18 18 18 18   Temp: 96.9 °F (36.1 °C) 97.4 °F (36.3 °C) 97.8 °F (36.6 °C) 97.1 °F (36.2 °C)   TempSrc:  Temporal Temporal Temporal   SpO2: 100%      Weight: 116.7 kg (257 lb 4.4 oz)      Height: 5' 3" (1.6 m)          Body mass index is 45.57 kg/m².    CONSTITUTIONAL  General Appearance: WF, in hospital garb, obese, NAD, tearful     MUSCULOSKELETAL  Muscle Strength and Tone:  normal  Abnormal Involuntary Movements:  none  Gait and Station:  normal; non-ataxic     PSYCHIATRIC " "  Level of Consciousness: awake, alert  Orientation: p/p/t/s  Grooming:  Disheveled, inadequate to circumstances  Psychomotor Behavior: some PMA  Speech: nl r/t/v/s  Language: able to repeat words English fluent  Mood: "I don"t know"  Affect: dysphoric, irritable, anxious, decreased range, tearful  Thought Process:  derailed  Associations:  intact; no SHAGUFTA  Thought Content: +SI, +AVH, +delusions, no HI  Memory:  intact to recent and remote events  Attention:  intact to conversation; not distractible   Fund of Knowledge:  age and education appropriate  Estimate if Intelligence:  average based on work/education history, vocabulary and mental status exam  Insight: impaired- partially seeks help and understands illness  Judgment: impaired- +recent bx issues ans s/p SA, compliant and cooperative        DIAGNOSTIC TESTING   Laboratory Results  No results found for this or any previous visit (from the past 24 hour(s)).          MEDICAL DECISION MAKING      ASSESSMENT:   Schizoaffective Disorder, depressed type, severe  JORDIN  Panic without agoraphobia   PTSD  Fibromyalgia      Polysubstance dependence (Methamphetamines, cannabis, alcohol, narcotics; methamphetamines, current, continuous, severe, without physiological dependence)  Nicotine Dependence     Psychosocial stressors     Obesity     MEDICAL DECISION MAKING     PROBLEM LIST AND MANAGEMENT PLANS; PRESCRIPTION DRUG MANAGEMENT  Compliance: yes  Side Effects: no  Regimen Adjustments:      Psychosis: pt counseled  -resume abilify- 2 mg po q day- seeking records to ascertain the last date and dose of administration  -depakote adjunctive (off-label) as below     Depression: pt counseled  -Resumed/continue Wellbutrin XL at 150 mg po q day(goal dose is 300 mg daily)     Anxiety/PTSD: pt counseled  -depakote off-label as below; wellbutrin off-label as above  -vistaril prn     Pain: pt counseled  -Restart/continue Depakote at higher dose 1000 mg po q HS-(last stabilized on 1500 " mg po q HS; check level 4 days after resuming the 1500 mg dosage     Substance use: pt counseled; will attempt to place in inpatient rehab if willing;   -monitor for now and encourage  -consider valium for detox if needed     Nicotine use: pt counseled;  -started/continue  nicotine patch 14 mg patch dermal daily;   -wellbutrin as above     Psychosocial stressors: pt counseled;   -SW consulted to assist with resources     Obesity: pt counseled          Discussed diagnosis, risks and benefits of proposed treatment vs alternative treatments vs no treatment, potential side effects of these treatments and the inherent unpredictability of treatment. The patient expresses understanding of the above and displays the capacity to agree with this treatment given said understanding. Patient also agrees that, currently, the benefits outweigh the risks and would like to pursue/continue treatment at this time.    Any medications being used off-label were discussed with the patient inclusive of the evidence base for the use of the medications and consent was obtained for the off-label use of the medication.       DISCHARGE PLANNING  Expected Disposition Plan: Home or Self Care      NEED FOR CONTINUED HOSPITALIZATION  Psychiatric illness continues to pose a potential threat to life or bodily function, of self or others, thereby requiring the need for continued inpatient psychiatric hospitalization: Yes, due to: significant psychotic thought disorder, aggressive neuroleptic titration, danger to self, gravely disabled, and suicidal ideation, as evidenced by:  Ongoing concerns with SI., Ongoing concerns with command hallucinations to hit/harm others., Ongoing concerns with grave disability with patient unable to perform basic feeding, hygiene and dressing activities without significant constant support., and Ongoing concerns with perceptual aberrancy and paranoid persecutory delusions leading to potential harm of self or  others.    Protective inpatient pyschiatric hospitalization required while a safe disposition plan is enacted: Yes    Patient stabilized and ready for discharge from inpatient psychiatric unit: No        STAFF:   Salomón Nettles MD  Psychiatry

## 2023-09-12 NOTE — PSYCH
"[facility] unable to complete assessment due to pt isolating in room refusing to do assessment due to AH. Pt reports a "tired mood." Staff encouraged pt to meet with [facility] at a later time and date to initiate assessment.   "

## 2023-09-12 NOTE — PLAN OF CARE
Problem: Adult Behavioral Health Plan of Care  Goal: Rounds/Family Conference  Outcome: Ongoing, Progressing  Flowsheets (Taken 9/12/2023 8266)  Participants:   psychiatrist   nursing   other ([facility] and med students)  TREATMENT TEAM      Chief Complaint:    Pt is a 38 year old female with chief complaints of psychosis.  Pt states she came in due to AH from the tv, air conditioner , radios and it is like  they want to kill my family. This has been going on for 4 years now.      Pt Goal(s):      Pt states she can not handle the meth anymore and would like to go to NA meetings or an IOP.   Current Progress:   Pt attended treatment team dressed in personal clothing    Pt affect irritable, anxious/dysphoric mood  Pt states she is feeling more depressed and feels like if she took her life  the AH  would not kill her family.   Pt states she has been with [facility] and they dropped her and she took her shot ( Abilify )2 days ago and was non compliant for 2 weeks.  Pt states she has a good support system with her family.   Pt remains a limited historian.     Program/groups:    Encourage pt to attend all groups. Pt has been isolating in her room.       Revisions to Plan:    Encourage to attend treatment team and to attend all groups.  Recommendations are for to to attend rehab. Pt denied rehab, but is willing to go to a psychotherapy and IOP.

## 2023-09-12 NOTE — NURSING
Pt sleeping at this time, slept 7.5 hrs with no awakenings. NAD. Resp even and unlabored.Pathways clear,bed in low position. Q 15 min safety check ongoing.All precautions maintained.

## 2023-09-12 NOTE — MEDICAL/APP STUDENT
"PSYCHIATRY DAILY INPATIENT PROGRESS NOTE  SUBSEQUENT HOSPITAL VISIT    ENCOUNTER DATE: 9/12/2023  SITE: [facility]    DATE OF ADMISSION: 9/10/2023  9:26 PM  LENGTH OF STAY: 2 days      CHIEF COMPLAINT   Maria Yancey is a 38 y.o. female, seen during daily manzo rounds on the inpatient unit.    Maria Yancey presented with the chief complaint of psychosis      The patient was seen and examined. The chart was reviewed.     Reviewed notes from Rns, MD, and CTRS and labs from the last 24 hours.    The patient's case was discussed with the treatment team/care providers today including Rns and MD    Staff reports less behavioral or management issues.     The patient has been compliant with treatment.      Subjective 09/12/2023       Today the patient reports feeling "alright and improving a little," but says her mood is "a bit depressed." Reports experiencing AVH earlier this morning, experiencing the same distressing hallucinations of people and her adult children being forced into washing machines. However, she states she is not currently having AVH. She says she slept well last night and had an appetite this morning, eating bread and eggs. Rates depression as a 5 out of 10 severity. Anxiety: 2/10 severity. Denies any side effects from medications. Has been spending most her time in bed, declining participation in group therapy yesterday.        Interim/overnight events per report/notes:    Microcytic anemia being monitored by Dr Coleman and hospital medicine team: Found to have microcytic anemia.  She states she has been bleeding for 21 days straight and Is heavy.  IUD placed but symptoms have not resolved.  Followed by Dr. Gibson. "      Psychiatric ROS (observed, reported, or endorsed/denied):  Depressed mood - Yes  Interest/pleasure/anhedonia: Continuing  Guilt/hopelessness/worthlessness - Continuing  Changes in Sleep - Yes  Changes in Appetite - No  Changes in Concentration - Continuing  Changes in " "Energy - Continuing  PMA/R- Continuing  Suicidal- active/passive ideations - No  Homicidal ideations: active/passive ideations - No    Hallucinations - Yes  Delusions - Yes  Disorganized behavior - No  Disorganized speech - No  Negative symptoms - Continuing    Elevated mood - None  Decreased need for sleep - None  Grandiosity - None  Racing thoughts - None  Impulsivity - None  Irritability- None  Increased energy - None  Distractibility - None  Increase in goal-directed activity or PMA- None    Symptoms of JORDIN - Continuing  Symptoms of Panic Disorder- None  Symptoms of PTSD - None        Overall progress: Patient is showing mild improvement ; still experiencing hallucinations        Psychotherapy:    Pt did not participate in yestrday's group therapy re CTRS: "Despite encouragement patient isolating in room, in bed and refused to attend group."         Medical ROS  Review of Systems   Constitutional:  Negative for chills, fever and weight loss.   HENT: Negative.  Negative for sore throat.    Eyes: Negative.    Respiratory: Negative.  Negative for cough.    Cardiovascular:  Negative for chest pain.   Gastrointestinal: Negative.    Genitourinary: Negative.    Musculoskeletal: Negative.    Skin: Negative.    Neurological: Negative.    Endo/Heme/Allergies: Negative.          PAST MEDICAL HISTORY   Past Medical History:   Diagnosis Date    Addiction to drug     recovering drug addict    ADHD (attention deficit hyperactivity disorder)     Alcohol abuse     Anxiety     Asthma     Bipolar 1 disorder     Cervical pain     Chronic constipation     Chronic diarrhea     Depression     Fatigue     Fibromyalgia     Hallucination     Headache     History of psychiatric hospitalization     pt reports 10 total- [facility] 10/2019 and 3/2019, 1/2020    Hx of psychiatric care     Seroquel    Magalis     Panic disorder     Polysubstance dependence     Psychiatric exam requested by authority     Psychiatric problem     Auditory " "hallucinations    PTSD (post-traumatic stress disorder)     Schizoaffective disorder     Sleep difficulties     Spina bifida     Spinal stenosis of lumbar region     Substance abuse     Suicide attempt     Once by hanging, once by overdose, once by cutting wrists     Syringomyelia     Syrinx of spinal cord     Therapy     [facility]    Trigger finger     Withdrawal symptoms, alcohol     Withdrawal symptoms, drug or narcotic            PSYCHOTROPIC MEDICATIONS   Scheduled Meds:   buPROPion  150 mg Oral Daily    divalproex  1,000 mg Oral Nightly    folic acid  1 mg Oral Daily    multivitamin  1 tablet Oral Daily    thiamine  100 mg Oral Daily     Continuous Infusions:  PRN Meds:.acetaminophen, albuterol, benztropine mesylate, calcium carbonate, hydrOXYzine HCL, loperamide, melatonin, nicotine, OLANZapine **AND** OLANZapine, ondansetron        EXAMINATION    VITALS   Vitals:    09/10/23 2145 09/11/23 0800 09/11/23 1959 09/12/23 0745   BP: 125/76 123/71 (!) 104/57 111/68   BP Location: Left arm Right arm Right arm Left arm   Patient Position: Sitting Sitting Sitting Sitting   Pulse: 95 82 92 84   Resp: 18 18 18 18   Temp: 96.9 °F (36.1 °C) 97.4 °F (36.3 °C) 97.8 °F (36.6 °C) 97.1 °F (36.2 °C)   TempSrc:  Temporal Temporal Temporal   SpO2: 100%      Weight: 116.7 kg (257 lb 4.4 oz)      Height: 5' 3" (1.6 m)          Body mass index is 45.57 kg/m².        CONSTITUTIONAL  General Appearance: unremarkable, age appropriate    MUSCULOSKELETAL  Muscle Strength and Tone:no tremor, no tic  Abnormal Involuntary Movements: No  Gait and Station: non-ataxic    PSYCHIATRIC   Level of Consciousness: awake and alert   Orientation: person, place, and situation  Grooming: Casually dressed and Well groomed  Psychomotor Behavior: normal, cooperative  Speech: normal tone, normal rate, normal pitch, normal volume  Language: grossly intact  Mood: depressed and sad  Affect: Consistent with mood  Thought Process: linear, " logical  Associations: intact   Thought Content: +delusions  Perceptions: +AH and +VH  Memory: Able to recall past events, Remote intact, and Recent intact  Attention:Decreased  Fund of Knowledge: Aware of current events, Impaired, and Vocabulary appropriate   Estimate if Intelligence:  Average based on work/education history, vocabulary and mental status exam  Insight: limited awareness of illness  Judgment: limited        DIAGNOSTIC TESTING   Laboratory Results  No results found for this or any previous visit (from the past 24 hour(s)).          MEDICAL DECISION MAKING      ASSESSMENT:   Mood disorder with psychosis  JORDIN  Schizoaffective disorder  Substance use disorder        PROBLEM LIST AND MANAGEMENT PLANS    ***            Discussed diagnosis, risks and benefits of proposed treatment vs alternative treatments vs no treatment, potential side effects of these treatments and the inherent unpredictability of treatment. The patient expresses understanding of the above and displays the capacity to agree with this treatment given said understanding. Patient also agrees that, currently, the benefits outweigh the risks and would like to pursue/continue treatment at this time.    Any medications being used off-label were discussed with the patient inclusive of the evidence base for the use of the medications and consent was obtained for the off-label use of the medication.       DISCHARGE PLANNING  Expected Disposition Plan: {Select Anticipated Discharge Plan:66484}      NEED FOR CONTINUED HOSPITALIZATION  Psychiatric illness continues to pose a potential threat to life or bodily function, of self or others, thereby requiring the need for continued inpatient psychiatric hospitalization: Yes, due to: {PSASIF IP JUSTIFICATION FOR CONTINUED ACUTE CARE HOSPITALIZATION:31965}, as evidenced by:  {just:21721}    Protective inpatient pyschiatric hospitalization required while a safe disposition plan is enacted: Yes    Patient  stabilized and ready for discharge from inpatient psychiatric unit: No        STAFF:   Tobi Vides, MS3  [facility]

## 2023-09-12 NOTE — PROGRESS NOTES
"   09/12/23 1525   [facility] Group Therapy   Group Name Other  (1:1 attempt)   Specific Interventions Coping Skills Training   Participation Level Minimal   Participation Quality Drowsy   Insight/Motivation Limited   Affect/Mood Display Other (see comments)  (tired sleepy)   Cognition Oriented   Psychomotor Other (see comments)  (in bed)     Patient presents half sleep, reports "tired, sleepy" mood. Patient was resting in bed and lacks alertness. Patient was offered a substance abuse for coping skills worksheet to educate and help toward her recovery.  "

## 2023-09-12 NOTE — PROGRESS NOTES
"   09/12/23 0833   Assessment   Patient's Identification of the Problem Patient presents flat, sluggish, reports "tired, sleepy" mood. Patient reports her admit is due to "because I was screaming and hearing voices... heard scream like people being tortured." Per H&P chief compliant: psychosis. Patient states "I was anxious and scared the mob or something was coming to get me. I saw hair pieces of jewelry and clothes in my house that wasn't mine...they stole my keys." Patient reports her schizophrenia keeps her from doing things. Patient admit to negative leisure lifestyle of cigarettes, methamphetamines (last use 3 days ago). Patient reports she is , have 2 children, 2 years of college, is unemployed, receives disability income, wears glasses, lives alone in [place]. Patient verbalized her main goals are to "stop hearing voices, stop using meth".   Leisure Interest Reading;Walk;Enjoy Family/Friends  (read the bible)   Leisure Barriers Loss of Interest;Lack of Finances;Energy Level;Drug Use;Fears/Phobias  (fear someone is after her, per patient her schizophrenia keep her from participating in leisure activities)   Treatment Focus To Improve Reality Orientation;To Improve Mood;To Improve Leisure Awareness/Lifestyle/Interest;To Improve Coping Skills;To Educate and Increase Awareness of Sober Free Activities;To Increase Energy Level       Treatment Recommendation:   1:1 Intervention (as needed)    Reality Orientation Skilled Activity  Cognitive Stimulation Skilled Activity  Self Expression Skilled Activity  Mild Exercises Skilled Activity  Coping Skilled Activity  Leisure Education and Awareness Skilled Activity    Treatment Goal(s):  Long Term Goals Refer To Master Treatment Plan    Short Term Treatment Goal(s)  Patient Will:  Comply with Treatment  Exhibit Improvement in Mood  Demonstrate Improvement in Thought Processes / Awareness  Demonstrate Constructive Expression of Feelings and Behavior  Verbalize " Improvement in Mood  Identify (+) Leisure / Recreation Activities that Promote Wellness and Promote a Sober Lifestyle    Discharge Recommendations:  Encourage Patient to Actively Utilize Available Community Resources to Increase Leisure Involvement to Decrease Signs and Symptoms of Illness  Encourage Patient to Utilize Coping Skills on a Regular Basis to Reduce the Risk of Decompensating and Re-Hospitalizations  AA/NA Meetings  Follow Up with After Care Appointments

## 2023-09-13 LAB
BASOPHILS # BLD AUTO: 0.05 K/UL (ref 0–0.2)
BASOPHILS NFR BLD: 0.5 % (ref 0–1.9)
DIFFERENTIAL METHOD BLD: ABNORMAL
EOSINOPHIL # BLD AUTO: 0.2 K/UL (ref 0–0.5)
EOSINOPHIL NFR BLD: 1.5 % (ref 0–8)
ERYTHROCYTE [DISTWIDTH] IN BLOOD BY AUTOMATED COUNT: 17.7 % (ref 11.5–14.5)
HCT VFR BLD AUTO: 40.1 % (ref 37–48.5)
HGB BLD-MCNC: 11.4 G/DL (ref 12–16)
IMM GRANULOCYTES # BLD AUTO: 0.04 K/UL (ref 0–0.04)
IMM GRANULOCYTES NFR BLD AUTO: 0.4 % (ref 0–0.5)
LYMPHOCYTES # BLD AUTO: 2.5 K/UL (ref 1–4.8)
LYMPHOCYTES NFR BLD: 25.8 % (ref 18–48)
MCH RBC QN AUTO: 20.2 PG (ref 27–31)
MCHC RBC AUTO-ENTMCNC: 28.4 G/DL (ref 32–36)
MCV RBC AUTO: 71 FL (ref 82–98)
MONOCYTES # BLD AUTO: 0.6 K/UL (ref 0.3–1)
MONOCYTES NFR BLD: 6 % (ref 4–15)
NEUTROPHILS # BLD AUTO: 6.4 K/UL (ref 1.8–7.7)
NEUTROPHILS NFR BLD: 65.8 % (ref 38–73)
NRBC BLD-RTO: 0 /100 WBC
PLATELET # BLD AUTO: 234 K/UL (ref 150–450)
PMV BLD AUTO: 10.6 FL (ref 9.2–12.9)
RBC # BLD AUTO: 5.65 M/UL (ref 4–5.4)
WBC # BLD AUTO: 9.7 K/UL (ref 3.9–12.7)

## 2023-09-13 PROCEDURE — 99900035 HC TECH TIME PER 15 MIN (STAT)

## 2023-09-13 PROCEDURE — 36415 COLL VENOUS BLD VENIPUNCTURE: CPT | Performed by: PHYSICIAN ASSISTANT

## 2023-09-13 PROCEDURE — 25000003 PHARM REV CODE 250: Performed by: PSYCHIATRY & NEUROLOGY

## 2023-09-13 PROCEDURE — 85025 COMPLETE CBC W/AUTO DIFF WBC: CPT | Performed by: PHYSICIAN ASSISTANT

## 2023-09-13 PROCEDURE — 99233 SBSQ HOSP IP/OBS HIGH 50: CPT | Mod: AF,HB,, | Performed by: PSYCHIATRY & NEUROLOGY

## 2023-09-13 PROCEDURE — 11400000 HC PSYCH PRIVATE ROOM

## 2023-09-13 PROCEDURE — 90833 PSYTX W PT W E/M 30 MIN: CPT | Mod: AF,HB,, | Performed by: PSYCHIATRY & NEUROLOGY

## 2023-09-13 PROCEDURE — 25000003 PHARM REV CODE 250: Performed by: PHYSICIAN ASSISTANT

## 2023-09-13 RX ORDER — ARIPIPRAZOLE 5 MG/1
5 TABLET ORAL DAILY
Status: DISCONTINUED | OUTPATIENT
Start: 2023-09-14 | End: 2023-09-14

## 2023-09-13 RX ORDER — DIVALPROEX SODIUM 500 MG/1
1500 TABLET, FILM COATED, EXTENDED RELEASE ORAL NIGHTLY
Status: DISCONTINUED | OUTPATIENT
Start: 2023-09-13 | End: 2023-09-18

## 2023-09-13 RX ADMIN — FERROUS SULFATE TAB 325 MG (65 MG ELEMENTAL FE) 1 EACH: 325 (65 FE) TAB at 09:09

## 2023-09-13 RX ADMIN — ARIPIPRAZOLE 2 MG: 2 TABLET ORAL at 09:09

## 2023-09-13 RX ADMIN — THERA TABS 1 TABLET: TAB at 09:09

## 2023-09-13 RX ADMIN — Medication 100 MG: at 09:09

## 2023-09-13 RX ADMIN — DIVALPROEX SODIUM 1500 MG: 500 TABLET, FILM COATED, EXTENDED RELEASE ORAL at 08:09

## 2023-09-13 RX ADMIN — HYDROXYZINE HYDROCHLORIDE 50 MG: 25 TABLET, FILM COATED ORAL at 08:09

## 2023-09-13 RX ADMIN — FOLIC ACID 1 MG: 1 TABLET ORAL at 09:09

## 2023-09-13 RX ADMIN — BUPROPION HYDROCHLORIDE 150 MG: 150 TABLET, EXTENDED RELEASE ORAL at 09:09

## 2023-09-13 RX ADMIN — FERROUS SULFATE TAB 325 MG (65 MG ELEMENTAL FE) 1 EACH: 325 (65 FE) TAB at 08:09

## 2023-09-13 NOTE — PSYCH
[facility] was contacted by pt Leonora Dhillon to discuss rehab. Aunt states she convenienced pt to go to rehab. Aunt states they will get some things together for pt to attend rehab and will drop them off at a later date.

## 2023-09-13 NOTE — NURSING
Pt sleeping at this time, slept 10 hrs with 1awakenings. NAD. Resp even and unlabored.Pathways clear,bed in low position. Q 15 min safety check ongoing.All precautions maintained.

## 2023-09-13 NOTE — PLAN OF CARE
Problem: Adult Behavioral Health Plan of Care  Goal: Develops/Participates in Therapeutic Madeline to Support Successful Transition  Intervention: Foster Therapeutic Madeline  Flowsheets (Taken 9/13/2023 1205)  Trust Relationship/Rapport:   care explained   choices provided   emotional support provided   empathic listening provided   questions answered   questions encouraged   reassurance provided   thoughts/feelings acknowledged     Process Group        Behavior:  Pt did not attend group. [facility] provided individual session with pt. Pt dressed in personal clothing. Pt laying down in bed quietly.  Pt attentive and engaged.      Intervention:     Process discussion on symptoms of indication of processing thoughts with discharge planning. Patients were encouraged to identify ways to cope with and reflect on what are the plans when they are discharged and what coping skills did they learn while in the hospital?        Response:  Pt affect flat with  depressed mood. Pt  states she made a decision to go to rehab and would like to go as soon as possible and knows that her family will not be around of she does not do this for herself. She states that she has been to 4 or 5 rehabs already, but it really does not matter which one she goes to as long as she goes. She states the coping skills she learned so far is that she taylor to take a chance to change her life and her family will always be there for her if she does.      Plan:  Staff will continue to assess and obtain collaterals as needed.  Staff will coordinate discharge to home with residential treatment when deemed stable.

## 2023-09-13 NOTE — CARE UPDATE
[facility]  Encounter Date: 9/10/2023  9:26 PM    Discharge Date No discharge date for patient encounter.   Hospital Account: [id number]    MRN: [id number]   Guarantor: FOREIGN FRAZIER   Contact Serial #: [id number]         ENCOUNTER             Patient Class: IP Psych   Unit: [facility]   Hospital Service: Psychiatry   Bed: 211   Admitting Provider: Cachorro Conde Iii, Md   Referring Physician: Self, Aaareferral   Attending Provider: Cachorro Conde Iii, Md   Adm Diagnosis: Suicidal ideations [R45.*      PATIENT                 Name: FOREIGN FRAZIER : 1985 (38 yrs)   Address: [address] [place] [address] Sex: Female   City: [place] [address]       Primary Care Provider: Mandi, Primary Doctor         Primary Phone: [phone]   EMERGENCY CONTACT   Contact Name Legal Guardian? Relationship to Patient Home Phone Work Phone Mobile Phone   1. Ania Benjamin  2. ChipChristal    No Mother  Relative [phone]      GUARANTOR                  Guarantor: FOREIGN FRAZIER       : 1985   Address: [address] [place] [address]    Sex: [place] [address] Guarantor  Type: B/H   Relation to Patient: Self         Home Phone: [phone]   Guarantor ID: [id number]         Work Phone:     GUARANTOR EMPLOYER     Employer:             Status: NOT EMPLO*      COVERAGE          PRIMARY INSURANCE   Payor / Plan: MEDICAID/UHC COMMUNITY PLAN [facility] ([place] MEDICAID)       Group Number: [id number]       Subscriber Name: FOREIGN FRAZIER VIV Subscriber : 1985   Subscriber ID: [id number] Pat. Rel. to Subscriber: Self   Insurance Address: [address] [address]  [place] [address]       SECONDARY INSURANCE   Payor / Plan: - No Secondary Coverage -       Group Number:         Subscriber Name:   Subscriber :     Subscriber ID:   Pat. Rel. to Subscriber:

## 2023-09-13 NOTE — PROGRESS NOTES
"PSYCHIATRY DAILY INPATIENT PROGRESS NOTE  SUBSEQUENT HOSPITAL VISIT    ENCOUNTER DATE: 9/13/2023  S[facility]    DATE OF ADMISSION: 9/10/2023  9:26 PM  LENGTH OF STAY: 3 days      CHIEF COMPLAINT   Maria Yancey is a 38 y.o. female, seen during daily manzo rounds on the inpatient unit.  Maria Yancey presented with the chief complaint of psychosis, "I don't really know."      The patient was seen and examined. The chart was reviewed.     Reviewed notes from Rns, [facility]S, and LPC and labs from the last 24 hours.    The patient's case was discussed with the treatment team/care providers today including Rns, Speciality services, and LPC    Staff reports no behavioral or management issues.     The patient has been compliant with treatment.      Subjective 09/13/2023       Today the patient reports that she feels "tired." She notes continued and minimally changed symptoms as documented below.  -she notes ongoing AH- the voices are threatening her family.  -she notes continued depression with SI. " Thought I may have to kill myself to keep the voices from hurting my family."    She remains a limited historian. She admits to amphetamine use. She is minimizing the severity of her addiction.   -she is likely having meth withdrawals which are complicating her treatment.     She has been isolative and limitedly participates in her care      The patient denies any side effects to medications.          Psychiatric ROS (observed, reported, or endorsed/denied):  Depressed mood - variable  Interest/pleasure/anhedonia: variable  Guilt/hopelessness/worthlessness - variable  Changes in Sleep - variable  Changes in Appetite - variable  Changes in Concentration - variable  Changes in Energy - variable  PMA/R- variable  Suicidal- active/passive ideations - variable  Homicidal ideations: active/passive ideations - No    Hallucinations - Continuing  Delusions - Continuing  Disorganized behavior - variable  Disorganized " speech - variable  Negative symptoms - variable    Elevated mood - No  Decreased need for sleep - No  Grandiosity - No  Racing thoughts - No  Impulsivity - No  Irritability- No  Increased energy - No  Distractibility - No  Increase in goal-directed activity or PMA- No    Symptoms of JORDIN - Continuing  Symptoms of Panic Disorder- denies  Symptoms of PTSD - denies        Overall progress: Patient is showing no improvement on the Unit to date        Psychotherapy:  Target symptoms: depression, anxiety , substance abuse, mood disorder  Why chosen therapy is appropriate versus another modality: relevant to diagnosis, patient responds to this modality, evidence based practice  Outcome monitoring methods: self-report, observation  Therapeutic intervention type: insight oriented psychotherapy, behavior modifying psychotherapy, supportive psychotherapy, interactive psychotherapy  Topics discussed/themes: building skills sets for symptom management, symptom recognition  The patient's response to the intervention is accepting. The patient's progress toward treatment goals is limited.   Duration of intervention: 16 minutes.        Medical ROS  General ROS: negative  Ophthalmic ROS: negative  ENT ROS: negative  Allergy and Immunology ROS: negative  Hematological and Lymphatic ROS: negative  Endocrine ROS: negative  Respiratory ROS: no cough, shortness of breath, or wheezing  Cardiovascular ROS: no chest pain or dyspnea on exertion  Gastrointestinal ROS: no abdominal pain, change in bowel habits, or black or bloody stools  Genito-Urinary ROS: no dysuria, trouble voiding, or hematuria  Musculoskeletal ROS: positive for - joint pain and muscle pain  Neurological ROS: no TIA or stroke symptoms  Dermatological ROS: denied      PAST MEDICAL HISTORY   Past Medical History:   Diagnosis Date    Addiction to drug     recovering drug addict    ADHD (attention deficit hyperactivity disorder)     Alcohol abuse     Anxiety     Asthma      Bipolar 1 disorder     Cervical pain     Chronic constipation     Chronic diarrhea     Depression     Fatigue     Fibromyalgia     Hallucination     Headache     History of psychiatric hospitalization     pt reports 10 total- [facility] 10/2019 and 3/2019, 1/2020    Hx of psychiatric care     Seroquel    Magalis     Panic disorder     Polysubstance dependence     Psychiatric exam requested by authority     Psychiatric problem     Auditory hallucinations    PTSD (post-traumatic stress disorder)     Schizoaffective disorder     Sleep difficulties     Spina bifida     Spinal stenosis of lumbar region     Substance abuse     Suicide attempt     Once by hanging, once by overdose, once by cutting wrists     Syringomyelia     Syrinx of spinal cord     Therapy     [facility]    Trigger finger     Withdrawal symptoms, alcohol     Withdrawal symptoms, drug or narcotic            PSYCHOTROPIC MEDICATIONS   Scheduled Meds:   ARIPiprazole  2 mg Oral Daily    buPROPion  150 mg Oral Daily    divalproex  1,000 mg Oral Nightly    ferrous sulfate  1 tablet Oral BID    folic acid  1 mg Oral Daily    multivitamin  1 tablet Oral Daily    thiamine  100 mg Oral Daily     Continuous Infusions:  PRN Meds:.acetaminophen, albuterol, benztropine mesylate, calcium carbonate, hydrOXYzine HCL, loperamide, melatonin, nicotine, OLANZapine **AND** OLANZapine, ondansetron        EXAMINATION    VITALS   Vitals:    09/11/23 1959 09/12/23 0745 09/12/23 1905 09/13/23 0738   BP: (!) 104/57 111/68 121/69 (!) 119/56   BP Location: Right arm Left arm Left arm Left arm   Patient Position: Sitting Sitting Sitting Lying   Pulse: 92 84 93 94   Resp: 18 18 20 20   Temp: 97.8 °F (36.6 °C) 97.1 °F (36.2 °C) 96.9 °F (36.1 °C) 97.3 °F (36.3 °C)   TempSrc: Temporal Temporal Temporal Temporal   SpO2:       Weight:       Height:           Body mass index is 45.57 kg/m².    CONSTITUTIONAL  General Appearance: WF, in hospital garb, obese, NAD, tearful    "  MUSCULOSKELETAL  Muscle Strength and Tone:  normal  Abnormal Involuntary Movements:  none  Gait and Station:  normal; non-ataxic     PSYCHIATRIC   Level of Consciousness: awake, alert  Orientation: p/p/t/s  Grooming:  Disheveled, inadequate to circumstances  Psychomotor Behavior: some PMA  Speech: nl r/t/v/s  Language: able to repeat words English fluent  Mood: "I don"t know"  Affect: dysphoric, irritable, anxious, decreased range, tearful  Thought Process:  derailed  Associations:  intact; no SHAGUFTA  Thought Content: +SI, +AVH, +delusions, no HI  Memory:  intact to recent and remote events  Attention:  intact to conversation; not distractible   Fund of Knowledge:  age and education appropriate  Estimate if Intelligence:  average based on work/education history, vocabulary and mental status exam  Insight: impaired- partially seeks help and understands illness  Judgment: impaired- +recent bx issues ans s/p SA, compliant and cooperative        DIAGNOSTIC TESTING   Laboratory Results  Recent Results (from the past 24 hour(s))   CBC auto differential    Collection Time: 09/13/23  9:32 AM   Result Value Ref Range    WBC 9.70 3.90 - 12.70 K/uL    RBC 5.65 (H) 4.00 - 5.40 M/uL    Hemoglobin 11.4 (L) 12.0 - 16.0 g/dL    Hematocrit 40.1 37.0 - 48.5 %    MCV 71 (L) 82 - 98 fL    MCH 20.2 (L) 27.0 - 31.0 pg    MCHC 28.4 (L) 32.0 - 36.0 g/dL    RDW 17.7 (H) 11.5 - 14.5 %    Platelets 234 150 - 450 K/uL    MPV 10.6 9.2 - 12.9 fL    Immature Granulocytes 0.4 0.0 - 0.5 %    Gran # (ANC) 6.4 1.8 - 7.7 K/uL    Immature Grans (Abs) 0.04 0.00 - 0.04 K/uL    Lymph # 2.5 1.0 - 4.8 K/uL    Mono # 0.6 0.3 - 1.0 K/uL    Eos # 0.2 0.0 - 0.5 K/uL    Baso # 0.05 0.00 - 0.20 K/uL    nRBC 0 0 /100 WBC    Gran % 65.8 38.0 - 73.0 %    Lymph % 25.8 18.0 - 48.0 %    Mono % 6.0 4.0 - 15.0 %    Eosinophil % 1.5 0.0 - 8.0 %    Basophil % 0.5 0.0 - 1.9 %    Differential Method Automated              MEDICAL DECISION MAKING      ASSESSMENT: "   Schizoaffective Disorder, depressed type, severe  JORDIN  Panic without agoraphobia   PTSD  Fibromyalgia      Polysubstance dependence (Methamphetamines, cannabis, alcohol, narcotics; methamphetamines, current, continuous, severe, without physiological dependence)  Nicotine Dependence     Psychosocial stressors     Obesity     MEDICAL DECISION MAKING     PROBLEM LIST AND MANAGEMENT PLANS; PRESCRIPTION DRUG MANAGEMENT  Compliance: yes  Side Effects: no  Regimen Adjustments:      Psychosis: pt counseled  -resume abilify- 2 mg po q day- seeking records to ascertain the last date and dose of administration- increase to 5 mg po q day  -depakote adjunctive (off-label) as below     Depression: pt counseled  -Resumed/continue Wellbutrin XL at 150 mg po q day(goal dose is 300 mg daily)     Anxiety/PTSD: pt counseled  -depakote off-label as below; wellbutrin off-label as above  -vistaril prn     Pain: pt counseled  -Restart/continue Depakote at higher dose 1000 mg po q HS- increase to last stabilizing dose of 1500 mg po q HS; check level in 4 days      Substance use: pt counseled; will attempt to place in inpatient rehab if willing;   -monitor for now and encourage  -consider valium for detox if needed     Nicotine use: pt counseled;  -started/continue  nicotine patch 14 mg patch dermal daily;   -wellbutrin as above     Psychosocial stressors: pt counseled;   -SW consulted to assist with resources     Obesity: pt counseled          Discussed diagnosis, risks and benefits of proposed treatment vs alternative treatments vs no treatment, potential side effects of these treatments and the inherent unpredictability of treatment. The patient expresses understanding of the above and displays the capacity to agree with this treatment given said understanding. Patient also agrees that, currently, the benefits outweigh the risks and would like to pursue/continue treatment at this time.    Any medications being used off-label were  discussed with the patient inclusive of the evidence base for the use of the medications and consent was obtained for the off-label use of the medication.       DISCHARGE PLANNING  Expected Disposition Plan: Home or Self Care      NEED FOR CONTINUED HOSPITALIZATION  Psychiatric illness continues to pose a potential threat to life or bodily function, of self or others, thereby requiring the need for continued inpatient psychiatric hospitalization: Yes, due to: significant psychotic thought disorder, aggressive neuroleptic titration, danger to self, gravely disabled, and suicidal ideation, as evidenced by:  Ongoing concerns with SI., Ongoing concerns with command hallucinations to hit/harm others., Ongoing concerns with grave disability with patient unable to perform basic feeding, hygiene and dressing activities without significant constant support., and Ongoing concerns with perceptual aberrancy and paranoid persecutory delusions leading to potential harm of self or others.    Protective inpatient pyschiatric hospitalization required while a safe disposition plan is enacted: Yes    Patient stabilized and ready for discharge from inpatient psychiatric unit: No        STAFF:   Salomón Nettles MD  Psychiatry

## 2023-09-13 NOTE — PROGRESS NOTES
"   09/13/23 1000   [facility] Group Therapy   Group Name Therapeutic Recreation   Participation Level None     Despite encouragement the patient is isolating in assigned room, resting in bed and reports "tired" mood, complains of having "the shakes and chills.  "

## 2023-09-13 NOTE — PSYCH
[facility] discussed with pt on collateral consent. Pt signed collateral consent for Leonora Dhillon/aunt [phone] . [facility] attemtped to contact collateral consent to discuss pt admission. Aunt  stated:        Reason for admission- describe what happened?      I really do not know to much and we try to help her with her shot with Dr. Boyd and she is drug induced schizophrenic. She showed up at my house and I fed her and I tried to talk her down because she was stating that they had people in the woods trying  to get her and they were laughing  and screaming at her and she wanted to check in the woods so I tried to keep her inside. She also told me that [employer] security was coming to talk to her. Her father is a drug dealer and she wnet to live with him for three years and we did not know where she was. He is the one hwho started her on the drugs. 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 I know she knows how to run up the meth. When she is like that her children does not want to have anything to do with her when she gets that way. We do not know what to do to her. She always talks about bugs in her arms and that is when we know that she is messed up and she does not want to sleep at her home she sleeps with knives under her pillow and her mother does not allow her to go thre anymore. It has been  at least 2 weeks late on taking the shot. My sister went to bring her to get her shot, but my sister did not see her take the shot.     Prior treatment places and dates-doctor name and location  Reason for prior treatment- same or different  How long has patient had problem(s)?    She has been doing meth She has been having a meth problem for at east 3 years with this.     Substance abuse- what , how long, how much, how often?    Meth for at least 10 years if not longer. She used to endorse in [employer], bu she has not done crack in a long time, but I am not  sure.      Legal Issues- Current charges, type of offense, probation or parole?    She might have a warrant out for her arrest for her speeding ticket.     History of suicide attempts- when and what methods, did they require medical attention?    None to my knowledge.     Alcohol Use-  What preference of alcohol, how much, how long, how often?    none    History of violence-describe behaviors and triggers   none    Any guns or weapons in the home? If yes, recommend that these be secured.     None to my knowledge, but  I will make sure that all sharp objects are secured at her home.     What is patients baseline behavior/mood- how well do they know if patient is doing well?    When she is not on the drugs and is taking her medications like she is supposed to.       What helps the patient stay well?  Internal/external coping strategies ( attending meetings, going to groups, taking medications, spending time with family ( etc)?   When she is talking to her family and making sense of what she is saying.    Discharge plan:    Where will the patient live upon discharge?     We would like to have her go to rehab, but if not she will go back to her home    Who else in the home?    She lives alone    Will someone be able to pick the patient up when discharged?      Our sister will come to pick her up and bring her back to her home if she does not want to go to rehab.

## 2023-09-13 NOTE — PLAN OF CARE
"Pt withdrawn with a flat affect and depressed mood. Patient reports auditory/visual hallucinations. Reports " I hear voices saying they are after my children. And I see my children spinning in a washing machine.  Patient denies SI/HI no self harming behavior displayed, no aggressive behavior displayed towards others. Patient contracted safety with staff and unit.  Discussed healthy coping skills and techniques.  Discussed and educated this patient on the importance of compliance of medications. Patient voices understanding of all teachings.  "

## 2023-09-14 PROCEDURE — 11400000 HC PSYCH PRIVATE ROOM

## 2023-09-14 PROCEDURE — 90833 PSYTX W PT W E/M 30 MIN: CPT | Mod: AF,HB,, | Performed by: PSYCHIATRY & NEUROLOGY

## 2023-09-14 PROCEDURE — 25000003 PHARM REV CODE 250: Performed by: PSYCHIATRY & NEUROLOGY

## 2023-09-14 PROCEDURE — 99900035 HC TECH TIME PER 15 MIN (STAT)

## 2023-09-14 PROCEDURE — 99233 SBSQ HOSP IP/OBS HIGH 50: CPT | Mod: AF,HB,, | Performed by: PSYCHIATRY & NEUROLOGY

## 2023-09-14 PROCEDURE — 25000003 PHARM REV CODE 250: Performed by: PHYSICIAN ASSISTANT

## 2023-09-14 RX ADMIN — FOLIC ACID 1 MG: 1 TABLET ORAL at 08:09

## 2023-09-14 RX ADMIN — THERA TABS 1 TABLET: TAB at 08:09

## 2023-09-14 RX ADMIN — FERROUS SULFATE TAB 325 MG (65 MG ELEMENTAL FE) 1 EACH: 325 (65 FE) TAB at 08:09

## 2023-09-14 RX ADMIN — Medication 100 MG: at 08:09

## 2023-09-14 RX ADMIN — BUPROPION HYDROCHLORIDE 150 MG: 150 TABLET, EXTENDED RELEASE ORAL at 08:09

## 2023-09-14 RX ADMIN — ARIPIPRAZOLE 5 MG: 5 TABLET ORAL at 08:09

## 2023-09-14 RX ADMIN — DIVALPROEX SODIUM 1500 MG: 500 TABLET, FILM COATED, EXTENDED RELEASE ORAL at 08:09

## 2023-09-14 NOTE — PLAN OF CARE
Lying quietly in bed, eyes closed, respirations even, unlabored. Apparently asleep. Slept 8.5 hours thus far with one interruption. Safety precautions maintained. Rounds done every 15 minutes. Bed is fixed in low position and room is uncluttered and pathways are clear.

## 2023-09-14 NOTE — PROGRESS NOTES
"   09/14/23 1000   [facility] Group Therapy   Group Name Leisure Education   Participation Level None     Despite encouragement patient isolating in room, in bed, presents flat, depressed, gives minimal response, low energy, reports a "tired" mood, and verbalized the voices have decreased.  "

## 2023-09-14 NOTE — PLAN OF CARE
"Patient reports that she feels "okay" this evening, spending majority of time in room isolated, with minimal interaction with staff/peers. Denies SI/HI at this time. +auditory hallucinations intermittently. Appetite adequate. PRN Vistaril administered to aid with sleep. Medication complaint. NADN. Remains calm and cooperative. Safety precautions remain in place.  "

## 2023-09-14 NOTE — PROGRESS NOTES
"PSYCHIATRY DAILY INPATIENT PROGRESS NOTE  SUBSEQUENT HOSPITAL VISIT    ENCOUNTER DATE: 9/14/2023  [facility]    DATE OF ADMISSION: 9/10/2023  9:26 PM  LENGTH OF STAY: 4 days      CHIEF COMPLAINT   Maria Yancey is a 38 y.o. female, seen during daily manzo rounds on the inpatient unit.  Maria Yancey presented with the chief complaint of psychosis, "I don't really know."      The patient was seen and examined. The chart was reviewed.     Reviewed notes from Rns, CTRS, and LPC and labs from the last 24 hours.    The patient's case was discussed with the treatment team/care providers today including [facility], Speciality services, and LPC    Staff reports no behavioral or management issues.     The patient has been compliant with treatment.      Subjective 09/14/2023       Today the patient again reports that she feels "tired." She notes continued and minimally changed symptoms as documented below.  -she notes ongoing AH- the voices continue threatening her family.  -she notes continued depression with SI. " Thought I may have to kill myself to keep the voices from hurting my family."    She remains a limited historian. She admits to amphetamine use. She is minimizing the severity of her addiction.   -she is likely having meth withdrawals which are complicating her treatment.     She remains isolative and limitedly participates in her care. She remains gravely disabled.       The patient denies any side effects to medications.          Psychiatric ROS (observed, reported, or endorsed/denied):  Depressed mood - variable  Interest/pleasure/anhedonia: variable  Guilt/hopelessness/worthlessness - variable  Changes in Sleep - variable  Changes in Appetite - variable  Changes in Concentration - variable  Changes in Energy - variable  PMA/R- variable  Suicidal- active/passive ideations - variable  Homicidal ideations: active/passive ideations - No    Hallucinations - Continuing  Delusions - " Continuing  Disorganized behavior - variable  Disorganized speech - variable  Negative symptoms - variable    Elevated mood - No  Decreased need for sleep - No  Grandiosity - No  Racing thoughts - No  Impulsivity - No  Irritability- No  Increased energy - No  Distractibility - No  Increase in goal-directed activity or PMA- No    Symptoms of JORDIN - Continuing  Symptoms of Panic Disorder- denies  Symptoms of PTSD - denies        Overall progress: Patient is showing no improvement on the Unit to date        Psychotherapy:  Target symptoms: depression, anxiety , substance abuse, mood disorder  Why chosen therapy is appropriate versus another modality: relevant to diagnosis, patient responds to this modality, evidence based practice  Outcome monitoring methods: self-report, observation  Therapeutic intervention type: insight oriented psychotherapy, behavior modifying psychotherapy, supportive psychotherapy, interactive psychotherapy  Topics discussed/themes: building skills sets for symptom management, symptom recognition  The patient's response to the intervention is accepting. The patient's progress toward treatment goals is limited.   Duration of intervention: 16 minutes.        Medical ROS  General ROS: negative  Ophthalmic ROS: negative  ENT ROS: negative  Allergy and Immunology ROS: negative  Hematological and Lymphatic ROS: negative  Endocrine ROS: negative  Respiratory ROS: no cough, shortness of breath, or wheezing  Cardiovascular ROS: no chest pain or dyspnea on exertion  Gastrointestinal ROS: no abdominal pain, change in bowel habits, or black or bloody stools  Genito-Urinary ROS: no dysuria, trouble voiding, or hematuria  Musculoskeletal ROS: positive for - joint pain and muscle pain  Neurological ROS: no TIA or stroke symptoms  Dermatological ROS: denied      PAST MEDICAL HISTORY   Past Medical History:   Diagnosis Date    Addiction to drug     recovering drug addict    ADHD (attention deficit hyperactivity  disorder)     Alcohol abuse     Anxiety     Asthma     Bipolar 1 disorder     Cervical pain     Chronic constipation     Chronic diarrhea     Depression     Fatigue     Fibromyalgia     Hallucination     Headache     History of psychiatric hospitalization     pt reports 10 total- [facility] 10/2019 and 3/2019, 1/2020    Hx of psychiatric care     Seroquel    Magalis     Panic disorder     Polysubstance dependence     Psychiatric exam requested by authority     Psychiatric problem     Auditory hallucinations    PTSD (post-traumatic stress disorder)     Schizoaffective disorder     Sleep difficulties     Spina bifida     Spinal stenosis of lumbar region     Substance abuse     Suicide attempt     Once by hanging, once by overdose, once by cutting wrists     Syringomyelia     Syrinx of spinal cord     Therapy     [facility]    Trigger finger     Withdrawal symptoms, alcohol     Withdrawal symptoms, drug or narcotic            PSYCHOTROPIC MEDICATIONS   Scheduled Meds:   ARIPiprazole  5 mg Oral Daily    buPROPion  150 mg Oral Daily    divalproex  1,500 mg Oral Nightly    ferrous sulfate  1 tablet Oral BID    folic acid  1 mg Oral Daily    multivitamin  1 tablet Oral Daily    thiamine  100 mg Oral Daily     Continuous Infusions:  PRN Meds:.acetaminophen, albuterol, benztropine mesylate, calcium carbonate, hydrOXYzine HCL, loperamide, melatonin, nicotine, OLANZapine **AND** OLANZapine, ondansetron        EXAMINATION    VITALS   Vitals:    09/12/23 1905 09/13/23 0738 09/13/23 1918 09/14/23 0739   BP: 121/69 (!) 119/56 (!) 112/56 (!) 109/55   BP Location: Left arm Left arm Right arm Right arm   Patient Position: Sitting Lying Sitting Lying   Pulse: 93 94 107 98   Resp: 20 20 20 20   Temp: 96.9 °F (36.1 °C) 97.3 °F (36.3 °C) 96.6 °F (35.9 °C) 97.1 °F (36.2 °C)   TempSrc: Temporal Temporal Temporal Temporal   SpO2:       Weight:       Height:           Body mass index is 45.57 kg/m².    CONSTITUTIONAL  General  "Appearance: WF, in hospital garb, obese, NAD, tearful     MUSCULOSKELETAL  Muscle Strength and Tone:  normal  Abnormal Involuntary Movements:  none  Gait and Station:  normal; non-ataxic     PSYCHIATRIC   Level of Consciousness: awake, alert  Orientation: p/p/t/s  Grooming:  Disheveled, inadequate to circumstances  Psychomotor Behavior: some PMA  Speech: nl r/t/v/s  Language: able to repeat words English fluent  Mood: "I don"t know"  Affect: dysphoric, irritable, anxious, decreased range, tearful  Thought Process:  derailed  Associations:  intact; no SHAGUFTA  Thought Content: +SI, +AVH, +delusions, no HI  Memory:  intact to recent and remote events  Attention:  intact to conversation; not distractible   Fund of Knowledge:  age and education appropriate  Estimate if Intelligence:  average based on work/education history, vocabulary and mental status exam  Insight: impaired- partially seeks help and understands illness  Judgment: impaired- +recent bx issues ans s/p SA, compliant and cooperative        DIAGNOSTIC TESTING   Laboratory Results  No results found for this or any previous visit (from the past 24 hour(s)).            MEDICAL DECISION MAKING      ASSESSMENT:   Schizoaffective Disorder, depressed type, severe  JORDIN  Panic without agoraphobia   PTSD  Fibromyalgia      Polysubstance dependence (Methamphetamines, cannabis, alcohol, narcotics; methamphetamines, current, continuous, severe, without physiological dependence)  Nicotine Dependence     Psychosocial stressors     Obesity     MEDICAL DECISION MAKING     PROBLEM LIST AND MANAGEMENT PLANS; PRESCRIPTION DRUG MANAGEMENT  Compliance: yes  Side Effects: no  Regimen Adjustments:      Psychosis: pt counseled  -resume abilify- 2 mg po q day- seeking records to ascertain the last date and dose of administration- increase to 5 mg po q day- increase to 7 mg po q day  -depakote adjunctive (off-label) as below     Depression: pt counseled  -Resumed/continue Wellbutrin XL " at 150 mg po q day(goal dose is 300 mg daily)     Anxiety/PTSD: pt counseled  -depakote off-label as below; wellbutrin off-label as above  -vistaril prn     Pain: pt counseled  -Restart/continue Depakote at higher dose 1000 mg po q HS- increase to last stabilizing dose of 1500 mg po q HS; check level in 3 days      Substance use: pt counseled; will attempt to place in inpatient rehab if willing;   -monitor for now and encourage  -consider valium for detox if needed     Nicotine use: pt counseled;  -started/continue  nicotine patch 14 mg patch dermal daily;   -wellbutrin as above     Psychosocial stressors: pt counseled;   -SW consulted to assist with resources     Obesity: pt counseled          Discussed diagnosis, risks and benefits of proposed treatment vs alternative treatments vs no treatment, potential side effects of these treatments and the inherent unpredictability of treatment. The patient expresses understanding of the above and displays the capacity to agree with this treatment given said understanding. Patient also agrees that, currently, the benefits outweigh the risks and would like to pursue/continue treatment at this time.    Any medications being used off-label were discussed with the patient inclusive of the evidence base for the use of the medications and consent was obtained for the off-label use of the medication.       DISCHARGE PLANNING  Expected Disposition Plan: Home or Self Care      NEED FOR CONTINUED HOSPITALIZATION  Psychiatric illness continues to pose a potential threat to life or bodily function, of self or others, thereby requiring the need for continued inpatient psychiatric hospitalization: Yes, due to: significant psychotic thought disorder, aggressive neuroleptic titration, danger to self, gravely disabled, and suicidal ideation, as evidenced by:  Ongoing concerns with SI., Ongoing concerns with command hallucinations to hit/harm others., Ongoing concerns with grave disability  with patient unable to perform basic feeding, hygiene and dressing activities without significant constant support., and Ongoing concerns with perceptual aberrancy and paranoid persecutory delusions leading to potential harm of self or others.    Protective inpatient pyschiatric hospitalization required while a safe disposition plan is enacted: Yes    Patient stabilized and ready for discharge from inpatient psychiatric unit: No        STAFF:   Salomón Nettles MD  Psychiatry

## 2023-09-14 NOTE — PROGRESS NOTES
"   09/14/23 3584   [facility] Group Therapy   Group Name Other  (I will cope using my A to Z skills worksheet)   Participation Level None     Despite encouragement patient isolates in room, in bed,and did not attend group. Patient presents withdrawn, depressed, gives minimal response and reports "tired" mood, verbalized she continues to hear voices.  "

## 2023-09-14 NOTE — PLAN OF CARE
"Patient flat affect with depressed mood. Patient soft spoken, gaurded and withdrawn. Patient isolating herself in her room. Encouraged this patient to attend groups and activities. Patient denies A/V hallucinations but reports " I see people are trying to get my family. They are telling me they will hurt my children". During this conversation the patient has her eyes closed tight. Encouraged this patient to sit and interact wit peers and staff in the dayroom. Patient denies SI/HI no self harming behavior displayed, no aggressive behavior displayed towards others. Patient contracted safety with staff and unit.  Patient reports on a scale of 1-10 with ten being the highest level of depression. Patient rating depression today a 6/10. On the same scale patient rating anxiety a 6/10. Encouraged patient to attend groups and activities.  Discussed healthy coping skills and techniques.   Encouraged patient to live a drug free life style.  Encouraged this patient to seek substance abuse rehab to gain insight into illness and to gain coping skills to help facilitate soberity.  Patient has been accepted to [facility]. Educated, reviewed  and discussed plan of care and medication regiment with this patient. Discussed and educated with this patient any concerns that needed to be addressed along with the importance of medication compliance  1:1 with this patient. Patient voices understanding of all teachings.    "

## 2023-09-15 PROCEDURE — 25000003 PHARM REV CODE 250: Performed by: PSYCHIATRY & NEUROLOGY

## 2023-09-15 PROCEDURE — 25000003 PHARM REV CODE 250: Performed by: PHYSICIAN ASSISTANT

## 2023-09-15 PROCEDURE — 11400000 HC PSYCH PRIVATE ROOM

## 2023-09-15 PROCEDURE — 99233 SBSQ HOSP IP/OBS HIGH 50: CPT | Mod: AF,HB,, | Performed by: PSYCHIATRY & NEUROLOGY

## 2023-09-15 PROCEDURE — 90833 PSYTX W PT W E/M 30 MIN: CPT | Mod: AF,HB,, | Performed by: PSYCHIATRY & NEUROLOGY

## 2023-09-15 RX ORDER — ARIPIPRAZOLE 10 MG/1
10 TABLET ORAL DAILY
Status: DISCONTINUED | OUTPATIENT
Start: 2023-09-16 | End: 2023-09-17

## 2023-09-15 RX ORDER — BUPROPION HYDROCHLORIDE 300 MG/1
300 TABLET ORAL DAILY
Status: DISCONTINUED | OUTPATIENT
Start: 2023-09-16 | End: 2023-09-18

## 2023-09-15 RX ADMIN — FERROUS SULFATE TAB 325 MG (65 MG ELEMENTAL FE) 1 EACH: 325 (65 FE) TAB at 08:09

## 2023-09-15 RX ADMIN — FERROUS SULFATE TAB 325 MG (65 MG ELEMENTAL FE) 1 EACH: 325 (65 FE) TAB at 09:09

## 2023-09-15 RX ADMIN — BUPROPION HYDROCHLORIDE 150 MG: 150 TABLET, EXTENDED RELEASE ORAL at 09:09

## 2023-09-15 RX ADMIN — Medication 100 MG: at 09:09

## 2023-09-15 RX ADMIN — ARIPIPRAZOLE 7 MG: 5 TABLET ORAL at 09:09

## 2023-09-15 RX ADMIN — DIVALPROEX SODIUM 1500 MG: 500 TABLET, FILM COATED, EXTENDED RELEASE ORAL at 08:09

## 2023-09-15 RX ADMIN — FOLIC ACID 1 MG: 1 TABLET ORAL at 09:09

## 2023-09-15 RX ADMIN — THERA TABS 1 TABLET: TAB at 09:09

## 2023-09-15 NOTE — NURSING
Rested intermittently with closed eyes and even resp for 9.5 hours.  Modified VC and all precautions maintained.  Pathways clear and bed in low position.

## 2023-09-15 NOTE — PLAN OF CARE
"Patient guarded and withdrawn. Patient needing staff to her encouraged her to perform daily ADL's. Slow thoughts with thought blocking. Poor eye contact. Patient reports "I feel a little better". Patient paranoid. Patient reports she is hearing people talking and feel like people are after her kids. No interacting with internal stimuli noted.  Patient denies SI/HI no self harming behavior displayed, no aggressive behavior displayed towards others. Patient contracted safety with staff and unit.   Encouraged patient to live a drug free life style.  Encouraged this patient to seek substance abuse rehab to gain insight into illness and to gain coping skills to help facilitate soberity.  Discussed healthy coping skills and techniques.  Educated, reviewed  and discussed plan of care and medication regiment with this patient. Discussed and educated with this patient any concerns that needed to be addressed along with the importance of medication compliance  1:1 with this patient. Patient voices understanding of all teachings.    "

## 2023-09-15 NOTE — PROGRESS NOTES
"   09/15/23 7775   [facility] Group Therapy   Group Name Therapeutic Recreation   Participation Level None     Patient presents withdrawn withdrawn, isolating in assigned room, despite encouragement says "no" when asked to attend group.  "

## 2023-09-15 NOTE — PLAN OF CARE
Pt calm and cooperative, avoiding social contact, isolating to room, denies SI at this time, up for meals, med compliant, safety precautions maintained, will continue to monitor

## 2023-09-15 NOTE — PROGRESS NOTES
"PSYCHIATRY DAILY INPATIENT PROGRESS NOTE  SUBSEQUENT HOSPITAL VISIT    ENCOUNTER DATE: 9/15/2023  SITE: [facility]    DATE OF ADMISSION: 9/10/2023  9:26 PM  LENGTH OF STAY: 5 days      CHIEF COMPLAINT   Maria Yancey is a 38 y.o. female, seen during daily manzo rounds on the inpatient unit.  Maria Yancey presented with the chief complaint of psychosis, "I don't really know."      The patient was seen and examined. The chart was reviewed.     Reviewed notes from [facility] and [facility] and labs from the last 24 hours.    The patient's case was discussed with the treatment team/care providers today including [facility], Speciality services, and [facility]    Staff reports no behavioral or management issues.     The patient has been compliant with treatment.      Subjective 09/15/2023       Today the patient again reports that she feels "tired." She notes continued and slowly changing symptoms as documented below.  -she notes ongoing AH- the voices continue threatening her family but are reportedly less frequent/intense.  -she notes continued but less depression with SI. She recently reported "I thought I may have to kill myself to keep the voices from hurting my family."    She remains a limited historian. She admits to amphetamine use. She is less minimizing the severity of her addiction.   -she is likely having meth withdrawals which are complicating her treatment.    She is now considering rehab after discharge    She remains isolative and limitedly participates in her care. She remains gravely disabled.     She did better participate with her session this AM.       The patient denies any side effects to medications.          Psychiatric ROS (observed, reported, or endorsed/denied):  Depressed mood - variable  Interest/pleasure/anhedonia: variable  Guilt/hopelessness/worthlessness - variable  Changes in Sleep - variable  Changes in Appetite - variable  Changes in Concentration - variable  Changes in Energy - " variable  PMA/R- variable  Suicidal- active/passive ideations - variable  Homicidal ideations: active/passive ideations - No    Hallucinations - Continuing  Delusions - Continuing  Disorganized behavior - variable  Disorganized speech - variable  Negative symptoms - variable    Elevated mood - No  Decreased need for sleep - No  Grandiosity - No  Racing thoughts - No  Impulsivity - No  Irritability- No  Increased energy - No  Distractibility - No  Increase in goal-directed activity or PMA- No    Symptoms of JORDIN - Continuing  Symptoms of Panic Disorder- denies  Symptoms of PTSD - denies        Overall progress: Patient is showing no improvement on the Unit to date        Psychotherapy:  Target symptoms: depression, anxiety , substance abuse, mood disorder  Why chosen therapy is appropriate versus another modality: relevant to diagnosis, patient responds to this modality, evidence based practice  Outcome monitoring methods: self-report, observation  Therapeutic intervention type: insight oriented psychotherapy, behavior modifying psychotherapy, supportive psychotherapy, interactive psychotherapy  Topics discussed/themes: building skills sets for symptom management, symptom recognition  The patient's response to the intervention is accepting. The patient's progress toward treatment goals is limited.   Duration of intervention: 16 minutes.        Medical ROS  General ROS: negative  Ophthalmic ROS: negative  ENT ROS: negative  Allergy and Immunology ROS: negative  Hematological and Lymphatic ROS: negative  Endocrine ROS: negative  Respiratory ROS: no cough, shortness of breath, or wheezing  Cardiovascular ROS: no chest pain or dyspnea on exertion  Gastrointestinal ROS: no abdominal pain, change in bowel habits, or black or bloody stools  Genito-Urinary ROS: no dysuria, trouble voiding, or hematuria  Musculoskeletal ROS: positive for - joint pain and muscle pain  Neurological ROS: no TIA or stroke symptoms  Dermatological  ROS: denied      PAST MEDICAL HISTORY   Past Medical History:   Diagnosis Date    Addiction to drug     recovering drug addict    ADHD (attention deficit hyperactivity disorder)     Alcohol abuse     Anxiety     Asthma     Bipolar 1 disorder     Cervical pain     Chronic constipation     Chronic diarrhea     Depression     Fatigue     Fibromyalgia     Hallucination     Headache     History of psychiatric hospitalization     pt reports 10 total- [facility] 10/2019 and 3/2019, 1/2020    Hx of psychiatric care     Seroquel    Magalis     Panic disorder     Polysubstance dependence     Psychiatric exam requested by authority     Psychiatric problem     Auditory hallucinations    PTSD (post-traumatic stress disorder)     Schizoaffective disorder     Sleep difficulties     Spina bifida     Spinal stenosis of lumbar region     Substance abuse     Suicide attempt     Once by hanging, once by overdose, once by cutting wrists     Syringomyelia     Syrinx of spinal cord     Therapy     [facility]    Trigger finger     Withdrawal symptoms, alcohol     Withdrawal symptoms, drug or narcotic            PSYCHOTROPIC MEDICATIONS   Scheduled Meds:   ARIPiprazole  7 mg Oral Daily    buPROPion  150 mg Oral Daily    divalproex  1,500 mg Oral Nightly    ferrous sulfate  1 tablet Oral BID    folic acid  1 mg Oral Daily    multivitamin  1 tablet Oral Daily    thiamine  100 mg Oral Daily     Continuous Infusions:  PRN Meds:.acetaminophen, albuterol, benztropine mesylate, calcium carbonate, hydrOXYzine HCL, loperamide, melatonin, nicotine, OLANZapine **AND** OLANZapine, ondansetron        EXAMINATION    VITALS   Vitals:    09/13/23 1918 09/14/23 0739 09/14/23 2004 09/15/23 0800   BP: (!) 112/56 (!) 109/55 (!) 108/53 128/61   BP Location: Right arm Right arm Right arm Right arm   Patient Position: Sitting Lying Sitting Sitting   Pulse: 107 98 102 93   Resp: 20 20 20 18   Temp: 96.6 °F (35.9 °C) 97.1 °F (36.2 °C) 97.2 °F (36.2 °C)  "97.9 °F (36.6 °C)   TempSrc: Temporal Temporal Temporal Temporal   SpO2:       Weight:       Height:           Body mass index is 45.57 kg/m².    CONSTITUTIONAL  General Appearance: WF, in hospital garb, obese, NAD, tearful     MUSCULOSKELETAL  Muscle Strength and Tone:  normal  Abnormal Involuntary Movements:  none  Gait and Station:  normal; non-ataxic     PSYCHIATRIC   Level of Consciousness: awake, alert  Orientation: p/p/t/s  Grooming:  Disheveled, inadequate to circumstances  Psychomotor Behavior: some PMA  Speech: nl r/t/v/s  Language: able to repeat words English fluent  Mood: "tired"  Affect: dysphoric, irritable, anxious, decreased range  Thought Process:  derailed  Associations:  intact; no SHAGUFTA  Thought Content: +SI, +AVH, +delusions, no HI  Memory:  intact to recent and remote events  Attention:  intact to conversation; not distractible   Fund of Knowledge:  age and education appropriate  Estimate if Intelligence:  average based on work/education history, vocabulary and mental status exam  Insight: impaired- partially seeks help and understands illness  Judgment: impaired- +recent bx issues ans s/p SA, compliant and cooperative        DIAGNOSTIC TESTING   Laboratory Results  No results found for this or any previous visit (from the past 24 hour(s)).            MEDICAL DECISION MAKING      ASSESSMENT:   Schizoaffective Disorder, depressed type, severe  JORDIN  Panic without agoraphobia   PTSD  Fibromyalgia      Polysubstance dependence (Methamphetamines, cannabis, alcohol, narcotics; methamphetamines, current, continuous, severe, without physiological dependence)  Nicotine Dependence     Psychosocial stressors     Obesity     MEDICAL DECISION MAKING     PROBLEM LIST AND MANAGEMENT PLANS; PRESCRIPTION DRUG MANAGEMENT  Compliance: yes  Side Effects: no  Regimen Adjustments:      Psychosis: pt counseled  -resume abilify- 2 mg po q day- seeking records to ascertain the last date and dose of administration- " increase to 5 mg po q day- increase to 7 mg po q day- increase to 10 mg po q day on Saturday  -depakote adjunctive (off-label) as below     Depression: pt counseled  -Resumed/continue Wellbutrin XL at 150 mg po q day- increase to 300 mg daily on Saturday     Anxiety/PTSD: pt counseled  -depakote off-label as below; wellbutrin off-label as above  -vistaril prn     Pain: pt counseled  -Restart/continue Depakote at higher dose 1000 mg po q HS- increase to last stabilizing dose of 1500 mg po q HS; check level on Sunday      Substance use: pt counseled; will attempt to place in inpatient rehab if willing;   -monitor for now and encourage  -consider valium for detox if needed     Nicotine use: pt counseled;  -started/continue  nicotine patch 14 mg patch dermal daily;   -wellbutrin as above     Psychosocial stressors: pt counseled;   -SW consulted to assist with resources     Obesity: pt counseled          Discussed diagnosis, risks and benefits of proposed treatment vs alternative treatments vs no treatment, potential side effects of these treatments and the inherent unpredictability of treatment. The patient expresses understanding of the above and displays the capacity to agree with this treatment given said understanding. Patient also agrees that, currently, the benefits outweigh the risks and would like to pursue/continue treatment at this time.    Any medications being used off-label were discussed with the patient inclusive of the evidence base for the use of the medications and consent was obtained for the off-label use of the medication.       DISCHARGE PLANNING  Expected Disposition Plan: Home or Self Care      NEED FOR CONTINUED HOSPITALIZATION  Psychiatric illness continues to pose a potential threat to life or bodily function, of self or others, thereby requiring the need for continued inpatient psychiatric hospitalization: Yes, due to: significant psychotic thought disorder, aggressive neuroleptic titration,  danger to self, gravely disabled, and suicidal ideation, as evidenced by:  Ongoing concerns with SI., Ongoing concerns with command hallucinations to hit/harm others., Ongoing concerns with grave disability with patient unable to perform basic feeding, hygiene and dressing activities without significant constant support., and Ongoing concerns with perceptual aberrancy and paranoid persecutory delusions leading to potential harm of self or others.    Protective inpatient pyschiatric hospitalization required while a safe disposition plan is enacted: Yes    Patient stabilized and ready for discharge from inpatient psychiatric unit: No        STAFF:   Salomón Nettles MD  Psychiatry

## 2023-09-16 LAB
BILIRUB UR QL STRIP: NEGATIVE
CLARITY UR: CLEAR
COLOR UR: YELLOW
GLUCOSE UR QL STRIP: NEGATIVE
HGB UR QL STRIP: NEGATIVE
KETONES UR QL STRIP: ABNORMAL
LEUKOCYTE ESTERASE UR QL STRIP: NEGATIVE
NITRITE UR QL STRIP: NEGATIVE
PH UR STRIP: 6 [PH] (ref 5–8)
PROT UR QL STRIP: ABNORMAL
SP GR UR STRIP: >=1.03 (ref 1–1.03)
URN SPEC COLLECT METH UR: ABNORMAL
UROBILINOGEN UR STRIP-ACNC: NEGATIVE EU/DL

## 2023-09-16 PROCEDURE — 11400000 HC PSYCH PRIVATE ROOM

## 2023-09-16 PROCEDURE — 25000003 PHARM REV CODE 250: Performed by: PSYCHIATRY & NEUROLOGY

## 2023-09-16 PROCEDURE — 25000003 PHARM REV CODE 250: Performed by: PHYSICIAN ASSISTANT

## 2023-09-16 PROCEDURE — 81003 URINALYSIS AUTO W/O SCOPE: CPT | Performed by: PSYCHIATRY & NEUROLOGY

## 2023-09-16 PROCEDURE — 99233 SBSQ HOSP IP/OBS HIGH 50: CPT | Mod: AF,HB,, | Performed by: PSYCHIATRY & NEUROLOGY

## 2023-09-16 RX ADMIN — FERROUS SULFATE TAB 325 MG (65 MG ELEMENTAL FE) 1 EACH: 325 (65 FE) TAB at 08:09

## 2023-09-16 RX ADMIN — DIVALPROEX SODIUM 1500 MG: 500 TABLET, FILM COATED, EXTENDED RELEASE ORAL at 08:09

## 2023-09-16 RX ADMIN — ARIPIPRAZOLE 10 MG: 10 TABLET ORAL at 08:09

## 2023-09-16 RX ADMIN — THERA TABS 1 TABLET: TAB at 08:09

## 2023-09-16 RX ADMIN — BUPROPION HYDROCHLORIDE 300 MG: 300 TABLET, FILM COATED, EXTENDED RELEASE ORAL at 08:09

## 2023-09-16 RX ADMIN — Medication 100 MG: at 08:09

## 2023-09-16 RX ADMIN — FOLIC ACID 1 MG: 1 TABLET ORAL at 08:09

## 2023-09-16 NOTE — PLAN OF CARE
"Patient quiet and withdrawn, with a blunted affect. Patient having a hard time staying awake. States that medication is too strong that it makes her too sleepy. Patient c/o pain 5/10 to lower abdomen with dysuria. Urine culture sent to lab for analysis. Patient accepting to medications. Education given on the importance of getting up and out of bed to lessen the likely [place] of getting a blood clot. Verbalized understanding. Educated patient on importance of sobriety. Verbalized understanding. Denies SI/HI. States that she does still hear voices, but not as bad as prior to getting help. Patient declines auditory hallucinations telling her to do things. States that she just hears "screaming." Patient declines any needs at present.       "

## 2023-09-16 NOTE — PLAN OF CARE
Following POC.  Makes needs known.  Denies SI/HI.  Endorses AVH.  Out on the unit for snacks and a phone call.  States she is feeling better.  Paranoid, paucity of speech and thought.  Medication compliant.  Appetite is good.  Encouraged rehab.  Free of fall.

## 2023-09-16 NOTE — PROGRESS NOTES
"PSYCHIATRY DAILY INPATIENT PROGRESS NOTE  SUBSEQUENT HOSPITAL VISIT    ENCOUNTER DATE: 9/16/2023  [facility]    DATE OF ADMISSION: 9/10/2023  9:26 PM  LENGTH OF STAY: 6 days      CHIEF COMPLAINT   Maria Yancey is a 38 y.o. female, seen during daily manzo rounds on the inpatient unit.  Maria Yancey presented with the chief complaint of psychosis, "I don't really know."      The patient was seen and examined. The chart was reviewed.     Reviewed notes from [facility] and [facility] and labs from the last 24 hours.    The patient's case was discussed with the treatment team/care providers today including [facility], Special[facility] services, and [facility]    Staff reports no behavioral or management issues.     The patient has been compliant with treatment.      Subjective 09/16/2023       Today the patient again reports that she feels "tired." That she is not getting out of bed until it's time to go to rehab. She is superficial and disinterested in conversation. She denies AVH, HI, SI. She has cramping and spotting since IUD placement. UA negative for infection. States that she uses crystal methamphetamine for energy and weight loss.     The patient denies any side effects to medications.      Psychiatric ROS (observed, reported, or endorsed/denied):  Depressed mood - variable  Interest/pleasure/anhedonia: variable  Guilt/hopelessness/worthlessness - variable  Changes in Sleep - variable  Changes in Appetite - variable  Changes in Concentration - variable  Changes in Energy - variable  PMA/R- variable  Suicidal- active/passive ideations - variable  Homicidal ideations: active/passive ideations - No    Hallucinations - Continuing  Delusions - Continuing  Disorganized behavior - variable  Disorganized speech - variable  Negative symptoms - variable    Elevated mood - No  Decreased need for sleep - No  Grandiosity - No  Racing thoughts - No  Impulsivity - No  Irritability- No  Increased energy - No  Distractibility - " No  Increase in goal-directed activity or PMA- No    Symptoms of JORDIN - Continuing  Symptoms of Panic Disorder- denies  Symptoms of PTSD - denies        Overall progress: Patient is showing no improvement on the Unit to date      Medical ROS  General ROS: negative  Ophthalmic ROS: negative  ENT ROS: negative  Allergy and Immunology ROS: negative  Hematological and Lymphatic ROS: negative  Endocrine ROS: negative  Respiratory ROS: no cough, shortness of breath, or wheezing  Cardiovascular ROS: no chest pain or dyspnea on exertion  Gastrointestinal ROS: no abdominal pain, change in bowel habits, or black or bloody stools  Genito-Urinary ROS: no dysuria, trouble voiding, or hematuria  Musculoskeletal ROS: positive for - joint pain and muscle pain  Neurological ROS: no TIA or stroke symptoms  Dermatological ROS: denied      PAST MEDICAL HISTORY   Past Medical History:   Diagnosis Date    Addiction to drug     recovering drug addict    ADHD (attention deficit hyperactivity disorder)     Alcohol abuse     Anxiety     Asthma     Bipolar 1 disorder     Cervical pain     Chronic constipation     Chronic diarrhea     Depression     Fatigue     Fibromyalgia     Hallucination     Headache     History of psychiatric hospitalization     pt reports 10 total- [facility] 10/2019 and 3/2019, 1/2020    Hx of psychiatric care     Seroquel    Magalis     Panic disorder     Polysubstance dependence     Psychiatric exam requested by authority     Psychiatric problem     Auditory hallucinations    PTSD (post-traumatic stress disorder)     Schizoaffective disorder     Sleep difficulties     Spina bifida     Spinal stenosis of lumbar region     Substance abuse     Suicide attempt     Once by hanging, once by overdose, once by cutting wrists     Syringomyelia     Syrinx of spinal cord     Therapy     [facility]    Trigger finger     Withdrawal symptoms, alcohol     Withdrawal symptoms, drug or narcotic            PSYCHOTROPIC  MEDICATIONS   Scheduled Meds:   ARIPiprazole  10 mg Oral Daily    buPROPion  300 mg Oral Daily    divalproex  1,500 mg Oral Nightly    ferrous sulfate  1 tablet Oral BID    folic acid  1 mg Oral Daily    multivitamin  1 tablet Oral Daily    thiamine  100 mg Oral Daily     Continuous Infusions:  PRN Meds:.acetaminophen, albuterol, benztropine mesylate, calcium carbonate, hydrOXYzine HCL, loperamide, melatonin, nicotine, OLANZapine **AND** OLANZapine, ondansetron        EXAMINATION    VITALS   Vitals:    09/14/23 2004 09/15/23 0800 09/15/23 1915 09/16/23 0756   BP: (!) 108/53 128/61 118/62 136/64   BP Location: Right arm Right arm Left arm Left arm   Patient Position: Sitting Sitting Sitting Sitting   Pulse: 102 93 93 96   Resp: 20 18 18 18   Temp: 97.2 °F (36.2 °C) 97.9 °F (36.6 °C) 97.1 °F (36.2 °C) 98 °F (36.7 °C)   TempSrc: Temporal Temporal Temporal    SpO2:       Weight:       Height:           Body mass index is 45.57 kg/m².    CONSTITUTIONAL  General Appearance: WF, in hospital garb, obese, NAD, tearful     MUSCULOSKELETAL  Muscle Strength and Tone:  normal  Abnormal Involuntary Movements:  none  Gait and Station:  normal; non-ataxic     PSYCHIATRIC   Level of Consciousness: awake, alert  Orientation: p/p/t/s  Grooming:  Disheveled, inadequate to circumstances  Psychomotor Behavior: some PMA  Speech: nl r/t/v/s  Language: able to repeat words English fluent  Affect: inappropriate apathy, euthymic  Thought Process: linear superficial   Associations:  intact; no SHAGUFTA  Thought Content: denies SI, +AVH, +delusions, no HI  Memory:  intact to recent and remote events  Attention:  intact to conversation; not distractible   Fund of Knowledge:  age and education appropriate  Estimate if Intelligence:  average based on work/education history, vocabulary and mental status exam  Insight: impaired- partially seeks help and understands illness  Judgment: impaired- +recent bx issues ans s/p SA, compliant and  cooperative        DIAGNOSTIC TESTING   Laboratory Results  Recent Results (from the past 24 hour(s))   Urinalysis, Reflex to Urine Culture Urine, Clean Catch    Collection Time: 09/16/23  8:08 AM    Specimen: Urine   Result Value Ref Range    Specimen UA Urine, Clean Catch     Color, UA Yellow Yellow, Straw, Jess    Appearance, UA Clear Clear    pH, UA 6.0 5.0 - 8.0    Specific Gravity, UA >=1.030 (A) 1.005 - 1.030    Protein, UA Trace (A) Negative    Glucose, UA Negative Negative    Ketones, UA 1+ (A) Negative    Bilirubin (UA) Negative Negative    Occult Blood UA Negative Negative    Nitrite, UA Negative Negative    Urobilinogen, UA Negative <2.0 EU/dL    Leukocytes, UA Negative Negative               MEDICAL DECISION MAKING      ASSESSMENT:   Schizoaffective Disorder, depressed type, severe  JORDIN  Panic without agoraphobia   PTSD  Fibromyalgia      Polysubstance dependence (Methamphetamines, cannabis, alcohol, narcotics; methamphetamines, current, continuous, severe, without physiological dependence)  Nicotine Dependence     Psychosocial stressors     Obesity     MEDICAL DECISION MAKING     PROBLEM LIST AND MANAGEMENT PLANS; PRESCRIPTION DRUG MANAGEMENT  Compliance: yes  Side Effects: no  Regimen Adjustments:      Psychosis: pt counseled  -resume abilify- 2 mg po q day- seeking records to ascertain the last date and dose of administration- increase to 5 mg po q day- increase to 7 mg po q day- increase to 10 mg po q day today   -depakote adjunctive (off-label) as below     Depression: pt counseled  -Resumed/continue Wellbutrin XL at 150 mg po q day- increase to 300 mg daily today     Anxiety/PTSD: pt counseled  -depakote off-label as below; wellbutrin off-label as above  -vistaril prn     Pain: pt counseled  -Restart/continue Depakote at higher dose 1000 mg po q HS- increase to last stabilizing dose of 1500 mg po q HS; check level tomorrow. If patient continues to report fatigue will taper off Depakote tomorrow      Substance use: pt counseled; will attempt to place in inpatient rehab if willing;   -monitor for now and encourage  -consider valium for detox if needed     Nicotine use: pt counseled;  -started/continue  nicotine patch 14 mg patch dermal daily;   -wellbutrin as above     Psychosocial stressors: pt counseled;   -SW consulted to assist with resources     Obesity: pt counseled    Cramping and Spotting thought to be associated with recent IUD.   Ob/gyn has been consulted        Discussed diagnosis, risks and benefits of proposed treatment vs alternative treatments vs no treatment, potential side effects of these treatments and the inherent unpredictability of treatment. The patient expresses understanding of the above and displays the capacity to agree with this treatment given said understanding. Patient also agrees that, currently, the benefits outweigh the risks and would like to pursue/continue treatment at this time.    Any medications being used off-label were discussed with the patient inclusive of the evidence base for the use of the medications and consent was obtained for the off-label use of the medication.       DISCHARGE PLANNING  Expected Disposition Plan: Home or Self Care      NEED FOR CONTINUED HOSPITALIZATION  Psychiatric illness continues to pose a potential threat to life or bodily function, of self or others, thereby requiring the need for continued inpatient psychiatric hospitalization: Yes, due to: significant psychotic thought disorder, aggressive neuroleptic titration, danger to self, gravely disabled, and suicidal ideation, as evidenced by:  Ongoing concerns with SI., Ongoing concerns with command hallucinations to hit/harm others., Ongoing concerns with grave disability with patient unable to perform basic feeding, hygiene and dressing activities without significant constant support., and Ongoing concerns with perceptual aberrancy and paranoid persecutory delusions leading to potential harm  of self or others.    Protective inpatient pyschiatric hospitalization required while a safe disposition plan is enacted: Yes    Patient stabilized and ready for discharge from inpatient psychiatric unit: No        STAFF:   Kim Dodge MD  Psychiatry

## 2023-09-17 LAB — VALPROATE SERPL-MCNC: 76.3 UG/ML (ref 50–100)

## 2023-09-17 PROCEDURE — 25000003 PHARM REV CODE 250: Performed by: PSYCHIATRY & NEUROLOGY

## 2023-09-17 PROCEDURE — 25000003 PHARM REV CODE 250: Performed by: PHYSICIAN ASSISTANT

## 2023-09-17 PROCEDURE — 80164 ASSAY DIPROPYLACETIC ACD TOT: CPT | Performed by: PSYCHIATRY & NEUROLOGY

## 2023-09-17 PROCEDURE — 99233 SBSQ HOSP IP/OBS HIGH 50: CPT | Mod: AF,HB,, | Performed by: PSYCHIATRY & NEUROLOGY

## 2023-09-17 PROCEDURE — 11400000 HC PSYCH PRIVATE ROOM

## 2023-09-17 PROCEDURE — 36415 COLL VENOUS BLD VENIPUNCTURE: CPT | Performed by: PSYCHIATRY & NEUROLOGY

## 2023-09-17 PROCEDURE — 90833 PSYTX W PT W E/M 30 MIN: CPT | Mod: AF,HB,, | Performed by: PSYCHIATRY & NEUROLOGY

## 2023-09-17 RX ADMIN — ARIPIPRAZOLE 10 MG: 10 TABLET ORAL at 08:09

## 2023-09-17 RX ADMIN — DIVALPROEX SODIUM 1500 MG: 500 TABLET, FILM COATED, EXTENDED RELEASE ORAL at 08:09

## 2023-09-17 RX ADMIN — FERROUS SULFATE TAB 325 MG (65 MG ELEMENTAL FE) 1 EACH: 325 (65 FE) TAB at 08:09

## 2023-09-17 RX ADMIN — FOLIC ACID 1 MG: 1 TABLET ORAL at 08:09

## 2023-09-17 RX ADMIN — Medication 100 MG: at 08:09

## 2023-09-17 RX ADMIN — THERA TABS 1 TABLET: TAB at 08:09

## 2023-09-17 RX ADMIN — BUPROPION HYDROCHLORIDE 300 MG: 300 TABLET, FILM COATED, EXTENDED RELEASE ORAL at 08:09

## 2023-09-17 NOTE — PLAN OF CARE
"Patient guarded and withdrawn. Patient reports her mood has improved. This patient was in the dayroom interacting with peers and staff without encourage of staff today. Patient remains to report fixed delusions that she swallowed a bug and parasites has been roaming in her mouth, stomach, urine, and feces. She reports A/V hallucinations. She reports the voices are people just talking but she can see less of the people trying to kill her children. Patient observed in her room closing her eyes real tight. She stated " this all started when I did LSD".  Encouraged patient to live a drug free life style.  Encouraged this patient to seek substance abuse rehab to gain insight into illness and to gain coping skills to help facilitate soberity.  Patient is awaiting to get accepted to a substance abuse rehab facility. Patient denies SI/HI no self harming behavior displayed, no aggressive behavior displayed towards others. Patient contracted safety with staff and unit.  Discussed healthy coping skills and techniques.  Educated, reviewed  and discussed plan of care and medication regiment with this patient. Discussed and educated with this patient any concerns that needed to be addressed along with the importance of medication compliance  1:1 with this patient. Patient voices understanding of all teachings.    "

## 2023-09-17 NOTE — PLAN OF CARE
Following POC.  Makes needs known.  Denies SI/HI,AVH.  Out on the unit for snacks and a phone call.  States she is feeling better.  Paranoid, paucity of speech and thought.  Medication compliant.  Appetite is good.  Encouraged rehab.  Free of fall.

## 2023-09-17 NOTE — PROGRESS NOTES
"PSYCHIATRY DAILY INPATIENT PROGRESS NOTE  SUBSEQUENT HOSPITAL VISIT    ENCOUNTER DATE: 9/17/2023  [facility]    DATE OF ADMISSION: 9/10/2023  9:26 PM  LENGTH OF STAY: 7 days      CHIEF COMPLAINT   Maria Yancey is a 38 y.o. female, seen during daily manzo rounds on the inpatient unit.  Maria Yancey presented with the chief complaint of psychosis, "I don't really know."      The patient was seen and examined. The chart was reviewed.     Reviewed notes from Rns and CTRS and labs from the last 24 hours.    The patient's case was discussed with the treatment team/care providers today including Rns, Speciality services, and [facility]    Staff reports no behavioral or management issues.     The patient has been compliant with treatment.      Subjective 09/17/2023       Today the patient again reports that she is feeling less tired than yesterday. She comes to office. She discusses hearing children under her bed. Also parasites in her mouth, urine, fecal matter, and curling into her hair. She is indifferent-smiles when discussing. She has difficulty with low radio, ceiling fans, etc because heighten sounds that is misperceived as screaming. She details that psychosis started at 31yo after she accidental held a sheet of LSD in her hand at a [employer] in [place]-saw green lights for 2 weeks after and has never been the same. She states she using snorts 1 line of crystal methamphetamine, but prior to admits smoked a lot. Interested in rehab, but also laughs that "I'll never be right, I'm going to be in places like this for the rest of my life, will be my 5th time in rehab.     The patient denies any side effects to medications.Denies problems with sleep or appetite.     PSYCHOTHERAPY ADD-ON [id number]   20 minutes  Why chosen therapy is appropriate versus another modality: relevant to diagnosis, patient responds to this modality, evidence based practice  Topic(s) of discussion mental health, drug abuse education " and MI  Psychotherapeutic techniques: supportive psychotherapy, psychoeducation  Outcome monitoring methods: self-report, observation  Patient's response to intervention:  The patient's response to intervention is progressing      Psychiatric ROS (observed, reported, or endorsed/denied):  Depressed mood - variable  Interest/pleasure/anhedonia: variable  Guilt/hopelessness/worthlessness - variable  Changes in Sleep - variable  Changes in Appetite - variable  Changes in Concentration - variable  Changes in Energy - variable  PMA/R- variable  Suicidal- active/passive ideations - variable  Homicidal ideations: active/passive ideations - No    Hallucinations - Continuing  Delusions - Continuing  Disorganized behavior - variable  Disorganized speech - variable  Negative symptoms - variable    Elevated mood - No  Decreased need for sleep - No  Grandiosity - No  Racing thoughts - No  Impulsivity - No  Irritability- No  Increased energy - No  Distractibility - No  Increase in goal-directed activity or PMA- No    Symptoms of JORDIN - Continuing  Symptoms of Panic Disorder- denies  Symptoms of PTSD - denies        Overall progress: Patient is showing no improvement on the Unit to date      Medical ROS  General ROS: negative  Ophthalmic ROS: negative  ENT ROS: negative  Allergy and Immunology ROS: negative  Hematological and Lymphatic ROS: negative  Endocrine ROS: negative  Respiratory ROS: no cough, shortness of breath, or wheezing  Cardiovascular ROS: no chest pain or dyspnea on exertion  Gastrointestinal ROS: no abdominal pain, change in bowel habits, or black or bloody stools  Genito-Urinary ROS: no dysuria, trouble voiding, or hematuria  Musculoskeletal ROS: positive for - joint pain and muscle pain  Neurological ROS: no TIA or stroke symptoms  Dermatological ROS: denied      PAST MEDICAL HISTORY   Past Medical History:   Diagnosis Date    Addiction to drug     recovering drug addict    ADHD (attention deficit hyperactivity  disorder)     Alcohol abuse     Anxiety     Asthma     Bipolar 1 disorder     Cervical pain     Chronic constipation     Chronic diarrhea     Depression     Fatigue     Fibromyalgia     Hallucination     Headache     History of psychiatric hospitalization     pt reports 10 total- [facility] 10/2019 and 3/2019, 1/2020    Hx of psychiatric care     Seroquel    Magalis     Panic disorder     Polysubstance dependence     Psychiatric exam requested by authority     Psychiatric problem     Auditory hallucinations    PTSD (post-traumatic stress disorder)     Schizoaffective disorder     Sleep difficulties     Spina bifida     Spinal stenosis of lumbar region     Substance abuse     Suicide attempt     Once by hanging, once by overdose, once by cutting wrists     Syringomyelia     Syrinx of spinal cord     Therapy     [facility]    Trigger finger     Withdrawal symptoms, alcohol     Withdrawal symptoms, drug or narcotic            PSYCHOTROPIC MEDICATIONS   Scheduled Meds:   [START ON 9/18/2023] ARIPiprazole  15 mg Oral Daily    buPROPion  300 mg Oral Daily    divalproex  1,500 mg Oral Nightly    ferrous sulfate  1 tablet Oral BID    folic acid  1 mg Oral Daily    multivitamin  1 tablet Oral Daily    thiamine  100 mg Oral Daily     Continuous Infusions:  PRN Meds:.acetaminophen, albuterol, benztropine mesylate, calcium carbonate, hydrOXYzine HCL, loperamide, melatonin, nicotine, OLANZapine **AND** OLANZapine, ondansetron        EXAMINATION    VITALS   Vitals:    09/15/23 1915 09/16/23 0756 09/16/23 2005 09/17/23 0800   BP: 118/62 136/64 (!) 151/95 106/69   BP Location: Left arm Left arm Right arm Right arm   Patient Position: Sitting Sitting Sitting Sitting   Pulse: 93 96 (!) 113 82   Resp: 18 18 18 18   Temp: 97.1 °F (36.2 °C) 98 °F (36.7 °C) 97.9 °F (36.6 °C) 97.7 °F (36.5 °C)   TempSrc: Temporal  Temporal Temporal   SpO2:       Weight:    115.2 kg (253 lb 13.8 oz)   Height:           Body mass index is 44.97  kg/m².    CONSTITUTIONAL  General Appearance: WF, in hospital garb, obese, NAD, tearful     MUSCULOSKELETAL  Muscle Strength and Tone:  normal  Abnormal Involuntary Movements:  none  Gait and Station:  normal; non-ataxic     PSYCHIATRIC   Level of Consciousness: awake, alert  Orientation: p/p/t/s  Grooming:  Disheveled, inadequate to circumstances  Psychomotor Behavior: no PMR/A  Speech: nl r/t/v/s  Language: able to repeat words English fluent  Affect: inappropriate apathy and smiles, euthymic  Thought Process: linear superficial   Associations:  intact; no SHAGUFTA  Thought Content: denies SI, +AVH, +delusions, no HI  Memory:  intact to recent and remote events  Attention:  intact to conversation; not distractible   Fund of Knowledge:  age and education appropriate  Estimate if Intelligence:  average based on work/education history, vocabulary and mental status exam  Insight: impaired- partially seeks help and understands illness  Judgment: impaired- +recent bx issues ans s/p SA, compliant and cooperative        DIAGNOSTIC TESTING   Laboratory Results  Recent Results (from the past 24 hour(s))   Valproic Acid    Collection Time: 09/17/23  9:26 AM   Result Value Ref Range    Valproic Acid Level 76.3 50.0 - 100.0 ug/mL               MEDICAL DECISION MAKING      ASSESSMENT:   Schizoaffective Disorder, depressed type, severe  JORDIN  Panic without agoraphobia   PTSD  Fibromyalgia      Polysubstance dependence (Methamphetamines, cannabis, alcohol, narcotics; methamphetamines, current, continuous, severe, without physiological dependence)  Nicotine Dependence     Psychosocial stressors     Obesity     MEDICAL DECISION MAKING     PROBLEM LIST AND MANAGEMENT PLANS; PRESCRIPTION DRUG MANAGEMENT  Compliance: yes  Side Effects: no  Regimen Adjustments:      Psychosis: pt counseled  -resume abilify- 2 mg po q day- seeking records to ascertain the last date and dose of administration- increase to 5 mg po q day- increase to 7 mg po q  day- increase to 10 mg po q day - increase to 15mg PO daily   -depakote adjunctive (off-label) as below     Depression: pt counseled  -Resumed/continue Wellbutrin XL at 150 mg po q day- increase to 300 mg daily today     Anxiety/PTSD: pt counseled  -depakote off-label as below; wellbutrin off-label as above  -vistaril prn     Pain: pt counseled  -Restart/continue Depakote at higher dose 1000 mg po q HS- increase to last stabilizing dose of 1500 mg po q HS; level within normal range     Substance use: pt counseled; will attempt to place in inpatient rehab if willing;   -monitor for now and encourage  -consider valium for detox if needed     Nicotine use: pt counseled;  -started/continue  nicotine patch 14 mg patch dermal daily;   -wellbutrin as above     Psychosocial stressors: pt counseled;   -SW consulted to assist with resources     Obesity: pt counseled    Cramping and Spotting thought to be associated with recent IUD.   Ob/gyn has been consulted    Discussed diagnosis, risks and benefits of proposed treatment vs alternative treatments vs no treatment, potential side effects of these treatments and the inherent unpredictability of treatment. The patient expresses understanding of the above and displays the capacity to agree with this treatment given said understanding. Patient also agrees that, currently, the benefits outweigh the risks and would like to pursue/continue treatment at this time.    Any medications being used off-label were discussed with the patient inclusive of the evidence base for the use of the medications and consent was obtained for the off-label use of the medication.       DISCHARGE PLANNING  Expected Disposition Plan: Home or Self Care      NEED FOR CONTINUED HOSPITALIZATION  Psychiatric illness continues to pose a potential threat to life or bodily function, of self or others, thereby requiring the need for continued inpatient psychiatric hospitalization: Yes, due to: significant  psychotic thought disorder, aggressive neuroleptic titration, danger to self, gravely disabled, and suicidal ideation, as evidenced by:  Ongoing concerns with SI., Ongoing concerns with command hallucinations to hit/harm others., Ongoing concerns with grave disability with patient unable to perform basic feeding, hygiene and dressing activities without significant constant support., and Ongoing concerns with perceptual aberrancy and paranoid persecutory delusions leading to potential harm of self or others.    Protective inpatient pyschiatric hospitalization required while a safe disposition plan is enacted: Yes    Patient stabilized and ready for discharge from inpatient psychiatric unit: No        STAFF:   Kim Dodge MD  Psychiatry

## 2023-09-18 PROCEDURE — 25000003 PHARM REV CODE 250: Performed by: PSYCHIATRY & NEUROLOGY

## 2023-09-18 PROCEDURE — 99233 SBSQ HOSP IP/OBS HIGH 50: CPT | Mod: AF,HB,, | Performed by: STUDENT IN AN ORGANIZED HEALTH CARE EDUCATION/TRAINING PROGRAM

## 2023-09-18 PROCEDURE — 87491 CHLMYD TRACH DNA AMP PROBE: CPT | Performed by: STUDENT IN AN ORGANIZED HEALTH CARE EDUCATION/TRAINING PROGRAM

## 2023-09-18 PROCEDURE — 99900035 HC TECH TIME PER 15 MIN (STAT)

## 2023-09-18 PROCEDURE — 11400000 HC PSYCH PRIVATE ROOM

## 2023-09-18 PROCEDURE — 25000003 PHARM REV CODE 250: Performed by: STUDENT IN AN ORGANIZED HEALTH CARE EDUCATION/TRAINING PROGRAM

## 2023-09-18 PROCEDURE — 25000003 PHARM REV CODE 250: Performed by: PHYSICIAN ASSISTANT

## 2023-09-18 PROCEDURE — 90833 PSYTX W PT W E/M 30 MIN: CPT | Mod: AF,HB,, | Performed by: STUDENT IN AN ORGANIZED HEALTH CARE EDUCATION/TRAINING PROGRAM

## 2023-09-18 RX ORDER — DIVALPROEX SODIUM 500 MG/1
1000 TABLET, FILM COATED, EXTENDED RELEASE ORAL NIGHTLY
Status: DISCONTINUED | OUTPATIENT
Start: 2023-09-18 | End: 2023-09-19

## 2023-09-18 RX ADMIN — FERROUS SULFATE TAB 325 MG (65 MG ELEMENTAL FE) 1 EACH: 325 (65 FE) TAB at 08:09

## 2023-09-18 RX ADMIN — THERA TABS 1 TABLET: TAB at 08:09

## 2023-09-18 RX ADMIN — BUPROPION HYDROCHLORIDE 300 MG: 300 TABLET, FILM COATED, EXTENDED RELEASE ORAL at 08:09

## 2023-09-18 RX ADMIN — Medication 100 MG: at 08:09

## 2023-09-18 RX ADMIN — ARIPIPRAZOLE 15 MG: 5 TABLET ORAL at 08:09

## 2023-09-18 RX ADMIN — DIVALPROEX SODIUM 1000 MG: 500 TABLET, FILM COATED, EXTENDED RELEASE ORAL at 08:09

## 2023-09-18 RX ADMIN — FOLIC ACID 1 MG: 1 TABLET ORAL at 08:09

## 2023-09-18 RX ADMIN — Medication 6 MG: at 08:09

## 2023-09-18 NOTE — PROGRESS NOTES
"PSYCHIATRY DAILY INPATIENT PROGRESS NOTE  SUBSEQUENT HOSPITAL VISIT    ENCOUNTER DATE: 9/18/2023  [facility]    DATE OF ADMISSION: 9/10/2023  9:26 PM  LENGTH OF STAY: 8 days      CHIEF COMPLAINT   Maria Yancey is a 38 y.o. female, seen during daily manzo rounds on the inpatient unit.  Maria Yancey presented with the chief complaint of psychosis, "I don't really know."      The patient was seen and examined. The chart was reviewed.     Reviewed notes from Rns and [facility] and labs from the last 24 hours.    The patient's case was discussed with the treatment team/care providers today including Rns, Speciality services, and [facility]    Staff reports no behavioral or management issues.     The patient has been compliant with treatment.      Subjective 09/18/2023    Reports continued hallucinations, "I hear screaming... I do meth when I'm scared so I can stay up and not fall asleep, I sleep with the doors closed, with knives and booby traps."    Continues to report she has parasites in her hair.    Pt c/o vaginal odor and requests STD testing.    States she gained 100 pounds on depakote and requests to stop, "I hate it."    She is considering going to rehab.    The patient denies any side effects to medications.      Per RN:  Patient remains to report fixed delusions that she swallowed a bug and parasites has been roaming in her mouth, stomach, urine, and feces. She reports A/V hallucinations. She reports the voices are people just talking but she can see less of the people trying to kill her children. Patient observed in her room closing her eyes real tight. She stated " this all started when I did LSD".        Psychiatric ROS (observed, reported, or endorsed/denied):  Depressed mood - variable  Interest/pleasure/anhedonia: variable  Guilt/hopelessness/worthlessness - variable  Changes in Sleep - variable  Changes in Appetite - variable  Changes in Concentration - variable  Changes in Energy - " variable  PMA/R- variable  Suicidal- active/passive ideations - less  Homicidal ideations: active/passive ideations - No    Hallucinations - Continuing  Delusions - Continuing  Disorganized behavior - less  Disorganized speech - less  Negative symptoms - variable    Elevated mood - No  Decreased need for sleep - No  Grandiosity - No  Racing thoughts - No  Impulsivity - No  Irritability- No  Increased energy - No  Distractibility - No  Increase in goal-directed activity or PMA- No    Symptoms of JORDIN - less  Symptoms of Panic Disorder- denies  Symptoms of PTSD - denies        Psychotherapy:  Target symptoms: substance abuse, psychosis  Why chosen therapy is appropriate versus another modality: relevant to diagnosis  Outcome monitoring methods: self-report  Therapeutic intervention type: supportive psychotherapy  Topics discussed/themes: building skills sets for symptom management, symptom recognition, substance abuse  The patient's response to the intervention is accepting. The patient's progress toward treatment goals is fair.   Duration of intervention: 16 minutes.      Overall progress: Patient is showing no improvement on the Unit to date      Medical ROS  General ROS: negative  Ophthalmic ROS: negative  ENT ROS: negative  Allergy and Immunology ROS: negative  Hematological and Lymphatic ROS: negative  Endocrine ROS: negative  Respiratory ROS: no cough, shortness of breath, or wheezing  Cardiovascular ROS: no chest pain or dyspnea on exertion  Gastrointestinal ROS: no abdominal pain, change in bowel habits, or black or bloody stools  Genito-Urinary ROS: no dysuria, trouble voiding, or hematuria  Musculoskeletal ROS: positive for - joint pain and muscle pain  Neurological ROS: no TIA or stroke symptoms  Dermatological ROS: denied      PAST MEDICAL HISTORY   Past Medical History:   Diagnosis Date    Addiction to drug     recovering drug addict    ADHD (attention deficit hyperactivity disorder)     Alcohol abuse      Anxiety     Asthma     Bipolar 1 disorder     Cervical pain     Chronic constipation     Chronic diarrhea     Depression     Fatigue     Fibromyalgia     Hallucination     Headache     History of psychiatric hospitalization     pt reports 10 total- [facility] 10/2019 and 3/2019, 1/2020    Hx of psychiatric care     Seroquel    Magalis     Panic disorder     Polysubstance dependence     Psychiatric exam requested by authority     Psychiatric problem     Auditory hallucinations    PTSD (post-traumatic stress disorder)     Schizoaffective disorder     Sleep difficulties     Spina bifida     Spinal stenosis of lumbar region     Substance abuse     Suicide attempt     Once by hanging, once by overdose, once by cutting wrists     Syringomyelia     Syrinx of spinal cord     Therapy     [facility]    Trigger finger     Withdrawal symptoms, alcohol     Withdrawal symptoms, drug or narcotic            PSYCHOTROPIC MEDICATIONS   Scheduled Meds:   ARIPiprazole  15 mg Oral Daily    buPROPion  300 mg Oral Daily    divalproex  1,500 mg Oral Nightly    ferrous sulfate  1 tablet Oral BID    folic acid  1 mg Oral Daily    multivitamin  1 tablet Oral Daily    thiamine  100 mg Oral Daily     Continuous Infusions:  PRN Meds:.acetaminophen, albuterol, benztropine mesylate, calcium carbonate, hydrOXYzine HCL, loperamide, melatonin, nicotine, OLANZapine **AND** OLANZapine, ondansetron        EXAMINATION    VITALS   Vitals:    09/16/23 2005 09/17/23 0800 09/17/23 2002 09/18/23 0753   BP: (!) 151/95 106/69 (!) 105/56 128/69   BP Location: Right arm Right arm Right arm Right forearm   Patient Position: Sitting Sitting Sitting    Pulse: (!) 113 82 92 97   Resp: 18 18 18 18   Temp: 97.9 °F (36.6 °C) 97.7 °F (36.5 °C) 97 °F (36.1 °C) 98 °F (36.7 °C)   TempSrc: Temporal Temporal Temporal Temporal   SpO2:       Weight:  115.2 kg (253 lb 13.8 oz)     Height:           Body mass index is 44.97 kg/m².        CONSTITUTIONAL  General  Appearance: WF, in hospital garb, obese, NAD, tearful     MUSCULOSKELETAL  Muscle Strength and Tone:  normal  Abnormal Involuntary Movements:  none  Gait and Station:  normal; non-ataxic     PSYCHIATRIC   Level of Consciousness: awake, alert  Orientation: p/p/t/s  Grooming:  Disheveled, inadequate to circumstances  Psychomotor Behavior: no PMR/A  Speech: nl r/t/v/s  Language: able to repeat words English fluent  Mood: depressed  Affect: inappropriate  Thought Process: linear superficial   Associations:  intact; no SHAGUFTA  Thought Content: denies SI, +AVH, +delusions, no HI  Memory:  intact to recent and remote events  Attention:  intact to conversation; not distractible   Fund of Knowledge:  age and education appropriate  Estimate if Intelligence:  average based on work/education history, vocabulary and mental status exam  Insight: impaired- partially seeks help and understands illness  Judgment: impaired- +recent bx issues ans s/p SA, compliant and cooperative        DIAGNOSTIC TESTING   Laboratory Results  No results found for this or any previous visit (from the past 24 hour(s)).              MEDICAL DECISION MAKING      ASSESSMENT:   Schizoaffective Disorder, depressed type, severe  JORDIN  Panic without agoraphobia   PTSD  Fibromyalgia      Polysubstance dependence (Methamphetamines, cannabis, alcohol, narcotics; methamphetamines, current, continuous, severe, without physiological dependence)  Nicotine Dependence     Psychosocial stressors     Obesity       MEDICAL DECISION MAKING     PROBLEM LIST AND MANAGEMENT PLANS; PRESCRIPTION DRUG MANAGEMENT  Compliance: yes  Side Effects: no  Regimen Adjustments:      Psychosis: pt counseled  -resume abilify- 2 mg po q day- seeking records to ascertain the last date and dose of administration- increase to 5 mg po q day- increase to 7 mg po q day- increase to 10 mg po q day - increase to 15mg PO daily today  -depakote adjunctive (off-label) as below     Depression: pt  counseled  -Resumed/continue Wellbutrin XL at 150 mg po q day- increase to 300 mg daily -stop 2/2 psychosis     Anxiety/PTSD: pt counseled  -depakote off-label as below; wellbutrin off-label as above  -vistaril prn     Pain: pt counseled  -Restart/continue Depakote at higher dose 1000 mg po q HS- increase to last stabilizing dose of 1500 mg po q HS; level within normal range  -pt requests to discontinue 2/2 wt gain, will taper off, decrease to 1000 mg PO qhs tonight     Substance use: pt counseled; will attempt to place in inpatient rehab if willing;   -monitor for now and encourage  -consider valium for detox if needed     Nicotine use: pt counseled;  -started/continue  nicotine patch 14 mg patch dermal daily;   -wellbutrin as above     Psychosocial stressors: pt counseled;   -SW consulted to assist with resources     Obesity: pt counseled    Cramping and Spotting thought to be associated with recent IUD.   Ob/gyn has been consulted          Discussed diagnosis, risks and benefits of proposed treatment vs alternative treatments vs no treatment, potential side effects of these treatments and the inherent unpredictability of treatment. The patient expresses understanding of the above and displays the capacity to agree with this treatment given said understanding. Patient also agrees that, currently, the benefits outweigh the risks and would like to pursue/continue treatment at this time.    Any medications being used off-label were discussed with the patient inclusive of the evidence base for the use of the medications and consent was obtained for the off-label use of the medication.       DISCHARGE PLANNING  Expected Disposition Plan: Home or Self Care      NEED FOR CONTINUED HOSPITALIZATION  Psychiatric illness continues to pose a potential threat to life or bodily function, of self or others, thereby requiring the need for continued inpatient psychiatric hospitalization: Yes, due to: significant psychotic thought  disorder, aggressive neuroleptic titration, danger to self, gravely disabled, and suicidal ideation, as evidenced by:  Ongoing concerns with SI., Ongoing concerns with command hallucinations to hit/harm others., Ongoing concerns with grave disability with patient unable to perform basic feeding, hygiene and dressing activities without significant constant support., and Ongoing concerns with perceptual aberrancy and paranoid persecutory delusions leading to potential harm of self or others.    Protective inpatient pyschiatric hospitalization required while a safe disposition plan is enacted: Yes    Patient stabilized and ready for discharge from inpatient psychiatric unit: No        STAFF:   Cachorro Conde III, MD  Psychiatry

## 2023-09-18 NOTE — PLAN OF CARE
Verbal contract for safety obtained. VSS. Restricted, flat affect. Isolates. Thinks parasites are coming out of her body. Poor insight into substance abuse. Ambulates. Encouraged to attend groups. Accepts meals and meds without difficulty. Monitored Q/15 minutes for fall and safety precautions per staff.  Will continue to monitor for needs and safety.

## 2023-09-18 NOTE — PLAN OF CARE
Following POC.  Makes needs known.  Denies SI/HI.  Endorses AH.  Blunted, somber, withdrawn, paucity of speech in thought.  Isolated in room all evening.  Declined snacks and a shower.  Medication complaint.  Encouraged out of room activities.  Free of fall.

## 2023-09-18 NOTE — PROGRESS NOTES
"   09/18/23 1130   [facility] Group Therapy   Group Name Other  (1:1)   Specific Interventions Leisure Counseling   Participation Level Appropriate;Sharing   Participation Quality Cooperative   Insight/Motivation Improved   Affect/Mood Display Appropriate   Cognition Alert   Psychomotor WNL     Patient presents alert, cooperative, reports "little depressed, very tired" mood, "ready to go to rehab." Patient verbalized that her goal "to stop doing meth." Patient identified positive leisure outlets: attend [employer] to feel good in my soul, music calms me down, crossword puzzles to redirect negative thoughts, help concentrate."  "

## 2023-09-18 NOTE — PROGRESS NOTES
09/18/23 1430   [facility] Group Therapy   Group Name Therapeutic Recreation   Participation Level None   Participation Quality Refused     Despite encouragement patient isolating in room and refused to attend group.

## 2023-09-18 NOTE — NURSING
Pt sleeping at this time, slept 9 hrs with 2 awakenings. NAD. Resp even and unlabored.Pathways clear,bed in low position. Q 15 min safety check ongoing.All precautions maintained.

## 2023-09-19 LAB
C TRACH DNA SPEC QL NAA+PROBE: NOT DETECTED
N GONORRHOEA DNA SPEC QL NAA+PROBE: NOT DETECTED

## 2023-09-19 PROCEDURE — 25000003 PHARM REV CODE 250: Performed by: STUDENT IN AN ORGANIZED HEALTH CARE EDUCATION/TRAINING PROGRAM

## 2023-09-19 PROCEDURE — 99233 SBSQ HOSP IP/OBS HIGH 50: CPT | Mod: AF,HB,, | Performed by: STUDENT IN AN ORGANIZED HEALTH CARE EDUCATION/TRAINING PROGRAM

## 2023-09-19 PROCEDURE — 11400000 HC PSYCH PRIVATE ROOM

## 2023-09-19 PROCEDURE — 86592 SYPHILIS TEST NON-TREP QUAL: CPT | Performed by: STUDENT IN AN ORGANIZED HEALTH CARE EDUCATION/TRAINING PROGRAM

## 2023-09-19 PROCEDURE — 99900035 HC TECH TIME PER 15 MIN (STAT)

## 2023-09-19 PROCEDURE — 25000003 PHARM REV CODE 250: Performed by: PHYSICIAN ASSISTANT

## 2023-09-19 PROCEDURE — 90833 PSYTX W PT W E/M 30 MIN: CPT | Mod: AF,HB,, | Performed by: STUDENT IN AN ORGANIZED HEALTH CARE EDUCATION/TRAINING PROGRAM

## 2023-09-19 PROCEDURE — S4991 NICOTINE PATCH NONLEGEND: HCPCS | Performed by: PSYCHIATRY & NEUROLOGY

## 2023-09-19 PROCEDURE — 25000003 PHARM REV CODE 250: Performed by: PSYCHIATRY & NEUROLOGY

## 2023-09-19 PROCEDURE — 36415 COLL VENOUS BLD VENIPUNCTURE: CPT | Performed by: STUDENT IN AN ORGANIZED HEALTH CARE EDUCATION/TRAINING PROGRAM

## 2023-09-19 RX ORDER — ARIPIPRAZOLE 10 MG/1
20 TABLET ORAL DAILY
Status: DISCONTINUED | OUTPATIENT
Start: 2023-09-20 | End: 2023-09-22 | Stop reason: HOSPADM

## 2023-09-19 RX ADMIN — FERROUS SULFATE TAB 325 MG (65 MG ELEMENTAL FE) 1 EACH: 325 (65 FE) TAB at 08:09

## 2023-09-19 RX ADMIN — Medication 100 MG: at 08:09

## 2023-09-19 RX ADMIN — ARIPIPRAZOLE 17 MG: 5 TABLET ORAL at 08:09

## 2023-09-19 RX ADMIN — DIVALPROEX SODIUM 750 MG: 500 TABLET, FILM COATED, EXTENDED RELEASE ORAL at 08:09

## 2023-09-19 RX ADMIN — NICOTINE 1 PATCH: 14 PATCH, EXTENDED RELEASE TRANSDERMAL at 08:09

## 2023-09-19 RX ADMIN — FOLIC ACID 1 MG: 1 TABLET ORAL at 08:09

## 2023-09-19 RX ADMIN — THERA TABS 1 TABLET: TAB at 08:09

## 2023-09-19 NOTE — PLAN OF CARE
Plan of care reviewed. Denies intent to harm self or others at this time.  is no longer having auditory hallucinations and delusions about someone harming adult children. Patient states she realizes that now that she is no longer under the influence of methamphetamines, she is feeling better.  is hoping to go to rehab when she is discharged. Accepts snacks and medications. Gait steady, no falls. Interacting with staff and peers. Will continue precautions and monitor for safety.

## 2023-09-19 NOTE — PSYCH
Dr Conde states pt should be stable for discharge to [facility] rehab on Friday.  Informed DC planner.

## 2023-09-19 NOTE — PROGRESS NOTES
09/19/23 1000   [facility] Group Therapy   Group Name Therapeutic Recreation   Specific Interventions Cognitive Stimulation Training   Participation Level Appropriate   Participation Quality Cooperative   Insight/Motivation Applies New Skills;Improved   Affect/Mood Display Appropriate   Cognition Alert   Psychomotor WNL     Patient needed encouragement to attend group, presents withdrawn, minimal interaction, followed directions, denies voices at this time, remained involved, returned to room after group.

## 2023-09-19 NOTE — PROGRESS NOTES
"   09/19/23 3903   [facility] Group Therapy   Group Name Therapeutic Recreation   Participation Level None     Patient presents withdrawn, lack interest, poor eye contact, despite encouragement patient isolated in room, in bed with her covers pulled over her head and body, reports "tired, sleepy" mood, "my stomach hurts."  "

## 2023-09-19 NOTE — PROGRESS NOTES
"PSYCHIATRY DAILY INPATIENT PROGRESS NOTE  SUBSEQUENT HOSPITAL VISIT    ENCOUNTER DATE: 9/19/2023  S[facility]    DATE OF ADMISSION: 9/10/2023  9:26 PM  LENGTH OF STAY: 9 days      CHIEF COMPLAINT   Maria Yancey is a 38 y.o. female, seen during daily manzo rounds on the inpatient unit.  Maria Yancey presented with the chief complaint of psychosis, "I don't really know."      The patient was seen and examined. The chart was reviewed.     Reviewed notes from Rns and CTRS and labs from the last 24 hours.    The patient's case was discussed with the treatment team/care providers today including Rns    Staff reports no behavioral or management issues.     The patient has been compliant with treatment.      Subjective 09/19/2023    Reports continued hallucinations, "when I hearing air conditioners.... the other realm is tortuous, imaging bodies being torn apart, chaos, and violence, it's crazy."    She reports she understands methamphetamine has a negative impact on her mental health.    Discussed details of her relapse and trigger. Reports ACT "stopped working with me," was late on her abilify injection (reports was self administering injection) and became paranoid, "my neighbor said they had some meth to help me stay up and I did it for a week and stayed up."    She is considering going to rehab.    The patient denies any side effects to medications.      Per RN:  Patient disheveled with poor ADL's. Staff needing to encouraged this patient to perform daily. Slow thoughts with thought blocking. Patient reports she has fixed delusional of people wanting to hurt her children. Patient paranoid. No interacting with internal stimuli noted. Patient has been getting out of her room more than and she did over the weekend. Patient calm and cooperative and compliant with care plan.         Psychiatric ROS (observed, reported, or endorsed/denied):  Depressed mood - less  Interest/pleasure/anhedonia: " less  Guilt/hopelessness/worthlessness - less  Changes in Sleep - less  Changes in Appetite - less  Changes in Concentration - less  Changes in Energy - less  PMA/R- less  Suicidal- active/passive ideations - less  Homicidal ideations: active/passive ideations - No    Hallucinations - fluctuating  Delusions - fluctuating  Disorganized behavior - less  Disorganized speech - less  Negative symptoms - less    Elevated mood - No  Decreased need for sleep - No  Grandiosity - No  Racing thoughts - No  Impulsivity - No  Irritability- No  Increased energy - No  Distractibility - No  Increase in goal-directed activity or PMA- No    Symptoms of JORDIN - less  Symptoms of Panic Disorder- denies  Symptoms of PTSD - denies        Psychotherapy:  Target symptoms: substance abuse, psychosis  Why chosen therapy is appropriate versus another modality: relevant to diagnosis  Outcome monitoring methods: self-report  Therapeutic intervention type: supportive psychotherapy  Topics discussed/themes: building skills sets for symptom management, symptom recognition, substance abuse  The patient's response to the intervention is accepting. The patient's progress toward treatment goals is fair.   Duration of intervention: 16 minutes.        Overall progress: Patient is showing no improvement on the Unit to date        Medical ROS  General ROS: negative  Ophthalmic ROS: negative  ENT ROS: negative  Allergy and Immunology ROS: negative  Hematological and Lymphatic ROS: negative  Endocrine ROS: negative  Respiratory ROS: no cough, shortness of breath, or wheezing  Cardiovascular ROS: no chest pain or dyspnea on exertion  Gastrointestinal ROS: no abdominal pain, change in bowel habits, or black or bloody stools  Genito-Urinary ROS: no dysuria, trouble voiding, or hematuria  Musculoskeletal ROS: positive for - joint pain and muscle pain  Neurological ROS: no TIA or stroke symptoms  Dermatological ROS: denied      PAST MEDICAL HISTORY   Past Medical  History:   Diagnosis Date    Addiction to drug     recovering drug addict    ADHD (attention deficit hyperactivity disorder)     Alcohol abuse     Anxiety     Asthma     Bipolar 1 disorder     Cervical pain     Chronic constipation     Chronic diarrhea     Depression     Fatigue     Fibromyalgia     Hallucination     Headache     History of psychiatric hospitalization     pt reports 10 total- [facility] 10/2019 and 3/2019, 1/2020    Hx of psychiatric care     Seroquel    Magalis     Panic disorder     Polysubstance dependence     Psychiatric exam requested by authority     Psychiatric problem     Auditory hallucinations    PTSD (post-traumatic stress disorder)     Schizoaffective disorder     Sleep difficulties     Spina bifida     Spinal stenosis of lumbar region     Substance abuse     Suicide attempt     Once by hanging, once by overdose, once by cutting wrists     Syringomyelia     Syrinx of spinal cord     Therapy     [facility]    Trigger finger     Withdrawal symptoms, alcohol     Withdrawal symptoms, drug or narcotic            PSYCHOTROPIC MEDICATIONS   Scheduled Meds:   [START ON 9/20/2023] ARIPiprazole  20 mg Oral Daily    divalproex  750 mg Oral Nightly    ferrous sulfate  1 tablet Oral BID    folic acid  1 mg Oral Daily    multivitamin  1 tablet Oral Daily    thiamine  100 mg Oral Daily     Continuous Infusions:  PRN Meds:.acetaminophen, albuterol, benztropine mesylate, calcium carbonate, hydrOXYzine HCL, loperamide, melatonin, nicotine, OLANZapine **AND** OLANZapine, ondansetron        EXAMINATION    VITALS   Vitals:    09/17/23 2002 09/18/23 0753 09/18/23 2005 09/19/23 0751   BP: (!) 105/56 128/69 134/83 (!) 103/53   BP Location: Right arm Right forearm Left arm Right arm   Patient Position: Sitting  Sitting Lying   Pulse: 92 97 93 85   Resp: 18 18 18 18   Temp: 97 °F (36.1 °C) 98 °F (36.7 °C) 97.5 °F (36.4 °C) 97.7 °F (36.5 °C)   TempSrc: Temporal Temporal Temporal Temporal   SpO2:        Weight:       Height:           Body mass index is 44.97 kg/m².        CONSTITUTIONAL  General Appearance: WF, in hospital garb, obese, NAD, tearful     MUSCULOSKELETAL  Muscle Strength and Tone:  normal  Abnormal Involuntary Movements:  none  Gait and Station:  normal; non-ataxic     PSYCHIATRIC   Level of Consciousness: awake, alert  Orientation: p/p/t/s  Grooming:  Disheveled, inadequate to circumstances  Psychomotor Behavior: no PMR/A  Speech: nl r/t/v/s  Language: able to repeat words English fluent  Mood: depressed  Affect: inappropriate  Thought Process: linear superficial   Associations:  intact; no SHAGUFTA  Thought Content: denies SI, less AVH, less delusions, no HI  Memory:  intact to recent and remote events  Attention:  intact to conversation; not distractible   Fund of Knowledge:  age and education appropriate  Estimate if Intelligence:  average based on work/education history, vocabulary and mental status exam  Insight: improved  Judgment: improved        DIAGNOSTIC TESTING   Laboratory Results  No results found for this or any previous visit (from the past 24 hour(s)).              MEDICAL DECISION MAKING      ASSESSMENT:   Schizoaffective Disorder, depressed type, severe  JORDIN  Panic without agoraphobia   PTSD  Fibromyalgia      Polysubstance dependence (Methamphetamines, cannabis, alcohol, narcotics; methamphetamines, current, continuous, severe, without physiological dependence)  Nicotine Dependence     Psychosocial stressors     Obesity       MEDICAL DECISION MAKING     PROBLEM LIST AND MANAGEMENT PLANS; PRESCRIPTION DRUG MANAGEMENT  Compliance: yes  Side Effects: no  Regimen Adjustments:      Psychosis: pt counseled  -resume abilify- 2 mg po q day- seeking records to ascertain the last date and dose of administration- increase to 5 mg po q day- increase to 7 mg po q day- increase to 10 mg po q day - increase to 15mg PO daily increase to 17 mg PO qd today then 20 mg PO qd tomorrow  -depakote  adjunctive (off-label) as below     Depression: pt counseled  -Resumed/continue Wellbutrin XL at 150 mg po q day- increase to 300 mg daily -stop 2/2 psychosis     Anxiety/PTSD: pt counseled  -depakote off-label as below; wellbutrin off-label as above  -vistaril prn     Pain: pt counseled  -Restart/continue Depakote at higher dose 1000 mg po q HS- increase to last stabilizing dose of 1500 mg po q HS; level within normal range  -pt requests to discontinue 2/2 wt gain, will taper off, decrease to 1000 mg PO qhs -decrease to 750 mg PO qhs tonight      Substance use: pt counseled; will attempt to place in inpatient rehab if willing;   -monitor for now and encourage  -consider valium for detox if needed     Nicotine use: pt counseled;  -started/continue  nicotine patch 14 mg patch dermal daily;   -wellbutrin as above     Psychosocial stressors: pt counseled;   -SW consulted to assist with resources     Obesity: pt counseled    Cramping and Spotting thought to be associated with recent IUD.   Ob/gyn has been consulted          Discussed diagnosis, risks and benefits of proposed treatment vs alternative treatments vs no treatment, potential side effects of these treatments and the inherent unpredictability of treatment. The patient expresses understanding of the above and displays the capacity to agree with this treatment given said understanding. Patient also agrees that, currently, the benefits outweigh the risks and would like to pursue/continue treatment at this time.    Any medications being used off-label were discussed with the patient inclusive of the evidence base for the use of the medications and consent was obtained for the off-label use of the medication.       DISCHARGE PLANNING  Expected Disposition Plan: Home or Self Care      NEED FOR CONTINUED HOSPITALIZATION  Psychiatric illness continues to pose a potential threat to life or bodily function, of self or others, thereby requiring the need for continued  inpatient psychiatric hospitalization: Yes, due to: significant psychotic thought disorder, aggressive neuroleptic titration, danger to self, gravely disabled, and suicidal ideation, as evidenced by:  Ongoing concerns with SI., Ongoing concerns with command hallucinations to hit/harm others., Ongoing concerns with grave disability with patient unable to perform basic feeding, hygiene and dressing activities without significant constant support., and Ongoing concerns with perceptual aberrancy and paranoid persecutory delusions leading to potential harm of self or others.    Protective inpatient pyschiatric hospitalization required while a safe disposition plan is enacted: Yes    Patient stabilized and ready for discharge from inpatient psychiatric unit: No        STAFF:   Cachorro Conde III, MD  Psychiatry

## 2023-09-20 LAB — RPR SER QL: NORMAL

## 2023-09-20 PROCEDURE — 11400000 HC PSYCH PRIVATE ROOM

## 2023-09-20 PROCEDURE — 99233 SBSQ HOSP IP/OBS HIGH 50: CPT | Mod: AF,HB,, | Performed by: STUDENT IN AN ORGANIZED HEALTH CARE EDUCATION/TRAINING PROGRAM

## 2023-09-20 PROCEDURE — 25000003 PHARM REV CODE 250: Performed by: STUDENT IN AN ORGANIZED HEALTH CARE EDUCATION/TRAINING PROGRAM

## 2023-09-20 PROCEDURE — 25000003 PHARM REV CODE 250: Performed by: PSYCHIATRY & NEUROLOGY

## 2023-09-20 PROCEDURE — 25000003 PHARM REV CODE 250: Performed by: PHYSICIAN ASSISTANT

## 2023-09-20 PROCEDURE — S4991 NICOTINE PATCH NONLEGEND: HCPCS | Performed by: STUDENT IN AN ORGANIZED HEALTH CARE EDUCATION/TRAINING PROGRAM

## 2023-09-20 RX ORDER — GABAPENTIN 100 MG/1
200 CAPSULE ORAL 2 TIMES DAILY
Status: DISCONTINUED | OUTPATIENT
Start: 2023-09-20 | End: 2023-09-21

## 2023-09-20 RX ORDER — DIVALPROEX SODIUM 500 MG/1
500 TABLET, FILM COATED, EXTENDED RELEASE ORAL NIGHTLY
Status: DISCONTINUED | OUTPATIENT
Start: 2023-09-20 | End: 2023-09-21

## 2023-09-20 RX ORDER — IBUPROFEN 200 MG
1 TABLET ORAL DAILY
Status: DISCONTINUED | OUTPATIENT
Start: 2023-09-20 | End: 2023-09-22 | Stop reason: HOSPADM

## 2023-09-20 RX ADMIN — FOLIC ACID 1 MG: 1 TABLET ORAL at 09:09

## 2023-09-20 RX ADMIN — Medication 100 MG: at 09:09

## 2023-09-20 RX ADMIN — GABAPENTIN 200 MG: 100 CAPSULE ORAL at 08:09

## 2023-09-20 RX ADMIN — DIVALPROEX SODIUM 500 MG: 500 TABLET, FILM COATED, EXTENDED RELEASE ORAL at 08:09

## 2023-09-20 RX ADMIN — GABAPENTIN 200 MG: 100 CAPSULE ORAL at 12:09

## 2023-09-20 RX ADMIN — ARIPIPRAZOLE 20 MG: 10 TABLET ORAL at 09:09

## 2023-09-20 RX ADMIN — HYDROXYZINE HYDROCHLORIDE 50 MG: 25 TABLET, FILM COATED ORAL at 08:09

## 2023-09-20 RX ADMIN — THERA TABS 1 TABLET: TAB at 09:09

## 2023-09-20 RX ADMIN — NICOTINE 1 PATCH: 21 PATCH, EXTENDED RELEASE TRANSDERMAL at 12:09

## 2023-09-20 RX ADMIN — FERROUS SULFATE TAB 325 MG (65 MG ELEMENTAL FE) 1 EACH: 325 (65 FE) TAB at 08:09

## 2023-09-20 RX ADMIN — FERROUS SULFATE TAB 325 MG (65 MG ELEMENTAL FE) 1 EACH: 325 (65 FE) TAB at 09:09

## 2023-09-20 NOTE — PROGRESS NOTES
"PSYCHIATRY DAILY INPATIENT PROGRESS NOTE  SUBSEQUENT HOSPITAL VISIT    ENCOUNTER DATE: 9/20/2023  [facility]    DATE OF ADMISSION: 9/10/2023  9:26 PM  LENGTH OF STAY: 10 days      CHIEF COMPLAINT   Maria Yancey is a 38 y.o. female, seen during daily manzo rounds on the inpatient unit.  Maria Yancey presented with the chief complaint of psychosis, "I don't really know."      The patient was seen and examined. The chart was reviewed.     Reviewed notes from Rns and CTRS and labs from the last 24 hours.    The patient's case was discussed with the treatment team/care providers today including Rns    Staff reports no behavioral or management issues.     The patient has been compliant with treatment.      Subjective 09/20/2023    Reports less psychosis.    Complains of poor sleep last night due to anxious thoughts regarding her past.    She is having cravings for cigarettes.    She is considering going to rehab.    The patient denies any side effects to medications.        Psychiatric ROS (observed, reported, or endorsed/denied):  Depressed mood - less  Interest/pleasure/anhedonia: less  Guilt/hopelessness/worthlessness - less  Changes in Sleep - fluctuating  Changes in Appetite - less  Changes in Concentration - less  Changes in Energy - less  PMA/R- less  Suicidal- active/passive ideations - less  Homicidal ideations: active/passive ideations - No    Hallucinations - less  Delusions - less  Disorganized behavior - less  Disorganized speech - less  Negative symptoms - less    Elevated mood - No  Decreased need for sleep - No  Grandiosity - No  Racing thoughts - No  Impulsivity - No  Irritability- No  Increased energy - No  Distractibility - No  Increase in goal-directed activity or PMA- No    Symptoms of JORDIN - fluctuating  Symptoms of Panic Disorder- denies  Symptoms of PTSD - denies            Overall progress: Patient is showing mild improvement         Medical ROS  General ROS: " negative  Ophthalmic ROS: negative  ENT ROS: negative  Allergy and Immunology ROS: negative  Hematological and Lymphatic ROS: negative  Endocrine ROS: negative  Respiratory ROS: no cough, shortness of breath, or wheezing  Cardiovascular ROS: no chest pain or dyspnea on exertion  Gastrointestinal ROS: no abdominal pain, change in bowel habits, or black or bloody stools  Genito-Urinary ROS: no dysuria, trouble voiding, or hematuria  Musculoskeletal ROS: positive for - joint pain and muscle pain  Neurological ROS: no TIA or stroke symptoms  Dermatological ROS: denied      PAST MEDICAL HISTORY   Past Medical History:   Diagnosis Date    Addiction to drug     recovering drug addict    ADHD (attention deficit hyperactivity disorder)     Alcohol abuse     Anxiety     Asthma     Bipolar 1 disorder     Cervical pain     Chronic constipation     Chronic diarrhea     Depression     Fatigue     Fibromyalgia     Hallucination     Headache     History of psychiatric hospitalization     pt reports 10 total- [facility] 10/2019 and 3/2019, 1/2020    Hx of psychiatric care     Seroquel    Magalis     Panic disorder     Polysubstance dependence     Psychiatric exam requested by authority     Psychiatric problem     Auditory hallucinations    PTSD (post-traumatic stress disorder)     Schizoaffective disorder     Sleep difficulties     Spina bifida     Spinal stenosis of lumbar region     Substance abuse     Suicide attempt     Once by hanging, once by overdose, once by cutting wrists     Syringomyelia     Syrinx of spinal cord     Therapy     [facility]    Trigger finger     Withdrawal symptoms, alcohol     Withdrawal symptoms, drug or narcotic            PSYCHOTROPIC MEDICATIONS   Scheduled Meds:   ARIPiprazole  20 mg Oral Daily    divalproex  750 mg Oral Nightly    ferrous sulfate  1 tablet Oral BID    folic acid  1 mg Oral Daily    multivitamin  1 tablet Oral Daily    thiamine  100 mg Oral Daily     Continuous  Infusions:  PRN Meds:.acetaminophen, albuterol, benztropine mesylate, calcium carbonate, hydrOXYzine HCL, loperamide, melatonin, nicotine, OLANZapine **AND** OLANZapine, ondansetron        EXAMINATION    VITALS   Vitals:    09/19/23 0751 09/19/23 1934 09/20/23 0740 09/20/23 0813   BP: (!) 103/53 121/61 108/64    BP Location: Right arm Right arm Left arm    Patient Position: Lying Sitting     Pulse: 85 82 81    Resp: 18 18 20    Temp: 97.7 °F (36.5 °C) 98.2 °F (36.8 °C) 97.2 °F (36.2 °C)    TempSrc: Temporal Temporal Temporal    SpO2:       Weight:    115.8 kg (255 lb 2.9 oz)   Height:           Body mass index is 45.2 kg/m².        CONSTITUTIONAL  General Appearance: WF, in hospital garb, obese, NAD     MUSCULOSKELETAL  Muscle Strength and Tone:  normal  Abnormal Involuntary Movements:  none  Gait and Station:  normal; non-ataxic     PSYCHIATRIC   Level of Consciousness: awake, alert  Orientation: p/p/t/s  Grooming:  improving  Psychomotor Behavior: no PMR/A  Speech: nl r/t/v/s  Language: able to repeat words English fluent  Mood: ok  Affect: reactive  Thought Process: linear   Associations:  intact; no SHAGUFTA  Thought Content: denies SI, less AVH, less delusions, no HI  Memory:  intact to recent and remote events  Attention:  intact to conversation; not distractible   Fund of Knowledge:  age and education appropriate  Estimate if Intelligence:  average based on work/education history, vocabulary and mental status exam  Insight: improved  Judgment: improved        DIAGNOSTIC TESTING   Laboratory Results  No results found for this or any previous visit (from the past 24 hour(s)).              MEDICAL DECISION MAKING      ASSESSMENT:   Schizoaffective Disorder, depressed type, severe  JORDIN  Panic without agoraphobia   PTSD  Fibromyalgia      Polysubstance dependence (Methamphetamines, cannabis, alcohol, narcotics; methamphetamines, current, continuous, severe, without physiological dependence)  Nicotine Dependence      Psychosocial stressors     Obesity       MEDICAL DECISION MAKING     PROBLEM LIST AND MANAGEMENT PLANS; PRESCRIPTION DRUG MANAGEMENT  Compliance: yes  Side Effects: no  Regimen Adjustments:      Psychosis: pt counseled  -resume abilify- 2 mg po q day- seeking records to ascertain the last date and dose of administration- increase to 5 mg po q day- increase to 7 mg po q day- increase to 10 mg po q day - increase to 15mg PO daily increase to 17 mg PO qd today then 20 mg PO qd today  -depakote adjunctive (off-label) as below     Depression: pt counseled  -Resumed/continue Wellbutrin XL at 150 mg po q day- increase to 300 mg daily -stopped 2/2 psychosis     Anxiety/PTSD: pt counseled  -depakote off-label as below; wellbutrin off-label as above  -vistaril prn     Pain: pt counseled  -Restart/continue Depakote at higher dose 1000 mg po q HS- increase to last stabilizing dose of 1500 mg po q HS; level within normal range  -pt requests to discontinue 2/2 wt gain, will taper off, decrease to 1000 mg PO qhs -decrease to 750 mg PO qhs -decrease to 500 mg PO qhs tonight      Substance use: pt counseled; will attempt to place in inpatient rehab if willing;   -monitor for now and encourage  - start gabapentin 200 mg PO BID today,  will titrate (may also help anxiety and pain)     Nicotine use: pt counseled;  -started/continue  nicotine patch 14 mg patch dermal daily;   -wellbutrin as above     Psychosocial stressors: pt counseled;   -SW consulted to assist with resources     Obesity: pt counseled    Cramping and Spotting thought to be associated with recent IUD.   Ob/gyn has been consulted          Discussed diagnosis, risks and benefits of proposed treatment vs alternative treatments vs no treatment, potential side effects of these treatments and the inherent unpredictability of treatment. The patient expresses understanding of the above and displays the capacity to agree with this treatment given said understanding. Patient  also agrees that, currently, the benefits outweigh the risks and would like to pursue/continue treatment at this time.    Any medications being used off-label were discussed with the patient inclusive of the evidence base for the use of the medications and consent was obtained for the off-label use of the medication.       DISCHARGE PLANNING  Expected Disposition Plan: Home or Self Care      NEED FOR CONTINUED HOSPITALIZATION  Psychiatric illness continues to pose a potential threat to life or bodily function, of self or others, thereby requiring the need for continued inpatient psychiatric hospitalization: Yes, due to: significant psychotic thought disorder, aggressive neuroleptic titration, danger to self, gravely disabled, and suicidal ideation, as evidenced by:  Ongoing concerns with SI., Ongoing concerns with command hallucinations to hit/harm others., Ongoing concerns with grave disability with patient unable to perform basic feeding, hygiene and dressing activities without significant constant support., and Ongoing concerns with perceptual aberrancy and paranoid persecutory delusions leading to potential harm of self or others.    Protective inpatient pyschiatric hospitalization required while a safe disposition plan is enacted: Yes    Patient stabilized and ready for discharge from inpatient psychiatric unit: No        STAFF:   Cachorro Conde III, MD  Psychiatry

## 2023-09-20 NOTE — PLAN OF CARE
"Verbal contract for safety obtained. VSS. Restricted, flat affect. Isolates. Stated " if I can't go to [facility] today or tomorrow then I want to go home." Instructed patient that counselor/staff are working on getting her there. Poor insight into substance abuse. Ambulates. Encouraged to attend groups. Accepts meals and meds without difficulty. Monitored Q/15 minutes for fall and safety precautions per staff.  Will continue to monitor for needs and safety.   "

## 2023-09-20 NOTE — PROGRESS NOTES
"   09/20/23 1430   [facility] Group Therapy   Group Name Leisure Skills Training   Specific Interventions Cognitive Stimulation Training   Participation Level Appropriate;Attentive;Sharing   Participation Quality Cooperative;Social   Insight/Motivation Good   Affect/Mood Display Appropriate;Flat   Cognition Alert   Psychomotor WNL     Patient participated in skilled activity to encourage good listening skills and motivate verbal interaction. Patient presents calm, cooperative, shows interest and initial ability to comprehend directions. Patient reports a "better" mood, "I'm just tired. I went from taking no medicine to taking a bunch of medicine."  "

## 2023-09-20 NOTE — PLAN OF CARE
Problem: Adult Behavioral Health Plan of Care  Goal: Optimized Coping Skills in Response to Life Stressors  Outcome: Ongoing, Progressing     GROUP PROCESS    Pt did not attend group today. PLPC met with pt individually.  PLPC attempted to  discuss with pt on group discussion what it will look like for her upon discharge. Pt was resting in bed quietly. When pt name was called she picked up her head and responded o.k. and put her head back down. PLPC discussed with pt PLPC will come back at a later time and date to discuss group.

## 2023-09-20 NOTE — PSYCH
[facility] contacted Leatha Hanks [phone] from [facility] to discuss pt discharge plan after pt is finished the 28 day program. Leatha states she will certainly let admissions know upon her completion of the program to set her up with the [facility] [address], [place] [phone]. Leatha also states they do have a discharge planner and every pt has an appointment when they do leave it is there choice if they want to go or not, but she knows they always have an appointment when they are discharged.

## 2023-09-20 NOTE — PLAN OF CARE
Pt calm and cooperative. Out in the day room watching tv and interacting with peers. Compliant with medications. Denies A/V hallucinations, no interacting with internal stimuli noted.  Patient denies SI/HI no self harming behavior displayed, no aggressive behavior displayed towards others. Patient contracted safety with staff and unit.  Educated, reviewed  and discussed plan of care and medication regiment with this patient. Patient voiced understanding of all teachings.

## 2023-09-20 NOTE — PROGRESS NOTES
"   09/20/23 1000   [facility] Group Therapy   Group Name Therapeutic Recreation   Participation Level None     Patient presents flat, reports an "alright" mood, complains of her stomach hurting, despite encouragement patient isolated in room, in bed.  "

## 2023-09-21 LAB
ALBUMIN SERPL BCP-MCNC: 3.3 G/DL (ref 3.5–5.2)
ALP SERPL-CCNC: 80 U/L (ref 55–135)
ALT SERPL W/O P-5'-P-CCNC: 13 U/L (ref 10–44)
ANION GAP SERPL CALC-SCNC: 7 MMOL/L (ref 8–16)
AST SERPL-CCNC: 12 U/L (ref 10–40)
BILIRUB SERPL-MCNC: 0.3 MG/DL (ref 0.1–1)
BUN SERPL-MCNC: 11 MG/DL (ref 6–20)
CALCIUM SERPL-MCNC: 8.6 MG/DL (ref 8.7–10.5)
CHLORIDE SERPL-SCNC: 109 MMOL/L (ref 95–110)
CO2 SERPL-SCNC: 26 MMOL/L (ref 23–29)
CREAT SERPL-MCNC: 0.8 MG/DL (ref 0.5–1.4)
EST. GFR  (NO RACE VARIABLE): >60 ML/MIN/1.73 M^2
GLUCOSE SERPL-MCNC: 104 MG/DL (ref 70–110)
POTASSIUM SERPL-SCNC: 3.9 MMOL/L (ref 3.5–5.1)
PROT SERPL-MCNC: 6.9 G/DL (ref 6–8.4)
SODIUM SERPL-SCNC: 142 MMOL/L (ref 136–145)

## 2023-09-21 PROCEDURE — 25000003 PHARM REV CODE 250: Performed by: STUDENT IN AN ORGANIZED HEALTH CARE EDUCATION/TRAINING PROGRAM

## 2023-09-21 PROCEDURE — 80053 COMPREHEN METABOLIC PANEL: CPT | Performed by: STUDENT IN AN ORGANIZED HEALTH CARE EDUCATION/TRAINING PROGRAM

## 2023-09-21 PROCEDURE — 11400000 HC PSYCH PRIVATE ROOM

## 2023-09-21 PROCEDURE — S4991 NICOTINE PATCH NONLEGEND: HCPCS | Performed by: STUDENT IN AN ORGANIZED HEALTH CARE EDUCATION/TRAINING PROGRAM

## 2023-09-21 PROCEDURE — 36415 COLL VENOUS BLD VENIPUNCTURE: CPT | Performed by: STUDENT IN AN ORGANIZED HEALTH CARE EDUCATION/TRAINING PROGRAM

## 2023-09-21 PROCEDURE — 25000003 PHARM REV CODE 250: Performed by: PHYSICIAN ASSISTANT

## 2023-09-21 PROCEDURE — 99233 SBSQ HOSP IP/OBS HIGH 50: CPT | Mod: AF,HB,, | Performed by: STUDENT IN AN ORGANIZED HEALTH CARE EDUCATION/TRAINING PROGRAM

## 2023-09-21 PROCEDURE — 25000003 PHARM REV CODE 250: Performed by: PSYCHIATRY & NEUROLOGY

## 2023-09-21 RX ORDER — DIVALPROEX SODIUM 250 MG/1
250 TABLET, FILM COATED, EXTENDED RELEASE ORAL NIGHTLY
Status: DISCONTINUED | OUTPATIENT
Start: 2023-09-21 | End: 2023-09-22 | Stop reason: HOSPADM

## 2023-09-21 RX ORDER — GABAPENTIN 300 MG/1
300 CAPSULE ORAL 3 TIMES DAILY
Status: DISCONTINUED | OUTPATIENT
Start: 2023-09-21 | End: 2023-09-22 | Stop reason: HOSPADM

## 2023-09-21 RX ADMIN — NICOTINE 1 PATCH: 21 PATCH, EXTENDED RELEASE TRANSDERMAL at 08:09

## 2023-09-21 RX ADMIN — DIVALPROEX SODIUM 250 MG: 250 TABLET, EXTENDED RELEASE ORAL at 08:09

## 2023-09-21 RX ADMIN — Medication 100 MG: at 08:09

## 2023-09-21 RX ADMIN — GABAPENTIN 300 MG: 300 CAPSULE ORAL at 08:09

## 2023-09-21 RX ADMIN — FOLIC ACID 1 MG: 1 TABLET ORAL at 08:09

## 2023-09-21 RX ADMIN — FERROUS SULFATE TAB 325 MG (65 MG ELEMENTAL FE) 1 EACH: 325 (65 FE) TAB at 08:09

## 2023-09-21 RX ADMIN — GABAPENTIN 200 MG: 100 CAPSULE ORAL at 08:09

## 2023-09-21 RX ADMIN — ARIPIPRAZOLE 20 MG: 10 TABLET ORAL at 08:09

## 2023-09-21 RX ADMIN — THERA TABS 1 TABLET: TAB at 08:09

## 2023-09-21 RX ADMIN — GABAPENTIN 300 MG: 300 CAPSULE ORAL at 02:09

## 2023-09-21 NOTE — PLAN OF CARE
"Denies suicidal thoughts at present.  Calm and cooperative.  Speech and behavior appropriate.  Reports mood "pretty good" and feeling "positive".  Encouraged increased fluids and regular meals.  VS stable.  Accepted hs medications.  Stressed compliance with medications upon discharge.    "

## 2023-09-21 NOTE — NURSING
Rested quietly with closed eyes and even resp for 7.75 hours.  Modified VC and all precautions maintained.  Pathways clear and bed in low position.

## 2023-09-21 NOTE — PROGRESS NOTES
"   09/21/23 1271   [facility] Group Therapy   Group Name Therapeutic Recreation   Participation Level None      Patient presents flat, reports "tired, sleepy" mood, despite encouragement patient isolating in room, in bed and refused to attend group.  "

## 2023-09-21 NOTE — PROGRESS NOTES
"PSYCHIATRY DAILY INPATIENT PROGRESS NOTE  SUBSEQUENT HOSPITAL VISIT    ENCOUNTER DATE: 9/21/2023  SITE: [facility]    DATE OF ADMISSION: 9/10/2023  9:26 PM  LENGTH OF STAY: 11 days      CHIEF COMPLAINT   Maria Yancey is a 38 y.o. female, seen during daily manzo rounds on the inpatient unit.  Maria Yancey presented with the chief complaint of psychosis      The patient was seen and examined. The chart was reviewed.     Reviewed notes from Rns, CTRS, and LPC and labs from the last 24 hours.    The patient's case was discussed with the treatment team/care providers today including Rns    Staff reports no behavioral or management issues.     The patient has been compliant with treatment.      Subjective 09/21/2023    She states she feels "positive" about going to rehab tomorrow.    Her psychosis is improving steadily and her mood is stabilizing.    Responding well to medications and she feels they are helping.    The patient denies any side effects to medications.        Psychiatric ROS (observed, reported, or endorsed/denied):  Depressed mood - less  Interest/pleasure/anhedonia: less  Guilt/hopelessness/worthlessness - less  Changes in Sleep - fluctuating  Changes in Appetite - less  Changes in Concentration - less  Changes in Energy - less  PMA/R- less  Suicidal- active/passive ideations - less  Homicidal ideations: active/passive ideations - No    Hallucinations - less  Delusions - less  Disorganized behavior - less  Disorganized speech - less  Negative symptoms - less    Elevated mood - No  Decreased need for sleep - No  Grandiosity - No  Racing thoughts - No  Impulsivity - No  Irritability- No  Increased energy - No  Distractibility - No  Increase in goal-directed activity or PMA- No    Symptoms of JORDIN - less  Symptoms of Panic Disorder- denies  Symptoms of PTSD - denies        Overall progress: Patient is showing mild improvement         Medical ROS  General ROS: negative  Ophthalmic ROS: " negative  ENT ROS: negative  Allergy and Immunology ROS: negative  Hematological and Lymphatic ROS: negative  Endocrine ROS: negative  Respiratory ROS: no cough, shortness of breath, or wheezing  Cardiovascular ROS: no chest pain or dyspnea on exertion  Gastrointestinal ROS: no abdominal pain, change in bowel habits, or black or bloody stools  Genito-Urinary ROS: no dysuria, trouble voiding, or hematuria  Musculoskeletal ROS: positive for - joint pain and muscle pain  Neurological ROS: no TIA or stroke symptoms  Dermatological ROS: denied      PAST MEDICAL HISTORY   Past Medical History:   Diagnosis Date    Addiction to drug     recovering drug addict    ADHD (attention deficit hyperactivity disorder)     Alcohol abuse     Anxiety     Asthma     Bipolar 1 disorder     Cervical pain     Chronic constipation     Chronic diarrhea     Depression     Fatigue     Fibromyalgia     Hallucination     Headache     History of psychiatric hospitalization     pt reports 10 total- [facility] 10/2019 and 3/2019, 1/2020    Hx of psychiatric care     Seroquel    Magalis     Panic disorder     Polysubstance dependence     Psychiatric exam requested by authority     Psychiatric problem     Auditory hallucinations    PTSD (post-traumatic stress disorder)     Schizoaffective disorder     Sleep difficulties     Spina bifida     Spinal stenosis of lumbar region     Substance abuse     Suicide attempt     Once by hanging, once by overdose, once by cutting wrists     Syringomyelia     Syrinx of spinal cord     Therapy     [facility]    Trigger finger     Withdrawal symptoms, alcohol     Withdrawal symptoms, drug or narcotic            PSYCHOTROPIC MEDICATIONS   Scheduled Meds:   ARIPiprazole  20 mg Oral Daily    divalproex  500 mg Oral Nightly    ferrous sulfate  1 tablet Oral BID    folic acid  1 mg Oral Daily    gabapentin  200 mg Oral BID    multivitamin  1 tablet Oral Daily    nicotine  1 patch Transdermal Daily    thiamine   100 mg Oral Daily     Continuous Infusions:  PRN Meds:.acetaminophen, albuterol, benztropine mesylate, calcium carbonate, hydrOXYzine HCL, loperamide, melatonin, OLANZapine **AND** OLANZapine, ondansetron        EXAMINATION    VITALS   Vitals:    09/20/23 0740 09/20/23 0813 09/20/23 1950 09/21/23 0749   BP: 108/64  104/62 109/62   BP Location: Left arm  Right arm    Patient Position:   Sitting    Pulse: 81  84 82   Resp: 20  18 18   Temp: 97.2 °F (36.2 °C)  97.5 °F (36.4 °C) 97.9 °F (36.6 °C)   TempSrc: Temporal   Temporal   SpO2:       Weight:  115.8 kg (255 lb 2.9 oz)     Height:           Body mass index is 45.2 kg/m².        CONSTITUTIONAL  General Appearance: WF, in hospital garb, obese, NAD     MUSCULOSKELETAL  Muscle Strength and Tone:  normal  Abnormal Involuntary Movements:  none  Gait and Station:  normal; non-ataxic     PSYCHIATRIC   Level of Consciousness: awake, alert  Orientation: p/p/t/s  Grooming:  improving  Psychomotor Behavior: no PMR/A  Speech: nl r/t/v/s  Language: able to repeat words English fluent  Mood: fine  Affect: reactive  Thought Process: linear   Associations:  intact; no SHAGUFTA  Thought Content: denies SI, less AVH, less delusions, no HI  Memory:  intact to recent and remote events  Attention:  intact to conversation; not distractible   Fund of Knowledge:  age and education appropriate  Estimate if Intelligence:  average based on work/education history, vocabulary and mental status exam  Insight: improved  Judgment: improved        DIAGNOSTIC TESTING   Laboratory Results  Recent Results (from the past 24 hour(s))   Comprehensive metabolic panel    Collection Time: 09/21/23 10:20 AM   Result Value Ref Range    Sodium 142 136 - 145 mmol/L    Potassium 3.9 3.5 - 5.1 mmol/L    Chloride 109 95 - 110 mmol/L    CO2 26 23 - 29 mmol/L    Glucose 104 70 - 110 mg/dL    BUN 11 6 - 20 mg/dL    Creatinine 0.8 0.5 - 1.4 mg/dL    Calcium 8.6 (L) 8.7 - 10.5 mg/dL    Total Protein 6.9 6.0 - 8.4 g/dL     Albumin 3.3 (L) 3.5 - 5.2 g/dL    Total Bilirubin 0.3 0.1 - 1.0 mg/dL    Alkaline Phosphatase 80 55 - 135 U/L    AST 12 10 - 40 U/L    ALT 13 10 - 44 U/L    eGFR >60 >60 mL/min/1.73 m^2    Anion Gap 7 (L) 8 - 16 mmol/L                 MEDICAL DECISION MAKING      ASSESSMENT:   Schizoaffective Disorder, depressed type, severe  JORDIN  Panic without agoraphobia   PTSD  Fibromyalgia      Polysubstance dependence (Methamphetamines, cannabis, alcohol, narcotics; methamphetamines, current, continuous, severe, without physiological dependence)  Nicotine Dependence     Psychosocial stressors     Obesity       MEDICAL DECISION MAKING     PROBLEM LIST AND MANAGEMENT PLANS; PRESCRIPTION DRUG MANAGEMENT  Compliance: yes  Side Effects: no  Regimen Adjustments:      Psychosis: pt counseled  -resume abilify- 2 mg po q day- seeking records to ascertain the last date and dose of administration- increase to 5 mg po q day- increase to 7 mg po q day- increase to 10 mg po q day - increase to 15mg PO daily increase to 17 mg PO qd - then 20 mg PO qd   -depakote adjunctive (off-label) as below     Depression: pt counseled  -Resumed/continue Wellbutrin XL at 150 mg po q day- increase to 300 mg daily -stopped 2/2 psychosis     Anxiety/PTSD: pt counseled  -depakote off-label as below; wellbutrin off-label as above  -vistaril prn     Pain: pt counseled  -Restart/continue Depakote at higher dose 1000 mg po q HS- increase to last stabilizing dose of 1500 mg po q HS; level within normal range  -pt requests to discontinue 2/2 wt gain, will taper off, decrease to 1000 mg PO qhs -decrease to 750 mg PO qhs -decrease to 500 mg PO qhs -decrease to 250 mg PO qhs tonight then stop      Substance use: pt counseled; will attempt to place in inpatient rehab if willing;   -monitor for now and encourage  - start gabapentin 200 mg PO BID -increase to 300 mg PO TID,  will titrate (may also help anxiety and pain)     Nicotine use: pt counseled;  -started/continue   nicotine patch 14 mg patch dermal daily;   -wellbutrin as above     Psychosocial stressors: pt counseled;   -SW consulted to assist with resources     Obesity: pt counseled    Cramping and Spotting thought to be associated with recent IUD.   Ob/gyn has been consulted          Discussed diagnosis, risks and benefits of proposed treatment vs alternative treatments vs no treatment, potential side effects of these treatments and the inherent unpredictability of treatment. The patient expresses understanding of the above and displays the capacity to agree with this treatment given said understanding. Patient also agrees that, currently, the benefits outweigh the risks and would like to pursue/continue treatment at this time.    Any medications being used off-label were discussed with the patient inclusive of the evidence base for the use of the medications and consent was obtained for the off-label use of the medication.       DISCHARGE PLANNING  Expected Disposition Plan: Home or Self Care      NEED FOR CONTINUED HOSPITALIZATION  Psychiatric illness continues to pose a potential threat to life or bodily function, of self or others, thereby requiring the need for continued inpatient psychiatric hospitalization: Yes, due to: significant psychotic thought disorder, aggressive neuroleptic titration, danger to self, gravely disabled, and suicidal ideation, as evidenced by:  Ongoing concerns with SI., Ongoing concerns with command hallucinations to hit/harm others., Ongoing concerns with grave disability with patient unable to perform basic feeding, hygiene and dressing activities without significant constant support., and Ongoing concerns with perceptual aberrancy and paranoid persecutory delusions leading to potential harm of self or others.    Protective inpatient pyschiatric hospitalization required while a safe disposition plan is enacted: Yes    Patient stabilized and ready for discharge from inpatient psychiatric unit:  No        STAFF:   Cachorro Conde III, MD  Psychiatry

## 2023-09-21 NOTE — PLAN OF CARE
Verbal contract for safety obtained. VSS. Flat affect. Isolating to self. Waiting for rehab placement. Poor insight into substance abuse. Ambulates. Encouraged to attend groups. Accepts meals and meds without difficulty. Monitored Q/15 minutes for fall and safety precautions per staff.  Will continue to monitor for needs and safety.

## 2023-09-21 NOTE — PROGRESS NOTES
"   09/21/23 1000   [facility] Group Therapy   Group Name Leisure Education   Participation Level None     Despite encouragement patient isolated in her room, is resting in bed with her head and body covered and did not attend group, denies voices, denies depression, "I'm just tired." Patient verbalized she's ready to go to rehab.  "

## 2023-09-22 VITALS
HEIGHT: 63 IN | WEIGHT: 255.19 LBS | HEART RATE: 87 BPM | BODY MASS INDEX: 45.21 KG/M2 | OXYGEN SATURATION: 100 % | RESPIRATION RATE: 18 BRPM | TEMPERATURE: 98 F | DIASTOLIC BLOOD PRESSURE: 62 MMHG | SYSTOLIC BLOOD PRESSURE: 114 MMHG

## 2023-09-22 PROCEDURE — 25000003 PHARM REV CODE 250: Performed by: PSYCHIATRY & NEUROLOGY

## 2023-09-22 PROCEDURE — 99900035 HC TECH TIME PER 15 MIN (STAT)

## 2023-09-22 PROCEDURE — S4991 NICOTINE PATCH NONLEGEND: HCPCS | Performed by: STUDENT IN AN ORGANIZED HEALTH CARE EDUCATION/TRAINING PROGRAM

## 2023-09-22 PROCEDURE — 25000003 PHARM REV CODE 250: Performed by: STUDENT IN AN ORGANIZED HEALTH CARE EDUCATION/TRAINING PROGRAM

## 2023-09-22 PROCEDURE — 25000003 PHARM REV CODE 250: Performed by: PHYSICIAN ASSISTANT

## 2023-09-22 PROCEDURE — 99239 HOSP IP/OBS DSCHRG MGMT >30: CPT | Mod: AF,HB,, | Performed by: STUDENT IN AN ORGANIZED HEALTH CARE EDUCATION/TRAINING PROGRAM

## 2023-09-22 RX ORDER — GABAPENTIN 300 MG/1
300 CAPSULE ORAL 3 TIMES DAILY
Qty: 90 CAPSULE | Refills: 0 | Status: ON HOLD | OUTPATIENT
Start: 2023-09-22 | End: 2024-03-24 | Stop reason: HOSPADM

## 2023-09-22 RX ORDER — ARIPIPRAZOLE 20 MG/1
20 TABLET ORAL DAILY
Qty: 30 TABLET | Refills: 0 | Status: ON HOLD | OUTPATIENT
Start: 2023-09-23 | End: 2024-03-24

## 2023-09-22 RX ORDER — IBUPROFEN 200 MG
1 TABLET ORAL DAILY
Qty: 28 PATCH | Refills: 0 | Status: SHIPPED | OUTPATIENT
Start: 2023-09-23

## 2023-09-22 RX ADMIN — ARIPIPRAZOLE 20 MG: 10 TABLET ORAL at 08:09

## 2023-09-22 RX ADMIN — FOLIC ACID 1 MG: 1 TABLET ORAL at 08:09

## 2023-09-22 RX ADMIN — GABAPENTIN 300 MG: 300 CAPSULE ORAL at 08:09

## 2023-09-22 RX ADMIN — FERROUS SULFATE TAB 325 MG (65 MG ELEMENTAL FE) 1 EACH: 325 (65 FE) TAB at 08:09

## 2023-09-22 RX ADMIN — Medication 100 MG: at 08:09

## 2023-09-22 RX ADMIN — NICOTINE 1 PATCH: 21 PATCH, EXTENDED RELEASE TRANSDERMAL at 08:09

## 2023-09-22 RX ADMIN — THERA TABS 1 TABLET: TAB at 08:09

## 2023-09-22 NOTE — PROGRESS NOTES
"   09/22/23 1000   [facility] Group Therapy   Group Name Therapeutic Recreation   Participation Level None     Despite encouragement patient isolated in assigned room and refused to attend group, "the gabapentin got me sleepy." Patient was offered an alternative worksheet for the group session that focused on healthy habit forming to prevent relapse.  "

## 2023-09-22 NOTE — PLAN OF CARE
"Patient reports feeling "tired" today, spending majority of time in room isolated with minimal interaction with staff and peers. Denies SI/HI. Denies hallucinations at this time. Appetite adequate. Denies sleep disturbances. Medication compliant. NADN. Remains calm and cooperative. Safety precautions remain in place.  "

## 2023-09-22 NOTE — NURSING
Transportation to [place]  Patient received all personal items, prescriptions, and discharge papers.  Patient voiced understanding of all AVS discharge information. Patient denies distress, denies SI/HI. NAD noted. Patient escorted by [employer] downstairs  to transport to [facility] .   NAD noted.

## 2023-09-22 NOTE — NURSING
Called report to Johnathan Ling on this patient's status. Spoke to Joan Loomis today's medication administration list.

## 2023-09-22 NOTE — PSYCH
Transportation has been scheduled for today to take the patient to [facility]. The phone number for the ride is [phone].  The trip reference number is [id number]. Nursing report is to be called in [phone].
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
## 2023-09-22 NOTE — NURSING
"Discharge instructions on medication, self care, stop smoking, suicide crisis hotline, and scheduled follow up with [facility].  Patient voiced understanding of all discharge teachings. Patient denies SI/HI. Patient denies distress and reports " I am feeling so much better".  Patient is awaiting for transportation per discharge orders.    "

## 2024-03-18 ENCOUNTER — HOSPITAL ENCOUNTER (INPATIENT)
Facility: HOSPITAL | Age: 39
LOS: 7 days | Discharge: HOME | DRG: 885 | End: 2024-03-25
Attending: STUDENT IN AN ORGANIZED HEALTH CARE EDUCATION/TRAINING PROGRAM | Admitting: PSYCHIATRY & NEUROLOGY
Payer: COMMERCIAL

## 2024-03-18 ENCOUNTER — HOSPITAL ENCOUNTER (EMERGENCY)
Facility: HOSPITAL | Age: 39
Discharge: PSYCHIATRIC OR SUBSTANCE USE FACILITY | End: 2024-03-18
Attending: EMERGENCY MEDICINE
Payer: COMMERCIAL

## 2024-03-18 VITALS
BODY MASS INDEX: 46.68 KG/M2 | TEMPERATURE: 98 F | DIASTOLIC BLOOD PRESSURE: 81 MMHG | HEIGHT: 63 IN | SYSTOLIC BLOOD PRESSURE: 122 MMHG | WEIGHT: 263.44 LBS | RESPIRATION RATE: 16 BRPM | HEART RATE: 95 BPM | OXYGEN SATURATION: 100 %

## 2024-03-18 DIAGNOSIS — F25.1 SCHIZOAFFECTIVE DISORDER, DEPRESSIVE TYPE: Primary | ICD-10-CM

## 2024-03-18 DIAGNOSIS — F19.10 SUBSTANCE ABUSE: ICD-10-CM

## 2024-03-18 DIAGNOSIS — F23 ACUTE PSYCHOSIS: ICD-10-CM

## 2024-03-18 DIAGNOSIS — Z00.8 MEDICAL CLEARANCE FOR PSYCHIATRIC ADMISSION: Primary | ICD-10-CM

## 2024-03-18 LAB
ALBUMIN SERPL BCP-MCNC: 4 G/DL (ref 3.5–5.2)
ALP SERPL-CCNC: 100 U/L (ref 55–135)
ALT SERPL W/O P-5'-P-CCNC: 18 U/L (ref 10–44)
AMPHET+METHAMPHET UR QL: ABNORMAL
ANION GAP SERPL CALC-SCNC: 11 MMOL/L (ref 8–16)
APAP SERPL-MCNC: <3 UG/ML (ref 10–20)
AST SERPL-CCNC: 19 U/L (ref 10–40)
B-HCG UR QL: NEGATIVE
BARBITURATES UR QL SCN>200 NG/ML: NEGATIVE
BASOPHILS # BLD AUTO: 0.03 K/UL (ref 0–0.2)
BASOPHILS NFR BLD: 0.4 % (ref 0–1.9)
BENZODIAZ UR QL SCN>200 NG/ML: NEGATIVE
BILIRUB SERPL-MCNC: 0.4 MG/DL (ref 0.1–1)
BILIRUB UR QL STRIP: NEGATIVE
BUN SERPL-MCNC: 7 MG/DL (ref 6–20)
BZE UR QL SCN: NEGATIVE
CALCIUM SERPL-MCNC: 9.4 MG/DL (ref 8.7–10.5)
CANNABINOIDS UR QL SCN: NEGATIVE
CHLORIDE SERPL-SCNC: 107 MMOL/L (ref 95–110)
CLARITY UR: CLEAR
CO2 SERPL-SCNC: 21 MMOL/L (ref 23–29)
COLOR UR: YELLOW
CREAT SERPL-MCNC: 0.7 MG/DL (ref 0.5–1.4)
CREAT UR-MCNC: 145.2 MG/DL (ref 15–325)
DIFFERENTIAL METHOD BLD: ABNORMAL
EOSINOPHIL # BLD AUTO: 0.1 K/UL (ref 0–0.5)
EOSINOPHIL NFR BLD: 1.5 % (ref 0–8)
ERYTHROCYTE [DISTWIDTH] IN BLOOD BY AUTOMATED COUNT: 15.3 % (ref 11.5–14.5)
EST. GFR  (NO RACE VARIABLE): >60 ML/MIN/1.73 M^2
ETHANOL SERPL-MCNC: <10 MG/DL
GLUCOSE SERPL-MCNC: 60 MG/DL (ref 70–110)
GLUCOSE UR QL STRIP: NEGATIVE
HCT VFR BLD AUTO: 40.1 % (ref 37–48.5)
HGB BLD-MCNC: 13 G/DL (ref 12–16)
HGB UR QL STRIP: NEGATIVE
IMM GRANULOCYTES # BLD AUTO: 0.01 K/UL (ref 0–0.04)
IMM GRANULOCYTES NFR BLD AUTO: 0.1 % (ref 0–0.5)
KETONES UR QL STRIP: ABNORMAL
LEUKOCYTE ESTERASE UR QL STRIP: NEGATIVE
LYMPHOCYTES # BLD AUTO: 2.3 K/UL (ref 1–4.8)
LYMPHOCYTES NFR BLD: 29.4 % (ref 18–48)
MCH RBC QN AUTO: 25.6 PG (ref 27–31)
MCHC RBC AUTO-ENTMCNC: 32.4 G/DL (ref 32–36)
MCV RBC AUTO: 79 FL (ref 82–98)
METHADONE UR QL SCN>300 NG/ML: NEGATIVE
MONOCYTES # BLD AUTO: 0.5 K/UL (ref 0.3–1)
MONOCYTES NFR BLD: 5.8 % (ref 4–15)
NEUTROPHILS # BLD AUTO: 4.9 K/UL (ref 1.8–7.7)
NEUTROPHILS NFR BLD: 62.8 % (ref 38–73)
NITRITE UR QL STRIP: NEGATIVE
NRBC BLD-RTO: 0 /100 WBC
OPIATES UR QL SCN: NEGATIVE
PCP UR QL SCN>25 NG/ML: NEGATIVE
PH UR STRIP: >8 [PH] (ref 5–8)
PLATELET # BLD AUTO: 161 K/UL (ref 150–450)
PLATELET BLD QL SMEAR: ABNORMAL
PMV BLD AUTO: 11.8 FL (ref 9.2–12.9)
POCT GLUCOSE: 64 MG/DL (ref 70–110)
POTASSIUM SERPL-SCNC: 3.9 MMOL/L (ref 3.5–5.1)
PROT SERPL-MCNC: 8.3 G/DL (ref 6–8.4)
PROT UR QL STRIP: ABNORMAL
RBC # BLD AUTO: 5.07 M/UL (ref 4–5.4)
SALICYLATES SERPL-MCNC: <5 MG/DL (ref 15–30)
SODIUM SERPL-SCNC: 139 MMOL/L (ref 136–145)
SP GR UR STRIP: 1.01 (ref 1–1.03)
TOXICOLOGY INFORMATION: ABNORMAL
TSH SERPL DL<=0.005 MIU/L-ACNC: 0.91 UIU/ML (ref 0.4–4)
URN SPEC COLLECT METH UR: ABNORMAL
UROBILINOGEN UR STRIP-ACNC: NEGATIVE EU/DL
WBC # BLD AUTO: 7.82 K/UL (ref 3.9–12.7)

## 2024-03-18 PROCEDURE — 80179 DRUG ASSAY SALICYLATE: CPT | Performed by: NURSE PRACTITIONER

## 2024-03-18 PROCEDURE — 96372 THER/PROPH/DIAG INJ SC/IM: CPT | Performed by: NURSE PRACTITIONER

## 2024-03-18 PROCEDURE — 80053 COMPREHEN METABOLIC PANEL: CPT | Performed by: NURSE PRACTITIONER

## 2024-03-18 PROCEDURE — 11400000 HC PSYCH PRIVATE ROOM

## 2024-03-18 PROCEDURE — 82077 ASSAY SPEC XCP UR&BREATH IA: CPT | Performed by: NURSE PRACTITIONER

## 2024-03-18 PROCEDURE — 99285 EMERGENCY DEPT VISIT HI MDM: CPT | Mod: 25

## 2024-03-18 PROCEDURE — 81025 URINE PREGNANCY TEST: CPT | Performed by: NURSE PRACTITIONER

## 2024-03-18 PROCEDURE — 80143 DRUG ASSAY ACETAMINOPHEN: CPT | Performed by: NURSE PRACTITIONER

## 2024-03-18 PROCEDURE — 85025 COMPLETE CBC W/AUTO DIFF WBC: CPT | Performed by: NURSE PRACTITIONER

## 2024-03-18 PROCEDURE — 63600175 PHARM REV CODE 636 W HCPCS: Performed by: NURSE PRACTITIONER

## 2024-03-18 PROCEDURE — 80307 DRUG TEST PRSMV CHEM ANLYZR: CPT | Performed by: NURSE PRACTITIONER

## 2024-03-18 PROCEDURE — 82962 GLUCOSE BLOOD TEST: CPT

## 2024-03-18 PROCEDURE — 84443 ASSAY THYROID STIM HORMONE: CPT | Performed by: NURSE PRACTITIONER

## 2024-03-18 PROCEDURE — 81003 URINALYSIS AUTO W/O SCOPE: CPT | Mod: 59 | Performed by: NURSE PRACTITIONER

## 2024-03-18 RX ORDER — OLANZAPINE 10 MG/2ML
10 INJECTION, POWDER, FOR SOLUTION INTRAMUSCULAR EVERY 8 HOURS PRN
Status: DISCONTINUED | OUTPATIENT
Start: 2024-03-19 | End: 2024-03-19

## 2024-03-18 RX ORDER — HYDROXYZINE PAMOATE 50 MG/1
50 CAPSULE ORAL EVERY 6 HOURS PRN
Status: DISCONTINUED | OUTPATIENT
Start: 2024-03-19 | End: 2024-03-19

## 2024-03-18 RX ORDER — IBUPROFEN 200 MG
1 TABLET ORAL DAILY
Status: DISCONTINUED | OUTPATIENT
Start: 2024-03-19 | End: 2024-03-19

## 2024-03-18 RX ORDER — LORAZEPAM 2 MG/ML
2 INJECTION INTRAMUSCULAR
Status: COMPLETED | OUTPATIENT
Start: 2024-03-18 | End: 2024-03-18

## 2024-03-18 RX ORDER — OLANZAPINE 10 MG/1
10 TABLET ORAL EVERY 8 HOURS PRN
Status: DISCONTINUED | OUTPATIENT
Start: 2024-03-19 | End: 2024-03-19

## 2024-03-18 RX ADMIN — LORAZEPAM 2 MG: 2 INJECTION INTRAMUSCULAR; INTRAVENOUS at 07:03

## 2024-03-18 NOTE — ED TRIAGE NOTES
C/o hearing voices, seeing blood, seeing people who are not there, hears people screaming, and thinks her phone is hacked x 6 days. Patient reports quit taking her medications (Abilify)  3 weeks and through it away. Patient reports took 3 Depakote today.

## 2024-03-18 NOTE — ED PROVIDER NOTES
Encounter Date: 3/18/2024       History     Chief Complaint   Patient presents with    Psychiatric Evaluation     Maria Yancey is a 38 y.o. female PMH of drug addiction, ADHD, alcohol abuse, bipolar disorder, fibromyalgia, PTSD presents to the ED for psychiatric evaluation.  Patient has been hearing voices and seeing things for the past 5-6 days.  Her aunt reports that she is paranoid, feels like people are going to ambush her.  She is also seeing people that are not there and wanting her aunt to call 911.  She has not been sleeping at night bc she is scared someone will hurt her. She stopped taking her Abilify because she reports that she did not want to gain weight. She denies SI, HI.  She reports that voices are telling her that people are being hurt.    The history is provided by the patient and a relative.     Review of patient's allergies indicates:  No Known Allergies  Past Medical History:   Diagnosis Date    Addiction to drug     recovering drug addict    ADHD (attention deficit hyperactivity disorder)     Alcohol abuse     Anxiety     Asthma     Bipolar 1 disorder     Cervical pain     Chronic constipation     Chronic diarrhea     Depression     Fatigue     Fibromyalgia     Hallucination     Headache     History of psychiatric hospitalization     pt reports 10 total- [facility] 10/2019 and 3/2019, 1/2020    Hx of psychiatric care     Seroquel    Magalis     Panic disorder     Polysubstance dependence     Psychiatric exam requested by authority     Psychiatric problem     Auditory hallucinations    PTSD (post-traumatic stress disorder)     Schizoaffective disorder     Sleep difficulties     Spina bifida     Spinal stenosis of lumbar region     Substance abuse     Suicide attempt     Once by hanging, once by overdose, once by cutting wrists     Syringomyelia     Syrinx of spinal cord     Therapy     [facility]    Trigger finger     Withdrawal symptoms, alcohol     Withdrawal symptoms, drug  or narcotic      Past Surgical History:   Procedure Laterality Date    CARPAL TUNNEL RELEASE       SECTION, CLASSIC      siactica      spinal bifida      spinalsonois       Family History   Problem Relation Age of Onset    Hypertension Mother     Diabetes Mother     Hyperlipidemia Mother     Hypertension Father     Arthritis Father     Diabetes Father     Hyperlipidemia Father     Heart attack Father     No Known Problems Sister     No Known Problems Sister     No Known Problems Maternal Aunt     No Known Problems Maternal Uncle     No Known Problems Paternal Aunt     No Known Problems Paternal Uncle     Heart attack Maternal Grandmother     Heart attack Maternal Grandfather     Heart attack Paternal Grandmother     Heart attack Paternal Grandfather     Breast cancer Cousin     Colon cancer Neg Hx     Ovarian cancer Neg Hx      Social History     Tobacco Use    Smoking status: Every Day     Current packs/day: 0.50     Average packs/day: 0.5 packs/day for 26.4 years (13.2 ttl pk-yrs)     Types: Cigarettes     Start date: 10/29/1997    Smokeless tobacco: Never    Tobacco comments:     smokes 1-2 cig daily   Substance Use Topics    Alcohol use: Not Currently     Alcohol/week: 0.0 standard drinks of alcohol     Comment: last use alcholhol 4 months ago    Drug use: Yes     Types: Methamphetamines     Comment: last used 2 days ago     Review of Systems   Constitutional:  Negative for activity change, chills and fever.   HENT:  Negative for congestion, ear discharge, ear pain, postnasal drip, sinus pressure, sinus pain and sore throat.    Respiratory:  Negative for cough, chest tightness and shortness of breath.    Cardiovascular:  Negative for chest pain.   Gastrointestinal:  Negative for abdominal distention, abdominal pain and nausea.   Genitourinary:  Negative for dysuria, frequency and urgency.   Musculoskeletal:  Negative for back pain.   Skin: Negative.  Negative for rash.   Neurological:  Negative for  dizziness, weakness, light-headedness and numbness.   Hematological:  Does not bruise/bleed easily.   Psychiatric/Behavioral:  Positive for hallucinations. The patient is nervous/anxious and is hyperactive.        Physical Exam     Initial Vitals [03/18/24 1827]   BP Pulse Resp Temp SpO2   129/79 92 18 98.1 °F (36.7 °C) 100 %      MAP       --         Physical Exam    Nursing note and vitals reviewed.  Constitutional: She appears well-developed and well-nourished.   HENT:   Head: Normocephalic and atraumatic.   Right Ear: Tympanic membrane, external ear and ear canal normal. Tympanic membrane is not erythematous. No middle ear effusion.   Left Ear: Tympanic membrane, external ear and ear canal normal. Tympanic membrane is not erythematous.  No middle ear effusion.   Nose: Nose normal.   Mouth/Throat: Uvula is midline, oropharynx is clear and moist and mucous membranes are normal. Mucous membranes are not pale and not dry.   Eyes: Conjunctivae and EOM are normal. Pupils are equal, round, and reactive to light.   Neck: Neck supple.   Normal range of motion.  Cardiovascular:  Normal rate, regular rhythm, normal heart sounds and intact distal pulses.           Pulmonary/Chest: Effort normal and breath sounds normal. She has no decreased breath sounds. She has no wheezes. She has no rhonchi. She has no rales.   Abdominal: Abdomen is soft. Bowel sounds are normal. There is no abdominal tenderness.   Musculoskeletal:         General: Normal range of motion.      Cervical back: Normal range of motion and neck supple.     Neurological: She is alert and oriented to person, place, and time. She has normal strength. She displays normal reflexes. No cranial nerve deficit or sensory deficit.   Skin: Skin is warm and dry. Capillary refill takes less than 2 seconds. No rash noted.   Psychiatric: She has a normal mood and affect. Her behavior is normal. Judgment normal. She is actively hallucinating. Thought content is paranoid. She  expresses no homicidal and no suicidal ideation. She expresses no suicidal plans and no homicidal plans.         ED Course   Procedures  Labs Reviewed   CBC W/ AUTO DIFFERENTIAL - Abnormal; Notable for the following components:       Result Value    MCV 79 (*)     MCH 25.6 (*)     RDW 15.3 (*)     All other components within normal limits   COMPREHENSIVE METABOLIC PANEL - Abnormal; Notable for the following components:    CO2 21 (*)     Glucose 60 (*)     All other components within normal limits   URINALYSIS, REFLEX TO URINE CULTURE - Abnormal; Notable for the following components:    Protein, UA Trace (*)     Ketones, UA Trace (*)     All other components within normal limits    Narrative:     Specimen Source->Urine   DRUG SCREEN PANEL, URINE EMERGENCY - Abnormal; Notable for the following components:    Amphetamine Screen, Ur Presumptive Positive (*)     All other components within normal limits    Narrative:     Specimen Source->Urine   ACETAMINOPHEN LEVEL - Abnormal; Notable for the following components:    Acetaminophen (Tylenol), Serum <3.0 (*)     All other components within normal limits   SALICYLATE LEVEL - Abnormal; Notable for the following components:    Salicylate Lvl <5.0 (*)     All other components within normal limits   TSH   ALCOHOL,MEDICAL (ETHANOL)   PREGNANCY TEST, URINE RAPID    Narrative:     Specimen Source->Urine   POCT GLUCOSE MONITORING CONTINUOUS          Imaging Results    None          Medications   LORazepam injection 2 mg (2 mg Intramuscular Given 3/18/24 1920)     Medical Decision Making  Evaluation of a 38-year-old female with visual and auditory hallucinations.  Patient took herself off of Abilify due to weight gain issues  Placed on 72 hours psychiatric hold    Amount and/or Complexity of Data Reviewed  Labs: ordered. Decision-making details documented in ED Course.    Risk  Prescription drug management.  Risk Details: Patient is medically cleared for psychiatric admission                   Medically cleared for psychiatry placement: 3/18/2024  9:24 PM                   Clinical Impression:  Final diagnoses:  [Z00.8] Medical clearance for psychiatric admission (Primary)  [F23] Acute psychosis          ED Disposition Condition    Transfer to Psych Facility Stable          ED Prescriptions    None       Follow-up Information    None          Janette Ernst NP  03/18/24 0092

## 2024-03-19 PROBLEM — Z72.0 TOBACCO USE: Status: ACTIVE | Noted: 2024-03-19

## 2024-03-19 PROCEDURE — 25000003 PHARM REV CODE 250: Performed by: PSYCHIATRY & NEUROLOGY

## 2024-03-19 PROCEDURE — 11400000 HC PSYCH PRIVATE ROOM

## 2024-03-19 PROCEDURE — 63600175 PHARM REV CODE 636 W HCPCS: Mod: JZ,TB | Performed by: PSYCHIATRY & NEUROLOGY

## 2024-03-19 PROCEDURE — 25000003 PHARM REV CODE 250: Performed by: STUDENT IN AN ORGANIZED HEALTH CARE EDUCATION/TRAINING PROGRAM

## 2024-03-19 PROCEDURE — 99223 1ST HOSP IP/OBS HIGH 75: CPT | Mod: ,,, | Performed by: STUDENT IN AN ORGANIZED HEALTH CARE EDUCATION/TRAINING PROGRAM

## 2024-03-19 PROCEDURE — 99222 1ST HOSP IP/OBS MODERATE 55: CPT | Mod: ,,, | Performed by: INTERNAL MEDICINE

## 2024-03-19 RX ORDER — IBUPROFEN 200 MG
1 TABLET ORAL DAILY PRN
Status: DISCONTINUED | OUTPATIENT
Start: 2024-03-19 | End: 2024-03-25 | Stop reason: HOSPADM

## 2024-03-19 RX ORDER — BENZONATATE 100 MG/1
100 CAPSULE ORAL 3 TIMES DAILY PRN
Status: DISCONTINUED | OUTPATIENT
Start: 2024-03-19 | End: 2024-03-25 | Stop reason: HOSPADM

## 2024-03-19 RX ORDER — LOPERAMIDE HYDROCHLORIDE 2 MG/1
2 CAPSULE ORAL
Status: DISCONTINUED | OUTPATIENT
Start: 2024-03-19 | End: 2024-03-25 | Stop reason: HOSPADM

## 2024-03-19 RX ORDER — ONDANSETRON 4 MG/1
4 TABLET, ORALLY DISINTEGRATING ORAL EVERY 8 HOURS PRN
Status: DISCONTINUED | OUTPATIENT
Start: 2024-03-19 | End: 2024-03-25 | Stop reason: HOSPADM

## 2024-03-19 RX ORDER — ARIPIPRAZOLE 10 MG/1
10 TABLET ORAL DAILY
Status: DISCONTINUED | OUTPATIENT
Start: 2024-03-19 | End: 2024-03-20

## 2024-03-19 RX ORDER — ACETAMINOPHEN 325 MG/1
650 TABLET ORAL EVERY 6 HOURS PRN
Status: DISCONTINUED | OUTPATIENT
Start: 2024-03-19 | End: 2024-03-25 | Stop reason: HOSPADM

## 2024-03-19 RX ORDER — OLANZAPINE 10 MG/2ML
10 INJECTION, POWDER, FOR SOLUTION INTRAMUSCULAR EVERY 8 HOURS PRN
Status: DISCONTINUED | OUTPATIENT
Start: 2024-03-19 | End: 2024-03-20

## 2024-03-19 RX ORDER — ALUMINUM HYDROXIDE, MAGNESIUM HYDROXIDE, AND SIMETHICONE 1200; 120; 1200 MG/30ML; MG/30ML; MG/30ML
30 SUSPENSION ORAL EVERY 6 HOURS PRN
Status: DISCONTINUED | OUTPATIENT
Start: 2024-03-19 | End: 2024-03-25 | Stop reason: HOSPADM

## 2024-03-19 RX ORDER — PROMETHAZINE HYDROCHLORIDE 25 MG/1
25 TABLET ORAL EVERY 6 HOURS PRN
Status: DISCONTINUED | OUTPATIENT
Start: 2024-03-19 | End: 2024-03-25 | Stop reason: HOSPADM

## 2024-03-19 RX ORDER — BENZTROPINE MESYLATE 1 MG/ML
2 INJECTION, SOLUTION INTRAMUSCULAR; INTRAVENOUS EVERY 8 HOURS PRN
Status: DISCONTINUED | OUTPATIENT
Start: 2024-03-19 | End: 2024-03-25 | Stop reason: HOSPADM

## 2024-03-19 RX ORDER — OLANZAPINE 10 MG/1
10 TABLET ORAL EVERY 8 HOURS PRN
Status: DISCONTINUED | OUTPATIENT
Start: 2024-03-19 | End: 2024-03-20

## 2024-03-19 RX ORDER — HYDROXYZINE PAMOATE 50 MG/1
50 CAPSULE ORAL EVERY 6 HOURS PRN
Status: DISCONTINUED | OUTPATIENT
Start: 2024-03-19 | End: 2024-03-25 | Stop reason: HOSPADM

## 2024-03-19 RX ADMIN — HYDROXYZINE PAMOATE 50 MG: 50 CAPSULE ORAL at 09:03

## 2024-03-19 RX ADMIN — OLANZAPINE 10 MG: 10 TABLET, FILM COATED ORAL at 08:03

## 2024-03-19 RX ADMIN — OLANZAPINE 10 MG: 10 INJECTION, POWDER, FOR SOLUTION INTRAMUSCULAR at 08:03

## 2024-03-19 RX ADMIN — ARIPIPRAZOLE 10 MG: 10 TABLET ORAL at 12:03

## 2024-03-19 NOTE — PROGRESS NOTES
03/19/24 1115   [facility] Group Therapy   Group Name Other   Participation Level None     Patient presents disheveled, crying, paranoid and is inappropriate to attend the group at this time due to continuous crying and making threats to peers and the staff. Patient  verbalized to the CTRS that she was going to cut heads off and the baby's heads off and kill patients and staff.

## 2024-03-19 NOTE — PSYCH
Pt making threatening statements to staff and other patients in dayroom.  Pt tearful and paranoid.  Pt directed back to room after taking her scheduled meds and instructed to rest.  Pt compliant and lying in bed at this time.

## 2024-03-19 NOTE — HPI
Patient admitted to [facility] with acute psychosis/hallucination. She has no chronic medical problems-history of asthma noted in chart. Outpatient meds are for psychiatric condition. Denies SOB, abd pain, n/v/d/c but is a poor historian due to acute psych illness. Labs reviewed: MCV mildly low but H/H normal. UDS + amphetamines with admitted use. + tobacco use as well.

## 2024-03-19 NOTE — PSYCH
"Pt is visibly upset.  Crying and having auditory hallucinations.  Told tech is dayroom, "maybe I should go to [employer] and shoot some kids and families up to see how they like it".  Pt given IM zyprexa at this time, tolerated well.  Pt lying in bed crying, will continue to monitor.  "

## 2024-03-19 NOTE — NURSING
Received report from Garry MCFARLAND. Pt OPC'd by family. Pt presented with A/VH, pt seeing blood and hearing people screaming. Pt stopped taking her Abilify 3 weeks ago.  Pt believes her phone is hacked. Positive for Amphetamine. Hx Substance abuse, Bipolar, Alcohol abuse. Medically cleared per Janette Ernst. Accepted per Dr Nettles. Awaiting arrival to [facility].

## 2024-03-19 NOTE — CONSULTS
[facility] Hospital Medicine  Consult Note    Patient Name: Maria Yancey  MRN: [id number]  Admission Date: 3/18/2024  Hospital Length of Stay: 1 days  Attending Physician: Salomón Nettles MD   Primary Care Provider: Thomas Boyd PA           Patient information was obtained from patient and ER records.     Inpatient consult to [facility] for History and Physical  Consult performed by: Ania Roman MD  Consult ordered by: Salomón Nettles MD        Subjective:     Principal Problem: Schizoaffective disorder, depressive type    Chief Complaint: No chief complaint on file.       HPI: Patient admitted to [facility] with acute psychosis/hallucination. She has no chronic medical problems-history of asthma noted in chart. Outpatient meds are for psychiatric condition. Denies SOB, abd pain, n/v/d/c but is a poor historian due to acute psych illness. Labs reviewed: MCV mildly low but H/H normal. UDS + amphetamines with admitted use. + tobacco use as well.     Past Medical History:   Diagnosis Date    Addiction to drug     recovering drug addict    ADHD (attention deficit hyperactivity disorder)     Alcohol abuse     Anxiety     Asthma     Bipolar 1 disorder     Cervical pain     Chronic constipation     Chronic diarrhea     Depression     Fatigue     Fibromyalgia     Hallucination     Headache     History of psychiatric hospitalization     pt reports 10 total- [facility] 10/2019 and 3/2019, 1/2020    Hx of psychiatric care     Seroquel    Magalis     Panic disorder     Polysubstance dependence     Psychiatric exam requested by authority     Psychiatric problem     Auditory hallucinations    PTSD (post-traumatic stress disorder)     Schizoaffective disorder     Sleep difficulties     Spina bifida     Spinal stenosis of lumbar region     Substance abuse     Suicide attempt     Once by hanging, once by overdose, once by cutting wrists     Syringomyelia     Syrinx of spinal cord     Therapy      [facility]    Trigger finger     Withdrawal symptoms, alcohol     Withdrawal symptoms, drug or narcotic        Past Surgical History:   Procedure Laterality Date    CARPAL TUNNEL RELEASE       SECTION, CLASSIC      siactica      spinal bifida      spinalsonois         Review of patient's allergies indicates:  No Known Allergies    Current Facility-Administered Medications on File Prior to Encounter   Medication    [COMPLETED] LORazepam injection 2 mg     Current Outpatient Medications on File Prior to Encounter   Medication Sig    albuterol (VENTOLIN HFA) 90 mcg/actuation inhaler Ventolin HFA 90 mcg/actuation aerosol inhaler   Inhale 1 puff 3 times a day by inhalation route as needed.    ARIPiprazole (ABILIFY) 20 MG Tab Take 1 tablet (20 mg total) by mouth once daily.    gabapentin (NEURONTIN) 300 MG capsule Take 1 capsule (300 mg total) by mouth 3 (three) times daily.    nicotine (NICODERM CQ) 21 mg/24 hr Place 1 patch onto the skin once daily.     Family History       Problem Relation (Age of Onset)    Arthritis Father    Breast cancer Cousin    Diabetes Mother, Father    Heart attack Father, Maternal Grandmother, Maternal Grandfather, Paternal Grandmother, Paternal Grandfather    Hyperlipidemia Mother, Father    Hypertension Mother, Father    No Known Problems Sister, Sister, Maternal Aunt, Maternal Uncle, Paternal Aunt, Paternal Uncle          Tobacco Use    Smoking status: Every Day     Current packs/day: 0.50     Average packs/day: 0.5 packs/day for 26.4 years (13.2 ttl pk-yrs)     Types: Cigarettes     Start date: 10/29/1997    Smokeless tobacco: Never    Tobacco comments:     smokes 1-2 cig daily   Substance and Sexual Activity    Alcohol use: Not Currently     Alcohol/week: 0.0 standard drinks of alcohol     Comment: last use alcholhol 4 months ago    Drug use: Yes     Types: Methamphetamines     Comment: last used 2 days ago    Sexual activity: Yes     Partners: Male     Birth  control/protection: I.U.D.     Comment:      Review of Systems   Constitutional:  Negative for activity change, fatigue, fever and unexpected weight change.   HENT:  Negative for congestion, ear pain, hearing loss, rhinorrhea and sore throat.    Eyes:  Negative for redness and visual disturbance.   Respiratory:  Negative for cough, shortness of breath and wheezing.    Cardiovascular:  Negative for chest pain, palpitations and leg swelling.   Gastrointestinal:  Negative for abdominal pain, constipation, diarrhea, nausea and vomiting.   Genitourinary:  Negative for dysuria, frequency and urgency.   Musculoskeletal:  Negative for back pain, joint swelling and neck pain.   Skin:  Negative for color change, rash and wound.   Neurological:  Negative for dizziness, tremors, weakness, light-headedness and headaches.   Psychiatric/Behavioral:  Positive for hallucinations.      Poor historian however.   Objective:     Vital Signs (Most Recent):  Temp: 97.3 °F (36.3 °C) (03/19/24 0807)  Pulse: 83 (03/19/24 0807)  Resp: 18 (03/19/24 0807)  BP: 119/65 (03/19/24 0807)  SpO2: 99 % (03/18/24 2350) Vital Signs (24h Range):  Temp:  [97.2 °F (36.2 °C)-98.1 °F (36.7 °C)] 97.3 °F (36.3 °C)  Pulse:  [83-97] 83  Resp:  [16-18] 18  SpO2:  [99 %-100 %] 99 %  BP: (119-131)/(65-88) 119/65     Weight: 117.4 kg (258 lb 14.9 oz)  Body mass index is 45.87 kg/m².     Physical Exam  Vitals reviewed.   Constitutional:       General: She is not in acute distress.     Appearance: She is well-developed.   HENT:      Head: Normocephalic and atraumatic.      Right Ear: External ear normal.      Left Ear: External ear normal.      Nose: Nose normal.      Mouth/Throat:      Pharynx: No oropharyngeal exudate or posterior oropharyngeal erythema.   Eyes:      General:         Right eye: No discharge.         Left eye: No discharge.      Extraocular Movements: Extraocular movements intact.      Conjunctiva/sclera: Conjunctivae normal.      Pupils:  "Pupils are equal, round, and reactive to light.   Neck:      Thyroid: No thyromegaly.   Cardiovascular:      Rate and Rhythm: Normal rate and regular rhythm.      Heart sounds: No murmur heard.     Friction rub present.   Pulmonary:      Effort: Pulmonary effort is normal. No respiratory distress.      Breath sounds: Normal breath sounds.   Abdominal:      General: Bowel sounds are normal. There is no distension.      Palpations: Abdomen is soft.      Tenderness: There is no abdominal tenderness.   Skin:     General: Skin is warm and dry.   Neurological:      Mental Status: She is alert and oriented to person, place, and time.      Comments:   Neuro: Cranial nerves:  CN II Visual fields full to confrontation.   CN III, IV, VI Pupils are equal, round, and reactive to light.  CN III: no palsy  Nystagmus: none   Diplopia: none  Ophthalmoparesis: none  CN V Facial sensation intact.   CN VII Facial expression full, symmetric.   CN VIII normal.   CN IX normal.   CN X normal.   CN XI normal.   CN XII normal.       Psychiatric:         Behavior: Behavior normal.         Thought Content: Thought content normal.          Significant Labs: All pertinent labs within the past 24 hours have been reviewed.  CBC:   Recent Labs   Lab 03/18/24 1956   WBC 7.82   HGB 13.0   HCT 40.1        CMP:   Recent Labs   Lab 03/18/24 1956      K 3.9      CO2 21*   GLU 60*   BUN 7   CREATININE 0.7   CALCIUM 9.4   PROT 8.3   ALBUMIN 4.0   BILITOT 0.4   ALKPHOS 100   AST 19   ALT 18   ANIONGAP 11     Cardiac Markers: No results for input(s): "CKMB", "MYOGLOBIN", "BNP", "TROPISTAT" in the last 48 hours.  Lactic Acid: No results for input(s): "LACTATE" in the last 48 hours.  Lipid Panel: No results for input(s): "CHOL", "HDL", "LDLCALC", "TRIG", "CHOLHDL" in the last 48 hours.  Troponin: No results for input(s): "TROPONINI", "TROPONINIHS" in the last 48 hours.  TSH:   Recent Labs   Lab 03/18/24 1956   TSH 0.912     Urine Studies: "   Recent Labs   Lab 03/18/24  1845   COLORU Yellow   APPEARANCEUA Clear   PHUR >8.0   SPECGRAV 1.010   PROTEINUA Trace*   GLUCUA Negative   KETONESU Trace*   BILIRUBINUA Negative   OCCULTUA Negative   NITRITE Negative   UROBILINOGEN Negative   LEUKOCYTESUR Negative       Significant Imaging: I have reviewed all pertinent imaging results/findings within the past 24 hours.  Assessment/Plan:     * Schizoaffective disorder, depressive type  Management per psych team    Tobacco use  Nicotine patch added      Mild intermittent asthma without complication  History noted in chart  Only see PRN albuterol on home med list--poor historian  No signs of acute exacerbatoin    No change in management while admitted to U. Re consult if SOB, cough, wheezing develops      Polysubstance dependence  Management per psych        VTE Risk Mitigation (From admission, onward)      None                Thank you for your consult. I will sign off. Please contact us if you have any additional questions.    Ania Roman MD  Department of Hospital Medicine   [facility]

## 2024-03-19 NOTE — SUBJECTIVE & OBJECTIVE
Past Medical History:   Diagnosis Date    Addiction to drug     recovering drug addict    ADHD (attention deficit hyperactivity disorder)     Alcohol abuse     Anxiety     Asthma     Bipolar 1 disorder     Cervical pain     Chronic constipation     Chronic diarrhea     Depression     Fatigue     Fibromyalgia     Hallucination     Headache     History of psychiatric hospitalization     pt reports 10 total- [facility] 10/2019 and 3/2019, 2020    Hx of psychiatric care     Seroquel    Magalis     Panic disorder     Polysubstance dependence     Psychiatric exam requested by authority     Psychiatric problem     Auditory hallucinations    PTSD (post-traumatic stress disorder)     Schizoaffective disorder     Sleep difficulties     Spina bifida     Spinal stenosis of lumbar region     Substance abuse     Suicide attempt     Once by hanging, once by overdose, once by cutting wrists     Syringomyelia     Syrinx of spinal cord     Therapy     [facility]    Trigger finger     Withdrawal symptoms, alcohol     Withdrawal symptoms, drug or narcotic        Past Surgical History:   Procedure Laterality Date    CARPAL TUNNEL RELEASE       SECTION, CLASSIC      siactica      spinal bifida      spinalsonois         Review of patient's allergies indicates:  No Known Allergies    Current Facility-Administered Medications on File Prior to Encounter   Medication    [COMPLETED] LORazepam injection 2 mg     Current Outpatient Medications on File Prior to Encounter   Medication Sig    albuterol (VENTOLIN HFA) 90 mcg/actuation inhaler Ventolin HFA 90 mcg/actuation aerosol inhaler   Inhale 1 puff 3 times a day by inhalation route as needed.    ARIPiprazole (ABILIFY) 20 MG Tab Take 1 tablet (20 mg total) by mouth once daily.    gabapentin (NEURONTIN) 300 MG capsule Take 1 capsule (300 mg total) by mouth 3 (three) times daily.    nicotine (NICODERM CQ) 21 mg/24 hr Place 1 patch onto the skin once daily.     Family History        Problem Relation (Age of Onset)    Arthritis Father    Breast cancer Cousin    Diabetes Mother, Father    Heart attack Father, Maternal Grandmother, Maternal Grandfather, Paternal Grandmother, Paternal Grandfather    Hyperlipidemia Mother, Father    Hypertension Mother, Father    No Known Problems Sister, Sister, Maternal Aunt, Maternal Uncle, Paternal Aunt, Paternal Uncle          Tobacco Use    Smoking status: Every Day     Current packs/day: 0.50     Average packs/day: 0.5 packs/day for 26.4 years (13.2 ttl pk-yrs)     Types: Cigarettes     Start date: 10/29/1997    Smokeless tobacco: Never    Tobacco comments:     smokes 1-2 cig daily   Substance and Sexual Activity    Alcohol use: Not Currently     Alcohol/week: 0.0 standard drinks of alcohol     Comment: last use alcholhol 4 months ago    Drug use: Yes     Types: Methamphetamines     Comment: last used 2 days ago    Sexual activity: Yes     Partners: Male     Birth control/protection: I.U.D.     Comment:      Review of Systems   Constitutional:  Negative for activity change, fatigue, fever and unexpected weight change.   HENT:  Negative for congestion, ear pain, hearing loss, rhinorrhea and sore throat.    Eyes:  Negative for redness and visual disturbance.   Respiratory:  Negative for cough, shortness of breath and wheezing.    Cardiovascular:  Negative for chest pain, palpitations and leg swelling.   Gastrointestinal:  Negative for abdominal pain, constipation, diarrhea, nausea and vomiting.   Genitourinary:  Negative for dysuria, frequency and urgency.   Musculoskeletal:  Negative for back pain, joint swelling and neck pain.   Skin:  Negative for color change, rash and wound.   Neurological:  Negative for dizziness, tremors, weakness, light-headedness and headaches.   Psychiatric/Behavioral:  Positive for hallucinations.      Poor historian however.   Objective:     Vital Signs (Most Recent):  Temp: 97.3 °F (36.3 °C) (03/19/24 0807)  Pulse: 83  (03/19/24 0807)  Resp: 18 (03/19/24 0807)  BP: 119/65 (03/19/24 0807)  SpO2: 99 % (03/18/24 2350) Vital Signs (24h Range):  Temp:  [97.2 °F (36.2 °C)-98.1 °F (36.7 °C)] 97.3 °F (36.3 °C)  Pulse:  [83-97] 83  Resp:  [16-18] 18  SpO2:  [99 %-100 %] 99 %  BP: (119-131)/(65-88) 119/65     Weight: 117.4 kg (258 lb 14.9 oz)  Body mass index is 45.87 kg/m².     Physical Exam  Vitals reviewed.   Constitutional:       General: She is not in acute distress.     Appearance: She is well-developed.   HENT:      Head: Normocephalic and atraumatic.      Right Ear: External ear normal.      Left Ear: External ear normal.      Nose: Nose normal.      Mouth/Throat:      Pharynx: No oropharyngeal exudate or posterior oropharyngeal erythema.   Eyes:      General:         Right eye: No discharge.         Left eye: No discharge.      Extraocular Movements: Extraocular movements intact.      Conjunctiva/sclera: Conjunctivae normal.      Pupils: Pupils are equal, round, and reactive to light.   Neck:      Thyroid: No thyromegaly.   Cardiovascular:      Rate and Rhythm: Normal rate and regular rhythm.      Heart sounds: No murmur heard.     Friction rub present.   Pulmonary:      Effort: Pulmonary effort is normal. No respiratory distress.      Breath sounds: Normal breath sounds.   Abdominal:      General: Bowel sounds are normal. There is no distension.      Palpations: Abdomen is soft.      Tenderness: There is no abdominal tenderness.   Skin:     General: Skin is warm and dry.   Neurological:      Mental Status: She is alert and oriented to person, place, and time.      Comments:   Neuro: Cranial nerves:  CN II Visual fields full to confrontation.   CN III, IV, VI Pupils are equal, round, and reactive to light.  CN III: no palsy  Nystagmus: none   Diplopia: none  Ophthalmoparesis: none  CN V Facial sensation intact.   CN VII Facial expression full, symmetric.   CN VIII normal.   CN IX normal.   CN X normal.   CN XI normal.   CN XII  "normal.       Psychiatric:         Behavior: Behavior normal.         Thought Content: Thought content normal.          Significant Labs: All pertinent labs within the past 24 hours have been reviewed.  CBC:   Recent Labs   Lab 03/18/24 1956   WBC 7.82   HGB 13.0   HCT 40.1        CMP:   Recent Labs   Lab 03/18/24 1956      K 3.9      CO2 21*   GLU 60*   BUN 7   CREATININE 0.7   CALCIUM 9.4   PROT 8.3   ALBUMIN 4.0   BILITOT 0.4   ALKPHOS 100   AST 19   ALT 18   ANIONGAP 11     Cardiac Markers: No results for input(s): "CKMB", "MYOGLOBIN", "BNP", "TROPISTAT" in the last 48 hours.  Lactic Acid: No results for input(s): "LACTATE" in the last 48 hours.  Lipid Panel: No results for input(s): "CHOL", "HDL", "LDLCALC", "TRIG", "CHOLHDL" in the last 48 hours.  Troponin: No results for input(s): "TROPONINI", "TROPONINIHS" in the last 48 hours.  TSH:   Recent Labs   Lab 03/18/24 1956   TSH 0.912     Urine Studies:   Recent Labs   Lab 03/18/24  1845   COLORU Yellow   APPEARANCEUA Clear   PHUR >8.0   SPECGRAV 1.010   PROTEINUA Trace*   GLUCUA Negative   KETONESU Trace*   BILIRUBINUA Negative   OCCULTUA Negative   NITRITE Negative   UROBILINOGEN Negative   LEUKOCYTESUR Negative       Significant Imaging: I have reviewed all pertinent imaging results/findings within the past 24 hours.  "

## 2024-03-19 NOTE — PLAN OF CARE
Lying quietly in bed, eyes closed, respirations even, unlabored. Apparently asleep. Slept 4.5 hours thus far with one interruption. Patient was a late admit. Safety precautions maintained. Rounds done every 15 minutes. Bed is fixed in low position and room is uncluttered and pathways are clear.

## 2024-03-19 NOTE — ASSESSMENT & PLAN NOTE
History noted in chart  Only see PRN albuterol on home med list--poor historian  No signs of acute exacerbatoin    No change in management while admitted to [facility]. Re consult if SOB, cough, wheezing develops

## 2024-03-19 NOTE — PLAN OF CARE
Problem: Mood Impairment (Depressive Signs/Symptoms)  Goal: Improved Mood Symptoms (Depressive Signs/Symptoms)  Outcome: Ongoing, Progressing      GROUP PROCESS:  Pt did not attend group. [facility] met with pt individually. Pt presented disheveled, crying, paranoid. Pt is observed for safety by staff. PLPC will meet with pt at alter time and date to discuss group.
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
## 2024-03-19 NOTE — PSYCH
[facility] was unable to complete assessment due to pt crying, paranoid, and making threats to peers and staff.Pt verbalized she was going to cut the heads off and the baby's heads off and kill patients and staff. [facility] will attempt assessment at a later time and date.

## 2024-03-19 NOTE — ED NOTES
@1:00 am brought pt's bag from safe to [facility]. Pyxis was down at the time pt went upstairs.

## 2024-03-19 NOTE — NURSING
Patient calm and cooperative throughout admission process. All consents signed. Pt dressed in blue scrubs and unclean. Pt states she is her to sleep and get her mind right. Pt endorses A/VH stating she hears children and people being locked up and screaming and she sees blood on walls. Pt believes someone has hacked her phone. Pt states she stopped taking her Abilify because she didn't want to gain weight and she thought she could wean herself off of it. Pt states she snorted Meth 3 days ago and she does it 1-2 times per week. Pt states she is not interested in rehab. Pt smokes half a pack cigarettes per day and states she only drinks occasionally. Pt rates anxiety 10/10 on scale 1-10 with 10 highest level of anxiety and on same scale pt rates depression 5/10. Denies SI/HI. Pt escorted to her room without complaints. Will continue to monitor for needs and safety.

## 2024-03-19 NOTE — NURSING
Pt arrived onto unit escorted by security staff from [facility]. Pt scanned by [employer] no sharps or contraband noted. [employer] ID armband. Pt assisted into the unit by [employer] tech with a steady gait. Educated and instructed patient on the plan of care, meds, and fall precautions. Pt voiced understanding of all teachings and care plan. Personal belongings inventoried, verified and placed in storage room.

## 2024-03-19 NOTE — PROGRESS NOTES
03/19/24 1500   [facility] Group Therapy   Group Name Other   Participation Level None     [facility]RS was advised to leave the patient alone at this time. Patient was inappropriate to attend group due to making verbal threats to peers and staff.

## 2024-03-20 PROCEDURE — 90833 PSYTX W PT W E/M 30 MIN: CPT | Mod: ,,, | Performed by: STUDENT IN AN ORGANIZED HEALTH CARE EDUCATION/TRAINING PROGRAM

## 2024-03-20 PROCEDURE — 25000003 PHARM REV CODE 250: Performed by: PSYCHIATRY & NEUROLOGY

## 2024-03-20 PROCEDURE — 11400000 HC PSYCH PRIVATE ROOM

## 2024-03-20 PROCEDURE — 99233 SBSQ HOSP IP/OBS HIGH 50: CPT | Mod: ,,, | Performed by: STUDENT IN AN ORGANIZED HEALTH CARE EDUCATION/TRAINING PROGRAM

## 2024-03-20 PROCEDURE — 25000003 PHARM REV CODE 250: Performed by: STUDENT IN AN ORGANIZED HEALTH CARE EDUCATION/TRAINING PROGRAM

## 2024-03-20 RX ORDER — HALOPERIDOL 5 MG/ML
5 INJECTION INTRAMUSCULAR EVERY 6 HOURS PRN
Status: DISCONTINUED | OUTPATIENT
Start: 2024-03-20 | End: 2024-03-25 | Stop reason: HOSPADM

## 2024-03-20 RX ORDER — TALC
6 POWDER (GRAM) TOPICAL NIGHTLY PRN
Status: DISCONTINUED | OUTPATIENT
Start: 2024-03-20 | End: 2024-03-25 | Stop reason: HOSPADM

## 2024-03-20 RX ORDER — TOPIRAMATE 25 MG/1
50 TABLET ORAL DAILY
Status: DISCONTINUED | OUTPATIENT
Start: 2024-03-20 | End: 2024-03-23

## 2024-03-20 RX ORDER — LORAZEPAM 1 MG/1
2 TABLET ORAL EVERY 6 HOURS PRN
Status: DISCONTINUED | OUTPATIENT
Start: 2024-03-20 | End: 2024-03-25 | Stop reason: HOSPADM

## 2024-03-20 RX ORDER — HALOPERIDOL 5 MG/1
5 TABLET ORAL EVERY 6 HOURS PRN
Status: DISCONTINUED | OUTPATIENT
Start: 2024-03-20 | End: 2024-03-25 | Stop reason: HOSPADM

## 2024-03-20 RX ORDER — DIPHENHYDRAMINE HYDROCHLORIDE 50 MG/ML
50 INJECTION INTRAMUSCULAR; INTRAVENOUS EVERY 6 HOURS PRN
Status: DISCONTINUED | OUTPATIENT
Start: 2024-03-20 | End: 2024-03-25 | Stop reason: HOSPADM

## 2024-03-20 RX ORDER — DIPHENHYDRAMINE HCL 50 MG
50 CAPSULE ORAL EVERY 6 HOURS PRN
Status: DISCONTINUED | OUTPATIENT
Start: 2024-03-20 | End: 2024-03-25 | Stop reason: HOSPADM

## 2024-03-20 RX ORDER — LORAZEPAM 2 MG/ML
2 INJECTION INTRAMUSCULAR EVERY 6 HOURS PRN
Status: DISCONTINUED | OUTPATIENT
Start: 2024-03-20 | End: 2024-03-25 | Stop reason: HOSPADM

## 2024-03-20 RX ADMIN — HALOPERIDOL 5 MG: 5 TABLET ORAL at 10:03

## 2024-03-20 RX ADMIN — Medication 6 MG: at 10:03

## 2024-03-20 RX ADMIN — TOPIRAMATE 50 MG: 25 TABLET, FILM COATED ORAL at 11:03

## 2024-03-20 RX ADMIN — ARIPIPRAZOLE 10 MG: 10 TABLET ORAL at 08:03

## 2024-03-20 RX ADMIN — DIPHENHYDRAMINE HYDROCHLORIDE 50 MG: 50 CAPSULE ORAL at 10:03

## 2024-03-20 RX ADMIN — OLANZAPINE 10 MG: 10 TABLET, FILM COATED ORAL at 08:03

## 2024-03-20 RX ADMIN — LORAZEPAM 2 MG: 1 TABLET ORAL at 10:03

## 2024-03-20 RX ADMIN — HYDROXYZINE PAMOATE 50 MG: 50 CAPSULE ORAL at 08:03

## 2024-03-20 NOTE — PLAN OF CARE
Recommendations  1. Continue regular diet.   2. Honor patient's food preferences to encourage PO intake.  3. RD to follow.    Goals: Pt will continue meet at least 75% ENN by RD follow up.  Nutrition Goal Status: new

## 2024-03-20 NOTE — NURSING
Patient at nursing station asking for snack at 12 noon and when asked did you just eat lunch and she stated no because it taste like people.

## 2024-03-20 NOTE — PLAN OF CARE
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 substance use rehab. Patient stated that she first used crystal meth 4 years ago. She has hx of drug use with opiates- pain pills- last used 2 years ago, cannabis- last used "years ago." She reports that she does not want to keep using and regularly attends NA meetings.      Additional Comments:  pt refused to give collateral consent. Pt UDS upon admit was positive for meth    Education:     Currently Enrolled? 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 No    Interested in Completing Education/GED: No  Employment and Financial:      Currently employed? 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      Source of Income: none- would like to apply for SSI- representative coming speak to patient       Able to afford basic needs (food, shelter, utilities)? No     Who manages finances/personal affairs?  Self       Service:      [place]? no     Combat Service? No      Community Resources:      Describe present use of community resources:      Identify previously used community resources   (Include previous " mental health treatment - outpatient and inpatient): [facility], other [facility]; psychiatrist at [facility] and a therapist     Environmental:      Current living situation:Lives alone in a camper behind her mother's home      Social Evaluation:      Patient Assets: Average or above intelligence, Motivation for treatment/growth and Communicable skills     Patient Limitations: unstable housing/ environment, legal involvement; hx of abuse/trauma; suicidal ideations; noncompliance with medications; strained finances; limited support; sleep problems; lives alone      High risk psychosocial issues that may impact discharge planning:   homeless and inability to afford medications or follow up outpatient treatment     Recommendations:      Anticipated discharge plan:   home     High risk issues requiring early treatment planning and immediate intervention: suicidal ideation; return to baseline; improve sleep; reduce depression      Community resources needed for discharge planning:  [facility]     Anticipated social work role(s) in treatment and discharge planning:  will encourage patient to engage in treatment and participate in group, as well as create a safety plan. Social  will aid in discharge planning.

## 2024-03-20 NOTE — PLAN OF CARE
Problem: Adult Behavioral Health Plan of Care  Goal: Optimized Coping Skills in Response to Life Stressors  Outcome: Ongoing, Progressing    PROCESS GROUP:    PLPC met with pt individually.  PLPC attempted to  discuss with pt on  discharge planning. Pt was resting in bed quietly. Pt  states she is going back home and is refusing to go to a rehab and just ants to go back home. PLPC encouraged and discussed with pt the importance of rehab and how it could help with her to recover. Pt states she is fine the way she is. PLPC will continue to encourage pt to attend rehab.

## 2024-03-20 NOTE — PLAN OF CARE
Pt is sleeping at this time and has slept 7.5 hours intermittently.  NAD.  Resp even & unlabored.  Pathways clear.  Q 15 minute safety checks ongoing.  All precautions maintained

## 2024-03-20 NOTE — CONSULTS
[facility]  Adult Nutrition  Consult Note    SUMMARY     Recommendations  1. Continue regular diet.   2. Honor patient's food preferences to encourage PO intake.  3. RD to follow.    Goals: Pt will continue meet at least 75% ENN by RD follow up.  Nutrition Goal Status: new  Communication of RD Recs: other (comment) (POC)    Assessment and Plan    Nutrition Problem  Increased protein energy needs    Related to (etiology):   Conditions associated with diagnosis    Signs and Symptoms (as evidenced by):   Substance abuse; UDS (+) for amphetamines     Interventions/Recommendations (treatment strategy):  Recommendations  1. Continue regular diet.   2. Honor patient's food preferences to encourage PO intake.  3. RD to follow.    Interventions  1. Increased energy diet  2. Increased protein diet  3. Collaboration with other providers    Goals: Pt will continue meet at least 75% ENN by RD follow up.  Nutrition Goal Status: new    Nutrition Diagnosis Status:   New         Malnutrition Assessment       No NFPE - PEC                                Reason for Assessment    Reason For Assessment: consult  Diagnosis: psychological disorder  Past Medical History:   Diagnosis Date    Addiction to drug     recovering drug addict    ADHD (attention deficit hyperactivity disorder)     Alcohol abuse     Anxiety     Asthma     Bipolar 1 disorder     Cervical pain     Chronic constipation     Chronic diarrhea     Depression     Fatigue     Fibromyalgia     Hallucination     Headache     History of psychiatric hospitalization     pt reports 10 total- [facility] 10/2019 and 3/2019, 1/2020    Hx of psychiatric care     Seroquel    Magalis     Panic disorder     Polysubstance dependence     Psychiatric exam requested by authority     Psychiatric problem     Auditory hallucinations    PTSD (post-traumatic stress disorder)     Schizoaffective disorder     Sleep difficulties     Spina bifida     Spinal stenosis of lumbar region   "   Substance abuse     Suicide attempt     Once by hanging, once by overdose, once by cutting wrists     Syringomyelia     Syrinx of spinal cord     Therapy     [facility]    Trigger finger     Withdrawal symptoms, alcohol     Withdrawal symptoms, drug or narcotic      Interdisciplinary Rounds: did not attend  General Information Comments:   Consult received for 39 yo F. On regular diet. Meal and snack intake variable at this time -- however patient meeting ENN despite increased nutritional needs. Weight stable per chart review. LBM: 3/17. Will continue to monitor.    Nutrition Discharge Planning: Pt to dc on general healthful diet    Nutrition Risk Screen    Nutrition Risk Screen: no indicators present    Nutrition/Diet History    Food Allergies: NKFA  Factors Affecting Nutritional Intake: decreased appetite    Nutrition Related Social Determinants of Health: SDOH: Unable to assess at this time.     Anthropometrics    Temp: 97.6 °F (36.4 °C)  Height Method: Stated  Height: 5' 3" (160 cm)  Height (inches): 63 in  Weight Method: Standard Scale  Weight: 117.9 kg (260 lb 0.5 oz)  Weight (lb): 260.04 lb  Ideal Body Weight (IBW), Female: 115 lb  % Ideal Body Weight, Female (lb): 225.16 %  BMI (Calculated): 46.1  BMI Grade: greater than 40 - morbid obesity       Lab/Procedures/Meds    Pertinent Labs Reviewed: reviewed  UDS (+) for amphetamines; glucose: 60 (L)  Pertinent Medications Reviewed: reviewed  Scheduled:   [START ON 3/21/2024] ARIPiprazole  15 mg Oral Daily    topiramate  50 mg Oral Daily     PRN: acetaminophen, aluminum-magnesium hydroxide-simethicone, benzonatate, benztropine mesylate, hydrOXYzine pamoate, loperamide, nicotine, OLANZapine **AND** OLANZapine, ondansetron, promethazine    Physical Findings/Assessment         Estimated/Assessed Needs    Weight Used For Calorie Calculations: 52.3 kg (115 lb 3.8 oz) (IBW)  Energy Calorie Requirements (kcal): 3076-4825  Energy Need Method: Kcal/kg " (30-35 kcal/kg)  Protein Requirements: 52-63 g (1-1.2 g/kg)  Weight Used For Protein Calculations: 52.3 kg (115 lb 3.8 oz) (IBW)  Fluid Requirements (mL): 1 mL/kcal  Estimated Fluid Requirement Method: RDA Method (or per MD)  RDA Method (mL): 1568         Nutrition Prescription Ordered    Current Diet Order: regular    Evaluation of Received Nutrient/Fluid Intake    % Kcal Needs: %  % Protein Needs: %  I/O: N/A  Energy Calories Required: meeting needs  Protein Required: meeting needs  Fluid Required: meeting needs  Tolerance: tolerating  % Intake of Estimated Energy Needs: 75 - 100 %  % Meal Intake: Other: 3/19 - 75% B, 25% L, 50% D, 100% x 2 snacks    Nutrition Risk    Level of Risk/Frequency of Follow-up: low (1 x per week)       Monitor and Evaluation    Food and Nutrient Intake: energy intake, food and beverage intake  Food and Nutrient Adminstration: diet order  Knowledge/Beliefs/Attitudes: food and nutrition knowledge/skill, beliefs and attitudes  Physical Activity and Function: nutrition-related ADLs and IADLs, factors affecting access to physical activity  Anthropometric Measurements: height/length, weight, body mass index, weight change  Biochemical Data, Medical Tests and Procedures: electrolyte and renal panel, gastrointestinal profile, glucose/endocrine profile, inflammatory profile, lipid profile       Nutrition Follow-Up    RD Follow-up?: Yes        Kathryn Fritz RDN, LENYN

## 2024-03-20 NOTE — PSYCH
PLPC discussed with pt on collateral consent. Pt refused at this time stating that she did not want her aunt to know anything about her using drugs. PLPC will attempt again at a later time and date.

## 2024-03-20 NOTE — PROGRESS NOTES
PSYCHIATRY DAILY INPATIENT PROGRESS NOTE  SUBSEQUENT HOSPITAL VISIT    ENCOUNTER DATE: 3/20/2024  SITE: [facility]    DATE OF ADMISSION: 3/18/2024 11:32 PM  LENGTH OF STAY: 2 days      CHIEF COMPLAINT   Maria Yancey is a 38 y.o. female, seen during daily manzo rounds on the inpatient unit.  Maria Yancey presented with the chief complaint of psychosis      The patient was seen and examined. The chart was reviewed.     Reviewed notes from CTRS and LPC and labs from the last 24 hours.    The patient's case was discussed with the treatment team/care providers today including Rns    Staff reports some behavioral or management issues.     The patient has been compliant with treatment.      Subjective 03/20/2024       Today the patient reports continuing psychosis.    Reality testing not intact.    Patient extremely paranoid and delusional. Discussed methamphetamine's connection to psychosis. Provided reassurance and encouraged patient to be proactive in calling her family so that she can confirm their safety.    The patient denies any side effects to medications.      Interim/overnight events per report/notes:    Per RN:  Pt making threatening statements to staff and other patients in dayroom.  Pt tearful and paranoid.  Pt directed back to room after taking her scheduled meds and instructed to rest.  Pt compliant and lying in bed at this time.         Per CTRS:  Patient presents disheveled, crying, paranoid and is inappropriate to attend the group at this time due to continuous crying and making threats to peers and the staff.       Per LPC:         [facility] was unable to complete assessment due to pt crying, paranoid, and making threats to peers and staff.Pt verbalized she was going to cut the heads off and the baby's heads off and kill patients and staff.                 Psychiatric ROS (observed, reported, or endorsed/denied):  Depressed mood - fluctuating  Interest/pleasure/anhedonia:  fluctuating  Guilt/hopelessness/worthlessness - Continuing  Changes in Sleep - fluctuating  Changes in Appetite - fluctuating  Changes in Concentration - Continuing  Changes in Energy - Continuing  PMA/R- Continuing  Suicidal- active/passive ideations - No  Homicidal ideations: active/passive ideations - Continuing    Hallucinations - Continuing  Delusions - Continuing  Disorganized behavior - No  Disorganized speech - No  Negative symptoms - Continuing    Elevated mood - No  Decreased need for sleep - No  Grandiosity - No  Racing thoughts - No  Impulsivity - No  Irritability- No  Increased energy - No  Distractibility - No  Increase in goal-directed activity or PMA- No    Symptoms of JORDIN - No  Symptoms of Panic Disorder- No  Symptoms of PTSD - Continuing        Overall progress: Patient is showing no improvement on the Unit to date        Psychotherapy:  Target symptoms: psychosis  Why chosen therapy is appropriate versus another modality: relevant to diagnosis  Outcome monitoring methods: self-report  Therapeutic intervention type: supportive psychotherapy  Topics discussed/themes: symptom recognition, substance abuse  The patient's response to the intervention is accepting. The patient's progress toward treatment goals is limited.   Duration of intervention: 16 minutes.          Medical ROS  Review of Systems   Constitutional:  Negative for chills and fever.   HENT:  Negative for hearing loss.    Eyes:  Negative for blurred vision and double vision.   Respiratory:  Negative for shortness of breath.    Cardiovascular:  Negative for chest pain and palpitations.   Gastrointestinal:  Negative for constipation, diarrhea, nausea and vomiting.   Genitourinary:  Negative for dysuria.   Musculoskeletal:  Negative for back pain and neck pain.   Skin:  Negative for rash.   Neurological:  Negative for dizziness and headaches.   Endo/Heme/Allergies:  Negative for environmental allergies.         PAST MEDICAL HISTORY   Past  Medical History:   Diagnosis Date    Addiction to drug     recovering drug addict    ADHD (attention deficit hyperactivity disorder)     Alcohol abuse     Anxiety     Asthma     Bipolar 1 disorder     Cervical pain     Chronic constipation     Chronic diarrhea     Depression     Fatigue     Fibromyalgia     Hallucination     Headache     History of psychiatric hospitalization     pt reports 10 total- [facility] 10/2019 and 3/2019, 1/2020    Hx of psychiatric care     Seroquel    Magalis     Panic disorder     Polysubstance dependence     Psychiatric exam requested by authority     Psychiatric problem     Auditory hallucinations    PTSD (post-traumatic stress disorder)     Schizoaffective disorder     Sleep difficulties     Spina bifida     Spinal stenosis of lumbar region     Substance abuse     Suicide attempt     Once by hanging, once by overdose, once by cutting wrists     Syringomyelia     Syrinx of spinal cord     Therapy     [facility]    Trigger finger     Withdrawal symptoms, alcohol     Withdrawal symptoms, drug or narcotic            PSYCHOTROPIC MEDICATIONS   Scheduled Meds:   ARIPiprazole  10 mg Oral Daily     Continuous Infusions:  PRN Meds:.acetaminophen, aluminum-magnesium hydroxide-simethicone, benzonatate, benztropine mesylate, hydrOXYzine pamoate, loperamide, nicotine, OLANZapine **AND** OLANZapine, ondansetron, promethazine        EXAMINATION    VITALS   Vitals:    03/19/24 0807 03/19/24 1919 03/20/24 0751 03/20/24 0803   BP: 119/65 (!) 102/53 (!) 113/56    BP Location: Right arm  Right arm    Patient Position: Lying  Sitting    Pulse: 83 93 87    Resp: 18 18 18    Temp: 97.3 °F (36.3 °C) 98.6 °F (37 °C) 97.6 °F (36.4 °C)    TempSrc: Temporal  Temporal    SpO2:       Weight:    117.9 kg (260 lb 0.5 oz)   Height:           Body mass index is 46.06 kg/m².        CONSTITUTIONAL  General Appearance: unremarkable, age appropriate    MUSCULOSKELETAL  Muscle Strength and Tone:no tremor, no  tic  Abnormal Involuntary Movements: No  Gait and Station: non-ataxic    PSYCHIATRIC   Level of Consciousness: awake and alert   Orientation: person, place, and situation  Grooming: Disheveled  Psychomotor Behavior: normal, cooperative  Speech: normal tone, normal rate, normal pitch, normal volume  Language: grossly intact  Mood: depressed  Affect: Consistent with mood  Thought Process: circumstantial  Associations: intact   Thought Content: +HI, +delusions, and denies SI  Perceptions: +AH and denies  VH  Memory: Able to recall past events, Remote intact, and Recent intact  Attention:Impaired to some degree  Fund of Knowledge: Aware of current events and Vocabulary appropriate   Estimate if Intelligence:  Average based on work/education history, vocabulary and mental status exam  Insight: limited awareness of illness  Judgment: behavior is adequate to circumstances        DIAGNOSTIC TESTING   Laboratory Results  No results found for this or any previous visit (from the past 24 hour(s)).          MEDICAL DECISION MAKING          ASSESSMENT         Schizoaffective Disorder, depressed type, severe  Homicidal ideations  PTSD  Polysubstance abuse  Nicotine Dependence     Psychosocial stressors     Obesity        PROBLEM LIST AND MANAGEMENT PLANS         Schizoaffective Disorder, depressed type, severe  - restart abilify 10 mg PO qd -increase to 15 mg PO qd tomorrow  - pt counseled  - follow up with outpatient mental health providers after discharge for medication management and psychotherapy     Homicidal ideations  - continue psychiatric hospitalization  - provide psychotherapeutic interventions and medication management        PTSD  - restart abilify 10 mg PO qd -increase to 15 mg PO qd tomorrow  - pt counseled  - follow up with outpatient mental health providers after discharge for medication management and psychotherapy     Polysubstance abuse  - plan to restart gabapentin  - pt counseled  - consider rehab  - SW  consulted for assistance with additional resources      Nicotine Dependence  - start nicotine patch 14 mg PO qd PRN        Psychosocial stressors  - pt counseled  - SW consulted for assistance with additional resources         Obesity  - start topamax 50 mg PO qd  - avoid medications with high potential for serious weight gain (remeron, seroquel, etc.)  - f/u lipid panel and a1c        Discussed diagnosis, risks and benefits of proposed treatment vs alternative treatments vs no treatment, potential side effects of these treatments and the inherent unpredictability of treatment. The patient expresses understanding of the above and displays the capacity to agree with this treatment given said understanding. Patient also agrees that, currently, the benefits outweigh the risks and would like to pursue/continue treatment at this time.    Any medications being used off-label were discussed with the patient inclusive of the evidence base for the use of the medications and consent was obtained for the off-label use of the medication.       DISCHARGE PLANNING  Expected Disposition Plan: Home or Self Care      NEED FOR CONTINUED HOSPITALIZATION  Psychiatric illness continues to pose a potential threat to life or bodily function, of self or others, thereby requiring the need for continued inpatient psychiatric hospitalization: Yes, due to: hallucinations, danger to others, and gravely disabled, as evidenced by:  Ongoing concerns with Active HI/Threatening behavior. and Ongoing concerns with perceptual aberrancy and paranoid persecutory delusions leading to potential harm of self or others.    Protective inpatient pyschiatric hospitalization required while a safe disposition plan is enacted: Yes    Patient stabilized and ready for discharge from inpatient psychiatric unit: No        STAFF:   Cachorro Conde III, MD  Psychiatry

## 2024-03-20 NOTE — PLAN OF CARE
Patient isolating in room, lying in bed sleeping most of the time. No interaction with peers. Out for meals and meds only. Pt refusing rehab. Pt endorsing AH stating she continues to hear screaming. Pt rates depression 5/10 on scale 1-10 with 10 highest level of depression. On same scale pt rates anxiety 8/10. Instructed pt to increase interaction with peers. Patient denies SI/HI, no self harming behavior displayed, no aggressive behavior displayed towards others. Discussed healthy coping skills and techniques. Educated, reviewed  and discussed plan of care and medication regiment with this patient. Patient voiced understanding of all teachings.

## 2024-03-20 NOTE — PROGRESS NOTES
"   03/20/24 1121   [facility] Group Therapy   Group Name Other  (1:1 attempt)   Specific Interventions Coping Skills Training  (the importance of participating in healthy leisure activities)   Participation Level Minimal   Participation Quality Other (see comments)   Insight/Motivation Limited   Affect/Mood Display Depressed   Cognition Other (see comments)  (AH & VH per patient)   Psychomotor WNL     Patient isolating in room, resting in bed, presents disheveled, slow to respond, trouble remaining alert, questions repeated as needed. Patient reports she was not thinking clear, reports hallucinations, "I hear crying and screaming. I think they gonna torture me next, a gang of people." Patient reports she does not feel safe. When asked about leisure activities the patient says "skip that one."  "

## 2024-03-20 NOTE — PROGRESS NOTES
"   03/20/24 1103   Assessment   Patient's Identification of the Problem Patient isolating in assigned room, resting in bed, presents anxious, disheveled, trouble remaining alert, reports a "little better" mood, "the med's are helping me." Patient states her admit is due to "cause I'm having hallucinations, hearing things, seeing things, Depressed, more like homicidal, about the people who are hurting my family." Per H&P chief compliant: substance abuse and psychosis. Patient reports she stop taking her medications for a little while. Patient admits to negative leisure lifestyle of cigarettes, alcohol and meth. Patient reports she is single, have 2 children , wears glasses and lives alone in [place]. Patient verbalized her main goal, "get back on medicine."   Leisure Interest Enjoy Family/Friends   Leisure Barriers Drug Use;ETOH Use;Loss of Interest   Treatment Focus To Improve Reality Orientation;To Improve Mood;To Improve Leisure Awareness/Lifestyle/Interest;To Promote Successful and Safe Self Expression;To Improve Coping Skills;To Educate and Increase Awareness of Sober Free Activities       Treatment Recommendation:   1:1 Intervention (as needed)    Reality Orientation Skilled Activity  Cognitive Stimulation Skilled Activity  Self Expression Skilled Activity  Mild Exercises Skilled Activity  Coping Skilled Activity  Leisure Education and Awareness Skilled Activity    Treatment Goal(s):  Long Term Goals Refer To Master Treatment Plan    Short Term Treatment Goal(s)  Patient Will:  Comply with Treatment  Exhibit Improvement in Mood  Demonstrate Improvement in Thought Processes / Awareness  Demonstrate Constructive Expression of Feelings and Behavior  Verbalize Improvement in Mood  Identify at Least 2 Coping Skills or Leisure Skills to Reduce Depression and Hopelessness Upon Request from Therapist  Identify (+) Leisure / Recreation Activities that Promote Wellness and Promote a Sober Lifestyle    Discharge " Recommendations:  Encourage Patient to Actively Utilize Available Community Resources to Increase Leisure Involvement to Decrease Signs and Symptoms of Illness  Encourage Patient to Utilize Coping Skills on a Regular Basis to Reduce the Risk of Decompensating and Re-Hospitalizations  AA/NA Meetings  Follow Up with After Care Appointments

## 2024-03-20 NOTE — PROGRESS NOTES
03/20/24 1526   [facility] Group Therapy   Group Name Other  (1:1 attempt)   Participation Level None   Participation Quality Sleeping     Patient presents malodorous, resting in bed, under the covers with her eyes closed, and did not give a verbal response when approached.

## 2024-03-21 PROCEDURE — 99232 SBSQ HOSP IP/OBS MODERATE 35: CPT | Mod: ,,, | Performed by: STUDENT IN AN ORGANIZED HEALTH CARE EDUCATION/TRAINING PROGRAM

## 2024-03-21 PROCEDURE — 11400000 HC PSYCH PRIVATE ROOM

## 2024-03-21 PROCEDURE — 25000003 PHARM REV CODE 250: Performed by: STUDENT IN AN ORGANIZED HEALTH CARE EDUCATION/TRAINING PROGRAM

## 2024-03-21 RX ADMIN — TOPIRAMATE 50 MG: 25 TABLET, FILM COATED ORAL at 08:03

## 2024-03-21 RX ADMIN — ARIPIPRAZOLE 15 MG: 10 TABLET ORAL at 08:03

## 2024-03-21 NOTE — PLAN OF CARE
"Patient calm and cooperative. Pt in her room most of the day. No interaction with peers. Accepts all meds and meals without difficulty. Pt states her hallucinations are better today and "I'm just tired"  Instructed pt to increase interaction with peers. Patient denies SI/HI, no self harming behavior displayed, no aggressive behavior displayed towards others. Discussed healthy coping skills and techniques. Educated, reviewed  and discussed plan of care and medication regiment with this patient. Patient voiced understanding of all teachings.     "

## 2024-03-21 NOTE — NURSING
"Olanzapine 10mg po and hydroxyzine 50mg po given for severe agitation, crying saying people are crying under the bed and making the bed vibrate.  "This is horrible".  Actively listened.  Support given.    "

## 2024-03-21 NOTE — PLAN OF CARE
Pt tearful at times, poverty of thought, illogical thought processes, denies SI at this time, pt showered and talked on phone, appetite good, safety precautions maintained, will continue to monitor

## 2024-03-21 NOTE — PLAN OF CARE
Problem: Adult Behavioral Health Plan of Care  Goal: Optimized Coping Skills in Response to Life Stressors  Outcome: Ongoing, Progressing  GROUP NOTE:      Pt did not attend group today. PLPC met with pt individually.  PLPC attempted to  discuss with pt on group discussion. Pt was resting in bed quietly. Pt could not keep his eyes opened and kept falling in and out of sleep. PLPC discussed with pt PLPC will come back at a later time and date to discuss group.

## 2024-03-21 NOTE — NURSING
Pt sleeping at this time, slept 5 hrs with no awakenings. NAD. Resp even and unlabored.Pathways clear,bed in low position. Q 15 min safety check ongoing.All precautions maintained.

## 2024-03-21 NOTE — NURSING
Tearful, pacing, agitated.  Afraid someone will shoot her.  Said the bed is vibrating.  Said she smells smoke.  Dr Nettles notified.  Haldol 5mg, Ativan 2mg, and Benadryl 50mg po given.

## 2024-03-21 NOTE — PROGRESS NOTES
"   03/21/24 1321   [facility] Group Therapy   Group Name Other  (1:1 attempt)   Specific Interventions Coping Skills Training   Participation Level Minimal   Participation Quality Drowsy;Lack of Interest   Insight/Motivation Limited   Affect/Mood Display Other (see comments)   Cognition Other (see comments)   Psychomotor WNL     Patient isolating in assigned room, in bed, unable to remain alert, reports a "little better" mood. Patient reports her thoughts are a little better, her hallucinations have decreased, reports a "tired, sleepy" mood. Patient discussed coping skills and states "I need to take my medicine. It helps me sleep and not think about stuff."  "

## 2024-03-21 NOTE — PLAN OF CARE
Problem: Adult Behavioral Health Plan of Care  Goal: Rounds/Family Conference  Outcome: Ongoing, Progressing  Flowsheets (Taken 3/21/2024 1100)  Participants:   psychiatrist   nursing   other (PLPC and med students)   TREATMENT TEAM      Chief Complaint:    Pt refused treatment team.  Staff will continue to encourage pt to attend treatment team for further assessments.

## 2024-03-21 NOTE — PLAN OF CARE
Problem: Cognitive Impairment (Depressive Signs/Symptoms)  Goal: Optimized Cognitive Function  Outcome: Ongoing, Progressing  GROUP NOTE:    Pt is isolating in room, in bed refusing to attend group. PLPC met with pt indvidually. Patient states she felt tired, but a little better and her hallucinations have decreased. Staff encouraged pt to meet with staff at a later time to initatie group discussion.

## 2024-03-22 PROCEDURE — 99232 SBSQ HOSP IP/OBS MODERATE 35: CPT | Mod: ,,, | Performed by: STUDENT IN AN ORGANIZED HEALTH CARE EDUCATION/TRAINING PROGRAM

## 2024-03-22 PROCEDURE — 11400000 HC PSYCH PRIVATE ROOM

## 2024-03-22 PROCEDURE — 25000003 PHARM REV CODE 250: Performed by: PSYCHIATRY & NEUROLOGY

## 2024-03-22 PROCEDURE — 25000003 PHARM REV CODE 250: Performed by: STUDENT IN AN ORGANIZED HEALTH CARE EDUCATION/TRAINING PROGRAM

## 2024-03-22 RX ADMIN — ARIPIPRAZOLE 15 MG: 10 TABLET ORAL at 08:03

## 2024-03-22 RX ADMIN — HYDROXYZINE PAMOATE 50 MG: 50 CAPSULE ORAL at 08:03

## 2024-03-22 RX ADMIN — TOPIRAMATE 50 MG: 25 TABLET, FILM COATED ORAL at 08:03

## 2024-03-22 RX ADMIN — Medication 6 MG: at 08:03

## 2024-03-22 NOTE — PROGRESS NOTES
PSYCHIATRY DAILY INPATIENT PROGRESS NOTE  SUBSEQUENT HOSPITAL VISIT    ENCOUNTER DATE: 3/22/2024  SITE: [facility]    DATE OF ADMISSION: 3/18/2024 11:32 PM  LENGTH OF STAY: 4 days      CHIEF COMPLAINT   Maria Yancey is a 38 y.o. female, seen during daily manzo rounds on the inpatient unit.  Maria Yancey presented with the chief complaint of psychosis      The patient was seen and examined. The chart was reviewed.     Reviewed notes from CTRS and LPC and labs from the last 24 hours.    The patient's case was discussed with the treatment team/care providers today including Rns    Staff reports severe (requiring PRN medications for non redirectable, agitated behavior in order to prevent harm to self or others) behavioral or management issues.     The patient has been compliant with treatment.      Subjective 03/22/2024      Patient reports she feels better today. She feels the medications are starting work. Psychosis is lessening somewhat.    Methamphetamine w/d is complicating her progress.    The patient denies any side effects to medications.      Interim/overnight events per report/notes:    Per RN:  Following POC.  Makes need known.  Denies SI/HI, AVH.  Isolated in her room all evening.  Minimal interactions with staff.  Paucity of speech and thought.  Declined a snack.   Medication brought to her room.          Psychiatric ROS (observed, reported, or endorsed/denied):  Depressed mood - fluctuating  Interest/pleasure/anhedonia: fluctuating  Guilt/hopelessness/worthlessness - less  Changes in Sleep - less  Changes in Appetite - fluctuating  Changes in Concentration - Continuing  Changes in Energy - Continuing  PMA/R- Continuing  Suicidal- active/passive ideations - No  Homicidal ideations: active/passive ideations - less    Hallucinations - less  Delusions - less  Disorganized behavior - No  Disorganized speech - No  Negative symptoms - Continuing    Elevated mood - No  Decreased need for sleep -  No  Grandiosity - No  Racing thoughts - No  Impulsivity - No  Irritability- No  Increased energy - No  Distractibility - No  Increase in goal-directed activity or PMA- No    Symptoms of JORDIN - No  Symptoms of Panic Disorder- No  Symptoms of PTSD - Continuing        Overall progress: minimal/slow          Medical ROS  Review of Systems   Constitutional:  Negative for chills and fever.   HENT:  Negative for hearing loss.    Eyes:  Negative for blurred vision and double vision.   Respiratory:  Negative for shortness of breath.    Cardiovascular:  Negative for chest pain and palpitations.   Gastrointestinal:  Negative for constipation, diarrhea, nausea and vomiting.   Genitourinary:  Negative for dysuria.   Musculoskeletal:  Negative for back pain and neck pain.   Skin:  Negative for rash.   Neurological:  Negative for dizziness and headaches.   Endo/Heme/Allergies:  Negative for environmental allergies.         PAST MEDICAL HISTORY   Past Medical History:   Diagnosis Date    Addiction to drug     recovering drug addict    ADHD (attention deficit hyperactivity disorder)     Alcohol abuse     Anxiety     Asthma     Bipolar 1 disorder     Cervical pain     Chronic constipation     Chronic diarrhea     Depression     Fatigue     Fibromyalgia     Hallucination     Headache     History of psychiatric hospitalization     pt reports 10 total- [facility] 10/2019 and 3/2019, 1/2020    Hx of psychiatric care     Seroquel    Magalis     Panic disorder     Polysubstance dependence     Psychiatric exam requested by authority     Psychiatric problem     Auditory hallucinations    PTSD (post-traumatic stress disorder)     Schizoaffective disorder     Sleep difficulties     Spina bifida     Spinal stenosis of lumbar region     Substance abuse     Suicide attempt     Once by hanging, once by overdose, once by cutting wrists     Syringomyelia     Syrinx of spinal cord     Therapy     [facility]    Trigger finger     Withdrawal  symptoms, alcohol     Withdrawal symptoms, drug or narcotic            PSYCHOTROPIC MEDICATIONS   Scheduled Meds:   ARIPiprazole  15 mg Oral Daily    topiramate  50 mg Oral Daily     Continuous Infusions:  PRN Meds:.acetaminophen, aluminum-magnesium hydroxide-simethicone, benzonatate, benztropine mesylate, diphenhydrAMINE, diphenhydrAMINE, haloperidol lactate, haloperidoL, hydrOXYzine pamoate, loperamide, lorazepam, LORazepam, melatonin, nicotine, ondansetron, promethazine        EXAMINATION    VITALS   Vitals:    03/20/24 1942 03/21/24 0733 03/21/24 1906 03/22/24 0723   BP: (!) 118/57 114/61 102/62 (!) 101/54   BP Location: Right arm Right arm Right arm Right arm   Patient Position: Sitting Sitting Sitting Lying   Pulse: 87 92 92 97   Resp: 18 20 18 18   Temp: 96.8 °F (36 °C) 97.5 °F (36.4 °C) 97.7 °F (36.5 °C) 97.7 °F (36.5 °C)   TempSrc:  Temporal Temporal Temporal   SpO2:       Weight:       Height:           Body mass index is 46.06 kg/m².        CONSTITUTIONAL  General Appearance: unremarkable, age appropriate    MUSCULOSKELETAL  Muscle Strength and Tone:no tremor, no tic  Abnormal Involuntary Movements: No  Gait and Station: non-ataxic    PSYCHIATRIC   Level of Consciousness: awake and alert   Orientation: person, place, and situation  Grooming: Disheveled  Psychomotor Behavior: normal, cooperative  Speech: normal tone, normal rate, normal pitch, normal volume  Language: grossly intact  Mood: ok  Affect: Consistent with mood  Thought Process: linear  Associations: intact   Thought Content: less HI, less delusions, and denies SI  Perceptions: less AH and denies  VH  Memory: Able to recall past events, Remote intact, and Recent intact  Attention:Impaired to some degree  Fund of Knowledge: Aware of current events and Vocabulary appropriate   Estimate if Intelligence:  Average based on work/education history, vocabulary and mental status exam  Insight: improving  Judgment: behavior is adequate to  circumstances        DIAGNOSTIC TESTING   Laboratory Results  No results found for this or any previous visit (from the past 24 hour(s)).          MEDICAL DECISION MAKING          ASSESSMENT         Schizoaffective Disorder, depressed type, severe  Homicidal ideations  PTSD  Polysubstance abuse  Nicotine Dependence     Psychosocial stressors     Obesity        PROBLEM LIST AND MANAGEMENT PLANS         Schizoaffective Disorder, depressed type, severe  - restart abilify 10 mg PO qd -increase to 15 mg PO qd  -increase to to 17 mg PO qd tomorrow  - pt counseled  - follow up with outpatient mental health providers after discharge for medication management and psychotherapy     Homicidal ideations  - continue psychiatric hospitalization  - provide psychotherapeutic interventions and medication management        PTSD  - restart abilify 10 mg PO qd -increase to 15 mg PO qd  -increase to to 17 mg PO qd tomorrow  - pt counseled  - follow up with outpatient mental health providers after discharge for medication management and psychotherapy     Polysubstance abuse  - plan to restart gabapentin  - pt counseled  - consider rehab  - SW consulted for assistance with additional resources      Nicotine Dependence  - start nicotine patch 14 mg PO qd PRN        Psychosocial stressors  - pt counseled  - SW consulted for assistance with additional resources         Obesity  - continue topamax 50 mg PO qd  - avoid medications with high potential for serious weight gain (remeron, seroquel, etc.)  - f/u lipid panel and a1c        Discussed diagnosis, risks and benefits of proposed treatment vs alternative treatments vs no treatment, potential side effects of these treatments and the inherent unpredictability of treatment. The patient expresses understanding of the above and displays the capacity to agree with this treatment given said understanding. Patient also agrees that, currently, the benefits outweigh the risks and would like to  pursue/continue treatment at this time.    Any medications being used off-label were discussed with the patient inclusive of the evidence base for the use of the medications and consent was obtained for the off-label use of the medication.       DISCHARGE PLANNING  Expected Disposition Plan: Home or Self Care      NEED FOR CONTINUED HOSPITALIZATION  Psychiatric illness continues to pose a potential threat to life or bodily function, of self or others, thereby requiring the need for continued inpatient psychiatric hospitalization: Yes, due to: hallucinations, danger to others, and gravely disabled, as evidenced by:  Ongoing concerns with Active HI/Threatening behavior. and Ongoing concerns with perceptual aberrancy and paranoid persecutory delusions leading to potential harm of self or others.    Protective inpatient pyschiatric hospitalization required while a safe disposition plan is enacted: Yes    Patient stabilized and ready for discharge from inpatient psychiatric unit: No        STAFF:   Cachorro Conde III, MD  Psychiatry

## 2024-03-22 NOTE — PLAN OF CARE
"  Problem: Cognitive Impairment (Depressive Signs/Symptoms)  Goal: Optimized Cognitive Function  Outcome: Ongoing, Progressing     GROUP PROCESS:     Pt did not attend group today. PLPC met with pt individually.  PLPC attempted to  discuss with pt on group discussion on Prescription Medications. Pt was resting in bed quietly. Pt  presented withdrawn, responding to internal stimuli. PLPC discussed with pt  the importance of the medications and how drugs could affect the effect of the medications currently taking.Pt stated' I will not do drugs again and I will take my medications like I am supposed to so I do not have to come back to the hospital."   "

## 2024-03-22 NOTE — PLAN OF CARE
Following POC.  Makes need known.  Denies SI/HI, AVH.  Isolated in her room all evening.  Minimal interactions with staff.  Paucity of speech and thought.  Declined a snack.   Medication brought to her room.  Encouraged out of room activities.  Free of fall.

## 2024-03-22 NOTE — PLAN OF CARE
Pt is improving.  She denies any hallucinations today.  States she is just really tired.  Denies any S/I or H/I at this time.  Somewhat withdrawn, remaining in bed most of the time.  Interaction with others is minimal.  No acute distress apparent at this time, will continue to monitor.

## 2024-03-22 NOTE — PROGRESS NOTES
"PSYCHIATRY DAILY INPATIENT PROGRESS NOTE  SUBSEQUENT HOSPITAL VISIT    ENCOUNTER DATE: 3/22/2024  [facility]    DATE OF ADMISSION: 3/18/2024 11:32 PM  LENGTH OF STAY: 4 days      CHIEF COMPLAINT   Maria Yancey is a 38 y.o. female, seen during daily manzo rounds on the inpatient unit.  Maria Yancey presented with the chief complaint of psychosis      The patient was seen and examined. The chart was reviewed.     Reviewed notes from CTRS and LPC and labs from the last 24 hours.    The patient's case was discussed with the treatment team/care providers today including Rns    Staff reports severe (requiring PRN medications for non redirectable, agitated behavior in order to prevent harm to self or others) behavioral or management issues.     The patient has been compliant with treatment.      Subjective 03/22/2024        Patient refused to participate in interview today on 3/21. She refused to come to office. She is irritable and easily agitated and requested to be left alone to rest.    Methamphetamine w/d is complicating her progress.    The patient denies any side effects to medications.      Interim/overnight events per report/notes:    Per RN:  Olanzapine 10mg po and hydroxyzine 50mg po given for severe agitation, crying saying people are crying under the bed and making the bed vibrate. "This is horrible". Actively listened. Support given.     Tearful, pacing, agitated.  Afraid someone will shoot her.  Said the bed is vibrating.  Said she smells smoke.  Dr Nettles notified.  Haldol 5mg, Ativan 2mg, and Benadryl 50mg po given.                 Psychiatric ROS (observed, reported, or endorsed/denied):  Depressed mood - fluctuating  Interest/pleasure/anhedonia: fluctuating  Guilt/hopelessness/worthlessness - Continuing  Changes in Sleep - fluctuating  Changes in Appetite - fluctuating  Changes in Concentration - Continuing  Changes in Energy - Continuing  PMA/R- Continuing  Suicidal- " active/passive ideations - No  Homicidal ideations: active/passive ideations - Continuing    Hallucinations - Continuing  Delusions - Continuing  Disorganized behavior - No  Disorganized speech - No  Negative symptoms - Continuing    Elevated mood - No  Decreased need for sleep - No  Grandiosity - No  Racing thoughts - No  Impulsivity - No  Irritability- No  Increased energy - No  Distractibility - No  Increase in goal-directed activity or PMA- No    Symptoms of JORDIN - No  Symptoms of Panic Disorder- No  Symptoms of PTSD - Continuing        Overall progress: Patient is showing no improvement on the Unit to date          Medical ROS  Review of Systems   Constitutional:  Negative for chills and fever.   HENT:  Negative for hearing loss.    Eyes:  Negative for blurred vision and double vision.   Respiratory:  Negative for shortness of breath.    Cardiovascular:  Negative for chest pain and palpitations.   Gastrointestinal:  Negative for constipation, diarrhea, nausea and vomiting.   Genitourinary:  Negative for dysuria.   Musculoskeletal:  Negative for back pain and neck pain.   Skin:  Negative for rash.   Neurological:  Negative for dizziness and headaches.   Endo/Heme/Allergies:  Negative for environmental allergies.         PAST MEDICAL HISTORY   Past Medical History:   Diagnosis Date    Addiction to drug     recovering drug addict    ADHD (attention deficit hyperactivity disorder)     Alcohol abuse     Anxiety     Asthma     Bipolar 1 disorder     Cervical pain     Chronic constipation     Chronic diarrhea     Depression     Fatigue     Fibromyalgia     Hallucination     Headache     History of psychiatric hospitalization     pt reports 10 total- [facility] 10/2019 and 3/2019, 1/2020    Hx of psychiatric care     Seroquel    Magalis     Panic disorder     Polysubstance dependence     Psychiatric exam requested by authority     Psychiatric problem     Auditory hallucinations    PTSD (post-traumatic stress disorder)      Schizoaffective disorder     Sleep difficulties     Spina bifida     Spinal stenosis of lumbar region     Substance abuse     Suicide attempt     Once by hanging, once by overdose, once by cutting wrists     Syringomyelia     Syrinx of spinal cord     Therapy     [facility]    Trigger finger     Withdrawal symptoms, alcohol     Withdrawal symptoms, drug or narcotic            PSYCHOTROPIC MEDICATIONS   Scheduled Meds:   ARIPiprazole  15 mg Oral Daily    topiramate  50 mg Oral Daily     Continuous Infusions:  PRN Meds:.acetaminophen, aluminum-magnesium hydroxide-simethicone, benzonatate, benztropine mesylate, diphenhydrAMINE, diphenhydrAMINE, haloperidol lactate, haloperidoL, hydrOXYzine pamoate, loperamide, lorazepam, LORazepam, melatonin, nicotine, ondansetron, promethazine        EXAMINATION    VITALS   Vitals:    03/20/24 1942 03/21/24 0733 03/21/24 1906 03/22/24 0723   BP: (!) 118/57 114/61 102/62 (!) 101/54   BP Location: Right arm Right arm Right arm Right arm   Patient Position: Sitting Sitting Sitting Lying   Pulse: 87 92 92 97   Resp: 18 20 18 18   Temp: 96.8 °F (36 °C) 97.5 °F (36.4 °C) 97.7 °F (36.5 °C) 97.7 °F (36.5 °C)   TempSrc:  Temporal Temporal Temporal   SpO2:       Weight:       Height:           Body mass index is 46.06 kg/m².        CONSTITUTIONAL  General Appearance: unremarkable, age appropriate    MUSCULOSKELETAL  Muscle Strength and Tone:no tremor, no tic  Abnormal Involuntary Movements: No  Gait and Station: non-ataxic    PSYCHIATRIC   Level of Consciousness: awake and alert   Orientation: person, place, and situation  Grooming: Disheveled  Psychomotor Behavior: normal, cooperative  Speech: normal tone, normal rate, normal pitch, normal volume  Language: grossly intact  Mood: depressed  Affect: Consistent with mood  Thought Process: circumstantial  Associations: intact   Thought Content: +HI, +delusions, and denies SI  Perceptions: +AH and denies  VH  Memory: Able to recall  past events, Remote intact, and Recent intact  Attention:Impaired to some degree  Fund of Knowledge: Aware of current events and Vocabulary appropriate   Estimate if Intelligence:  Average based on work/education history, vocabulary and mental status exam  Insight: limited awareness of illness  Judgment: behavior is adequate to circumstances        DIAGNOSTIC TESTING   Laboratory Results  No results found for this or any previous visit (from the past 24 hour(s)).          MEDICAL DECISION MAKING          ASSESSMENT         Schizoaffective Disorder, depressed type, severe  Homicidal ideations  PTSD  Polysubstance abuse  Nicotine Dependence     Psychosocial stressors     Obesity        PROBLEM LIST AND MANAGEMENT PLANS         Schizoaffective Disorder, depressed type, severe  - restart abilify 10 mg PO qd -increase to 15 mg PO qd today  - pt counseled  - follow up with outpatient mental health providers after discharge for medication management and psychotherapy     Homicidal ideations  - continue psychiatric hospitalization  - provide psychotherapeutic interventions and medication management        PTSD  - restart abilify 10 mg PO qd -increase to 15 mg PO qd today  - pt counseled  - follow up with outpatient mental health providers after discharge for medication management and psychotherapy     Polysubstance abuse  - plan to restart gabapentin  - pt counseled  - consider rehab  - SW consulted for assistance with additional resources      Nicotine Dependence  - start nicotine patch 14 mg PO qd PRN        Psychosocial stressors  - pt counseled  - SW consulted for assistance with additional resources         Obesity  - continue topamax 50 mg PO qd  - avoid medications with high potential for serious weight gain (remeron, seroquel, etc.)  - f/u lipid panel and a1c        Discussed diagnosis, risks and benefits of proposed treatment vs alternative treatments vs no treatment, potential side effects of these treatments and  the inherent unpredictability of treatment. The patient expresses understanding of the above and displays the capacity to agree with this treatment given said understanding. Patient also agrees that, currently, the benefits outweigh the risks and would like to pursue/continue treatment at this time.    Any medications being used off-label were discussed with the patient inclusive of the evidence base for the use of the medications and consent was obtained for the off-label use of the medication.       DISCHARGE PLANNING  Expected Disposition Plan: Home or Self Care      NEED FOR CONTINUED HOSPITALIZATION  Psychiatric illness continues to pose a potential threat to life or bodily function, of self or others, thereby requiring the need for continued inpatient psychiatric hospitalization: Yes, due to: hallucinations, danger to others, and gravely disabled, as evidenced by:  Ongoing concerns with Active HI/Threatening behavior. and Ongoing concerns with perceptual aberrancy and paranoid persecutory delusions leading to potential harm of self or others.    Protective inpatient pyschiatric hospitalization required while a safe disposition plan is enacted: Yes    Patient stabilized and ready for discharge from inpatient psychiatric unit: No        STAFF:   Cachorro Conde III, MD  Psychiatry

## 2024-03-23 PROCEDURE — 99233 SBSQ HOSP IP/OBS HIGH 50: CPT | Mod: ,,, | Performed by: PSYCHIATRY & NEUROLOGY

## 2024-03-23 PROCEDURE — 90833 PSYTX W PT W E/M 30 MIN: CPT | Mod: ,,, | Performed by: PSYCHIATRY & NEUROLOGY

## 2024-03-23 PROCEDURE — 25000003 PHARM REV CODE 250: Performed by: PSYCHIATRY & NEUROLOGY

## 2024-03-23 PROCEDURE — 25000003 PHARM REV CODE 250: Performed by: STUDENT IN AN ORGANIZED HEALTH CARE EDUCATION/TRAINING PROGRAM

## 2024-03-23 PROCEDURE — 11400000 HC PSYCH PRIVATE ROOM

## 2024-03-23 RX ORDER — ARIPIPRAZOLE 10 MG/1
20 TABLET ORAL DAILY
Status: DISCONTINUED | OUTPATIENT
Start: 2024-03-24 | End: 2024-03-25 | Stop reason: HOSPADM

## 2024-03-23 RX ORDER — TOPIRAMATE 25 MG/1
50 TABLET ORAL 2 TIMES DAILY
Status: DISCONTINUED | OUTPATIENT
Start: 2024-03-23 | End: 2024-03-25 | Stop reason: HOSPADM

## 2024-03-23 RX ADMIN — HYDROXYZINE PAMOATE 50 MG: 50 CAPSULE ORAL at 05:03

## 2024-03-23 RX ADMIN — Medication 6 MG: at 08:03

## 2024-03-23 RX ADMIN — TOPIRAMATE 50 MG: 25 TABLET, FILM COATED ORAL at 08:03

## 2024-03-23 RX ADMIN — ARIPIPRAZOLE 17 MG: 10 TABLET ORAL at 08:03

## 2024-03-23 NOTE — PLAN OF CARE
"Patient reports feeling "better" today, spending majority of time in room isolated with minimal interaction with staff peers. Denies SI/HI. Reports having auditory hallucinations of people screaming. Denies visual hallucinations. Appetite adequate. PRN hydroxyzine and melatonin administered to aid with sleep/anxiety. NADN. Remains calm and cooperative. Safety precautions remain in place.  "

## 2024-03-23 NOTE — PLAN OF CARE
Pt is improving.  She denies any hallucinations today.  States she is just really tired.  Denies any S/I or H/I at this time.  Somewhat withdrawn, remaining in bed most of the time.  She does appear anxious at times.  Interaction with others is minimal.  No acute distress apparent at this time, will continue to monitor.

## 2024-03-23 NOTE — PROGRESS NOTES
PSYCHIATRY DAILY INPATIENT PROGRESS NOTE  SUBSEQUENT HOSPITAL VISIT    ENCOUNTER DATE: 3/23/2024  SITE: [facility]    DATE OF ADMISSION: 3/18/2024 11:32 PM  LENGTH OF STAY: 5 days      CHIEF COMPLAINT   Maria Yancey is a 38 y.o. female, seen during daily manzo rounds on the inpatient unit.  Maria Yancey presented with the chief complaint of psychosis      The patient was seen and examined. The chart was reviewed.     Reviewed notes from Rns, MD, and LPC and labs from the last 24 hours.    The patient's case was discussed with the treatment team/care providers today including Rns    Staff reports less behavioral or management issues.     The patient has been compliant with treatment.      Subjective 03/23/2024      Patient again reports she feels better today. She feels the medications are starting work. Psychosis is lessening somewhat- she is making slow but steady progress.  -Paranoia persist; she is inappropriately focused on discharge    Methamphetamine w/d is complicating her progress. She denied cravings  She declined rehab    The patient denies any side effects to medications.            Psychiatric ROS (observed, reported, or endorsed/denied):  Depressed mood - decreasing steadily  Interest/pleasure/anhedonia: decreasing steadily  Guilt/hopelessness/worthlessness - decreasing steadily  Changes in Sleep - decreasing steadily  Changes in Appetite - decreasing steadily  Changes in Concentration - decreasing steadily  Changes in Energy - decreasing steadily  PMA/R- decreasing steadily  Suicidal- active/passive ideations - No  Homicidal ideations: active/passive ideations - denies    Hallucinations - decreasing steadily  Delusions - decreasing steadily  Disorganized behavior - No  Disorganized speech - No  Negative symptoms - decreasing steadily    Elevated mood - No  Decreased need for sleep - No  Grandiosity - No  Racing thoughts - No  Impulsivity - No  Irritability- No  Increased energy -  No  Distractibility - No  Increase in goal-directed activity or PMA- No    Symptoms of JORDIN - No  Symptoms of Panic Disorder- No  Symptoms of PTSD - decreasing steadily        Overall progress: mild steady    Psychotherapy:  Target symptoms: psychosis  Why chosen therapy is appropriate versus another modality: relevant to diagnosis  Outcome monitoring methods: self-report  Therapeutic intervention type: supportive psychotherapy  Topics discussed/themes: symptom recognition, substance abuse  The patient's response to the intervention is accepting. The patient's progress toward treatment goals is fair.   Duration of intervention: 16 minutes.      Medical ROS  Review of Systems   Constitutional:  Negative for chills and fever.   HENT:  Negative for hearing loss.    Eyes:  Negative for blurred vision and double vision.   Respiratory:  Negative for shortness of breath.    Cardiovascular:  Negative for chest pain and palpitations.   Gastrointestinal:  Negative for constipation, diarrhea, nausea and vomiting.   Genitourinary:  Negative for dysuria.   Musculoskeletal:  Negative for back pain and neck pain.   Skin:  Negative for rash.   Neurological:  Negative for dizziness and headaches.   Endo/Heme/Allergies:  Negative for environmental allergies.         PAST MEDICAL HISTORY   Past Medical History:   Diagnosis Date    Addiction to drug     recovering drug addict    ADHD (attention deficit hyperactivity disorder)     Alcohol abuse     Anxiety     Asthma     Bipolar 1 disorder     Cervical pain     Chronic constipation     Chronic diarrhea     Depression     Fatigue     Fibromyalgia     Hallucination     Headache     History of psychiatric hospitalization     pt reports 10 total- [facility] 10/2019 and 3/2019, 1/2020    Hx of psychiatric care     Seroquel    Magalis     Panic disorder     Polysubstance dependence     Psychiatric exam requested by authority     Psychiatric problem     Auditory hallucinations    PTSD  (post-traumatic stress disorder)     Schizoaffective disorder     Sleep difficulties     Spina bifida     Spinal stenosis of lumbar region     Substance abuse     Suicide attempt     Once by hanging, once by overdose, once by cutting wrists     Syringomyelia     Syrinx of spinal cord     Therapy     [facility]    Trigger finger     Withdrawal symptoms, alcohol     Withdrawal symptoms, drug or narcotic            PSYCHOTROPIC MEDICATIONS   Scheduled Meds:   ARIPiprazole  17 mg Oral Daily    topiramate  50 mg Oral Daily     Continuous Infusions:  PRN Meds:.acetaminophen, aluminum-magnesium hydroxide-simethicone, benzonatate, benztropine mesylate, diphenhydrAMINE, diphenhydrAMINE, haloperidol lactate, haloperidoL, hydrOXYzine pamoate, loperamide, lorazepam, LORazepam, melatonin, nicotine, ondansetron, promethazine        EXAMINATION    VITALS   Vitals:    03/21/24 1906 03/22/24 0723 03/22/24 1926 03/23/24 0732   BP: 102/62 (!) 101/54 126/64 (!) 114/58   BP Location: Right arm Right arm Left arm Left arm   Patient Position: Sitting Lying Sitting Lying   Pulse: 92 97 91 87   Resp: 18 18 20 18   Temp: 97.7 °F (36.5 °C) 97.7 °F (36.5 °C) 97.6 °F (36.4 °C) 97.9 °F (36.6 °C)   TempSrc: Temporal Temporal Temporal Temporal   SpO2:       Weight:       Height:           Body mass index is 46.06 kg/m².        CONSTITUTIONAL  General Appearance: unremarkable, age appropriate    MUSCULOSKELETAL  Muscle Strength and Tone:no tremor, no tic  Abnormal Involuntary Movements: No  Gait and Station: non-ataxic    PSYCHIATRIC   Level of Consciousness: awake and alert   Orientation: person, place, time and situation  Grooming: Disheveled  Psychomotor Behavior: normal, cooperative  Speech: normal tone, normal rate, normal pitch, normal volume  Language: grossly intact  Mood: ok  Affect: Consistent with mood  Thought Process: linear  Associations: intact   Thought Content: less HI, less delusions, and denies SI  Perceptions:  less AH and denies  VH  Memory: Able to recall past events, Remote intact, and Recent intact  Attention:Impaired to some degree  Fund of Knowledge: Aware of current events and Vocabulary appropriate   Estimate if Intelligence:  Average based on work/education history, vocabulary and mental status exam  Insight: improving  Judgment: behavior is adequate to circumstances        DIAGNOSTIC TESTING   Laboratory Results  No results found for this or any previous visit (from the past 24 hour(s)).          MEDICAL DECISION MAKING          ASSESSMENT         Schizoaffective Disorder, depressed type, severe  Homicidal ideations  PTSD  Polysubstance abuse  Nicotine Dependence     Psychosocial stressors     Obesity        PROBLEM LIST AND MANAGEMENT PLANS         Schizoaffective Disorder, depressed type, severe  - restart abilify 10 mg PO qd -increase to 15 mg PO qd  -increase to to 17 mg PO qd- increase to 20 mg po q day tomorrow  - pt counseled  - follow up with outpatient mental health providers after discharge for medication management and psychotherapy     Homicidal ideations  - continue psychiatric hospitalization  - provide psychotherapeutic interventions and medication management        PTSD  - restart abilify as above  - pt counseled  - follow up with outpatient mental health providers after discharge for medication management and psychotherapy     Polysubstance abuse  - plan to restart gabapentin  - pt counseled  - consider rehab  - SW consulted for assistance with additional resources      Nicotine Dependence  - start nicotine patch 14 mg PO qd PRN        Psychosocial stressors  - pt counseled  - SW consulted for assistance with additional resources         Obesity  - continue topamax 50 mg PO qd- increase to 50 mg po BID  - avoid medications with high potential for serious weight gain (remeron, seroquel, etc.)  - f/u lipid panel and a1c        Discussed diagnosis, risks and benefits of proposed treatment vs  alternative treatments vs no treatment, potential side effects of these treatments and the inherent unpredictability of treatment. The patient expresses understanding of the above and displays the capacity to agree with this treatment given said understanding. Patient also agrees that, currently, the benefits outweigh the risks and would like to pursue/continue treatment at this time.    Any medications being used off-label were discussed with the patient inclusive of the evidence base for the use of the medications and consent was obtained for the off-label use of the medication.       DISCHARGE PLANNING  Expected Disposition Plan: Home or Self Care      NEED FOR CONTINUED HOSPITALIZATION  Psychiatric illness continues to pose a potential threat to life or bodily function, of self or others, thereby requiring the need for continued inpatient psychiatric hospitalization: Yes, due to: hallucinations, danger to others, and gravely disabled, as evidenced by:  Ongoing concerns with Active HI/Threatening behavior. and Ongoing concerns with perceptual aberrancy and paranoid persecutory delusions leading to potential harm of self or others.    Protective inpatient pyschiatric hospitalization required while a safe disposition plan is enacted: Yes    Patient stabilized and ready for discharge from inpatient psychiatric unit: No        STAFF:   Salomón Nettles MD  Psychiatry

## 2024-03-23 NOTE — PLAN OF CARE
Lying quietly in bed, eyes closed, respirations even, unlabored. Apparently asleep. Slept 9 hours thus far with 3 interruptions. Safety precautions maintained. Rounds done every 15 minutes. Bed is fixed in low position and room is uncluttered and pathways are clear.

## 2024-03-23 NOTE — PSYCH
Pt is crying, states she is just ready to go home and hopes to get her abilify shot.  Pt reports anxiety.  Given prn vistaril for anxiety at this time, will continue to monitor.

## 2024-03-24 PROCEDURE — 90833 PSYTX W PT W E/M 30 MIN: CPT | Mod: ,,, | Performed by: PSYCHIATRY & NEUROLOGY

## 2024-03-24 PROCEDURE — 25000003 PHARM REV CODE 250: Performed by: PSYCHIATRY & NEUROLOGY

## 2024-03-24 PROCEDURE — 99233 SBSQ HOSP IP/OBS HIGH 50: CPT | Mod: ,,, | Performed by: PSYCHIATRY & NEUROLOGY

## 2024-03-24 PROCEDURE — 11400000 HC PSYCH PRIVATE ROOM

## 2024-03-24 RX ORDER — ARIPIPRAZOLE 20 MG/1
20 TABLET ORAL DAILY
Qty: 30 TABLET | Refills: 2 | Status: SHIPPED | OUTPATIENT
Start: 2024-03-24 | End: 2025-03-24

## 2024-03-24 RX ORDER — IBUPROFEN 200 MG
1 TABLET ORAL DAILY
COMMUNITY
Start: 2024-03-24

## 2024-03-24 RX ORDER — TOPIRAMATE 50 MG/1
50 TABLET, FILM COATED ORAL 2 TIMES DAILY
Qty: 60 TABLET | Refills: 1 | Status: SHIPPED | OUTPATIENT
Start: 2024-03-24 | End: 2025-03-24

## 2024-03-24 RX ADMIN — TOPIRAMATE 50 MG: 25 TABLET, FILM COATED ORAL at 08:03

## 2024-03-24 RX ADMIN — HYDROXYZINE PAMOATE 50 MG: 50 CAPSULE ORAL at 08:03

## 2024-03-24 RX ADMIN — ARIPIPRAZOLE 20 MG: 10 TABLET ORAL at 08:03

## 2024-03-24 RX ADMIN — Medication 6 MG: at 08:03

## 2024-03-24 NOTE — PLAN OF CARE
Pt is calm and cooperative.  Denies any S/I or H/I at this time.  She denies any hallucinations at this time.  Reports she feels well and ready to go home.  Still withdrawn, not interacting with others much.  Stays to herself in her room.  No acute distress apparent at this time, will continue to monitor.

## 2024-03-24 NOTE — PLAN OF CARE
Plan of care reviewed. Denies intent to harm self or others at this time. States is no longer having auditory hallucinations. Stays in bed and has minimal interaction with staff and peers. Accepts snacks and medications. Gait steady, no falls. Patient was given Melatonin to promote sleep. Will continue precautions and monitor for safety.

## 2024-03-24 NOTE — PROGRESS NOTES
PSYCHIATRY DAILY INPATIENT PROGRESS NOTE  SUBSEQUENT HOSPITAL VISIT    ENCOUNTER DATE: 3/24/2024  SITE: [facility]    DATE OF ADMISSION: 3/18/2024 11:32 PM  LENGTH OF STAY: 6 days      CHIEF COMPLAINT   Maria Yancey is a 38 y.o. female, seen during daily manzo rounds on the inpatient unit.  Maria Yancey presented with the chief complaint of psychosis      The patient was seen and examined. The chart was reviewed.     Reviewed notes from Rns and labs from the last 24 hours.    The patient's case was discussed with the treatment team/care providers today including Rns    Staff reports less behavioral or management issues.     The patient has been compliant with treatment.      Subjective 03/24/2024      Patient again reports she feels much better today. She feels the medications are working and helpful. Psychosis is improving- she is making slow but steady progress.  -Paranoia persist but is improving ; she is more appropriately focused on discharge    Methamphetamine w/d is complicating her progress. She denied cravings  She declined rehab    She is approaching her baseline- she will be stable for dishcarge tomorrow    The patient denies any side effects to medications.            Psychiatric ROS (observed, reported, or endorsed/denied):  Depressed mood - improving steadily  Interest/pleasure/anhedonia: improving steadily  Guilt/hopelessness/worthlessness - improving steadily  Changes in Sleep - improving steadily  Changes in Appetite - improving steadily  Changes in Concentration - improving steadily  Changes in Energy - improving steadily  PMA/R- denies  Suicidal- active/passive ideations - No  Homicidal ideations: active/passive ideations - denies    Hallucinations - improving steadily  Delusions - improving steadily  Disorganized behavior - No  Disorganized speech - No  Negative symptoms - improving steadily    Elevated mood - No  Decreased need for sleep - No  Grandiosity - No  Racing thoughts -  No  Impulsivity - No  Irritability- No  Increased energy - No  Distractibility - No  Increase in goal-directed activity or PMA- No    Symptoms of JORDIN - No  Symptoms of Panic Disorder- No  Symptoms of PTSD - improving steadily        Overall progress: mild steady    Psychotherapy:  Target symptoms: psychosis  Why chosen therapy is appropriate versus another modality: relevant to diagnosis  Outcome monitoring methods: self-report  Therapeutic intervention type: supportive psychotherapy  Topics discussed/themes: symptom recognition, substance abuse  The patient's response to the intervention is accepting. The patient's progress toward treatment goals is fair.   Duration of intervention: 16 minutes.      Medical ROS  Review of Systems   Constitutional:  Negative for chills and fever.   HENT:  Negative for hearing loss.    Eyes:  Negative for blurred vision and double vision.   Respiratory:  Negative for shortness of breath.    Cardiovascular:  Negative for chest pain and palpitations.   Gastrointestinal:  Negative for constipation, diarrhea, nausea and vomiting.   Genitourinary:  Negative for dysuria.   Musculoskeletal:  Negative for back pain and neck pain.   Skin:  Negative for rash.   Neurological:  Negative for dizziness and headaches.   Endo/Heme/Allergies:  Negative for environmental allergies.         PAST MEDICAL HISTORY   Past Medical History:   Diagnosis Date    Addiction to drug     recovering drug addict    ADHD (attention deficit hyperactivity disorder)     Alcohol abuse     Anxiety     Asthma     Bipolar 1 disorder     Cervical pain     Chronic constipation     Chronic diarrhea     Depression     Fatigue     Fibromyalgia     Hallucination     Headache     History of psychiatric hospitalization     pt reports 10 total- [facility] 10/2019 and 3/2019, 1/2020    Hx of psychiatric care     Seroquel    Magalis     Panic disorder     Polysubstance dependence     Psychiatric exam requested by [facility]     Psychiatric  problem     Auditory hallucinations    PTSD (post-traumatic stress disorder)     Schizoaffective disorder     Sleep difficulties     Spina bifida     Spinal stenosis of lumbar region     Substance abuse     Suicide attempt     Once by hanging, once by overdose, once by cutting wrists     Syringomyelia     Syrinx of spinal cord     Therapy     [facility]    Trigger finger     Withdrawal symptoms, alcohol     Withdrawal symptoms, drug or narcotic            PSYCHOTROPIC MEDICATIONS   Scheduled Meds:   ARIPiprazole  20 mg Oral Daily    topiramate  50 mg Oral BID     Continuous Infusions:  PRN Meds:.acetaminophen, aluminum-magnesium hydroxide-simethicone, benzonatate, benztropine mesylate, diphenhydrAMINE, diphenhydrAMINE, haloperidol lactate, haloperidoL, hydrOXYzine pamoate, loperamide, lorazepam, LORazepam, melatonin, nicotine, ondansetron, promethazine        EXAMINATION    VITALS   Vitals:    03/23/24 0732 03/23/24 1945 03/24/24 0738 03/24/24 0810   BP: (!) 114/58 (!) 102/55 (!) 110/55    BP Location: Left arm Right arm Right arm    Patient Position: Lying Sitting Lying    Pulse: 87 89 85    Resp: 18 18 18    Temp: 97.9 °F (36.6 °C) 97.6 °F (36.4 °C) 97.1 °F (36.2 °C)    TempSrc: Temporal  Temporal    SpO2:       Weight:    116.9 kg (257 lb 11.5 oz)   Height:           Body mass index is 45.65 kg/m².        CONSTITUTIONAL  General Appearance: unremarkable, age appropriate    MUSCULOSKELETAL  Muscle Strength and Tone:no tremor, no tic  Abnormal Involuntary Movements: No  Gait and Station: non-ataxic    PSYCHIATRIC   Level of Consciousness: awake and alert   Orientation: person, place, time and situation  Grooming: Disheveled  Psychomotor Behavior: normal, cooperative  Speech: normal tone, normal rate, normal pitch, normal volume  Language: grossly intact, able to name, able to repeat  Mood: ok  Affect: Consistent with mood and Congruent with thought  Thought Process: linear, organized  Associations:  intact   Thought Content: denies SI, denies HI, and no delusions  Perceptions: denies AH and denies  VH  Memory: Able to recall past events, Remote intact, and Recent intact  Attention:Impaired to some degree  Fund of Knowledge: Aware of current events and Vocabulary appropriate   Estimate if Intelligence:  Average based on work/education history, vocabulary and mental status exam  Insight: improving- fair  Judgment: behavior is adequate to circumstances- fair        DIAGNOSTIC TESTING   Laboratory Results  No results found for this or any previous visit (from the past 24 hour(s)).          MEDICAL DECISION MAKING          ASSESSMENT         Schizoaffective Disorder, depressed type, severe  Homicidal ideations  PTSD  Polysubstance abuse  Nicotine Dependence     Psychosocial stressors     Obesity        PROBLEM LIST AND MANAGEMENT PLANS         Schizoaffective Disorder, depressed type, severe  - restart abilify 10 mg PO qd -increase to 15 mg PO qd  -increase to to 17 mg PO qd- increase to 20 mg po q day today  - pt counseled  - follow up with outpatient mental health providers after discharge for medication management and psychotherapy     Homicidal ideations  - continue psychiatric hospitalization  - provide psychotherapeutic interventions and medication management        PTSD  - restart abilify as above  - pt counseled  - follow up with outpatient mental health providers after discharge for medication management and psychotherapy     Polysubstance abuse  - plan to restart gabapentin  - pt counseled  - consider rehab  - SW consulted for assistance with additional resources      Nicotine Dependence  - start nicotine patch 14 mg PO qd PRN        Psychosocial stressors  - pt counseled  - SW consulted for assistance with additional resources         Obesity  - continue topamax 50 mg PO qd- increase to/continue at 50 mg po BID  - avoid medications with high potential for serious weight gain (remeron, seroquel, etc.)  -  f/u lipid panel and a1c        Discussed diagnosis, risks and benefits of proposed treatment vs alternative treatments vs no treatment, potential side effects of these treatments and the inherent unpredictability of treatment. The patient expresses understanding of the above and displays the capacity to agree with this treatment given said understanding. Patient also agrees that, currently, the benefits outweigh the risks and would like to pursue/continue treatment at this time.    Any medications being used off-label were discussed with the patient inclusive of the evidence base for the use of the medications and consent was obtained for the off-label use of the medication.       DISCHARGE PLANNING  Expected Disposition Plan: Home or Self Care- dishcarge tomorrow       NEED FOR CONTINUED HOSPITALIZATION  Psychiatric illness continues to pose a potential threat to life or bodily function, of self or others, thereby requiring the need for continued inpatient psychiatric hospitalization: Yes, due to: hallucinations, danger to others, and gravely disabled, as evidenced by:  Ongoing concerns with grave disability with patient unable to perform basic feeding, hygiene and dressing activities without significant constant support. and Ongoing concerns with perceptual aberrancy and paranoid persecutory delusions leading to potential harm of self or others.    Protective inpatient pyschiatric hospitalization required while a safe disposition plan is enacted: Yes    Patient stabilized and ready for discharge from inpatient psychiatric unit: No        STAFF:   Salomón Nettlse MD  Psychiatry

## 2024-03-24 NOTE — DISCHARGE SUMMARY
"Discharge Summary  Psychiatry    Admit Date: 3/18/2024    Discharge Date and Time:  03/24/2024- 3/25/24 at 10:35 AM    Attending Physician: Salomón Nettles MD     Discharge Provider: Salomón Nettles MD    Reason for Admission:   substance abuse and psychosis     History of Present Illness:   The patient presented to the ER on 3/18/2024 .     The patient was medically cleared and admitted to the U.     Per RN:  C/o hearing voices, seeing blood, seeing people who are not there, hears people screaming, and thinks her phone is hacked x 6 days. Patient reports quit taking her medications (Abilify) 3 weeks and through it away. Patient reports took 3 Depakote today.               Per ED NP:  Maria Yancey is a 38 y.o. female PMH of drug addiction, ADHD, alcohol abuse, bipolar disorder, fibromyalgia, PTSD presents to the ED for psychiatric evaluation. Patient has been hearing voices and seeing things for the past 5-6 days. Her aunt reports that she is paranoid, feels like people are going to ambush her. She is also seeing people that are not there and wanting her aunt to call 911. She has not been sleeping at night bc she is scared someone will hurt her. She stopped taking her Abilify because she reports that she did not want to gain weight. She denies SI, HI. She reports that voices are telling her that people are being hurt.      Per RN:        Pt is visibly upset.  Crying and having auditory hallucinations.  Told tech is dayroom, "maybe I should go to [employer] and shoot some kids and families up to see how they like it".  Pt given IM zyprexa at this time, tolerated well.  Pt lying in bed crying, will continue to monitor.            Psychiatric interview:  "I tried to wean myself off my meds." She states she has been noncompliant for the last 3 weeks. She states started having "psychosis real bad.. seeing someone taking my children and hurting them and my family." Reports AH daily of "screaming." She " "admits to using methamphetamine 3-4 days ago, "I wanted to stay up because I was scared to death, I have been going to family members' houses trying to hide."           Symptoms of Depression: diminished mood - No, loss of interest/anhedonia - No;     Changes in Sleep: trouble with initiation- Yes, maintenance, - Yes early morning awakening with inability to return to sleep - No, hypersomnolence - No     Suicidal- active/passive ideations - No, organized plans, future intentions - No     Homicidal ideations: active/passive ideations - Yes, organized plans, future intentions - Yes     Symptoms of psychosis: hallucinations - Yes, delusions - Yes, disorganized speech - No, disorganized behavior or abnormal motor behavior - No, or negative symptoms (diminshed emotional expression, avolition, anhedonia, alogia, asociality) - Yes,     Symptoms of esther or hypomania: elevated, expansive, or irritable mood with increased energy or activity - No; > 4 days - No,  >7 days - No; with inflated self-esteem or grandiosity - No, decreased need for sleep - No, increased rate of speech - No, FOI or racing thoughts - No, distractibility - No, increased goal directed activity or PMA - No, risky/disinhibited behavior - No     Symptoms of JORDIN: excessive anxiety/worry/fear, more days than not, about numerous issues - No, ongoing for >6 months - No, difficult to control - No, with restlessness - No, fatigue - No, poor concentration - No, irritability - No, muscle tension - No, sleep disturbance - No; causes functionally impairing distress - No     Symptoms of Panic Disorder: recurrent panic attacks (palpitations/heart racing, sweating, shakiness, dyspnea, choking, chest pain/discomfort, Gi symptoms, dizzy/lightheadedness, hot/col flashes, paresthesias, derealization, fear of losing control or fear of dying or fear of "going crazy") - No, precipitated - No, un-precipitated - No, source of worry and/or behavioral changes secondary for 1 " month or longer- No, agoraphobia - No     Symptoms of PTSD: h/o trauma exposure - Yes; re-experiencing/intrusive symptoms - Yes, avoidant behavior - Yes, 2 or more negative alterations in cognition or mood - Yes, 2 or more hyperarousal symptoms - Yes; with dissociative symptoms - No, ongoing for 1 or more  months - Yes    Procedures Performed: * No surgery found *    Hospital Course:    Patient was admitted to the inpatient psychiatry unit after being medically cleared in the ED. Chart and labs were reviewed. The patient was stabilized as follows:      Schizoaffective Disorder, depressed type, severe  - restart abilify 10 mg PO qd -increase to 15 mg PO qd  -increase to to 17 mg PO qd- increase to 20 mg po q day today  - pt counseled  - follow up with outpatient mental health providers after discharge for medication management and psychotherapy     Homicidal ideations  - continue psychiatric hospitalization  - provide psychotherapeutic interventions and medication management        PTSD  - restart abilify as above  - pt counseled  - follow up with outpatient mental health providers after discharge for medication management and psychotherapy     Polysubstance abuse  - plan to restart gabapentin  - pt counseled  - consider rehab  - SW consulted for assistance with additional resources      Nicotine Dependence  - start nicotine patch 14 mg PO qd PRN        Psychosocial stressors  - pt counseled  - SW consulted for assistance with additional resources         Obesity  - continue topamax 50 mg PO qd- increase to/continue at 50 mg po BID  - avoid medications with high potential for serious weight gain (remeron, seroquel, etc.)             During hospitalization, the patient was encouraged to go to both groups and individual counseling. Patient was monitored for any side effects. A meeting was held with multidisciplinary team prior to discharge and pt's diagnosis, current medications, and follow up were discussed. The  patient has been compliant with treatment and can adequately attend to activities of daily living in an independent manner. The patient denies any side effects. The patient denies SI, HI, plan or intent for self harm or harm to others. The patient is no longer a danger to self or others nor gravely disabled disabled. Patient discharged  in stable condition with scheduled outpatient follow up.    AIMS score was 0    She denied withdrawals or cravings; she declined rehab    The patient reports improved symptoms as documented below. The patient is requesting discharge. The patient is currently stable for discharge home and is able/willing to attend outpatient care. The patient is hopeful, future oriented and goal directed. The patient readily discusses both short and long term goals. The patient can identify positive coping skills and social support.       Improved Symptoms of Depression: no diminished mood or loss of interest/anhedonia; no irritability, no diminished energy, no change in sleep, no change in appetite, no diminished concentration or cognition or indecisiveness, no PMA/R, no excessive guilt or hopelessness or worthlessness, no suicidal ideations     Improved Changes in Sleep: no trouble with initiation/maintenance, no early morning awakening with inability to return to sleep     Improved Suicidal/(no)Homicidal ideations: no active/passive ideations, no organized plans, no future intentions; she is hopeful and future oriented; she readily discusses her short and long term goals     Improved Symptoms of psychosis: no hallucinations, denied delusions, no disorganized thinking, no disorganized behavior or abnormal motor behavior, no negative symptoms     Denied past or current Symptoms of esther or hypomania: no elevated, expansive, or irritable mood with no increased energy or activity; with no inflated self-esteem or grandiosity, decreased need for sleep, increased rate of speech, FOI or racing thoughts,  distractibility, increased goal directed activity or PMA, or risky/disinhibited behavior     Improved Symptoms of JORDIN: no excessive anxiety/worry/fear,  with no current symptoms of restlessness, fatigue, poor concentration, irritability, muscle tension, or sleep disturbance      Improved Symptoms of Panic Disorder: no panic attacks since admission, may be precipitated or un-precipitated, note source of worry and/or behavioral changes secondary; with less agoraphobia     Improved Symptoms of PTSD: +h/o trauma; less symptoms of re-experiencing/intrusive symptoms, avoidant behavior, negative alterations in cognition or mood, and hyperarousal symptoms; without dissociative symptoms      Amphetamine withdrawals are improving.  -She is not interested in rehab but is interested with outpatient treatment/12-step meetings    Discussed diagnosis, risks and benefits of proposed treatment vs alternative treatments vs no treatment, and potential side effects of these treatments.  The patient expresses understanding of the above and displays the capacity to agree with this treatment given said understanding.  Patient also agrees that, currently, the benefits outweigh the risks and would like to pursue treatment at this time.      Discharge MSE: stated age, casually dressed, well groomed.  No psychomotor agitation or retardation.  No abnormal involuntary movements.  Gait normal.  Speech normal, conversational.  Language fluent English. Mood fine.  Affect normal range, pleasant, euthymic.  Thought process linear.  Associations intact.  Denies suicidal or homicidal ideation.  Denies auditory hallucinations, paranoid ideation, ideas of reference.  Memory intact.  Attention intact.  Fund of knowledge intact.  Insight intact.  Judgment intact.  Alert and oriented to person, place, time.      Tobacco Usage:  Is patient a smoker? Yes  Does patient want prescription for Tobacco Cessation? No  Does patient want counseling for Tobacco  Cessation? No    If patient would like to quit, then over the counter nicotine patch could be used. The patient could also follow up with his PCP or psychiatric provider for other alternatives.     Tobacco Use Treatment Practical Counseling    Were the following discussed with the patient:    Recognizing danger situations:    A. Alcohol use during the first month after quitting - Yes   B. Being around smoke and/or smokers or time/situations when the patient routinely smoked - Yes   C. Triggers and/or roadblocks are the same as danger situations - Yes    2.   Developing coping skills   A. Learning new ways to manage stress - Yes   B. Exercising and/or relaxation breathing - Yes   C. Changing routines - Yes   D. Distraction techniques to prevent tobacco use - Yes    3.   Basic information about quitting:   A. Benefits of quitting tobacco - Yes   B. How to quit techniques - Yes   C. Available resources to support quitting - Yes        Final Diagnoses:    Principal Problem: Schizoaffective Disorder, depressed type, severe    Secondary Diagnoses:   PTSD  Polysubstance abuse  Nicotine Dependence     Psychosocial stressors     Obesit    Labs:  Admission on 03/18/2024, Discharged on 03/18/2024   Component Date Value Ref Range Status    WBC 03/18/2024 7.82  3.90 - 12.70 K/uL Final    RBC 03/18/2024 5.07  4.00 - 5.40 M/uL Final    Hemoglobin 03/18/2024 13.0  12.0 - 16.0 g/dL Final    Hematocrit 03/18/2024 40.1  37.0 - 48.5 % Final    MCV 03/18/2024 79 (L)  82 - 98 fL Final    MCH 03/18/2024 25.6 (L)  27.0 - 31.0 pg Final    MCHC 03/18/2024 32.4  32.0 - 36.0 g/dL Final    RDW 03/18/2024 15.3 (H)  11.5 - 14.5 % Final    Platelets 03/18/2024 161  150 - 450 K/uL Final    MPV 03/18/2024 11.8  9.2 - 12.9 fL Final    Immature Granulocytes 03/18/2024 0.1  0.0 - 0.5 % Final    Gran # (ANC) 03/18/2024 4.9  1.8 - 7.7 K/uL Final    Immature Grans (Abs) 03/18/2024 0.01  0.00 - 0.04 K/uL Final    Lymph # 03/18/2024 2.3  1.0 - 4.8 K/uL  Final    Mono # 03/18/2024 0.5  0.3 - 1.0 K/uL Final    Eos # 03/18/2024 0.1  0.0 - 0.5 K/uL Final    Baso # 03/18/2024 0.03  0.00 - 0.20 K/uL Final    nRBC 03/18/2024 0  0 /100 WBC Final    Gran % 03/18/2024 62.8  38.0 - 73.0 % Final    Lymph % 03/18/2024 29.4  18.0 - 48.0 % Final    Mono % 03/18/2024 5.8  4.0 - 15.0 % Final    Eosinophil % 03/18/2024 1.5  0.0 - 8.0 % Final    Basophil % 03/18/2024 0.4  0.0 - 1.9 % Final    Platelet Estimate 03/18/2024 Appears normal   Final    Differential Method 03/18/2024 Automated   Final    Sodium 03/18/2024 139  136 - 145 mmol/L Final    Potassium 03/18/2024 3.9  3.5 - 5.1 mmol/L Final    Chloride 03/18/2024 107  95 - 110 mmol/L Final    CO2 03/18/2024 21 (L)  23 - 29 mmol/L Final    Glucose 03/18/2024 60 (L)  70 - 110 mg/dL Final    BUN 03/18/2024 7  6 - 20 mg/dL Final    Creatinine 03/18/2024 0.7  0.5 - 1.4 mg/dL Final    Calcium 03/18/2024 9.4  8.7 - 10.5 mg/dL Final    Total Protein 03/18/2024 8.3  6.0 - 8.4 g/dL Final    Albumin 03/18/2024 4.0  3.5 - 5.2 g/dL Final    Total Bilirubin 03/18/2024 0.4  0.1 - 1.0 mg/dL Final    Alkaline Phosphatase 03/18/2024 100  55 - 135 U/L Final    AST 03/18/2024 19  10 - 40 U/L Final    ALT 03/18/2024 18  10 - 44 U/L Final    eGFR 03/18/2024 >60  >60 mL/min/1.73 m^2 Final    Anion Gap 03/18/2024 11  8 - 16 mmol/L Final    TSH 03/18/2024 0.912  0.400 - 4.000 uIU/mL Final    Specimen UA 03/18/2024 Urine, Clean Catch   Final    Color, UA 03/18/2024 Yellow  Yellow, Straw, Jess Final    Appearance, UA 03/18/2024 Clear  Clear Final    pH, UA 03/18/2024 >8.0  5.0 - 8.0 Final    Specific Gravity, UA 03/18/2024 1.010  1.005 - 1.030 Final    Protein, UA 03/18/2024 Trace (A)  Negative Final    Glucose, UA 03/18/2024 Negative  Negative Final    Ketones, UA 03/18/2024 Trace (A)  Negative Final    Bilirubin (UA) 03/18/2024 Negative  Negative Final    Occult Blood UA 03/18/2024 Negative  Negative Final    Nitrite, UA 03/18/2024 Negative  Negative  Final    Urobilinogen, UA 03/18/2024 Negative  <2.0 EU/dL Final    Leukocytes, UA 03/18/2024 Negative  Negative Final    Benzodiazepines 03/18/2024 Negative  Negative Final    Methadone metabolites 03/18/2024 Negative  Negative Final    Cocaine (Metab.) 03/18/2024 Negative  Negative Final    Opiate Scrn, Ur 03/18/2024 Negative  Negative Final    Barbiturate Screen, Ur 03/18/2024 Negative  Negative Final    Amphetamine Screen, Ur 03/18/2024 Presumptive Positive (A)  Negative Final    THC 03/18/2024 Negative  Negative Final    Phencyclidine 03/18/2024 Negative  Negative Final    Creatinine, Urine 03/18/2024 145.2  15.0 - 325.0 mg/dL Final    Toxicology Information 03/18/2024 SEE COMMENT   Final    Alcohol, Serum 03/18/2024 <10  <10 mg/dL Final    Acetaminophen (Tylenol), Serum 03/18/2024 <3.0 (L)  10.0 - 20.0 ug/mL Final    Salicylate Lvl 03/18/2024 <5.0 (L)  15.0 - 30.0 mg/dL Final    Preg Test, Ur 03/18/2024 Negative   Final    POCT Glucose 03/18/2024 64 (L)  70 - 110 mg/dL Final         Discharged Condition: stable and improved; not currently a danger to self/others or gravely disabled    Disposition: Home or Self Care    Is patient being discharged on multiple neuroleptics? No    Follow Up/Patient Instructions:     Take all medications as prescribed.  Attend all psychiatric and medical follow up appointments.   Abstain from all drugs and alcohol.  Call the crisis line at: [phone] for help in a crisis and emergent situations or call 911 and Return to ED for any acute worsening of your condition including suicidal or homicidal ideations      No discharge procedures on file.        Follow up apt: local [facility]- see SW/discharge notes for ful details      Medications:  Reconciled Home Medications:      Medication List        START taking these medications      topiramate 50 MG tablet  Commonly known as: TOPAMAX  Take 1 tablet (50 mg total) by mouth 2 (two) times daily.            CHANGE how you take these  medications      * nicotine 21 mg/24 hr  Commonly known as: NICODERM CQ  Place 1 patch onto the skin once daily.  What changed: Another medication with the same name was added. Make sure you understand how and when to take each.     * nicotine 14 mg/24 hr  Commonly known as: NICODERM CQ  Place 1 patch onto the skin once daily.  What changed: You were already taking a medication with the same name, and this prescription was added. Make sure you understand how and when to take each.           * This list has 2 medication(s) that are the same as other medications prescribed for you. Read the directions carefully, and ask your doctor or other care provider to review them with you.                CONTINUE taking these medications      ARIPiprazole 20 MG Tab  Commonly known as: ABILIFY  Take 1 tablet (20 mg total) by mouth once daily.     VENTOLIN HFA 90 mcg/actuation inhaler  Generic drug: albuterol  Ventolin HFA 90 mcg/actuation aerosol inhaler   Inhale 1 puff 3 times a day by inhalation route as needed.            STOP taking these medications      gabapentin 300 MG capsule  Commonly known as: NEURONTIN                Diet: regular     Activity as tolerated    Total time spent discharging patient: 35 minutes    Salomón Nettles MD  Psychiatry

## 2024-03-25 VITALS
WEIGHT: 257.69 LBS | SYSTOLIC BLOOD PRESSURE: 103 MMHG | RESPIRATION RATE: 16 BRPM | BODY MASS INDEX: 45.66 KG/M2 | HEART RATE: 82 BPM | HEIGHT: 63 IN | DIASTOLIC BLOOD PRESSURE: 54 MMHG | TEMPERATURE: 97 F | OXYGEN SATURATION: 99 %

## 2024-03-25 PROCEDURE — 99239 HOSP IP/OBS DSCHRG MGMT >30: CPT | Mod: ,,, | Performed by: PSYCHIATRY & NEUROLOGY

## 2024-03-25 PROCEDURE — 25000003 PHARM REV CODE 250: Performed by: PSYCHIATRY & NEUROLOGY

## 2024-03-25 PROCEDURE — 90833 PSYTX W PT W E/M 30 MIN: CPT | Mod: ,,, | Performed by: PSYCHIATRY & NEUROLOGY

## 2024-03-25 RX ADMIN — ARIPIPRAZOLE 20 MG: 10 TABLET ORAL at 08:03

## 2024-03-25 RX ADMIN — TOPIRAMATE 50 MG: 25 TABLET, FILM COATED ORAL at 08:03

## 2024-03-25 NOTE — PSYCH
Patient will be following up with [facility] [address] [place] [address] Phone number  [phone]. Fax number [phone]. Appointment is on April 1,2024@ 8:00 a.m..Pt will receive tobacco cessation therapy along with substance abuse therapy due to positive UDS on admit. AVS faxed on 03/25/2024@ 11:46 a.m..

## 2024-03-25 NOTE — NURSING
AVS instructed to pt on discharge information. Meds reviewed. Pt denies SI, HI, AVH. Mood WNL. Pt denies distress. Pt verbalized understanding on all d/c instructions. Pt escorted off unit downstairs by unit staff. NAD

## 2024-03-25 NOTE — DISCHARGE SUMMARY
"Discharge Summary  Psychiatry      Admit Date: 3/18/2024    Discharge Date and Time:  03/25/2024 and  AM    Attending Physician: Salomón Nettles MD     Discharge Provider: Salomón Nettles MD    Reason for Admission:   substance abuse and psychosis     History of Present Illness:   The patient presented to the ER on 3/18/2024 .     The patient was medically cleared and admitted to the U.     Per RN:  C/o hearing voices, seeing blood, seeing people who are not there, hears people screaming, and thinks her phone is hacked x 6 days. Patient reports quit taking her medications (Abilify) 3 weeks and through it away. Patient reports took 3 Depakote today.               Per ED NP:  Maria Yancey is a 38 y.o. female PMH of drug addiction, ADHD, alcohol abuse, bipolar disorder, fibromyalgia, PTSD presents to the ED for psychiatric evaluation. Patient has been hearing voices and seeing things for the past 5-6 days. Her aunt reports that she is paranoid, feels like people are going to ambush her. She is also seeing people that are not there and wanting her aunt to call 911. She has not been sleeping at night bc she is scared someone will hurt her. She stopped taking her Abilify because she reports that she did not want to gain weight. She denies SI, HI. She reports that voices are telling her that people are being hurt.      Per RN:        Pt is visibly upset.  Crying and having auditory hallucinations.  Told tech is dayroom, "maybe I should go to [employer] and shoot some kids and families up to see how they like it".  Pt given IM zyprexa at this time, tolerated well.  Pt lying in bed crying, will continue to monitor.            Psychiatric interview:  "I tried to wean myself off my meds." She states she has been noncompliant for the last 3 weeks. She states started having "psychosis real bad.. seeing someone taking my children and hurting them and my family." Reports AH daily of "screaming." She admits to " "using methamphetamine 3-4 days ago, "I wanted to stay up because I was scared to death, I have been going to family members' houses trying to hide."           Symptoms of Depression: diminished mood - No, loss of interest/anhedonia - No;     Changes in Sleep: trouble with initiation- Yes, maintenance, - Yes early morning awakening with inability to return to sleep - No, hypersomnolence - No     Suicidal- active/passive ideations - No, organized plans, future intentions - No     Homicidal ideations: active/passive ideations - Yes, organized plans, future intentions - Yes     Symptoms of psychosis: hallucinations - Yes, delusions - Yes, disorganized speech - No, disorganized behavior or abnormal motor behavior - No, or negative symptoms (diminshed emotional expression, avolition, anhedonia, alogia, asociality) - Yes,     Symptoms of esther or hypomania: elevated, expansive, or irritable mood with increased energy or activity - No; > 4 days - No,  >7 days - No; with inflated self-esteem or grandiosity - No, decreased need for sleep - No, increased rate of speech - No, FOI or racing thoughts - No, distractibility - No, increased goal directed activity or PMA - No, risky/disinhibited behavior - No     Symptoms of JORDIN: excessive anxiety/worry/fear, more days than not, about numerous issues - No, ongoing for >6 months - No, difficult to control - No, with restlessness - No, fatigue - No, poor concentration - No, irritability - No, muscle tension - No, sleep disturbance - No; causes functionally impairing distress - No     Symptoms of Panic Disorder: recurrent panic attacks (palpitations/heart racing, sweating, shakiness, dyspnea, choking, chest pain/discomfort, Gi symptoms, dizzy/lightheadedness, hot/col flashes, paresthesias, derealization, fear of losing control or fear of dying or fear of "going crazy") - No, precipitated - No, un-precipitated - No, source of worry and/or behavioral changes secondary for 1 month or " longer- No, agoraphobia - No     Symptoms of PTSD: h/o trauma exposure - Yes; re-experiencing/intrusive symptoms - Yes, avoidant behavior - Yes, 2 or more negative alterations in cognition or mood - Yes, 2 or more hyperarousal symptoms - Yes; with dissociative symptoms - No, ongoing for 1 or more  months - Yes    Procedures Performed: * No surgery found *    Hospital Course:    Patient was admitted to the inpatient psychiatry unit after being medically cleared in the ED. Chart and labs were reviewed. The patient was stabilized as follows:      Schizoaffective Disorder, depressed type, severe  - restart abilify 10 mg PO qd -increase to 15 mg PO qd  -increase to to 17 mg PO qd- increase to 20 mg po q day today  - pt counseled  - follow up with outpatient mental health providers after discharge for medication management and psychotherapy     Homicidal ideations  - continue psychiatric hospitalization  - provide psychotherapeutic interventions and medication management        PTSD  - restart abilify as above  - pt counseled  - follow up with outpatient mental health providers after discharge for medication management and psychotherapy     Polysubstance abuse  - plan to restart gabapentin  - pt counseled  - consider rehab  - SW consulted for assistance with additional resources      Nicotine Dependence  - start nicotine patch 14 mg PO qd PRN        Psychosocial stressors  - pt counseled  - SW consulted for assistance with additional resources         Obesity  - continue topamax 50 mg PO qd- increase to/continue at 50 mg po BID  - avoid medications with high potential for serious weight gain (remeron, seroquel, etc.)             During hospitalization, the patient was encouraged to go to both groups and individual counseling. Patient was monitored for any side effects. A meeting was held with multidisciplinary team prior to discharge and pt's diagnosis, current medications, and follow up were discussed. The patient has  been compliant with treatment and can adequately attend to activities of daily living in an independent manner. The patient denies any side effects. The patient denies SI, HI, plan or intent for self harm or harm to others. The patient is no longer a danger to self or others nor gravely disabled disabled. Patient discharged  in stable condition with scheduled outpatient follow up.    AIMS score was 0    She denied withdrawals or cravings; she declined rehab    The patient reports improved symptoms as documented below. The patient is requesting discharge. The patient is currently stable for discharge home and is able/willing to attend outpatient care. The patient is hopeful, future oriented and goal directed. The patient readily discusses both short and long term goals. The patient can identify positive coping skills and social support.       Improved Symptoms of Depression: no diminished mood or loss of interest/anhedonia; no irritability, no diminished energy, no change in sleep, no change in appetite, no diminished concentration or cognition or indecisiveness, no PMA/R, no excessive guilt or hopelessness or worthlessness, no suicidal ideations     Improved Changes in Sleep: no trouble with initiation/maintenance, no early morning awakening with inability to return to sleep     Improved Suicidal/(no)Homicidal ideations: no active/passive ideations, no organized plans, no future intentions; she is hopeful and future oriented; she readily discusses her short and long term goals     Improved Symptoms of psychosis: no hallucinations, denied delusions, no disorganized thinking, no disorganized behavior or abnormal motor behavior, no negative symptoms     Denied past or current Symptoms of esther or hypomania: no elevated, expansive, or irritable mood with no increased energy or activity; with no inflated self-esteem or grandiosity, decreased need for sleep, increased rate of speech, FOI or racing thoughts,  distractibility, increased goal directed activity or PMA, or risky/disinhibited behavior     Improved Symptoms of JORDIN: no excessive anxiety/worry/fear,  with no current symptoms of restlessness, fatigue, poor concentration, irritability, muscle tension, or sleep disturbance      Improved Symptoms of Panic Disorder: no panic attacks since admission, may be precipitated or un-precipitated, note source of worry and/or behavioral changes secondary; with less agoraphobia     Improved Symptoms of PTSD: +h/o trauma; less symptoms of re-experiencing/intrusive symptoms, avoidant behavior, negative alterations in cognition or mood, and hyperarousal symptoms; without dissociative symptoms      Amphetamine withdrawals are improving.  -She is not interested in rehab but is interested with outpatient treatment/12-step meetings    Discussed diagnosis, risks and benefits of proposed treatment vs alternative treatments vs no treatment, and potential side effects of these treatments.  The patient expresses understanding of the above and displays the capacity to agree with this treatment given said understanding.  Patient also agrees that, currently, the benefits outweigh the risks and would like to pursue treatment at this time.      Discharge MSE: stated age, casually dressed, well groomed.  No psychomotor agitation or retardation.  No abnormal involuntary movements.  Gait normal.  Speech normal, conversational.  Language fluent English. Mood fine.  Affect normal range, pleasant, euthymic.  Thought process linear.  Associations intact.  Denies suicidal or homicidal ideation.  Denies auditory hallucinations, paranoid ideation, ideas of reference.  Memory intact.  Attention intact.  Fund of knowledge intact.  Insight intact.  Judgment intact.  Alert and oriented to person, place, time.      Tobacco Usage:  Is patient a smoker? Yes  Does patient want prescription for Tobacco Cessation? No  Does patient want counseling for Tobacco  Cessation? No    If patient would like to quit, then over the counter nicotine patch could be used. The patient could also follow up with his PCP or psychiatric provider for other alternatives.     Tobacco Use Treatment Practical Counseling    Were the following discussed with the patient:    Recognizing danger situations:    A. Alcohol use during the first month after quitting - Yes   B. Being around smoke and/or smokers or time/situations when the patient routinely smoked - Yes   C. Triggers and/or roadblocks are the same as danger situations - Yes    2.   Developing coping skills   A. Learning new ways to manage stress - Yes   B. Exercising and/or relaxation breathing - Yes   C. Changing routines - Yes   D. Distraction techniques to prevent tobacco use - Yes    3.   Basic information about quitting:   A. Benefits of quitting tobacco - Yes   B. How to quit techniques - Yes   C. Available resources to support quitting - Yes        Final Diagnoses:    Principal Problem: Schizoaffective Disorder, depressed type, severe    Secondary Diagnoses:   PTSD  Polysubstance abuse  Nicotine Dependence     Psychosocial stressors     Obesit    Labs:  Admission on 03/18/2024, Discharged on 03/18/2024   Component Date Value Ref Range Status    WBC 03/18/2024 7.82  3.90 - 12.70 K/uL Final    RBC 03/18/2024 5.07  4.00 - 5.40 M/uL Final    Hemoglobin 03/18/2024 13.0  12.0 - 16.0 g/dL Final    Hematocrit 03/18/2024 40.1  37.0 - 48.5 % Final    MCV 03/18/2024 79 (L)  82 - 98 fL Final    MCH 03/18/2024 25.6 (L)  27.0 - 31.0 pg Final    MCHC 03/18/2024 32.4  32.0 - 36.0 g/dL Final    RDW 03/18/2024 15.3 (H)  11.5 - 14.5 % Final    Platelets 03/18/2024 161  150 - 450 K/uL Final    MPV 03/18/2024 11.8  9.2 - 12.9 fL Final    Immature Granulocytes 03/18/2024 0.1  0.0 - 0.5 % Final    Gran # (ANC) 03/18/2024 4.9  1.8 - 7.7 K/uL Final    Immature Grans (Abs) 03/18/2024 0.01  0.00 - 0.04 K/uL Final    Lymph # 03/18/2024 2.3  1.0 - 4.8 K/uL  Final    Mono # 03/18/2024 0.5  0.3 - 1.0 K/uL Final    Eos # 03/18/2024 0.1  0.0 - 0.5 K/uL Final    Baso # 03/18/2024 0.03  0.00 - 0.20 K/uL Final    nRBC 03/18/2024 0  0 /100 WBC Final    Gran % 03/18/2024 62.8  38.0 - 73.0 % Final    Lymph % 03/18/2024 29.4  18.0 - 48.0 % Final    Mono % 03/18/2024 5.8  4.0 - 15.0 % Final    Eosinophil % 03/18/2024 1.5  0.0 - 8.0 % Final    Basophil % 03/18/2024 0.4  0.0 - 1.9 % Final    Platelet Estimate 03/18/2024 Appears normal   Final    Differential Method 03/18/2024 Automated   Final    Sodium 03/18/2024 139  136 - 145 mmol/L Final    Potassium 03/18/2024 3.9  3.5 - 5.1 mmol/L Final    Chloride 03/18/2024 107  95 - 110 mmol/L Final    CO2 03/18/2024 21 (L)  23 - 29 mmol/L Final    Glucose 03/18/2024 60 (L)  70 - 110 mg/dL Final    BUN 03/18/2024 7  6 - 20 mg/dL Final    Creatinine 03/18/2024 0.7  0.5 - 1.4 mg/dL Final    Calcium 03/18/2024 9.4  8.7 - 10.5 mg/dL Final    Total Protein 03/18/2024 8.3  6.0 - 8.4 g/dL Final    Albumin 03/18/2024 4.0  3.5 - 5.2 g/dL Final    Total Bilirubin 03/18/2024 0.4  0.1 - 1.0 mg/dL Final    Alkaline Phosphatase 03/18/2024 100  55 - 135 U/L Final    AST 03/18/2024 19  10 - 40 U/L Final    ALT 03/18/2024 18  10 - 44 U/L Final    eGFR 03/18/2024 >60  >60 mL/min/1.73 m^2 Final    Anion Gap 03/18/2024 11  8 - 16 mmol/L Final    TSH 03/18/2024 0.912  0.400 - 4.000 uIU/mL Final    Specimen UA 03/18/2024 Urine, Clean Catch   Final    Color, UA 03/18/2024 Yellow  Yellow, Straw, Jess Final    Appearance, UA 03/18/2024 Clear  Clear Final    pH, UA 03/18/2024 >8.0  5.0 - 8.0 Final    Specific Gravity, UA 03/18/2024 1.010  1.005 - 1.030 Final    Protein, UA 03/18/2024 Trace (A)  Negative Final    Glucose, UA 03/18/2024 Negative  Negative Final    Ketones, UA 03/18/2024 Trace (A)  Negative Final    Bilirubin (UA) 03/18/2024 Negative  Negative Final    Occult Blood UA 03/18/2024 Negative  Negative Final    Nitrite, UA 03/18/2024 Negative  Negative  Final    Urobilinogen, UA 03/18/2024 Negative  <2.0 EU/dL Final    Leukocytes, UA 03/18/2024 Negative  Negative Final    Benzodiazepines 03/18/2024 Negative  Negative Final    Methadone metabolites 03/18/2024 Negative  Negative Final    Cocaine (Metab.) 03/18/2024 Negative  Negative Final    Opiate Scrn, Ur 03/18/2024 Negative  Negative Final    Barbiturate Screen, Ur 03/18/2024 Negative  Negative Final    Amphetamine Screen, Ur 03/18/2024 Presumptive Positive (A)  Negative Final    THC 03/18/2024 Negative  Negative Final    Phencyclidine 03/18/2024 Negative  Negative Final    Creatinine, Urine 03/18/2024 145.2  15.0 - 325.0 mg/dL Final    Toxicology Information 03/18/2024 SEE COMMENT   Final    Alcohol, Serum 03/18/2024 <10  <10 mg/dL Final    Acetaminophen (Tylenol), Serum 03/18/2024 <3.0 (L)  10.0 - 20.0 ug/mL Final    Salicylate Lvl 03/18/2024 <5.0 (L)  15.0 - 30.0 mg/dL Final    Preg Test, Ur 03/18/2024 Negative   Final    POCT Glucose 03/18/2024 64 (L)  70 - 110 mg/dL Final         Discharged Condition: stable and improved; not currently a danger to self/others or gravely disabled    Disposition: Home or Self Care    Is patient being discharged on multiple neuroleptics? No    Follow Up/Patient Instructions:     Take all medications as prescribed.  Attend all psychiatric and medical follow up appointments.   Abstain from all drugs and alcohol.  Call the crisis line at: [phone] for help in a crisis and emergent situations or call 911 and Return to ED for any acute worsening of your condition including suicidal or homicidal ideations      No discharge procedures on file.        Follow up apt: local [facility]- see SW/discharge notes for ful details      Medications:  Reconciled Home Medications:      Medication List        START taking these medications      topiramate 50 MG tablet  Commonly known as: TOPAMAX  Take 1 tablet (50 mg total) by mouth 2 (two) times daily.            CHANGE how you take these  medications      * nicotine 21 mg/24 hr  Commonly known as: NICODERM CQ  Place 1 patch onto the skin once daily.  What changed: Another medication with the same name was added. Make sure you understand how and when to take each.     * nicotine 14 mg/24 hr  Commonly known as: NICODERM CQ  Place 1 patch onto the skin once daily.  What changed: You were already taking a medication with the same name, and this prescription was added. Make sure you understand how and when to take each.           * This list has 2 medication(s) that are the same as other medications prescribed for you. Read the directions carefully, and ask your doctor or other care provider to review them with you.                CONTINUE taking these medications      ARIPiprazole 20 MG Tab  Commonly known as: ABILIFY  Take 1 tablet (20 mg total) by mouth once daily.     VENTOLIN HFA 90 mcg/actuation inhaler  Generic drug: albuterol  Ventolin HFA 90 mcg/actuation aerosol inhaler   Inhale 1 puff 3 times a day by inhalation route as needed.            STOP taking these medications      gabapentin 300 MG capsule  Commonly known as: NEURONTIN                Diet: regular     Activity as tolerated    Total time spent discharging patient: 35 minutes    Salomón Nettles MD  Psychiatry

## 2024-03-25 NOTE — PROGRESS NOTES
"   03/25/24 1115   [facility] Group Therapy   Group Name Other  (1:1)   Specific Interventions Coping Skills Training   Participation Level Sharing   Participation Quality Cooperative   Insight/Motivation Improved   Affect/Mood Display Appropriate   Cognition Alert   Psychomotor WNL     Patient isolating in room, cooperative, reports an "alright, better" mood. Patient states "I'm not hearing voices anymore. I'm not depressed anymore and I'm taking my medicine." Patient verbalized she has to work on getting out of bed and identified walking and reading as healthy coping skills and accepted a list of healthy coping skills.  "

## 2024-03-25 NOTE — CARE UPDATE
DISCHARGE INSTRUCTIONS  Maria Yancey MRN: [id number]     Schizoaffective disorder, depressive type   3/18/2024 - 3/25/2024   [place] [facility]   Your Next Steps (Patient should check each box as tasks are completed)   Do       these medications from [facility] - [place], [place] - [address]  ARIPiprazole  topiramate     these medications from any pharmacy  nicotine   Read      Read 7 attachments  Patient Portal  We want you to be involved with your health care. Our patient portal, called [facility], is a secure, online website for convenient 24-hour access to your personal health information.     With [facility], you can view your after visit summary, schedule appointments, request prescription refills, view test results, communicate with your health care providers, and make payments online at [email or web address]           Instructions    Your medications have changed   START taking:  topiramate (TOPAMAX)    CHANGE how you take:  nicotine (NICODERM CQ)    STOP taking:  gabapentin 300 MG capsule (NEURONTIN)   Review your updated medication list below.  Medications You Will Be Given  Your care is important to us. If your provider recommended a follow-up appointment or test, we are happy to help you coordinate your recommended care. It is important that you complete your recommended follow-up.  If you need help scheduling, please call [email or web address]. Appointments can also be made online through the patient portal.      While scheduling and attending your appointments is your responsibility, our goal is to support and empower you throughout that process.         Why you were hospitalized  Your primary diagnosis was: Schizoaffective Disorder, Depressive Type   Your diagnoses also included: Mild Intermittent Asthma Without Complication, Polysubstance Dependence, Tobacco Use     You are allergic to the following  You are allergic to the following  No active allergies  "    Your Latest Vitals    Blood Pressure 103/54       BMI 45.65       Weight 257 lb 11.5 oz       Height 5' 3"       Temperature (Temporal) 97.1 °F       Pulse 82       Respiration 16       Oxygen Saturation 99%       BSA 2.28 m²  Treatment Team  Chat With All Treatment Team   Provider Role Specialty    Salomón Nettles MD  Attending Psychiatry    Sheila Kovacs PA-C  Consulting Physician Hospitalist    Salomón Nettles MD  Admitting Psychiatry     Recent Lab Values   Include labs without results  Most Recent Result    A1C  5.3  10/28 0000  Most Recent 8 Results  Show dates as rows  Behavioral Health Safety Plan          Step 1: Warning Signs:     Pt stated" I get anxious."     Pt stated" I cry."     Pt stated" I get very nervous."       Step 2: Internal coping strategies - Things I can do to take my mind off my problems without contacting another person:     Pt stated" I like to listen to music."     Pt stated"I like to go for a walk."     Pt stated" I like to watch t.v. and movies."       Step 3: People and social settings that provide distraction:     Name: Ania Hawley/ Phone: [phone]     Name: Marry SaundersBrandBeau Phone: [phone]     Place: Pt stated" I call her on the phone."     Place: Pt stated" I call her on the phone."       Step 4: People whom I can ask for help:     Name: Ania Swann Phone: [phone]     Name: Marry SaundersBrandBeau Phone: [phone]     Name:   Phone:         Step 5: Professionals or agencies I can contact during a crisis:     Clinician Name: [facility] Phone: [phone]     Clinician Pager or Emergency Contact #: [phone]           Clinician Name: [facility] Phone: [phone]     Clinician Pager or Emergency Contact #: [phone]           Suicide Prevention Lifeline: 988 or 1-260-280-CEGQ (4584)           Local Emergency Service: [facility]     Emergency Services Address: [address], [place] [address]     Emergency Services Phone: [phone] [place] Crisis [place], [place], [place] Crisis and [place] Hot[place]       Step 6: Making the environment safer:     Pt stated" I need to stop using drugs."   2. Pt stated" My family."         Used with permission from DEANGELO Sarabia & ADELITA [place]. (2011). Safety planning intervention: A brief intervention to mitigate suicide risk. Cognitive [facility] Behavioral Practice. 19, 256-264            [facility]  [facility] Nurse Care Line - 24/7 Assistance  Unless otherwise directed by your provider, please contact [facility], our nurse care line that is available for 24/7 assistance. Please refer to the Patient Instructions section of your After Visit Summary for specific instructions from your physician.     Registered nurses in the [facility] Center provide appointment scheduling, clinical advisement, health education, and other advisory services.  Call: [phone] (toll free).  Advance Directives  An advance directive is a document which, in the event you are no longer able to make decisions for yourself, tells your healthcare team what kind of treatment you do or do not want to receive, or who you would like to make those decisions for you.  If you do not currently have an advance directive, [facility] encourages you to create one.  For more information call:  [phone]), [phone] ([phone]),  or log on to [email or web address].  Smoking Cessation  If you would like to quit smoking:  You may be eligible for free services if you are a [place] or [place] resident Call [facility] at ([phone].  Contact us via email: [email or web address]  View our website for more information: [email or web address]  Language Assistance Services  ATTENTION: Language assistance services are available, free of charge. Please call [phone].       ATENCIÓN: Si habla español, tiene a galvan disposición servicios " aftab de asistencia lingüística. Kwesi valdes [phone].     [facility] Ý: N?u b?n nói Ti?ng Vi?t, có các d?ch v? h? tr? ngôn ng? mi?n phí dành cho b?n. G?i s? [phone].  National Suicide Prevention Lifeline  If you or someone you know is thinking about suicide, call the National Suicide Prevention Lifeline using the 3 digit phone number of 308 or 4-539-700-WANB (2650).  The lifeline is free, confidential and always available.  Help a loved one, a friend or yourself.  www.suicidepreventionlifeline.org     Medication list    START taking these medications  START taking these medications    Additional info Begin Date AM Noon PM Bedtime    topiramate 50 MG tablet  Commonly known as: TOPAMAX  Quantity: 60 tablet  Refills: 1  Dose: 50 mg  Last time this was given: 50 mg on March 25, 2024  8:48 AM Take 1 tablet (50 mg total) by mouth 2 (two) times daily.          CHANGE how you take these medications  CHANGE how you take these medications    Additional info Begin Date AM Noon PM Bedtime    * nicotine 21 mg/24 hr  Commonly known as: NICODERM CQ  Quantity: 28 patch  Refills: 0  Dose: 1 patch  What changed: Another medication with the same name was added. Make sure you understand how and when to take each. Place 1 patch onto the skin once daily.         * nicotine 14 mg/24 hr  Commonly known as: NICODERM CQ  Refills: 0  Dose: 1 patch  What changed: You were already taking a medication with the same name, and this prescription was added. Make sure you understand how and when to take each. Place 1 patch onto the skin once daily.         very important  * This list has 2 medication(s) that are the same as other medications prescribed for you. Read the directions carefully, and ask your doctor or other care provider to review them with you.     CONTINUE taking these medications  CONTINUE taking these medications    Additional info Begin Date AM Noon PM Bedtime    ARIPiprazole 20 MG Tab  Commonly known as: ABILIFY  Quantity:  30 tablet  Refills: 2  Dose: 20 mg  Last time this was given: 20 mg on March 25, 2024  8:48 AM Take 1 tablet (20 mg total) by mouth once daily.         VENTOLIN HFA 90 mcg/actuation inhaler  Refills: 0  Generic drug: albuterol Ventolin HFA 90 mcg/actuation aerosol inhaler  Inhale 1 puff 3 times a day by inhalation route as needed.          STOP taking these medications  STOP taking these medications   gabapentin 300 MG capsule  Commonly known as: NEURONTIN            Where to  your medications     these medications at [facility] - [place], [place] - [address]  ARIPiprazole  topiramate  Address: [address], [place] [address]   Phone: [phone]        these medications from any pharmacy  You don't need a prescription for these medications   nicotine 14 mg/24 hr

## 2024-03-25 NOTE — NURSING
"Came into reilly saying, "Since they're getting beat to death, can you at least adjust the thermostat"?  "

## 2024-03-25 NOTE — PROGRESS NOTES
Psychotherapy:  Target symptoms: psychosis  Why chosen therapy is appropriate versus another modality: relevant to diagnosis  Outcome monitoring methods: self-report  Therapeutic intervention type: supportive psychotherapy  Topics discussed/themes: symptom recognition, substance abuse  Safety planning and wrap up session  The patient's response to the intervention is accepting. The patient's progress toward treatment goals is fair.   Duration of intervention: 16 minutes.    Salomón Nettles MD  Psychiatry

## 2024-03-25 NOTE — PLAN OF CARE
Anesthesia Evaluation     Patient summary reviewed and Nursing notes reviewed   NPO Solid Status: > 8 hours  NPO Liquid Status: > 2 hours           Airway   Mallampati: II  TM distance: >3 FB  Neck ROM: full  No difficulty expected  Dental    (+) edentulous    Pulmonary - normal exam   (+) a smoker Current,   Cardiovascular - normal exam    (+) hypertension well controlled 2 medications or greater, CAD, hyperlipidemia,       Neuro/Psych  (+) CVA, headaches, numbness, psychiatric history Anxiety and Depression,    GI/Hepatic/Renal/Endo    (+) obesity,  GERD,  diabetes mellitus type 2 poorly controlled using insulin, thyroid problem     ROS Comment: dyspnea    Musculoskeletal     (+) neck pain,   Abdominal   (+) obese,    Substance History      OB/GYN          Other                        Anesthesia Plan    ASA 3     general     intravenous induction     Anesthetic plan, risks, benefits, and alternatives have been provided, discussed and informed consent has been obtained with: patient.        CODE STATUS:        Lying quietly in bed, eyes closed, respirations even, unlabored. Apparently asleep. Slept 8 hours thus far with 7 interruptions. Safety precautions maintained. Rounds done every 15 minutes. Bed is fixed in low position and room is uncluttered and pathways are clear.

## 2024-03-25 NOTE — PLAN OF CARE
Plan of care reviewed. Denies intent to harm self or others at this time. Denies hallucinations and delusions. Patient is calm and cooperative. Accepts snacks and medications. Gait steady, no falls. Patient speaks very little and interacts minimally with staff and peers.  Will continue precautions and monitor for safety.

## 2024-03-27 NOTE — PSYCH
AVS  DISCHARGE INSTRUCTIONS  Maria Yancey MRN: [id number]     Schizoaffective disorder, depressive type   3/18/2024 - 3/25/2024   [facility]   Your Next Steps (Patient should check each box as tasks are completed)   Do       these medications from [facility] - [place], [place] - [address]  ARIPiprazole  topiramate     these medications from any pharmacy  nicotine   Read      Read 8 attachments  Patient Portal  We want you to be involved with your health care. Our patient portal, called [facility], is a secure, online website for convenient 24-hour access to your personal health information.     With [facility], you can view your after visit summary, schedule appointments, request prescription refills, view test results, communicate with your health care providers, and make payments online at [email or web address]           Instructions    Your medications have changed   START taking:  topiramate (TOPAMAX)    CHANGE how you take:  nicotine (NICODERM CQ)    STOP taking:  gabapentin 300 MG capsule (NEURONTIN)   Review your updated medication list below.  What's Next  What's Next         Go to [facility]  Monday Apr 1, 2024  As scheduled on April 1, 2024@ 8:00a.m.Please bring insurance card and Picture ID. [address] [facility] [id number] [phone]     Medications You Will Be Given  All Component Based Labs   03/18/24  2205  03/18/24  1956  03/18/24  1845     Benzodiazepines   Negative    Methadone metabolites   Negative    Phencyclidine   Negative    Acetaminophen Level  <3.0 Low      Albumin  4.0     Alcohol, Serum  <10     ALP  100     ALT  18     Amphetamines, Urine   Presumptive Positive Abnormal     Anion Gap  11     Appearance, UA   Clear    AST  19     Barbituates, Urine   Negative    Baso #  0.03     Basophil %  0.4     Bilirubin (UA)   Negative    BILIRUBIN TOTAL  0.4     BUN  7     Calcium  9.4     Chloride  107     CO2   21 Low      Cocaine, Urine   Negative    Color, UA   Yellow    Creatinine  0.7     Urine Creatinine   145.2    Differential Method  Automated     eGFR  >60     Eos #  0.1     Eos %  1.5     Glucose  60 Low      Glucose, UA   Negative    Gran # (ANC)  4.9     Gran %  62.8     Hematocrit  40.1     Hemoglobin  13.0     Immature Grans (Abs)  0.01     Immature Granulocytes  0.1     Ketones, UA   Trace Abnormal     Leukocyte Esterase, UA   Negative    Lymph #  2.3     Lymph %  29.4     MCH  25.6 Low      MCHC  32.4     MCV  79 Low      Mono #  0.5     Mono %  5.8     MPV  11.8     NITRITE UA   Negative    nRBC  0     Blood, UA   Negative    Opiates, Urine   Negative    pH, UA   >8.0    Platelet Estimate  Appears normal     Platelet Count  161     POCT Glucose 64 Low       Potassium  3.9     hCG Qualitative, Urine   Negative    PROTEIN TOTAL  8.3     Protein, UA   Trace Abnormal     RBC  5.07     RDW  15.3 High      Salicylate Level  <5.0 Low      Sodium  139     Specific Gravity, UA   1.010    Specimen UA   Urine, Clean Catch    Marijuana (THC) Metabolite   Negative    Toxicology Information   SEE COMMENT    TSH  0.912     UROBILINOGEN UA   Negative    WBC  7.82                   Your care is important to us. If your provider recommended a follow-up appointment or test, we are happy to help you coordinate your recommended care. It is important that you complete your recommended follow-up.  If you need help scheduling, please call [email or web address]. Appointments can also be made online through the patient portal.      While scheduling and attending your appointments is your responsibility, our goal is to support and empower you throughout that process.         Your Diagnoses at Discharge   Schizoaffective disorder, depressive type  Polysubstance dependence  Mild intermittent asthma without complication  Tobacco use  Reason for Admission  pt having auditory and visual hallucinations and paranoia  You are allergic to the  "following  You are allergic to the following  No active allergies     Your Latest Vitals    Blood Pressure 103/54       BMI 45.65       Weight 257 lb 11.5 oz       Height 5' 3"       Temperature (Temporal) 97.1 °F       Pulse 82       Respiration 16       Oxygen Saturation 99%       BSA 2.28 m²  Treatment Team  Chat With All Treatment Team   Provider Role Specialty    Sheila Kovacs PA-C  Consulting Physician Hospitalist    Salomón Nettles MD  Admitting Psychiatry     Recent Lab Values   Include labs without results  Most Recent Result    A1C  5.3  10/28 0000  Most Recent 8 Results  Show dates as rows  Blood Glucose and Lipid Panel Labs   Include labs without results  Most Recent Result from Past 365 Days    Glucose  64  03/18 2205  Most Recent 8 Results  Show dates as rows  Pending Labs/Studies  You have no pending labs/studies.  Contact Information  Call [phone] (anytime, 7 days a week) to:  Report concerns regarding hospital stay  Ask questions about your hospital stay and home care plan  Get new or updated results of your lab tests and other procedures  [facility] On Call  [facility] Nurse Care Line - 24/7 Assistance  Unless otherwise directed by your provider, please contact [facility], our nurse care line that is available for 24/7 assistance. Please refer to the Patient Instructions section of your After Visit Summary for specific instructions from your physician.     Registered nurses in the [facility] Center provide appointment scheduling, clinical advisement, health education, and other advisory services.  Call: [phone] (toll free).  Advance Directives  An advance directive is a document which, in the event you are no longer able to make decisions for yourself, tells your healthcare team what kind of treatment you do or do not want to receive, or who you would like to make those decisions for you.  If you do not currently have an advance directive, [facility] encourages " "you to create one.  For more information call:  [phone]), [phone] ([phone]),  or log on to [email or web address].  Advance Directive Status  Flowsheet Row Most Recent Value   Advance Directive Status Patient does not have Advance Directive, declines information.   Advance Directive Type Does not have either a psychiatric or medical Advance Directive   Reason you declined to provide an Advance Directive Do not feel it is needed     Behavioral Health Safety Plan          Step 1: Warning Signs:     Pt stated" I get anxious."     Pt stated" I cry."     Pt stated" I get very nervous."       Step 2: Internal coping strategies - Things I can do to take my mind off my problems without contacting another person:     Pt stated" I like to listen to music."     Pt stated"I like to go for a walk."     Pt stated" I like to watch t.v. and movies."       Step 3: People and social settings that provide distraction:     Name: Ania Hawley/ Phone: [phone]     Name: Marry JonesExajoule Phone: [phone]     Place: Pt stated" I call her on the phone."     Place: Pt stated" I call her on the phone."       Step 4: People whom I can ask for help:     Name: Ania Swann Phone: [phone]     Name: Marry Shultz Phone: [phone]     Name:   Phone:         Step 5: Professionals or agencies I can contact during a crisis:     Clinician Name: [facility] Phone: [phone]     Clinician Pager or Emergency Contact #: [phone]           Clinician Name: [facility] Phone: [phone]     Clinician Pager or Emergency Contact #: [phone]           Suicide Prevention Lifeline: 970 or 7-464-606-UXWE (9562)           Local Emergency Service: [facility]     Emergency Services Address: [address], [place] [address]     Emergency Services Phone: 911,-902 [place], [place], [place] Crisis and [place] Hotline    " "   Step 6: Making the environment safer:     Pt stated" I need to stop using drugs."   2. Pt stated" My family."         Used with permission from DEANGELO Sarabia & ADELITA [place]. (2011). Safety planning intervention: A brief intervention to mitigate suicide risk. Cognitive and Behavioral Practice. 19, 256264            Smoking Cessation  If you would like to quit smoking:  You may be eligible for free services if you are a [place] or [place] resident Call [facility] at ([phone].  Contact us via email: [email or web address]  View our website for more information: [email or web address]  Language Assistance Services  ATTENTION: Language assistance services are available, free of charge. Please call [phone].       ATENCIÓN: Si habla español, tiene a galvan disposición servicios gratuitos de asistencia lingüística. Llame al [phone].     CHÚ Ý: N?u b?n nói Ti?ng Vi?t, có các d?ch v? h? tr? ngôn ng? mi?n phí dành cho b?n. G?i s? [phone].  National Suicide Prevention Lifeline  If you or someone you know is thinking about suicide, call the National Suicide Prevention Lifeline using the 3 digit phone number of 597 or 4-726-962-IPOG (4168).  The lifeline is free, confidential and always available.  Help a loved one, a friend or yourself.  www.suicidepreventionlifeline.org     Medication list    START taking these medications  START taking these medications    Additional info Begin Date AM Noon PM Bedtime    topiramate 50 MG tablet  Commonly known as: TOPAMAX  Quantity: 60 tablet  Refills: 1  Dose: 50 mg Take 1 tablet (50 mg total) by mouth 2 (two) times daily.          CHANGE how you take these medications  CHANGE how you take these medications    Additional info Begin Date AM Noon PM Bedtime    * nicotine 21 mg/24 hr  Commonly known as: NICODERM CQ  Quantity: 28 patch  Refills: 0  Dose: 1 patch  What changed: Another medication with the same name was added. Make sure you understand how and when to " take each. Place 1 patch onto the skin once daily.         * nicotine 14 mg/24 hr  Commonly known as: NICODERM CQ  Refills: 0  Dose: 1 patch  What changed: You were already taking a medication with the same name, and this prescription was added. Make sure you understand how and when to take each. Place 1 patch onto the skin once daily.         very important  * This list has 2 medication(s) that are the same as other medications prescribed for you. Read the directions carefully, and ask your doctor or other care provider to review them with you.     CONTINUE taking these medications  CONTINUE taking these medications    Additional info Begin Date AM Noon PM Bedtime    ARIPiprazole 20 MG Tab  Commonly known as: ABILIFY  Quantity: 30 tablet  Refills: 2  Dose: 20 mg Take 1 tablet (20 mg total) by mouth once daily.         VENTOLIN HFA 90 mcg/actuation inhaler  Refills: 0  Generic drug: albuterol Ventolin HFA 90 mcg/actuation aerosol inhaler  Inhale 1 puff 3 times a day by inhalation route as needed.          STOP taking these medications  STOP taking these medications   gabapentin 300 MG capsule  Commonly known as: NEURONTIN

## 2024-03-27 NOTE — PSYCH
The patient has transitioned to outpatient treatment at [facility], [address] [place] [id number], [phone]. An appointment has been scheduled on April 1, 2024 at 8 am. While being treated the patient will receive medication management, smoking cessation, counseling for substance abuse due to a positive drug screen and pycho therapy. The AVS was faxed on 3/27/2024 at 1:56 pm .

## 2024-07-13 ENCOUNTER — HOSPITAL ENCOUNTER (EMERGENCY)
Facility: HOSPITAL | Age: 39
Discharge: PSYCHIATRIC OR SUBSTANCE USE FACILITY | End: 2024-07-13
Attending: SURGERY
Payer: COMMERCIAL

## 2024-07-13 VITALS
DIASTOLIC BLOOD PRESSURE: 66 MMHG | TEMPERATURE: 98 F | OXYGEN SATURATION: 98 % | HEART RATE: 99 BPM | WEIGHT: 252.75 LBS | RESPIRATION RATE: 16 BRPM | BODY MASS INDEX: 44.77 KG/M2 | SYSTOLIC BLOOD PRESSURE: 116 MMHG

## 2024-07-13 DIAGNOSIS — N30.00 ACUTE CYSTITIS WITHOUT HEMATURIA: ICD-10-CM

## 2024-07-13 DIAGNOSIS — F15.10 AMPHETAMINE ABUSE: ICD-10-CM

## 2024-07-13 DIAGNOSIS — Z00.8 MEDICAL CLEARANCE FOR PSYCHIATRIC ADMISSION: ICD-10-CM

## 2024-07-13 DIAGNOSIS — F23 ACUTE PSYCHOSIS: Primary | ICD-10-CM

## 2024-07-13 LAB
ALBUMIN SERPL BCP-MCNC: 4.2 G/DL (ref 3.5–5.2)
ALP SERPL-CCNC: 89 U/L (ref 55–135)
ALT SERPL W/O P-5'-P-CCNC: 25 U/L (ref 10–44)
AMORPH CRY URNS QL MICRO: ABNORMAL
AMPHET+METHAMPHET UR QL: ABNORMAL
ANION GAP SERPL CALC-SCNC: 13 MMOL/L (ref 8–16)
APAP SERPL-MCNC: <3 UG/ML (ref 10–20)
AST SERPL-CCNC: 25 U/L (ref 10–40)
B-HCG UR QL: NEGATIVE
BACTERIA #/AREA URNS HPF: ABNORMAL /HPF
BARBITURATES UR QL SCN>200 NG/ML: NEGATIVE
BASOPHILS # BLD AUTO: 0.05 K/UL (ref 0–0.2)
BASOPHILS NFR BLD: 0.5 % (ref 0–1.9)
BENZODIAZ UR QL SCN>200 NG/ML: NEGATIVE
BILIRUB SERPL-MCNC: 0.6 MG/DL (ref 0.1–1)
BILIRUB UR QL STRIP: ABNORMAL
BUN SERPL-MCNC: 10 MG/DL (ref 6–20)
BZE UR QL SCN: NEGATIVE
CALCIUM SERPL-MCNC: 9.5 MG/DL (ref 8.7–10.5)
CANNABINOIDS UR QL SCN: NEGATIVE
CHLORIDE SERPL-SCNC: 108 MMOL/L (ref 95–110)
CLARITY UR: ABNORMAL
CO2 SERPL-SCNC: 18 MMOL/L (ref 23–29)
COLOR UR: YELLOW
CREAT SERPL-MCNC: 0.9 MG/DL (ref 0.5–1.4)
CREAT UR-MCNC: 423.5 MG/DL (ref 15–325)
DIFFERENTIAL METHOD BLD: ABNORMAL
EOSINOPHIL # BLD AUTO: 0.2 K/UL (ref 0–0.5)
EOSINOPHIL NFR BLD: 1.7 % (ref 0–8)
ERYTHROCYTE [DISTWIDTH] IN BLOOD BY AUTOMATED COUNT: 14.2 % (ref 11.5–14.5)
EST. GFR  (NO RACE VARIABLE): >60 ML/MIN/1.73 M^2
ETHANOL SERPL-MCNC: <10 MG/DL
GLUCOSE SERPL-MCNC: 114 MG/DL (ref 70–110)
GLUCOSE UR QL STRIP: NEGATIVE
HCT VFR BLD AUTO: 43.6 % (ref 37–48.5)
HGB BLD-MCNC: 13.7 G/DL (ref 12–16)
HGB UR QL STRIP: NEGATIVE
HYALINE CASTS #/AREA URNS LPF: 1 /LPF
IMM GRANULOCYTES # BLD AUTO: 0.02 K/UL (ref 0–0.04)
IMM GRANULOCYTES NFR BLD AUTO: 0.2 % (ref 0–0.5)
KETONES UR QL STRIP: NEGATIVE
LEUKOCYTE ESTERASE UR QL STRIP: NEGATIVE
LYMPHOCYTES # BLD AUTO: 2.6 K/UL (ref 1–4.8)
LYMPHOCYTES NFR BLD: 27.5 % (ref 18–48)
MCH RBC QN AUTO: 26.8 PG (ref 27–31)
MCHC RBC AUTO-ENTMCNC: 31.4 G/DL (ref 32–36)
MCV RBC AUTO: 85 FL (ref 82–98)
METHADONE UR QL SCN>300 NG/ML: NEGATIVE
MICROSCOPIC COMMENT: ABNORMAL
MONOCYTES # BLD AUTO: 0.7 K/UL (ref 0.3–1)
MONOCYTES NFR BLD: 7.6 % (ref 4–15)
NEUTROPHILS # BLD AUTO: 6 K/UL (ref 1.8–7.7)
NEUTROPHILS NFR BLD: 62.5 % (ref 38–73)
NITRITE UR QL STRIP: NEGATIVE
NRBC BLD-RTO: 0 /100 WBC
OPIATES UR QL SCN: NEGATIVE
PCP UR QL SCN>25 NG/ML: NEGATIVE
PH UR STRIP: 6 [PH] (ref 5–8)
PLATELET # BLD AUTO: ABNORMAL K/UL (ref 150–450)
PLATELET BLD QL SMEAR: ABNORMAL
PMV BLD AUTO: 12.8 FL (ref 9.2–12.9)
POTASSIUM SERPL-SCNC: 3.8 MMOL/L (ref 3.5–5.1)
PROT SERPL-MCNC: 8 G/DL (ref 6–8.4)
PROT UR QL STRIP: ABNORMAL
RBC # BLD AUTO: 5.11 M/UL (ref 4–5.4)
RBC #/AREA URNS HPF: 0 /HPF (ref 0–4)
SODIUM SERPL-SCNC: 139 MMOL/L (ref 136–145)
SP GR UR STRIP: >=1.03 (ref 1–1.03)
SQUAMOUS #/AREA URNS HPF: 2 /HPF
TOXICOLOGY INFORMATION: ABNORMAL
TSH SERPL DL<=0.005 MIU/L-ACNC: 0.98 UIU/ML (ref 0.4–4)
URN SPEC COLLECT METH UR: ABNORMAL
UROBILINOGEN UR STRIP-ACNC: NEGATIVE EU/DL
WBC # BLD AUTO: 9.56 K/UL (ref 3.9–12.7)
WBC #/AREA URNS HPF: 3 /HPF (ref 0–5)
WBC CLUMPS URNS QL MICRO: ABNORMAL

## 2024-07-13 PROCEDURE — 96372 THER/PROPH/DIAG INJ SC/IM: CPT | Performed by: SURGERY

## 2024-07-13 PROCEDURE — 81000 URINALYSIS NONAUTO W/SCOPE: CPT | Mod: 59 | Performed by: SURGERY

## 2024-07-13 PROCEDURE — 80053 COMPREHEN METABOLIC PANEL: CPT | Performed by: SURGERY

## 2024-07-13 PROCEDURE — 85025 COMPLETE CBC W/AUTO DIFF WBC: CPT | Performed by: SURGERY

## 2024-07-13 PROCEDURE — 81025 URINE PREGNANCY TEST: CPT | Performed by: SURGERY

## 2024-07-13 PROCEDURE — 25000003 PHARM REV CODE 250: Performed by: SURGERY

## 2024-07-13 PROCEDURE — 82077 ASSAY SPEC XCP UR&BREATH IA: CPT | Performed by: SURGERY

## 2024-07-13 PROCEDURE — 80307 DRUG TEST PRSMV CHEM ANLYZR: CPT | Performed by: SURGERY

## 2024-07-13 PROCEDURE — 63600175 PHARM REV CODE 636 W HCPCS: Performed by: SURGERY

## 2024-07-13 PROCEDURE — 80143 DRUG ASSAY ACETAMINOPHEN: CPT | Performed by: SURGERY

## 2024-07-13 PROCEDURE — 84443 ASSAY THYROID STIM HORMONE: CPT | Performed by: SURGERY

## 2024-07-13 PROCEDURE — 99285 EMERGENCY DEPT VISIT HI MDM: CPT | Mod: 25

## 2024-07-13 RX ORDER — NITROFURANTOIN 25; 75 MG/1; MG/1
100 CAPSULE ORAL 2 TIMES DAILY
Qty: 10 CAPSULE | Refills: 0 | Status: ON HOLD | OUTPATIENT
Start: 2024-07-13 | End: 2024-07-22 | Stop reason: HOSPADM

## 2024-07-13 RX ORDER — NITROFURANTOIN 25; 75 MG/1; MG/1
100 CAPSULE ORAL
Status: COMPLETED | OUTPATIENT
Start: 2024-07-13 | End: 2024-07-13

## 2024-07-13 RX ORDER — ZIPRASIDONE MESYLATE 20 MG/ML
20 INJECTION, POWDER, LYOPHILIZED, FOR SOLUTION INTRAMUSCULAR
Status: COMPLETED | OUTPATIENT
Start: 2024-07-13 | End: 2024-07-13

## 2024-07-13 RX ORDER — LORAZEPAM 1 MG/1
2 TABLET ORAL
Status: COMPLETED | OUTPATIENT
Start: 2024-07-13 | End: 2024-07-13

## 2024-07-13 RX ADMIN — ZIPRASIDONE MESYLATE 20 MG: 20 INJECTION, POWDER, LYOPHILIZED, FOR SOLUTION INTRAMUSCULAR at 05:07

## 2024-07-13 RX ADMIN — LORAZEPAM 2 MG: 1 TABLET ORAL at 03:07

## 2024-07-13 RX ADMIN — NITROFURANTOIN (MONOHYDRATE/MACROCRYSTALS) 100 MG: 75; 25 CAPSULE ORAL at 04:07

## 2024-07-13 NOTE — ED PROVIDER NOTES
Encounter Date: 7/13/2024       History     Chief Complaint   Patient presents with    Psychiatric Evaluation     39-year-old with history of ADHD bipolar disease fibromyalgia hallucinations magalis panic disorder polysubstance dependence at over 10 pec since 2019 returns with onset of auditory and visual hallucinations with paranoid thoughts and difficulty sleeping her mother states that the patient said she got her Abilify monthly shot a few days ago but there is no record of it      Mental Health Problem  The primary symptoms include bizarre behavior, depressed mood, disorganized thinking and hallucinations. The primary symptoms do not include aggression or agitation. The current episode started several days ago. This is a recurrent problem.   The onset of the illness is precipitated by stressful event and emotional stress. The degree of incapacity that she is experiencing as a consequence of her illness is moderate. Additional symptoms of the illness include anhedonia and insomnia. Additional symptoms of the illness do not include agitation.     Review of patient's allergies indicates:  No Known Allergies  Past Medical History:   Diagnosis Date    Addiction to drug     recovering drug addict    ADHD (attention deficit hyperactivity disorder)     Alcohol abuse     Anxiety     Asthma     Bipolar 1 disorder     Cervical pain     Chronic constipation     Chronic diarrhea     Depression     Fatigue     Fibromyalgia     Hallucination     Headache     History of psychiatric hospitalization     pt reports 10 total- [facility] 10/2019 and 3/2019, 1/2020    Hx of psychiatric care     Seroquel    Magalis     Panic disorder     Polysubstance dependence     Psychiatric exam requested by authority     Psychiatric problem     Auditory hallucinations    PTSD (post-traumatic stress disorder)     Schizoaffective disorder     Sleep difficulties     Spina bifida     Spinal stenosis of lumbar region     Substance abuse     Suicide attempt      Once by hanging, once by overdose, once by cutting wrists     Syringomyelia     Syrinx of spinal cord     Therapy     [facility]    Trigger finger     Withdrawal symptoms, alcohol     Withdrawal symptoms, drug or narcotic      Past Surgical History:   Procedure Laterality Date    CARPAL TUNNEL RELEASE       SECTION, CLASSIC      siactica      spinal bifida      spinalsonois       Family History   Problem Relation Name Age of Onset    Hypertension Mother Ania     Diabetes Mother Ania     Hyperlipidemia Mother Ania     Hypertension Father Diogo     Arthritis Father Diogo     Diabetes Father Diogo     Hyperlipidemia Father Diogo     Heart attack Father Diogo     No Known Problems Sister Lacy     No Known Problems Sister Daniel     No Known Problems Maternal Aunt Leonora     No Known Problems Maternal Uncle Lyle     No Known Problems Paternal Aunt Eryn     No Known Problems Paternal Uncle Adán     Heart attack Maternal Grandmother Kate     Heart attack Maternal Grandfather Donna     Heart attack Paternal Grandmother Taylor Monroy     Heart attack Paternal Grandfather Doulas     Breast cancer Cousin 2     Colon cancer Neg Hx      Ovarian cancer Neg Hx       Social History     Tobacco Use    Smoking status: Every Day     Current packs/day: 0.50     Average packs/day: 0.5 packs/day for 26.7 years (13.4 ttl pk-yrs)     Types: Cigarettes     Start date: 10/29/1997    Smokeless tobacco: Never    Tobacco comments:     smokes 1-2 cig daily   Substance Use Topics    Alcohol use: Not Currently     Alcohol/week: 0.0 standard drinks of alcohol     Comment: last use alcholhol 4 months ago    Drug use: Yes     Types: Methamphetamines     Comment: last used 2 days ago     Review of Systems   Constitutional: Negative.    HENT: Negative.     Eyes: Negative.    Respiratory: Negative.     Cardiovascular: Negative.    Gastrointestinal: Negative.    Endocrine: Negative.    Genitourinary: Negative.     Musculoskeletal: Negative.    Allergic/Immunologic: Negative.    Neurological: Negative.    Hematological: Negative.    Psychiatric/Behavioral:  Positive for behavioral problems, hallucinations and sleep disturbance. Negative for agitation. The patient has insomnia.        Physical Exam     Initial Vitals [07/13/24 1442]   BP Pulse Resp Temp SpO2   115/71 100 (!) 22 98.1 °F (36.7 °C) 96 %      MAP       --         Physical Exam    Nursing note and vitals reviewed.  Constitutional: She appears well-developed and well-nourished.   HENT:   Head: Normocephalic.   Eyes: Conjunctivae are normal.   Neck:   Normal range of motion.  Cardiovascular:  Normal rate and regular rhythm.           Pulmonary/Chest: Breath sounds normal.   Abdominal: Abdomen is soft.   Musculoskeletal:         General: Normal range of motion.      Cervical back: Normal range of motion.     Neurological: She is alert and oriented to person, place, and time. GCS score is 15. GCS eye subscore is 4. GCS verbal subscore is 5. GCS motor subscore is 6.   Skin: Skin is warm and dry.   Psychiatric: Her affect is labile. Her speech is tangential. She is withdrawn and actively hallucinating. Thought content is paranoid and delusional. Cognition and memory are impaired. She expresses impulsivity.         ED Course   Procedures  Labs Reviewed   CBC W/ AUTO DIFFERENTIAL - Abnormal; Notable for the following components:       Result Value    MCH 26.8 (*)     MCHC 31.4 (*)     All other components within normal limits    Narrative:     Recoll. [id number] by VENUS at 07/13/2024 15:52, reason: Specimen   clotted   COMPREHENSIVE METABOLIC PANEL - Abnormal; Notable for the following components:    CO2 18 (*)     Glucose 114 (*)     All other components within normal limits   URINALYSIS, REFLEX TO URINE CULTURE - Abnormal; Notable for the following components:    Appearance, UA Cloudy (*)     Specific Gravity, UA >=1.030 (*)     Protein, UA 1+ (*)     Bilirubin (UA) 1+  (*)     All other components within normal limits    Narrative:     Specimen Source->Urine   DRUG SCREEN PANEL, URINE EMERGENCY - Abnormal; Notable for the following components:    Amphetamine Screen, Ur Presumptive Positive (*)     Creatinine, Urine 423.5 (*)     All other components within normal limits    Narrative:     Specimen Source->Urine   ACETAMINOPHEN LEVEL - Abnormal; Notable for the following components:    Acetaminophen (Tylenol), Serum <3.0 (*)     All other components within normal limits   URINALYSIS MICROSCOPIC - Abnormal; Notable for the following components:    Bacteria Moderate (*)     All other components within normal limits    Narrative:     Specimen Source->Urine   TSH   ALCOHOL,MEDICAL (ETHANOL)   PREGNANCY TEST, URINE RAPID    Narrative:     Specimen Source->Urine     EKG Readings: (Independently Interpreted)   Heart Rate: 75.       Imaging Results    None          Medications   LORazepam tablet 2 mg (2 mg Oral Given 7/13/24 1514)   nitrofurantoin (macrocrystal-monohydrate) 100 MG capsule 100 mg (100 mg Oral Given 7/13/24 1622)     Medical Decision Making  Patient with a history of bipolar esther schizoaffective disorder with auditory and visual hallucinations she states she got Abilify shot a few days ago but the mother doubt the veracity she is calm and cooperative but is actively delusional and fits the criteria for a pec on the basis of her disability she has amphetamines in her urine she is medically cleared for psychiatric hospitalization she has a minor UTI    Amount and/or Complexity of Data Reviewed  Labs: ordered.    Risk  Prescription drug management.                                      Clinical Impression:  Final diagnoses:  [Z00.8] Medical clearance for psychiatric admission  [F23] Acute psychosis (Primary)  [F15.10] Amphetamine abuse  [N30.00] Acute cystitis without hematuria          ED Disposition Condition    Transfer to Psych Facility Stable          ED Prescriptions        Medication Sig Dispense Start Date End Date Auth. Provider    nitrofurantoin, macrocrystal-monohydrate, (MACROBID) 100 MG capsule Take 1 capsule (100 mg total) by mouth 2 (two) times daily. for 5 days 10 capsule 7/13/2024 7/18/2024 DIANA Arreguin III, MD          Follow-up Information    None          DIANA Arreguin III, MD  07/13/24 1532       DIANA Arreguin III, MD  07/13/24 1927       DIANA Arreguin III, MD  07/13/24 0922

## 2024-07-13 NOTE — ED TRIAGE NOTES
Patient to ER via Mother states she is hearing voices, needs a place to sleep and have a comfortable place to stay, also reports she is paranoid   Denies HI, denies SI

## 2024-07-13 NOTE — ED NOTES
Pt states that she doesn't think she needs to stay here and she just needs some meds so she can go to sleep. Pt states she is having a little psychosis because she hasn't been sleeping well. States she is having AH and VH. Denies HI and SI. Thinks someone is going to come hurt her family. Pt takes Abilify injection monthly, last injection was 4 days ago.   Went to [facility] this morning and was discharged.

## 2024-07-14 PROBLEM — N39.0 UTI (URINARY TRACT INFECTION): Status: ACTIVE | Noted: 2024-07-14

## 2024-07-14 PROBLEM — Z13.9 ENCOUNTER FOR MEDICAL SCREENING EXAMINATION: Status: ACTIVE | Noted: 2024-07-14

## 2024-09-24 NOTE — PROGRESS NOTES
09/11/18 1115   [facility] Group Therapy   Group Name Leisure Education   Specific Interventions Coping Skills Training   Participation Level Appropriate   Participation Quality Cooperative   Insight/Motivation Good   Affect/Mood Display Appropriate   Cognition Alert   Psychomotor WNL    Patient identified emotions/feelings and leisure activities to help manage, vent or cope. Patient identified when angry(attend [employer] or meditate) and when she's bored(play cards).   No

## 2024-10-14 PROBLEM — Z13.9 ENCOUNTER FOR MEDICAL SCREENING EXAMINATION: Status: RESOLVED | Noted: 2024-07-14 | Resolved: 2024-10-14

## 2024-10-14 PROBLEM — N39.0 UTI (URINARY TRACT INFECTION): Status: RESOLVED | Noted: 2024-07-14 | Resolved: 2024-10-14

## 2025-08-13 ENCOUNTER — HOSPITAL ENCOUNTER (EMERGENCY)
Facility: HOSPITAL | Age: 40
Discharge: PSYCHIATRIC OR SUBSTANCE USE FACILITY | End: 2025-08-13
Attending: SURGERY
Payer: COMMERCIAL

## 2025-08-13 ENCOUNTER — HOSPITAL ENCOUNTER (INPATIENT)
Facility: HOSPITAL | Age: 40
LOS: 9 days | Discharge: HOME | DRG: 885 | End: 2025-08-22
Attending: PSYCHIATRY & NEUROLOGY | Admitting: PSYCHIATRY & NEUROLOGY
Payer: COMMERCIAL

## 2025-08-13 VITALS
SYSTOLIC BLOOD PRESSURE: 131 MMHG | WEIGHT: 254 LBS | BODY MASS INDEX: 45 KG/M2 | RESPIRATION RATE: 20 BRPM | DIASTOLIC BLOOD PRESSURE: 78 MMHG | HEIGHT: 63 IN | TEMPERATURE: 99 F | HEART RATE: 105 BPM | OXYGEN SATURATION: 98 %

## 2025-08-13 DIAGNOSIS — F25.1 SCHIZOAFFECTIVE DISORDER, DEPRESSIVE TYPE: ICD-10-CM

## 2025-08-13 DIAGNOSIS — F19.90 SUBSTANCE USE DISORDER: ICD-10-CM

## 2025-08-13 DIAGNOSIS — F25.8 OTHER SCHIZOAFFECTIVE DISORDERS: ICD-10-CM

## 2025-08-13 DIAGNOSIS — F19.20 POLYSUBSTANCE DEPENDENCE: ICD-10-CM

## 2025-08-13 DIAGNOSIS — F23 ACUTE PSYCHOSIS: Primary | ICD-10-CM

## 2025-08-13 DIAGNOSIS — Z00.8 MEDICAL CLEARANCE FOR PSYCHIATRIC ADMISSION: ICD-10-CM

## 2025-08-13 DIAGNOSIS — F25.1 SCHIZOAFFECTIVE DISORDER, DEPRESSIVE TYPE: Primary | ICD-10-CM

## 2025-08-13 LAB
ABSOLUTE EOSINOPHIL (OHS): 0.04 K/UL
ABSOLUTE MONOCYTE (OHS): 0.49 K/UL (ref 0.3–1)
ABSOLUTE NEUTROPHIL COUNT (OHS): 2.19 K/UL (ref 1.8–7.7)
ALBUMIN SERPL BCP-MCNC: 4.2 G/DL (ref 3.5–5.2)
ALP SERPL-CCNC: 99 UNIT/L (ref 40–150)
ALT SERPL W/O P-5'-P-CCNC: 25 UNIT/L (ref 10–44)
AMORPH CRY URNS QL MICRO: NORMAL
AMPHET UR QL SCN: ABNORMAL
ANION GAP (OHS): 12 MMOL/L (ref 8–16)
APAP SERPL-MCNC: <3 UG/ML (ref 10–20)
AST SERPL-CCNC: 25 UNIT/L (ref 11–45)
B-HCG UR QL: NEGATIVE
BACTERIA #/AREA URNS HPF: NORMAL /HPF
BARBITURATE SCN PRESENT UR: NEGATIVE
BASOPHILS # BLD AUTO: 0.02 K/UL
BASOPHILS NFR BLD AUTO: 0.4 %
BENZODIAZ UR QL SCN: ABNORMAL
BILIRUB SERPL-MCNC: 0.4 MG/DL (ref 0.1–1)
BILIRUB UR QL STRIP.AUTO: NEGATIVE
BUN SERPL-MCNC: 9 MG/DL (ref 6–20)
CALCIUM SERPL-MCNC: 9.2 MG/DL (ref 8.7–10.5)
CANNABINOIDS UR QL SCN: NEGATIVE
CHLORIDE SERPL-SCNC: 108 MMOL/L (ref 95–110)
CLARITY UR: CLEAR
CO2 SERPL-SCNC: 22 MMOL/L (ref 23–29)
COCAINE UR QL SCN: NEGATIVE
COLOR UR AUTO: YELLOW
CREAT SERPL-MCNC: 0.7 MG/DL (ref 0.5–1.4)
CREAT UR-MCNC: 372.1 MG/DL (ref 15–325)
ERYTHROCYTE [DISTWIDTH] IN BLOOD BY AUTOMATED COUNT: 16.2 % (ref 11.5–14.5)
ETHANOL SERPL-MCNC: <10 MG/DL
GFR SERPLBLD CREATININE-BSD FMLA CKD-EPI: >60 ML/MIN/1.73/M2
GLUCOSE SERPL-MCNC: 98 MG/DL (ref 70–110)
GLUCOSE UR QL STRIP: NEGATIVE
HCT VFR BLD AUTO: 33.3 % (ref 37–48.5)
HGB BLD-MCNC: 9.8 GM/DL (ref 12–16)
HGB UR QL STRIP: NEGATIVE
HYALINE CASTS #/AREA URNS LPF: 1 /LPF (ref 0–1)
IMM GRANULOCYTES # BLD AUTO: 0.03 K/UL (ref 0–0.04)
IMM GRANULOCYTES NFR BLD AUTO: 0.7 % (ref 0–0.5)
KETONES UR QL STRIP: NEGATIVE
LEUKOCYTE ESTERASE UR QL STRIP: NEGATIVE
LYMPHOCYTES # BLD AUTO: 1.74 K/UL (ref 1–4.8)
MCH RBC QN AUTO: 21.3 PG (ref 27–31)
MCHC RBC AUTO-ENTMCNC: 29.4 G/DL (ref 32–36)
MCV RBC AUTO: 72 FL (ref 82–98)
METHADONE UR QL SCN: NEGATIVE
MICROSCOPIC COMMENT: NORMAL
NITRITE UR QL STRIP: NEGATIVE
NUCLEATED RBC (/100WBC) (OHS): 0 /100 WBC
OPIATES UR QL SCN: NEGATIVE
PCP UR QL: NEGATIVE
PH UR STRIP: 6 [PH]
PLATELET # BLD AUTO: 303 K/UL (ref 150–450)
PMV BLD AUTO: 10.6 FL (ref 9.2–12.9)
POTASSIUM SERPL-SCNC: 3.5 MMOL/L (ref 3.5–5.1)
PROT SERPL-MCNC: 8.4 GM/DL (ref 6–8.4)
PROT UR QL STRIP: ABNORMAL
RBC # BLD AUTO: 4.61 M/UL (ref 4–5.4)
RBC #/AREA URNS HPF: 3 /HPF (ref 0–4)
RELATIVE EOSINOPHIL (OHS): 0.9 %
RELATIVE LYMPHOCYTE (OHS): 38.6 % (ref 18–48)
RELATIVE MONOCYTE (OHS): 10.9 % (ref 4–15)
RELATIVE NEUTROPHIL (OHS): 48.5 % (ref 38–73)
SALICYLATES SERPL-MCNC: <5 MG/DL (ref 15–30)
SODIUM SERPL-SCNC: 142 MMOL/L (ref 136–145)
SP GR UR STRIP: >=1.03
SQUAMOUS #/AREA URNS HPF: 16 /HPF
TSH SERPL-ACNC: 1.24 UIU/ML (ref 0.4–4)
UROBILINOGEN UR STRIP-ACNC: NEGATIVE EU/DL
WBC # BLD AUTO: 4.51 K/UL (ref 3.9–12.7)
WBC #/AREA URNS HPF: 4 /HPF (ref 0–5)

## 2025-08-13 PROCEDURE — 81003 URINALYSIS AUTO W/O SCOPE: CPT | Mod: 59 | Performed by: SURGERY

## 2025-08-13 PROCEDURE — 80061 LIPID PANEL: CPT | Performed by: PSYCHIATRY & NEUROLOGY

## 2025-08-13 PROCEDURE — 81025 URINE PREGNANCY TEST: CPT | Performed by: SURGERY

## 2025-08-13 PROCEDURE — 82077 ASSAY SPEC XCP UR&BREATH IA: CPT | Performed by: SURGERY

## 2025-08-13 PROCEDURE — 36415 COLL VENOUS BLD VENIPUNCTURE: CPT | Performed by: PSYCHIATRY & NEUROLOGY

## 2025-08-13 PROCEDURE — 80053 COMPREHEN METABOLIC PANEL: CPT | Performed by: SURGERY

## 2025-08-13 PROCEDURE — 99285 EMERGENCY DEPT VISIT HI MDM: CPT

## 2025-08-13 PROCEDURE — 90833 PSYTX W PT W E/M 30 MIN: CPT | Mod: ,,, | Performed by: PSYCHIATRY & NEUROLOGY

## 2025-08-13 PROCEDURE — 83036 HEMOGLOBIN GLYCOSYLATED A1C: CPT | Performed by: PSYCHIATRY & NEUROLOGY

## 2025-08-13 PROCEDURE — 84443 ASSAY THYROID STIM HORMONE: CPT | Performed by: SURGERY

## 2025-08-13 PROCEDURE — 80307 DRUG TEST PRSMV CHEM ANLYZR: CPT | Performed by: SURGERY

## 2025-08-13 PROCEDURE — 80179 DRUG ASSAY SALICYLATE: CPT | Performed by: SURGERY

## 2025-08-13 PROCEDURE — 99223 1ST HOSP IP/OBS HIGH 75: CPT | Mod: ,,, | Performed by: PSYCHIATRY & NEUROLOGY

## 2025-08-13 PROCEDURE — 25000003 PHARM REV CODE 250: Performed by: PSYCHIATRY & NEUROLOGY

## 2025-08-13 PROCEDURE — 80143 DRUG ASSAY ACETAMINOPHEN: CPT | Performed by: SURGERY

## 2025-08-13 PROCEDURE — 25000003 PHARM REV CODE 250

## 2025-08-13 PROCEDURE — 11400000 HC PSYCH PRIVATE ROOM

## 2025-08-13 PROCEDURE — 85025 COMPLETE CBC W/AUTO DIFF WBC: CPT | Performed by: SURGERY

## 2025-08-13 RX ORDER — ALUMINUM HYDROXIDE, MAGNESIUM HYDROXIDE, AND SIMETHICONE 1200; 120; 1200 MG/30ML; MG/30ML; MG/30ML
30 SUSPENSION ORAL EVERY 6 HOURS PRN
Status: DISCONTINUED | OUTPATIENT
Start: 2025-08-13 | End: 2025-08-22 | Stop reason: HOSPADM

## 2025-08-13 RX ORDER — IBUPROFEN 200 MG
1 TABLET ORAL DAILY PRN
Status: DISCONTINUED | OUTPATIENT
Start: 2025-08-13 | End: 2025-08-14

## 2025-08-13 RX ORDER — OLANZAPINE 10 MG/2ML
10 INJECTION, POWDER, FOR SOLUTION INTRAMUSCULAR EVERY 8 HOURS PRN
Status: DISCONTINUED | OUTPATIENT
Start: 2025-08-13 | End: 2025-08-22 | Stop reason: HOSPADM

## 2025-08-13 RX ORDER — ALPRAZOLAM 0.5 MG/1
1 TABLET ORAL
Status: COMPLETED | OUTPATIENT
Start: 2025-08-13 | End: 2025-08-13

## 2025-08-13 RX ORDER — LORAZEPAM 2 MG/ML
2 INJECTION INTRAMUSCULAR EVERY 4 HOURS PRN
Status: DISCONTINUED | OUTPATIENT
Start: 2025-08-13 | End: 2025-08-13 | Stop reason: HOSPADM

## 2025-08-13 RX ORDER — BENZTROPINE MESYLATE 1 MG/ML
2 INJECTION, SOLUTION INTRAMUSCULAR; INTRAVENOUS EVERY 8 HOURS PRN
Status: DISCONTINUED | OUTPATIENT
Start: 2025-08-13 | End: 2025-08-22 | Stop reason: HOSPADM

## 2025-08-13 RX ORDER — ACETAMINOPHEN 325 MG/1
650 TABLET ORAL EVERY 6 HOURS PRN
Status: DISCONTINUED | OUTPATIENT
Start: 2025-08-13 | End: 2025-08-22 | Stop reason: HOSPADM

## 2025-08-13 RX ORDER — LOPERAMIDE HYDROCHLORIDE 2 MG/1
2 CAPSULE ORAL
Status: DISCONTINUED | OUTPATIENT
Start: 2025-08-13 | End: 2025-08-22 | Stop reason: HOSPADM

## 2025-08-13 RX ORDER — PROMETHAZINE HYDROCHLORIDE 25 MG/1
25 TABLET ORAL EVERY 6 HOURS PRN
Status: DISCONTINUED | OUTPATIENT
Start: 2025-08-13 | End: 2025-08-22 | Stop reason: HOSPADM

## 2025-08-13 RX ORDER — TOPIRAMATE 50 MG/1
50 TABLET, FILM COATED ORAL 2 TIMES DAILY
Status: ON HOLD | COMMUNITY
End: 2025-08-22 | Stop reason: HOSPADM

## 2025-08-13 RX ORDER — TALC
6 POWDER (GRAM) TOPICAL NIGHTLY PRN
Status: DISCONTINUED | OUTPATIENT
Start: 2025-08-13 | End: 2025-08-22 | Stop reason: HOSPADM

## 2025-08-13 RX ORDER — OLANZAPINE 10 MG/1
10 TABLET, FILM COATED ORAL EVERY 8 HOURS PRN
Status: DISCONTINUED | OUTPATIENT
Start: 2025-08-13 | End: 2025-08-22 | Stop reason: HOSPADM

## 2025-08-13 RX ORDER — HALOPERIDOL LACTATE 5 MG/ML
5 INJECTION, SOLUTION INTRAMUSCULAR EVERY 4 HOURS PRN
Status: DISCONTINUED | OUTPATIENT
Start: 2025-08-13 | End: 2025-08-13 | Stop reason: HOSPADM

## 2025-08-13 RX ORDER — DIPHENHYDRAMINE HYDROCHLORIDE 50 MG/ML
50 INJECTION, SOLUTION INTRAMUSCULAR; INTRAVENOUS EVERY 4 HOURS PRN
Status: DISCONTINUED | OUTPATIENT
Start: 2025-08-13 | End: 2025-08-13 | Stop reason: HOSPADM

## 2025-08-13 RX ORDER — HYDROXYZINE PAMOATE 50 MG/1
50 CAPSULE ORAL EVERY 6 HOURS PRN
Status: DISCONTINUED | OUTPATIENT
Start: 2025-08-13 | End: 2025-08-22 | Stop reason: HOSPADM

## 2025-08-13 RX ORDER — BENZONATATE 100 MG/1
100 CAPSULE ORAL 3 TIMES DAILY PRN
Status: DISCONTINUED | OUTPATIENT
Start: 2025-08-13 | End: 2025-08-22 | Stop reason: HOSPADM

## 2025-08-13 RX ORDER — ONDANSETRON 4 MG/1
4 TABLET, ORALLY DISINTEGRATING ORAL EVERY 8 HOURS PRN
Status: DISCONTINUED | OUTPATIENT
Start: 2025-08-13 | End: 2025-08-22 | Stop reason: HOSPADM

## 2025-08-13 RX ADMIN — ALPRAZOLAM 1 MG: 0.5 TABLET ORAL at 01:08

## 2025-08-13 RX ADMIN — HYDROXYZINE PAMOATE 50 MG: 50 CAPSULE ORAL at 07:08

## 2025-08-14 PROBLEM — F25.8 OTHER SCHIZOAFFECTIVE DISORDERS: Status: ACTIVE | Noted: 2018-09-10

## 2025-08-14 LAB
CHOLEST SERPL-MCNC: 125 MG/DL (ref 120–199)
CHOLEST/HDLC SERPL: 3 {RATIO} (ref 2–5)
EAG (OHS): 105 MG/DL (ref 68–131)
HBA1C MFR BLD: 5.3 % (ref 4–5.6)
HDLC SERPL-MCNC: 41 MG/DL (ref 40–75)
HDLC SERPL: 32.8 % (ref 20–50)
LDLC SERPL CALC-MCNC: 58.6 MG/DL (ref 63–159)
NONHDLC SERPL-MCNC: 84 MG/DL
TRIGL SERPL-MCNC: 127 MG/DL (ref 30–150)

## 2025-08-14 PROCEDURE — 11400000 HC PSYCH PRIVATE ROOM

## 2025-08-14 PROCEDURE — S4991 NICOTINE PATCH NONLEGEND: HCPCS | Performed by: PSYCHIATRY & NEUROLOGY

## 2025-08-14 PROCEDURE — 25000003 PHARM REV CODE 250: Performed by: PSYCHIATRY & NEUROLOGY

## 2025-08-14 PROCEDURE — 99233 SBSQ HOSP IP/OBS HIGH 50: CPT | Mod: ,,, | Performed by: PSYCHIATRY & NEUROLOGY

## 2025-08-14 PROCEDURE — 99231 SBSQ HOSP IP/OBS SF/LOW 25: CPT | Mod: ,,, | Performed by: PHYSICIAN ASSISTANT

## 2025-08-14 PROCEDURE — 90833 PSYTX W PT W E/M 30 MIN: CPT | Mod: ,,, | Performed by: PSYCHIATRY & NEUROLOGY

## 2025-08-14 RX ORDER — IBUPROFEN 200 MG
1 TABLET ORAL DAILY
Status: DISCONTINUED | OUTPATIENT
Start: 2025-08-14 | End: 2025-08-22 | Stop reason: HOSPADM

## 2025-08-14 RX ORDER — LITHIUM CARBONATE 300 MG/1
300 TABLET, FILM COATED, EXTENDED RELEASE ORAL NIGHTLY
Status: DISCONTINUED | OUTPATIENT
Start: 2025-08-14 | End: 2025-08-18

## 2025-08-14 RX ADMIN — HYDROXYZINE PAMOATE 50 MG: 50 CAPSULE ORAL at 07:08

## 2025-08-14 RX ADMIN — NICOTINE 1 PATCH: 14 PATCH, EXTENDED RELEASE TRANSDERMAL at 12:08

## 2025-08-14 RX ADMIN — OLANZAPINE 10 MG: 10 TABLET, FILM COATED ORAL at 10:08

## 2025-08-14 RX ADMIN — LITHIUM CARBONATE 300 MG: 300 TABLET, EXTENDED RELEASE ORAL at 08:08

## 2025-08-15 PROBLEM — D64.9 NORMOCYTIC ANEMIA: Status: ACTIVE | Noted: 2025-08-15

## 2025-08-15 PROBLEM — D64.9 NORMOCYTIC ANEMIA: Status: RESOLVED | Noted: 2025-08-15 | Resolved: 2025-08-15

## 2025-08-15 LAB
FERRITIN SERPL-MCNC: 14 NG/ML (ref 20–300)
FOLATE SERPL-MCNC: 9.6 NG/ML (ref 4–24)
IRON SATN MFR SERPL: 4 % (ref 20–50)
IRON SERPL-MCNC: 17 UG/DL (ref 30–160)
TIBC SERPL-MCNC: 435 UG/DL (ref 250–450)
TRANSFERRIN SERPL-MCNC: 294 MG/DL (ref 200–375)
VIT B12 SERPL-MCNC: 796 PG/ML (ref 210–950)

## 2025-08-15 PROCEDURE — 82728 ASSAY OF FERRITIN: CPT | Performed by: PHYSICIAN ASSISTANT

## 2025-08-15 PROCEDURE — 99233 SBSQ HOSP IP/OBS HIGH 50: CPT | Mod: ,,, | Performed by: PSYCHIATRY & NEUROLOGY

## 2025-08-15 PROCEDURE — 36415 COLL VENOUS BLD VENIPUNCTURE: CPT | Performed by: PHYSICIAN ASSISTANT

## 2025-08-15 PROCEDURE — 25000003 PHARM REV CODE 250: Performed by: PSYCHIATRY & NEUROLOGY

## 2025-08-15 PROCEDURE — 84466 ASSAY OF TRANSFERRIN: CPT | Performed by: PHYSICIAN ASSISTANT

## 2025-08-15 PROCEDURE — 11400000 HC PSYCH PRIVATE ROOM

## 2025-08-15 PROCEDURE — 82607 VITAMIN B-12: CPT | Performed by: PHYSICIAN ASSISTANT

## 2025-08-15 PROCEDURE — 90833 PSYTX W PT W E/M 30 MIN: CPT | Mod: ,,, | Performed by: PSYCHIATRY & NEUROLOGY

## 2025-08-15 PROCEDURE — S4991 NICOTINE PATCH NONLEGEND: HCPCS | Performed by: PSYCHIATRY & NEUROLOGY

## 2025-08-15 PROCEDURE — 82746 ASSAY OF FOLIC ACID SERUM: CPT | Performed by: PHYSICIAN ASSISTANT

## 2025-08-15 RX ORDER — HALOPERIDOL 2 MG/1
2 TABLET ORAL 2 TIMES DAILY
Status: DISCONTINUED | OUTPATIENT
Start: 2025-08-15 | End: 2025-08-16

## 2025-08-15 RX ADMIN — HALOPERIDOL 2 MG: 2 TABLET ORAL at 08:08

## 2025-08-15 RX ADMIN — LITHIUM CARBONATE 300 MG: 300 TABLET, EXTENDED RELEASE ORAL at 08:08

## 2025-08-15 RX ADMIN — NICOTINE 1 PATCH: 14 PATCH, EXTENDED RELEASE TRANSDERMAL at 09:08

## 2025-08-15 RX ADMIN — HALOPERIDOL 2 MG: 2 TABLET ORAL at 11:08

## 2025-08-16 PROCEDURE — 11400000 HC PSYCH PRIVATE ROOM

## 2025-08-16 PROCEDURE — S4991 NICOTINE PATCH NONLEGEND: HCPCS | Performed by: PSYCHIATRY & NEUROLOGY

## 2025-08-16 PROCEDURE — 25000003 PHARM REV CODE 250: Performed by: PSYCHIATRY & NEUROLOGY

## 2025-08-16 PROCEDURE — 99233 SBSQ HOSP IP/OBS HIGH 50: CPT | Mod: ,,, | Performed by: PSYCHIATRY & NEUROLOGY

## 2025-08-16 RX ORDER — HALOPERIDOL 5 MG/1
5 TABLET ORAL 2 TIMES DAILY
Status: DISCONTINUED | OUTPATIENT
Start: 2025-08-16 | End: 2025-08-18

## 2025-08-16 RX ADMIN — NICOTINE 1 PATCH: 14 PATCH, EXTENDED RELEASE TRANSDERMAL at 08:08

## 2025-08-16 RX ADMIN — HALOPERIDOL 2 MG: 2 TABLET ORAL at 08:08

## 2025-08-17 PROCEDURE — 25000003 PHARM REV CODE 250: Performed by: PSYCHIATRY & NEUROLOGY

## 2025-08-17 PROCEDURE — 11400000 HC PSYCH PRIVATE ROOM

## 2025-08-17 PROCEDURE — S4991 NICOTINE PATCH NONLEGEND: HCPCS | Performed by: PSYCHIATRY & NEUROLOGY

## 2025-08-17 PROCEDURE — 99233 SBSQ HOSP IP/OBS HIGH 50: CPT | Mod: ,,, | Performed by: PSYCHIATRY & NEUROLOGY

## 2025-08-17 RX ADMIN — LITHIUM CARBONATE 300 MG: 300 TABLET, EXTENDED RELEASE ORAL at 08:08

## 2025-08-17 RX ADMIN — NICOTINE 1 PATCH: 14 PATCH, EXTENDED RELEASE TRANSDERMAL at 08:08

## 2025-08-17 RX ADMIN — HALOPERIDOL 5 MG: 5 TABLET ORAL at 08:08

## 2025-08-17 RX ADMIN — OLANZAPINE 10 MG: 10 TABLET, FILM COATED ORAL at 03:08

## 2025-08-18 PROCEDURE — 11400000 HC PSYCH PRIVATE ROOM

## 2025-08-18 PROCEDURE — 25000003 PHARM REV CODE 250: Performed by: PSYCHIATRY & NEUROLOGY

## 2025-08-18 PROCEDURE — 99233 SBSQ HOSP IP/OBS HIGH 50: CPT | Mod: ,,, | Performed by: STUDENT IN AN ORGANIZED HEALTH CARE EDUCATION/TRAINING PROGRAM

## 2025-08-18 PROCEDURE — S4991 NICOTINE PATCH NONLEGEND: HCPCS | Performed by: PSYCHIATRY & NEUROLOGY

## 2025-08-18 PROCEDURE — 90833 PSYTX W PT W E/M 30 MIN: CPT | Mod: ,,, | Performed by: STUDENT IN AN ORGANIZED HEALTH CARE EDUCATION/TRAINING PROGRAM

## 2025-08-18 RX ORDER — ARIPIPRAZOLE 10 MG/1
10 TABLET ORAL DAILY
Status: DISCONTINUED | OUTPATIENT
Start: 2025-08-19 | End: 2025-08-22 | Stop reason: HOSPADM

## 2025-08-18 RX ADMIN — OLANZAPINE 10 MG: 10 TABLET, FILM COATED ORAL at 02:08

## 2025-08-18 RX ADMIN — Medication 6 MG: at 08:08

## 2025-08-18 RX ADMIN — HYDROXYZINE PAMOATE 50 MG: 50 CAPSULE ORAL at 08:08

## 2025-08-18 RX ADMIN — HALOPERIDOL 5 MG: 5 TABLET ORAL at 09:08

## 2025-08-18 RX ADMIN — NICOTINE 1 PATCH: 14 PATCH, EXTENDED RELEASE TRANSDERMAL at 09:08

## 2025-08-19 PROCEDURE — 99233 SBSQ HOSP IP/OBS HIGH 50: CPT | Mod: ,,, | Performed by: STUDENT IN AN ORGANIZED HEALTH CARE EDUCATION/TRAINING PROGRAM

## 2025-08-19 PROCEDURE — 11400000 HC PSYCH PRIVATE ROOM

## 2025-08-19 PROCEDURE — S4991 NICOTINE PATCH NONLEGEND: HCPCS | Performed by: PSYCHIATRY & NEUROLOGY

## 2025-08-19 PROCEDURE — 25000003 PHARM REV CODE 250: Performed by: STUDENT IN AN ORGANIZED HEALTH CARE EDUCATION/TRAINING PROGRAM

## 2025-08-19 PROCEDURE — 25000003 PHARM REV CODE 250: Performed by: PSYCHIATRY & NEUROLOGY

## 2025-08-19 RX ORDER — DIAZEPAM 5 MG/1
5 TABLET ORAL 3 TIMES DAILY
Status: DISCONTINUED | OUTPATIENT
Start: 2025-08-19 | End: 2025-08-21

## 2025-08-19 RX ADMIN — DIAZEPAM 5 MG: 5 TABLET ORAL at 07:08

## 2025-08-19 RX ADMIN — ARIPIPRAZOLE 10 MG: 10 TABLET ORAL at 08:08

## 2025-08-19 RX ADMIN — DIAZEPAM 5 MG: 5 TABLET ORAL at 12:08

## 2025-08-19 RX ADMIN — DIAZEPAM 5 MG: 5 TABLET ORAL at 02:08

## 2025-08-19 RX ADMIN — NICOTINE 1 PATCH: 14 PATCH, EXTENDED RELEASE TRANSDERMAL at 08:08

## 2025-08-20 PROCEDURE — 11400000 HC PSYCH PRIVATE ROOM

## 2025-08-20 PROCEDURE — S4991 NICOTINE PATCH NONLEGEND: HCPCS | Performed by: PSYCHIATRY & NEUROLOGY

## 2025-08-20 PROCEDURE — 25000003 PHARM REV CODE 250: Performed by: STUDENT IN AN ORGANIZED HEALTH CARE EDUCATION/TRAINING PROGRAM

## 2025-08-20 PROCEDURE — 25000003 PHARM REV CODE 250: Performed by: PSYCHIATRY & NEUROLOGY

## 2025-08-20 PROCEDURE — 99233 SBSQ HOSP IP/OBS HIGH 50: CPT | Mod: ,,, | Performed by: STUDENT IN AN ORGANIZED HEALTH CARE EDUCATION/TRAINING PROGRAM

## 2025-08-20 RX ADMIN — DIAZEPAM 5 MG: 5 TABLET ORAL at 02:08

## 2025-08-20 RX ADMIN — DIAZEPAM 5 MG: 5 TABLET ORAL at 08:08

## 2025-08-20 RX ADMIN — ARIPIPRAZOLE 10 MG: 10 TABLET ORAL at 08:08

## 2025-08-20 RX ADMIN — Medication 6 MG: at 08:08

## 2025-08-20 RX ADMIN — HYDROXYZINE PAMOATE 50 MG: 50 CAPSULE ORAL at 08:08

## 2025-08-20 RX ADMIN — NICOTINE 1 PATCH: 14 PATCH, EXTENDED RELEASE TRANSDERMAL at 08:08

## 2025-08-21 PROCEDURE — 99232 SBSQ HOSP IP/OBS MODERATE 35: CPT | Mod: ,,, | Performed by: STUDENT IN AN ORGANIZED HEALTH CARE EDUCATION/TRAINING PROGRAM

## 2025-08-21 PROCEDURE — 11400000 HC PSYCH PRIVATE ROOM

## 2025-08-21 PROCEDURE — 25000003 PHARM REV CODE 250: Performed by: STUDENT IN AN ORGANIZED HEALTH CARE EDUCATION/TRAINING PROGRAM

## 2025-08-21 PROCEDURE — S4991 NICOTINE PATCH NONLEGEND: HCPCS | Performed by: PSYCHIATRY & NEUROLOGY

## 2025-08-21 PROCEDURE — 25000003 PHARM REV CODE 250: Performed by: PSYCHIATRY & NEUROLOGY

## 2025-08-21 RX ORDER — DIAZEPAM 5 MG/1
5 TABLET ORAL 2 TIMES DAILY
Status: DISCONTINUED | OUTPATIENT
Start: 2025-08-21 | End: 2025-08-22 | Stop reason: HOSPADM

## 2025-08-21 RX ADMIN — ARIPIPRAZOLE 10 MG: 10 TABLET ORAL at 08:08

## 2025-08-21 RX ADMIN — DIAZEPAM 5 MG: 5 TABLET ORAL at 08:08

## 2025-08-21 RX ADMIN — Medication 6 MG: at 08:08

## 2025-08-21 RX ADMIN — NICOTINE 1 PATCH: 14 PATCH, EXTENDED RELEASE TRANSDERMAL at 08:08

## 2025-08-21 RX ADMIN — HYDROXYZINE PAMOATE 50 MG: 50 CAPSULE ORAL at 08:08

## 2025-08-21 RX ADMIN — ACETAMINOPHEN 650 MG: 325 TABLET ORAL at 08:08

## 2025-08-22 VITALS
TEMPERATURE: 97 F | SYSTOLIC BLOOD PRESSURE: 130 MMHG | WEIGHT: 248.81 LBS | HEART RATE: 101 BPM | OXYGEN SATURATION: 99 % | DIASTOLIC BLOOD PRESSURE: 60 MMHG | BODY MASS INDEX: 44.09 KG/M2 | RESPIRATION RATE: 16 BRPM | HEIGHT: 63 IN

## 2025-08-22 PROCEDURE — 99239 HOSP IP/OBS DSCHRG MGMT >30: CPT | Mod: ,,, | Performed by: STUDENT IN AN ORGANIZED HEALTH CARE EDUCATION/TRAINING PROGRAM

## 2025-08-22 PROCEDURE — 25000003 PHARM REV CODE 250: Performed by: STUDENT IN AN ORGANIZED HEALTH CARE EDUCATION/TRAINING PROGRAM

## 2025-08-22 RX ORDER — IBUPROFEN 200 MG
1 TABLET ORAL DAILY
Qty: 30 PATCH | Refills: 0 | Status: SHIPPED | OUTPATIENT
Start: 2025-08-23

## 2025-08-22 RX ADMIN — ARIPIPRAZOLE 10 MG: 10 TABLET ORAL at 09:08

## 2025-08-22 RX ADMIN — DIAZEPAM 5 MG: 5 TABLET ORAL at 09:08
